# Patient Record
Sex: FEMALE | Race: WHITE | NOT HISPANIC OR LATINO | Employment: OTHER | URBAN - NONMETROPOLITAN AREA
[De-identification: names, ages, dates, MRNs, and addresses within clinical notes are randomized per-mention and may not be internally consistent; named-entity substitution may affect disease eponyms.]

---

## 2017-01-02 ENCOUNTER — COMMUNICATION - GICH (OUTPATIENT)
Dept: FAMILY MEDICINE | Facility: OTHER | Age: 67
End: 2017-01-02

## 2017-01-02 DIAGNOSIS — I10 ESSENTIAL (PRIMARY) HYPERTENSION: ICD-10-CM

## 2017-01-09 ENCOUNTER — COMMUNICATION - GICH (OUTPATIENT)
Dept: FAMILY MEDICINE | Facility: OTHER | Age: 67
End: 2017-01-09

## 2017-05-08 ENCOUNTER — HISTORY (OUTPATIENT)
Dept: FAMILY MEDICINE | Facility: OTHER | Age: 67
End: 2017-05-08

## 2017-05-08 ENCOUNTER — OFFICE VISIT - GICH (OUTPATIENT)
Dept: FAMILY MEDICINE | Facility: OTHER | Age: 67
End: 2017-05-08

## 2017-05-08 DIAGNOSIS — J01.01 ACUTE RECURRENT MAXILLARY SINUSITIS: ICD-10-CM

## 2017-05-11 ENCOUNTER — COMMUNICATION - GICH (OUTPATIENT)
Dept: FAMILY MEDICINE | Facility: OTHER | Age: 67
End: 2017-05-11

## 2017-05-11 DIAGNOSIS — J01.01 ACUTE RECURRENT MAXILLARY SINUSITIS: ICD-10-CM

## 2017-05-16 ENCOUNTER — COMMUNICATION - GICH (OUTPATIENT)
Dept: FAMILY MEDICINE | Facility: OTHER | Age: 67
End: 2017-05-16

## 2017-05-16 DIAGNOSIS — J01.01 ACUTE RECURRENT MAXILLARY SINUSITIS: ICD-10-CM

## 2017-09-22 ENCOUNTER — HOSPITAL ENCOUNTER (OUTPATIENT)
Dept: RADIOLOGY | Facility: OTHER | Age: 67
End: 2017-09-22
Attending: FAMILY MEDICINE

## 2017-09-22 ENCOUNTER — HISTORY (OUTPATIENT)
Dept: RADIOLOGY | Facility: OTHER | Age: 67
End: 2017-09-22

## 2017-09-22 DIAGNOSIS — Z12.31 ENCOUNTER FOR SCREENING MAMMOGRAM FOR MALIGNANT NEOPLASM OF BREAST: ICD-10-CM

## 2017-10-25 ENCOUNTER — HISTORY (OUTPATIENT)
Dept: FAMILY MEDICINE | Facility: OTHER | Age: 67
End: 2017-10-25

## 2017-10-25 ENCOUNTER — OFFICE VISIT - GICH (OUTPATIENT)
Dept: FAMILY MEDICINE | Facility: OTHER | Age: 67
End: 2017-10-25

## 2017-10-25 DIAGNOSIS — Z23 ENCOUNTER FOR IMMUNIZATION: ICD-10-CM

## 2017-10-25 DIAGNOSIS — R30.0 DYSURIA: ICD-10-CM

## 2017-10-25 DIAGNOSIS — N30.00 ACUTE CYSTITIS WITHOUT HEMATURIA: ICD-10-CM

## 2017-10-25 LAB
BILIRUB UR QL: NEGATIVE
CLARITY, URINE: CLEAR CLARITY
COLOR UR: YELLOW COLOR
GLUCOSE URINE: NEGATIVE MG/DL
KETONES UR QL: NEGATIVE MG/DL
LEUKOCYTE ESTERASE URINE: NEGATIVE
NITRITE UR QL STRIP: NEGATIVE
OCCULT BLOOD,URINE - HISTORICAL: NEGATIVE
PH UR: 6.5 [PH]
PROTEIN QUALITATIVE,URINE - HISTORICAL: NEGATIVE MG/DL
SP GR UR STRIP: <=1.005
UROBILINOGEN,QUALITATIVE - HISTORICAL: NORMAL EU/DL

## 2017-10-25 ASSESSMENT — ANXIETY QUESTIONNAIRES
7. FEELING AFRAID AS IF SOMETHING AWFUL MIGHT HAPPEN: NOT AT ALL
2. NOT BEING ABLE TO STOP OR CONTROL WORRYING: NOT AT ALL
5. BEING SO RESTLESS THAT IT IS HARD TO SIT STILL: NOT AT ALL
GAD7 TOTAL SCORE: 5
1. FEELING NERVOUS, ANXIOUS, OR ON EDGE: SEVERAL DAYS
6. BECOMING EASILY ANNOYED OR IRRITABLE: MORE THAN HALF THE DAYS
3. WORRYING TOO MUCH ABOUT DIFFERENT THINGS: NOT AT ALL
4. TROUBLE RELAXING: MORE THAN HALF THE DAYS

## 2017-11-02 ENCOUNTER — COMMUNICATION - GICH (OUTPATIENT)
Dept: FAMILY MEDICINE | Facility: OTHER | Age: 67
End: 2017-11-02

## 2017-11-02 DIAGNOSIS — N30.00 ACUTE CYSTITIS WITHOUT HEMATURIA: ICD-10-CM

## 2017-11-07 ENCOUNTER — AMBULATORY - GICH (OUTPATIENT)
Dept: RADIOLOGY | Facility: OTHER | Age: 67
End: 2017-11-07

## 2017-11-07 ENCOUNTER — HOSPITAL ENCOUNTER (OUTPATIENT)
Dept: RADIOLOGY | Facility: OTHER | Age: 67
End: 2017-11-07

## 2017-11-07 DIAGNOSIS — R52 PAIN: ICD-10-CM

## 2017-11-22 ENCOUNTER — AMBULATORY - GICH (OUTPATIENT)
Dept: SCHEDULING | Facility: OTHER | Age: 67
End: 2017-11-22

## 2017-12-06 ENCOUNTER — COMMUNICATION - GICH (OUTPATIENT)
Dept: FAMILY MEDICINE | Facility: OTHER | Age: 67
End: 2017-12-06

## 2017-12-06 DIAGNOSIS — N95.1 FEMALE CLIMACTERIC STATE: ICD-10-CM

## 2017-12-08 ENCOUNTER — AMBULATORY - GICH (OUTPATIENT)
Dept: SCHEDULING | Facility: OTHER | Age: 67
End: 2017-12-08

## 2017-12-20 ENCOUNTER — OFFICE VISIT - GICH (OUTPATIENT)
Dept: FAMILY MEDICINE | Facility: OTHER | Age: 67
End: 2017-12-20

## 2017-12-20 ENCOUNTER — HISTORY (OUTPATIENT)
Dept: FAMILY MEDICINE | Facility: OTHER | Age: 67
End: 2017-12-20

## 2017-12-20 DIAGNOSIS — M25.511 PAIN IN RIGHT SHOULDER: ICD-10-CM

## 2017-12-20 DIAGNOSIS — G89.29 OTHER CHRONIC PAIN: ICD-10-CM

## 2017-12-20 DIAGNOSIS — S43.431D SUPERIOR GLENOID LABRUM LESION OF RIGHT SHOULDER, SUBSEQUENT ENCOUNTER: ICD-10-CM

## 2017-12-20 DIAGNOSIS — K21.9 GASTRO-ESOPHAGEAL REFLUX DISEASE WITHOUT ESOPHAGITIS: ICD-10-CM

## 2017-12-20 DIAGNOSIS — M75.21 BICIPITAL TENDINITIS OF RIGHT SHOULDER: ICD-10-CM

## 2017-12-20 DIAGNOSIS — G47.00 INSOMNIA: ICD-10-CM

## 2017-12-20 DIAGNOSIS — Z01.818 ENCOUNTER FOR OTHER PREPROCEDURAL EXAMINATION: ICD-10-CM

## 2017-12-20 LAB
A/G RATIO - HISTORICAL: 1.7 (ref 1–2)
ABSOLUTE BASOPHILS - HISTORICAL: 0 THOU/CU MM
ABSOLUTE EOSINOPHILS - HISTORICAL: 0.1 THOU/CU MM
ABSOLUTE IMMATURE GRANULOCYTES(METAS,MYELOS,PROS) - HISTORICAL: 0 THOU/CU MM
ABSOLUTE LYMPHOCYTES - HISTORICAL: 1.8 THOU/CU MM (ref 0.9–2.9)
ABSOLUTE MONOCYTES - HISTORICAL: 0.4 THOU/CU MM
ABSOLUTE NEUTROPHILS - HISTORICAL: 4 THOU/CU MM (ref 1.7–7)
ALBUMIN SERPL-MCNC: 4.1 G/DL (ref 3.5–5.7)
ALP SERPL-CCNC: 54 IU/L (ref 34–104)
ALT (SGPT) - HISTORICAL: 18 IU/L (ref 7–52)
ANION GAP - HISTORICAL: 10 (ref 5–18)
AST SERPL-CCNC: 20 IU/L (ref 13–39)
BASOPHILS # BLD AUTO: 0.3 %
BILIRUB SERPL-MCNC: 0.3 MG/DL (ref 0.3–1)
BUN SERPL-MCNC: 21 MG/DL (ref 7–25)
BUN/CREAT RATIO - HISTORICAL: 23
CALCIUM SERPL-MCNC: 9 MG/DL (ref 8.6–10.3)
CHLORIDE SERPLBLD-SCNC: 105 MMOL/L (ref 98–107)
CHOL/HDL RATIO - HISTORICAL: 3.1
CHOLESTEROL TOTAL: 242 MG/DL
CO2 SERPL-SCNC: 23 MMOL/L (ref 21–31)
CREAT SERPL-MCNC: 0.91 MG/DL (ref 0.7–1.3)
EOSINOPHIL NFR BLD AUTO: 1.4 %
ERYTHROCYTE [DISTWIDTH] IN BLOOD BY AUTOMATED COUNT: 12.2 % (ref 11.5–15.5)
GFR IF NOT AFRICAN AMERICAN - HISTORICAL: >60 ML/MIN/1.73M2
GLOBULIN - HISTORICAL: 2.4 G/DL (ref 2–3.7)
GLUCOSE SERPL-MCNC: 89 MG/DL (ref 70–105)
HCT VFR BLD AUTO: 39.9 % (ref 33–51)
HDLC SERPL-MCNC: 78 MG/DL (ref 23–92)
HEMOGLOBIN: 13.9 G/DL (ref 12–16)
IMMATURE GRANULOCYTES(METAS,MYELOS,PROS) - HISTORICAL: 0.3 %
LDLC SERPL CALC-MCNC: 146 MG/DL
LYMPHOCYTES NFR BLD AUTO: 28.8 % (ref 20–44)
MCH RBC QN AUTO: 31.6 PG (ref 26–34)
MCHC RBC AUTO-ENTMCNC: 34.8 G/DL (ref 32–36)
MCV RBC AUTO: 91 FL (ref 80–100)
MONOCYTES NFR BLD AUTO: 5.5 %
NEUTROPHILS NFR BLD AUTO: 63.7 % (ref 42–72)
NON-HDL CHOLESTEROL - HISTORICAL: 164 MG/DL
PLATELET # BLD AUTO: 192 THOU/CU MM (ref 140–440)
PMV BLD: 10 FL (ref 6.5–11)
POTASSIUM SERPL-SCNC: 3.9 MMOL/L (ref 3.5–5.1)
PROT SERPL-MCNC: 6.5 G/DL (ref 6.4–8.9)
PROVIDER ORDERDED STATUS - HISTORICAL: ABNORMAL
RED BLOOD COUNT - HISTORICAL: 4.4 MIL/CU MM (ref 4–5.2)
SODIUM SERPL-SCNC: 138 MMOL/L (ref 133–143)
TRIGL SERPL-MCNC: 91 MG/DL
WHITE BLOOD COUNT - HISTORICAL: 6.3 THOU/CU MM (ref 4.5–11)

## 2017-12-27 NOTE — PROGRESS NOTES
"Patient Information     Patient Name MRN Sex     Monisha Velez [id number] Female 1950      Progress Notes by Tracey Eastman MD at 10/25/2017  9:45 AM     Author:  Tracey Eastman MD Service:  (none) Author Type:  Physician     Filed:  10/25/2017  8:03 PM Encounter Date:  10/25/2017 Status:  Signed     :  Tracey Eastman MD (Physician)            Nursing Notes:   Aminta Hardin  10/25/2017 10:58 AM  Signed  Patient presents in the clinic with a urinary problem. Patient states she has urning, pain during urination, and urinary frequency. Patient also has concerns regarding pain on the right side, with no injury. Patient also has concerns regarding tingling in hands and feet.  Aminta Hardin LPN............................ 10/25/2017 9:51 AM    SUBJECTIVE: Monisha Velez is a 67 y.o. female who complains of urinary frequency, urgency and dysuria x 2 days, without flank pain, fever, chills, or abnormal vaginal discharge or bleeding.       /70  Pulse 60  Temp 97  F (36.1  C) (Tympanic)   Ht 1.619 m (5' 3.75\")  Wt 74 kg (163 lb 3.2 oz)  Breastfeeding? No  BMI 28.23 kg/m2    OBJECTIVE: Appears well, in no apparent distress.   The abdomen is soft with tenderness on bladder, guarding, mass, rebound or organomegaly. No CVA tenderness or inguinal adenopathy noted. Urine dipstick shows     Results for orders placed or performed in visit on 10/25/17      URINALYSIS W REFLEX MICROSCOPIC IF POSITIVE      Result  Value Ref Range    COLOR                     Yellow Yellow Color    CLARITY                   Clear Clear Clarity    SPECIFIC GRAVITY,URINE    <=1.005 (A) 1.010, 1.015, 1.020, 1.025                    PH,URINE                  6.5 6.0, 7.0, 8.0, 5.5, 6.5, 7.5, 8.5                    UROBILINOGEN,QUALITATIVE  Normal Normal EU/dl    PROTEIN, URINE Negative Negative mg/dL    GLUCOSE, URINE Negative Negative mg/dL    KETONES,URINE             Negative Negative " mg/dL    BILIRUBIN,URINE           Negative Negative                    OCCULT BLOOD,URINE        Negative Negative                    NITRITE                   Negative Negative                    LEUKOCYTE ESTERASE        Negative Negative                         ASSESSMENT: UTI uncomplicated without evidence of pyelonephritis    PLAN:  - Treatment per orders - also push fluids, may use Pyridium OTC prn. Call or return to clinic prn if these symptoms worsen or fail to improve as anticipated.    1.  Start macrobid ;  One tablet twice a day for 7 days for urinary tract infection  2. Urine will be cultured  3. Recommend Aspercreme with lidocaine, to  areas of muscle soreness in back neck  4.  Low back pain exercises  5.  ICE is best anti- inflammation.   Recommend as tolerated.  Follow up worsening or persistent symptoms

## 2017-12-28 NOTE — TELEPHONE ENCOUNTER
Patient Information     Patient Name MRN Sex Monisha Barnes [id number] Female 1950      Telephone Encounter by Zaira Taveras RN at 2017 11:27 AM     Author:  Zaira Taveras RN Service:  (none) Author Type:  NURS- Registered Nurse     Filed:  2017 11:29 AM Encounter Date:  2017 Status:  Signed     :  Zaira Taveras RN (NURS- Registered Nurse)            Limited treatment plan for 7 days.  Unable to complete prescription refill per RN Medication Refill Policy.................... Zaira Taveras RN ....................  2017   11:29 AM

## 2017-12-28 NOTE — PROGRESS NOTES
Patient Information     Patient Name MRN Sex Monisha Barnes [id number] Female 1950      Progress Notes by [facility] at 2017  1:32 PM     Author:  Rowena Almendarez R.T. ([facility]) Service:  (none) Author Type:  (none)     Filed:  2017  1:33 PM Date of Service:  2017  1:32 PM Status:  Signed     :  Rowena Almendarez R.T. (MARCIAL[facility]) ([facility] - Registered Radiologic Technologist)            Falls Risk Criteria:    Age 65 and older or under age 4        Sensory deficits    Poor vision    Use of ambulatory aides    Impaired judgment    Unable to walk independently    Meets High Risk criteria for falls:  Yes               1.  Do you have dizziness or vertigo?    no                    2.  Do you need help standing or walking?   no                 3.  Have you fallen within the last 6 months?    no           4.  Has the patient been fasting?      no       If any risks are marked Yes, the following interventions are utilized:    Do not leave patient unattended     Assist patient in the dressing room and bathroom    Have ambulatory aides available throughout procedure    Involve patient s family if available    \

## 2017-12-28 NOTE — PATIENT INSTRUCTIONS
Patient Information     Patient Name MRN Sex Monisha Barnes [id number] Female 1950      Patient Instructions by Tracey Eastman MD at 10/25/2017 10:45 AM     Author:  Tracey Eastman MD  Service:  (none) Author Type:  Physician     Filed:  10/25/2017 10:46 AM  Encounter Date:  10/25/2017 Status:  Addendum     :  Tracey Eastman MD (Physician)        Related Notes: Original Note by Tracey Eastman MD (Physician) filed at 10/25/2017 10:45 AM             1.  Start macrobid ;  One tablet twice a day for 7 days for urinary tract infection  2. Urine will be cultured  3. Recommend Aspercreme with lidocaine, to  areas of muscle soreness in back neck  4.  Low back pain exercises  5.  ICE is best anti- inflammation.   Recommend as tolerated.  Follow up worsening or persistent symptoms  Index Faroese   Low Back Pain Exercises   Exercises that stretch and strengthen the muscles of your abdomen and spine can help prevent back problems. Strong back and abdominal muscles help you keep good posture, with your spine in its correct position.  If your muscles are tight, take a warm shower or bath before doing the exercises. Exercise on a rug or mat. Wear loose clothing. Don t wear shoes. Stop doing any exercise that causes pain until you have talked with your healthcare provider.  Ask your provider or physical therapist to help you develop an exercise program. Ask your provider how many times a week you need to do the exercises. Remember to start slowly.   Exercises  These exercises are intended only as suggestions. Be sure to check with your provider before starting the exercises.     Abdominal drawing-in maneuver: Lie on your back with your knees bent and your feet flat on the floor. Try to pull your belly button in towards your spine. Hold this position for 15 seconds and then relax. Repeat 5 to 10 times.    Cat and camel: Get down on your hands and knees. Let your stomach  sag, allowing your back to curve downward. Hold this position for 5 seconds. Then arch your back and hold for 5 seconds. Do 2 sets of 15.    Quadruped arm and leg raise: Get down on your hands and knees. Pull in your belly button and tighten your abdominal muscles to stiffen your spine. While keeping your abdominals tight, raise one arm and the opposite leg away from you. Hold this position for 5 seconds. Lower your arm and leg slowly and change sides. Do this 10 times on each side.    Pelvic tilt: Lie on your back with your knees bent and your feet flat on the floor. Pull your belly button in towards your spine and push your lower back into the floor, flattening your back. Hold this position for 15 seconds, then relax. Repeat 5 to 10 times.    Partial curl: Lie on your back with your knees bent and your feet flat on the floor. Draw in your abdomen and tighten your stomach muscles. With your hands stretched out in front of you, curl your upper body forward until your shoulders clear the floor. Hold this position for 3 seconds. Don't hold your breath. It helps to breathe out as you lift your shoulders. Relax back to the floor. Repeat 10 times. Build to 2 sets of 15. To challenge yourself, clasp your hands behind your head and keep your elbows out to your sides.    Gluteal stretch: Lie on your back with both knees bent. Rest your right ankle over the knee of your left leg. Grasp the thigh of the left leg and pull toward your chest. You will feel a stretch along the buttocks and possibly along the outside of your hip. Hold the stretch for 15 to 30 seconds. Then repeat the exercise with your left ankle over your right knee. Do the exercise 3 times with each leg.    Extension exercise  1. Lie face down on the floor for 5 minutes. If this hurts too much, lie face down with a pillow under your stomach. This should relieve your leg or back pain. When you can lie on your stomach for 5 minutes without a pillow, you can  continue with Part B of this exercise.  2. After lying on your stomach for 5 minutes, prop yourself up on your elbows for another 5 minutes. If you can do this without having more leg or buttock pain, you can start doing part C of this exercise.  3. Lie on your stomach with your hands under your shoulders. Then press down on your hands and extend your elbows while keeping your hips flat on the floor. Hold for 1 second and lower yourself to the floor. Do 3 to 5 sets of 10 repetitions. Rest for 1 minute between sets. You should have no pain in your legs when you do this, but it is normal to feel some pain in your lower back.  Do this exercise several times a day.    Side plank: Lie on your side with your legs, hips, and shoulders in a straight line. Prop yourself up onto your forearm with your elbow directly under your shoulder. Lift your hips off the floor and balance on your forearm and the outside of your foot. Try to hold this position for 15 seconds and then slowly lower your hip to the ground. Switch sides and repeat. Work up to holding for 1 minute. This exercise can be made easier by starting with your knees and hips flexed toward your chest.    Prone plank: Lie on your stomach on the floor with your elbows bent and your forearms resting on the floor. Lift your hips and knees off the floor and try to stay in this position while keeping your back flat. Work up to holding this position for at least 1 minute. Do 3 sets.  Exercises to avoid   It s best to avoid the following exercises because they strain the lower back:    Exercises in which you lie on your back and raise and lower both legs together    Full sit-ups or sit-ups with straight legs    Hip twists    Squats with weights  Sports and other activities   In addition to strengthening your back muscles, it s helpful to keep your entire body in shape. Good activities for people with back problems include:    Walking    Bicycling    Swimming    Cross-country  skiing    Yoga    Tomás Chi    Pilates  Some sports can hurt your back because of rough contact, twisting, sudden impact, or direct stress on your back. Sports that may be dangerous to your back include:    Basketball    Football    Soccer    Volleyball    Handball    Golf    Weight lifting    Trampoline    Tobogganing    Sledding    Snowmobiling    Snowboarding    Ice hockey  Developed by [employer].  Adult Advisor 2016.3 published by [employer].  Last modified: 2016-05-26  Last reviewed: 2016-05-18  This content is reviewed periodically and is subject to change as new health information becomes available. The information is intended to inform and educate and is not a replacement for medical evaluation, advice, diagnosis or treatment by a healthcare professional.  References   Adult Advisor 2016.3 Index    Copyright   2016 [employer], a division of [employer] All rights reserved.          Index Thai   Bladder Infection: Brief Version   ________________________________________________________________________  KEY POINTS    The bladder is the part of your body that stores urine. When bacteria get into the bladder, it can get infected.    Your provider will give you antibiotics to treat the infection.    Drink lots of water and take your medicine for as long as your healthcare provider prescribes, even when you feel better.  ________________________________________________________________________  What is a bladder infection?   The bladder is the part of your body that stores urine. When bacteria get into the bladder, it can get infected.  What is the cause?  A bladder infection happens when bacteria from the skin get into the bladder.    Bacteria can spread to the bladder from the rectal area or vagina.    Sometimes the bladder gets infected when something is blocking the flow of urine. For example, in men the problem can be caused by an enlarged prostate gland. In pregnant women pressure from the  baby might cause the problem.  Women get bladder infections more often than men.  What are the symptoms?     You may feel the need to urinate a lot.    You may feel a burning or stinging when you urinate.    You may have cramps in your lower belly or back.    Your urine may be cloudy and smell bad.    Your urine may look pink or red.    You may have a fever or chills.  How is it treated?   If tests by your healthcare provider show that you have a bladder infection, your provider will prescribe antibiotics. You may also need pain medicine.  How can I take care of myself?     Take the antibiotic medicine for as long as your healthcare provider prescribes, even when you feel better.    Drink more water than you usually do.    Make sure you know when you should come back for a checkup.    Call your provider if your symptoms aren t better in 2 days, or if you have worse fever or pain.  How can I help prevent bladder infection?   Urinate often during the day. You should also urinate after you have sex.  If you are a woman, it is important to:     Keep the area around your vagina clean.    Wipe from front to back after you go to the bathroom.    Gently wash the area around your vagina when you bathe or shower.    Wear cotton underwear and use pantyhose with cotton crotches.    Avoid tight clothing. Wear loose pants.    Take wet bathing suits off right away.  Talk to your healthcare provider if you have bladder infections often. You may need tests to find out why. Your provider may prescribe medicine that helps prevent bladder infections.  If you are a man, remember to:    Always wash your penis when you bathe or shower. If you are not circumcised, gently pull back the foreskin and wash the tip of the penis when you bathe.  Developed by [employer].  Adult Advisor 2016.3 published by [employer].  Last modified: 2015-04-29  Last reviewed: 2015-04-28  This content is reviewed periodically and is subject to change as new  health information becomes available. The information is intended to inform and educate and is not a replacement for medical evaluation, advice, diagnosis or treatment by a healthcare professional.  References   Adult Advisor 2016.3 Index    Copyright   2016 [employer], a division of [employer] All rights reserved.

## 2017-12-30 NOTE — NURSING NOTE
Patient Information     Patient Name MRN Monisha Samayoa [id number] Female 1950      Nursing Note by Aminta Hardin at 10/25/2017  9:45 AM     Author:  Aminta Hardin Service:  (none) Author Type:  (none)     Filed:  10/25/2017 10:58 AM Encounter Date:  10/25/2017 Status:  Signed     :  Aminta Hardin            Patient presents in the clinic with a urinary problem. Patient states she has urning, pain during urination, and urinary frequency. Patient also has concerns regarding pain on the right side, with no injury. Patient also has concerns regarding tingling in hands and feet.  Aminta Hardin LPN............................ 10/25/2017 9:51 AM

## 2018-01-02 NOTE — TELEPHONE ENCOUNTER
Patient Information     Patient Name MRN Monisha Samayoa [id number] Female 1950      Telephone Encounter by Yesi Garcia RN at 2017  2:20 PM     Author:  Yesi Garcia RN Service:  (none) Author Type:  (none)     Filed:  2017  2:23 PM Encounter Date:  2017 Status:  Signed     :  Yesi Garcia RN (NURS- Registered Nurse)            Hormones    Office visit in the past 12 months or per provider note.    Last visit with MANJU CHANDRA was on: 2016 in [facility]  Next visit with MANJU CHANDRA is on: No future appointment listed with this provider  Next visit with Family Practice is on: No future appointment listed in this department    Max refill for 12 months from last office visit or per provider note.    Refill request refused - given #90 R-3 11/10/16.    Unable to complete prescription refill per RN Medication Refill Policy.................... Yesi Garcia ....................  2017   2:22 PM

## 2018-01-02 NOTE — TELEPHONE ENCOUNTER
Patient Information     Patient Name MRN Sex Monisha Barnes [id number] Female 1950      Telephone Encounter by Bridget Mcdonald RN at 2017  1:42 PM     Author:  Bridget Mcdonald RN Service:  (none) Author Type:  NURS- Registered Nurse     Filed:  2017  1:47 PM Encounter Date:  2017 Status:  Signed     :  Bridget Mcdonald RN (NURS- Registered Nurse)            Diuretics (may be prescribed for edema)    Office visit in the past 12 months or per provider note.    Last visit with MANJU CHANDRA was on: 2016 in [facility]  Next visit with MANJU CHANDRA is on: No future appointment listed with this provider  Next visit with Family Practice is on: No future appointment listed in this department    Lab testing requirements:  Creatinine and Potassium annually, if ordering lab, order BMP.  CREATININE (mg/dL)    Date Value   11/10/2016 1.12     POTASSIUM (mmol/L)    Date Value   2016 3.5     BP Readings from Last 4 Encounters:    16 132/80   16 160/84   11/10/16 120/70   10/24/16 124/84         Review last provider visit note.  If BP reviewed and plan is noted, can refill.  Max refill for 12 months from last office visit or per provider note.

## 2018-01-04 NOTE — NURSING NOTE
Patient Information     Patient Name MRN Sex Monisha Barnes [id number] Female 1950      Nursing Note by Nora Gomez at 2017 10:30 AM     Author:  Nora Gomez Service:  (none) Author Type:  (none)     Filed:  2017 10:39 AM Encounter Date:  2017 Status:  Signed     :  Nora Gomez            Has chronic sinus, has a bad, cough is clear, times four days. Headache is bad  Nora Gomez ....................  2017   10:33 AM

## 2018-01-04 NOTE — PROGRESS NOTES
Patient Information     Patient Name MRN Sex Monisha Barnes [id number] Female 1950      Progress Notes by Bo Piper MD at 2017 10:30 AM     Author:  Bo Piper MD Service:  (none) Author Type:  Physician     Filed:  2017 10:48 AM Encounter Date:  2017 Status:  Signed     :  Bo Piper MD (Physician)            SUBJECTIVE:    Monisha Velez is a 67 y.o. female who presents for cough and headache      HPI    Has been up at night coughing.  Started with nasal congestion, which is improving, but now has a deep dry hacking cough.  Shortness of breath at times. Has felt feverish on and off.  Headache with all of this.  She senses this is all form post nasal drip, but had pneumonia last fall and is a bit worried it is back.  Lots of sinus pressure in her entire face.  Using nasal saline, not working much at all.    Allergies      Allergen   Reactions     Effexor [Venlafaxine Analogues]  Hives     'bad reaction       Pcn [Penicillins]  Hives     Sulfa (Sulfonamide Antibiotics)  Hives   ,   Current Outpatient Prescriptions on File Prior to Visit       Medication  Sig Dispense Refill     aspirin enteric coated 81 mg tablet Take 1 tablet by mouth once daily with a meal.  0     estradiol (ESTRACE) 0.5 mg tablet Take 1 tablet by mouth once daily. 90 tablet 3     FLUoxetine (PROZAC) 10 mg tablet Take 1 tablet by mouth every morning. 30 tablet 2     fluticasone (50 mcg per actuation) nasal solution (FLONASE) Inhale 2 Sprays into both nostrils at bedtime. 1 Bottle 3     hydroCHLOROthiazide 12.5 mg tablet TAKE 1 TABLET BY MOUTH BY MOUTH EVERY DAY 90 tablet 3     hydroCHLOROthiazide 12.5 mg tablet Take 1 tablet by mouth once daily. 90 tablet 3     Melatonin 5 mg Tab Take 2 tablets at bedtime for sleep.  0     mometasone-formoterol (DULERA) 200-5 mcg/actuation inhaler Inhale 2 Puffs by mouth 2 times daily. 1 Inhaler 0     multivitamin (MVI) tablet Take 1 tablet by mouth once daily.  0      No current facility-administered medications on file prior to visit.    ,   Past Medical History:     Diagnosis  Date     ADNEXAL MASS, RIGHT 8/22/2011     CONSTIPATION 7/29/2011     Dermatophytosis of the body 11/19/2010     H/O diplopia     Episode of mild diplopia secondary to sinusitis, ophthalmology consult 07/2008.      Hx of colonoscopy 11/19/03     Influenza vaccine needed     flu shot 2011, pneumovax 2011, zostavax 4/20/12      MENOPAUSE-RELATED VASOMOTOR SYMPTOMS      NUMMULAR ECZEMA 12/16/2009     URINARY RETENTION 8/22/2011     VAGINITIS, ATROPHIC, POSTMENOPAUSAL 7/29/2011    and   Past Surgical History:      Procedure  Laterality Date     COLONOSCOPY DIAGNOSTIC  3/18/16    F/U       COLONOSCOPY SCREENING  10/07          Colonoscopy, done in [place], normal.       CYSTOSCOPY  1/2013    [facility]       DENTAL SURGERY  2005           Dental surgery, left jaw, Dr. Olmos.       ESOPHAGOGASTRODUODENOSCOPY  3/18/16    EGD       pelvic surgery 9/27/11      laproscopy, cystectomy and lysis of adhesions 9/27/11 by Dr. Gay Mcgovern  at [facility]         SALPINGO-OOPHORECTOMY  10/07          Laparoscopy bilateral salpingo-oophorectomy for bilateral ovarian serous cystadenofibromas, benign.       SINUS SURGERY  1993            VAGINAL HYSTERECTOMY  1990        REVIEW OF SYSTEMS:  Review of Systems   Constitutional: Positive for fever.   HENT: Positive for congestion.    Respiratory: Positive for cough and shortness of breath.    Neurological: Positive for headaches.       OBJECTIVE:  /62  Resp 16  Wt 72.3 kg (159 lb 6.4 oz)  BMI 27.58 kg/m2    EXAM:   Physical Exam   Constitutional: She is well-developed, well-nourished, and in no distress.   HENT:   Head: Normocephalic and atraumatic.   Right Ear: External ear normal.   Left Ear: External ear normal.   Moderate posterior pharyngeal cobblestoning    Pulmonary/Chest: No respiratory distress. She has no wheezes. She has no  rales.   Cough with deep inspiration, otherwise clear to auscultation        ASSESSMENT/PLAN:    ICD-10-CM    1. Acute recurrent maxillary sinusitis J01.01 azithromycin (ZITHROMAX) 250 mg tablet      benzonatate (TESSALON PERLES) 100 mg capsule        Plan:  Her exam is relatively reassuring.  She really wants to try an antibiotic and a cough suppressant.  I want her to try aggressive nasal saline first, then if not working, can start the azithromycin.    Bo Piper MD ....................  5/8/2017   10:47 AM

## 2018-01-05 NOTE — TELEPHONE ENCOUNTER
Patient Information     Patient Name MRN Sex Monisha Barnes [id number] Female 1950      Telephone Encounter by Yesi Garcia RN at 2017  8:11 AM     Author:  Yesi Garcia RN Service:  (none) Author Type:  (none)     Filed:  2017  8:15 AM Encounter Date:  2017 Status:  Signed     :  Yesi Garcia RN (NURS- Registered Nurse)            This is a Refill request from: [facility]  Name of Medication: benzonatate  Quantity requested: #30  Last fill date: 2017  Last visit with WM HARRIS was on: 2017 in [facility]  PCP:  Tracey Eastman MD      Refused - was meant as short term medication. Must be seen if not better.     Unable to complete prescription refill per RN Medication Refill Policy.................... Yesi Garcia ....................  2017   8:12 AM

## 2018-01-26 VITALS
WEIGHT: 163.2 LBS | DIASTOLIC BLOOD PRESSURE: 70 MMHG | HEART RATE: 60 BPM | TEMPERATURE: 97 F | BODY MASS INDEX: 27.86 KG/M2 | HEIGHT: 64 IN | SYSTOLIC BLOOD PRESSURE: 122 MMHG

## 2018-01-26 VITALS
BODY MASS INDEX: 27.36 KG/M2 | WEIGHT: 159.4 LBS | RESPIRATION RATE: 16 BRPM | SYSTOLIC BLOOD PRESSURE: 124 MMHG | DIASTOLIC BLOOD PRESSURE: 62 MMHG

## 2018-01-28 ASSESSMENT — ANXIETY QUESTIONNAIRES: GAD7 TOTAL SCORE: 5

## 2018-02-09 VITALS
HEIGHT: 64 IN | HEART RATE: 62 BPM | DIASTOLIC BLOOD PRESSURE: 82 MMHG | SYSTOLIC BLOOD PRESSURE: 138 MMHG | BODY MASS INDEX: 27.83 KG/M2 | WEIGHT: 163 LBS

## 2018-02-12 NOTE — TELEPHONE ENCOUNTER
Patient Information     Patient Name MRN Sex Monisha Barnes [id number] Female 1950      Telephone Encounter by Zaira Taveras RN at 2017  2:56 PM     Author:  Zaira Taveras RN Service:  (none) Author Type:  NURS- Registered Nurse     Filed:  2017  3:07 PM Encounter Date:  2017 Status:  Signed     :  Zaira Taveras RN (NURS- Registered Nurse)            Hormones    Office visit in the past 12 months or per provider note.    Last visit with MANJU CHANDRA was on: 10/25/2017 in [facility]  Next visit with MANJU CHANDRA is on: No future appointment listed with this provider  Next visit with Family Practice is on: No future appointment listed in this department    Max refill for 12 months from last office visit or per provider note.    Patient is due for medication management appointment. Limited refill provided at this time. [employer] message and/or letter sent for reminder to patient. Prescription refilled per RN Medication Refill Policy.................... Zaira Taveras RN ....................  2017   3:06 PM

## 2018-02-12 NOTE — PROGRESS NOTES
Patient Information     Patient Name MRN Sex     Monisha Velez [id number] Female 1950      Progress Notes by Tea Garvin MD at 2017  2:01 PM     Author:  Tea Garvin MD Service:  (none) Author Type:  Physician     Filed:  2017  3:14 PM Encounter Date:  2017 Status:  Signed     :  Tea Garvin MD (Physician)              PREOPERATIVE CLEARANCE  Date of Exam: 2017    Nursing Notes:   Lien Hammond  2017  1:53 PM  Unsigned  This patient presents today for a Preoperative exam for this procedure: Right shoulder arthroscopy and debridement   Date of Surgery: 17   Surgeon:  Dr. John  Facility:  [facility]  Fax:  [phone]  Kalyn Hammond LPN ...... 2017 1:53 PM             HPI:  Monisha Velez is a 67 y.o. Female here for preoperative clearance for above procedure. She has been experiencing right shoulder pain for about 6 months that has been nonresponsive to chiropractic care, therapy and massage. She is tried lidocaine patches without relief. She recalls an injury where she reached up to try to catch a log that was falling off a pile and thinks that's when she actually hurt it although progressively has gotten worse. Recent MRI confirms need for surgery. That report is available from [facility] today.     Problem List:   Patient Active Problem List     Diagnosis  Code     RESTLESS LEG SYNDROME G25.81     INSOMNIA, CHRONIC G47.00     Rosacea L71.9     ANXIETY F41.1     H/O colonoscopy Z98.890     Family history of malignant melanoma of skin Z80.8     Gastroesophageal reflux disease without esophagitis K21.9     H/O adenomatous polyp of colon Z86.010     Fundic gastritis K29.50     Reactive airway disease that is not asthma R09.89      Histories:  Past Medical History:     Diagnosis  Date     ADNEXAL MASS, RIGHT 2011     CONSTIPATION 2011     Dermatophytosis of the body 2010     H/O diplopia      Episode of mild diplopia secondary to sinusitis, ophthalmology consult 07/2008.      Hx of colonoscopy 11/19/03     Influenza vaccine needed     flu shot 2011, pneumovax 2011, zostavax 4/20/12      MENOPAUSE-RELATED VASOMOTOR SYMPTOMS      NUMMULAR ECZEMA 12/16/2009     URINARY RETENTION 8/22/2011     VAGINITIS, ATROPHIC, POSTMENOPAUSAL 7/29/2011      Past Surgical History:      Procedure  Laterality Date     COLONOSCOPY DIAGNOSTIC  3/18/16    F/U       COLONOSCOPY SCREENING  10/07          Colonoscopy, done in [place], normal.       CYSTOSCOPY  1/2013    [facility]       DENTAL SURGERY  2005           Dental surgery, left jaw, Dr. Olmos.       ESOPHAGOGASTRODUODENOSCOPY  3/18/16    EGD       pelvic surgery 9/27/11      laproscopy, cystectomy and lysis of adhesions 9/27/11 by Dr. Gay Mcgovern  at [facility]         SALPINGO-OOPHORECTOMY  10/07          Laparoscopy bilateral salpingo-oophorectomy for bilateral ovarian serous cystadenofibromas, benign.       SINUS SURGERY  1993            VAGINAL HYSTERECTOMY  1990      Social History     Social History        Marital status:       Spouse name: N/A     Number of children:  N/A     Years of education:  N/A     Occupational History      Not on file.     Social History Main Topics        Smoking status:  Former Smoker     Packs/day: 0.50     Quit date: 8/1/1972     Smokeless tobacco:  Never Used     Alcohol use  No     Drug use:  No     Sexual activity:  Not on file     Other Topics   Concern      Service  No     Blood Transfusions  No     Caffeine Concern  No     Occupational Exposure  No     Hobby Hazards  No     Sleep Concern  Yes     melatonin helps      Stress Concern  Yes     Weight Concern  No     Special Diet  No     Back Care  No     Exercise  No     Bike Helmet  Not Asked     n/a      Seat Belt  Yes     100%      Self-Exams  Yes     Social History Narrative     No tobacco.  [employer]    The patient is retired.  She  and her  are building a home, moved to the area in the spring of 2005 from [place].  Previously worked for [employer] ;   Her grand-daughter is living with her.     Patient is a former smoker.     Alcohol Use - yes    Drug Use - no    HIV/High Risk - no    Regular Exercise - yes        <Preloaded 12      Obstetric History       T2      L2     SAB0   TAB0   Ectopic0   Multiple0   Live Births0      Family History       Problem   Relation Age of Onset     Asthma  Mother      Asthma, diabetes mellitus type 2 , hypertension       Hypertension  Father      Hypertension, melanoma in his 80's       Cancer  Sister      ? Ovarian cancer       Other  Daughter      pleurisy, chronic       Thyroid Disease  Sister      thyroid problems       Stroke  No Family History      Cancer-breast  No Family History       Allergies: Effexor [venlafaxine analogues]; Pcn [penicillins]; and Sulfa (sulfonamide antibiotics)   Latex Allergy: no    Current Medications:  Current Outpatient Rx       Medication  Sig Dispense Refill     aspirin enteric coated 81 mg tablet Take 1 tablet by mouth once daily with a meal.  0     estradiol (ESTRACE) 0.5 mg tablet TAKE 1 TABLET BY MOUTH ONCE DAILY. 90 tablet 0     Melatonin 5 mg Tab Take 2 tablets at bedtime for sleep.  0     multivitamin (MVI) tablet Take 1 tablet by mouth once daily.  0     Medications have been reviewed by me and are current to the best of my knowledge and ability.    Recent use of: no recent use of aspirin (ASA), NSAIDS or steroids    HABITS:   Social History      Substance Use Topics        Smoking status:  Former Smoker     Packs/day: 0.50     Quit date: 1972     Smokeless tobacco:  Never Used     Alcohol use  No   Tetanus up to date yes    Proposed anesthesia: per surgeon and anesthesia  Anesthesia Complications: None  History of abnormal bleeding : None  History of Blood Transfusions: No    Health Care Directive or Living Will: no    REVIEW OF  "SYSTEMS:  A comprehensive review of systems was negative except for items noted in HPI/Subjective.    Preoperative Evaluation: Obstructive Sleep Apnea screening    S: Snore -  Do you snore loudly? (louder than talking or loud enough to be heard through closed doors)(YES)  T: Tired - Do you often feel tired, fatigued, or sleepy during the daytime?(NO)  O: Observed - Has anyone ever observed you stop breathing during your sleep?(NO)  P: Pressure - Do you have or are you being treated for high blood pressure?(NO)  B: BMI - BMI greater than 35kg/m2?(NO)  A: Age - Age over 50 years old?(YES)  N: Neck - Neck circumference greater than 40 cm?(NO)  G: Gender - Gender: Male?(NO)    Total number of \"YES\" responses:  2    Scoring: Low risk of FELIZ 0-2  At Risk of FELIZ: >3 High Risk of FELIZ: 5-8          EXAM:   /82 (Cuff Site: Right Arm, Position: Sitting, Cuff Size: Adult Regular)  Pulse 62  Ht 1.62 m (5' 3.78\")  Wt 73.9 kg (163 lb)  BMI 28.17 kg/m2 Body mass index is 28.17 kg/(m^2).  General Appearance: Pleasant, alert, appropriate appearance for age. No acute distress  Ear Exam: Normal TM's bilaterally. Normal auditory canals and external ears. Non-tender.  Dentition normal and oropharynx unremarkable. No dentures  Neck Exam: Supple, no masses or nodes.  Chest/Respiratory Exam: Normal chest wall and respirations. Clear to auscultation.  Cardiovascular Exam: Regular rate and rhythm. S1, S2, no murmur, click, gallop, or rubs.  Skin: Normal. and no rash or abnormalities  Psychiatric Exam: Alert and oriented, appropriate affect.    DIAGNOSTICS:   1. EKG: EKG FINDINGS -   Notes Recorded by Joselo Webb MD on 2/1/2016 at 3:49 PM  EKG Interpretation:  Normal axis and intervals.  Sinus bradycardia  Rate 57  Otherwise Normal EKG   Compared to previous EKG of 5/23/2007, there are no significant changes.  Joselo Webb MD  2. CXR: Not indicated  3. Labs:   Results for orders placed or performed in visit on 12/20/17    "   COMPLETE METABOLIC PANEL      Result  Value Ref Range    SODIUM 138 133 - 143 mmol/L    POTASSIUM 3.9 3.5 - 5.1 mmol/L    CHLORIDE 105 98 - 107 mmol/L    CO2,TOTAL 23 21 - 31 mmol/L    ANION GAP 10 5 - 18                    GLUCOSE 89 70 - 105 mg/dL    CALCIUM 9.0 8.6 - 10.3 mg/dL    BUN 21 7 - 25 mg/dL    CREATININE 0.91 0.70 - 1.30 mg/dL    BUN/CREAT RATIO           23                    GFR if African American >60 >60 ml/min/1.73m2    GFR if not African American >60 >60 ml/min/1.73m2    ALBUMIN 4.1 3.5 - 5.7 g/dL    PROTEIN,TOTAL 6.5 6.4 - 8.9 g/dL    GLOBULIN                  2.4 2.0 - 3.7 g/dL    A/G RATIO 1.7 1.0 - 2.0                    BILIRUBIN,TOTAL 0.3 0.3 - 1.0 mg/dL    ALK PHOSPHATASE 54 34 - 104 IU/L    ALT (SGPT) 18 7 - 52 IU/L    AST (SGOT) 20 13 - 39 IU/L   LIPID PANEL      Result  Value Ref Range    CHOLESTEROL,TOTAL 242 (H) <200 mg/dL    TRIGLYCERIDES 91 <150 mg/dL    HDL CHOLESTEROL 78 23 - 92 mg/dL    NON-HDL CHOLESTEROL 164 (H) <145 mg/dl    CHOL/HDL RATIO            3.10 <4.50                    LDL CHOLESTEROL 146 (H) <100 mg/dL    PROVIDER ORDERED STATUS RANDOM    CBC WITH AUTO DIFFERENTIAL      Result  Value Ref Range    WHITE BLOOD COUNT         6.3 4.5 - 11.0 thou/cu mm    RED BLOOD COUNT           4.40 4.00 - 5.20 mil/cu mm    HEMOGLOBIN                13.9 12.0 - 16.0 g/dL    HEMATOCRIT                39.9 33.0 - 51.0 %    MCV                       91 80 - 100 fL    MCH                       31.6 26.0 - 34.0 pg    MCHC                      34.8 32.0 - 36.0 g/dL    RDW                       12.2 11.5 - 15.5 %    PLATELET COUNT            192 140 - 440 thou/cu mm    MPV                       10.0 6.5 - 11.0 fL    NEUTROPHILS               63.7 42.0 - 72.0 %    LYMPHOCYTES               28.8 20.0 - 44.0 %    MONOCYTES                 5.5 <12.0 %    EOSINOPHILS               1.4 <8.0 %    BASOPHILS                 0.3 <3.0 %    IMMATURE GRANULOCYTES(METAS,MYELOS,PROS) 0.3 %    ABSOLUTE  NEUTROPHILS      4.0 1.7 - 7.0 thou/cu mm    ABSOLUTE LYMPHOCYTES      1.8 0.9 - 2.9 thou/cu mm    ABSOLUTE MONOCYTES        0.4 <0.9 thou/cu mm    ABSOLUTE EOSINOPHILS      0.1 <0.5 thou/cu mm    ABSOLUTE BASOPHILS        0.0 <0.3 thou/cu mm    ABSOLUTE IMMATURE GRANULOCYTES(METAS,MYELOS,PROS) 0.0 <=0.3 thou/cu mm     I have personally reviewed the labs listed above.    4. Pre-op urine for pregnancy (for 12 yrs and older or menstruating): not applicable    IMPRESSION:   1. Preop examination    2. Chronic right shoulder pain    3. Tear of right glenoid labrum, subsequent encounter    4. Biceps tendinosis of right shoulder    5. Gastroesophageal reflux disease without esophagitis    6. INSOMNIA, CHRONIC         For above listed surgery and anesthesia:   Patient is low risk for perioperative complications.    RECOMMENDATIONS: proceed without further diagnostic evaluation, discontinue aspirin (ASA) 7 days prior to reduce bleeding risk, discontinue NSAIDS 7 days prior to procedure to reduce bleeding risk and resume all medications post-operative or per surgeon  May use Tylenol as needed for pain preoperatively.    Electronically Signed by:   Tea Garvin MD  2:38 PM 12/20/2017     Portions of this dictation were created using the Dragon Nuance voice recognition system. Proofreading was completed but there may be errors in text.

## 2018-02-12 NOTE — H&P
Patient Information     Patient Name MRN Sex     Monisha Velez [id number] Female 1950      H&P by Tea Garvin MD at 2017  1:45 PM     Author:  Tea Garvin MD Service:  (none) Author Type:  Physician     Filed:  2017  3:14 PM Encounter Date:  2017 Status:  Signed     :  Tea Garvin MD (Physician)              PREOPERATIVE CLEARANCE  Date of Exam: 2017    Nursing Notes:   Lien Hammond  2017  1:53 PM  Unsigned  This patient presents today for a Preoperative exam for this procedure: Right shoulder arthroscopy and debridement   Date of Surgery: 17   Surgeon:  Dr. John  Facility:  [facility]  Fax:  [phone]  Kalyn Hammond BRENDA ...... 2017 1:53 PM             HPI:  Monisha Velez is a 67 y.o. Female here for preoperative clearance for above procedure. She has been experiencing right shoulder pain for about 6 months that has been nonresponsive to chiropractic care, therapy and massage. She is tried lidocaine patches without relief. She recalls an injury where she reached up to try to catch a log that was falling off a pile and thinks that's when she actually hurt it although progressively has gotten worse. Recent MRI confirms need for surgery. That report is available from [facility] today.     Problem List:   Patient Active Problem List     Diagnosis  Code     RESTLESS LEG SYNDROME G25.81     INSOMNIA, CHRONIC G47.00     Rosacea L71.9     ANXIETY F41.1     H/O colonoscopy Z98.890     Family history of malignant melanoma of skin Z80.8     Gastroesophageal reflux disease without esophagitis K21.9     H/O adenomatous polyp of colon Z86.010     Fundic gastritis K29.50     Reactive airway disease that is not asthma R09.89      Histories:  Past Medical History:     Diagnosis  Date     ADNEXAL MASS, RIGHT 2011     CONSTIPATION 2011     Dermatophytosis of the body 2010     H/O diplopia     Episode  of mild diplopia secondary to sinusitis, ophthalmology consult 07/2008.      Hx of colonoscopy 11/19/03     Influenza vaccine needed     flu shot 2011, pneumovax 2011, zostavax 4/20/12      MENOPAUSE-RELATED VASOMOTOR SYMPTOMS      NUMMULAR ECZEMA 12/16/2009     URINARY RETENTION 8/22/2011     VAGINITIS, ATROPHIC, POSTMENOPAUSAL 7/29/2011      Past Surgical History:      Procedure  Laterality Date     COLONOSCOPY DIAGNOSTIC  3/18/16    F/U       COLONOSCOPY SCREENING  10/07          Colonoscopy, done in [place], normal.       CYSTOSCOPY  1/2013    [facility]       DENTAL SURGERY  2005           Dental surgery, left jaw, Dr. Olmos.       ESOPHAGOGASTRODUODENOSCOPY  3/18/16    EGD       pelvic surgery 9/27/11      laproscopy, cystectomy and lysis of adhesions 9/27/11 by Dr. Gay Mcgovern  at [facility]         SALPINGO-OOPHORECTOMY  10/07          Laparoscopy bilateral salpingo-oophorectomy for bilateral ovarian serous cystadenofibromas, benign.       SINUS SURGERY  1993            VAGINAL HYSTERECTOMY  1990      Social History     Social History        Marital status:       Spouse name: N/A     Number of children:  N/A     Years of education:  N/A     Occupational History      Not on file.     Social History Main Topics        Smoking status:  Former Smoker     Packs/day: 0.50     Quit date: 8/1/1972     Smokeless tobacco:  Never Used     Alcohol use  No     Drug use:  No     Sexual activity:  Not on file     Other Topics   Concern      Service  No     Blood Transfusions  No     Caffeine Concern  No     Occupational Exposure  No     Hobby Hazards  No     Sleep Concern  Yes     melatonin helps      Stress Concern  Yes     Weight Concern  No     Special Diet  No     Back Care  No     Exercise  No     Bike Helmet  Not Asked     n/a      Seat Belt  Yes     100%      Self-Exams  Yes     Social History Narrative     No tobacco.  [employer]    The patient is retired.  She and her   are building a home, moved to the area in the spring of 2005 from [place].  Previously worked for [employer] ;   Her grand-daughter is living with her.     Patient is a former smoker.     Alcohol Use - yes    Drug Use - no    HIV/High Risk - no    Regular Exercise - yes        <Preloaded 12      Obstetric History       T2      L2     SAB0   TAB0   Ectopic0   Multiple0   Live Births0      Family History       Problem   Relation Age of Onset     Asthma  Mother      Asthma, diabetes mellitus type 2 , hypertension       Hypertension  Father      Hypertension, melanoma in his 80's       Cancer  Sister      ? Ovarian cancer       Other  Daughter      pleurisy, chronic       Thyroid Disease  Sister      thyroid problems       Stroke  No Family History      Cancer-breast  No Family History       Allergies: Effexor [venlafaxine analogues]; Pcn [penicillins]; and Sulfa (sulfonamide antibiotics)   Latex Allergy: no    Current Medications:  Current Outpatient Rx       Medication  Sig Dispense Refill     aspirin enteric coated 81 mg tablet Take 1 tablet by mouth once daily with a meal.  0     estradiol (ESTRACE) 0.5 mg tablet TAKE 1 TABLET BY MOUTH ONCE DAILY. 90 tablet 0     Melatonin 5 mg Tab Take 2 tablets at bedtime for sleep.  0     multivitamin (MVI) tablet Take 1 tablet by mouth once daily.  0     Medications have been reviewed by me and are current to the best of my knowledge and ability.    Recent use of: no recent use of aspirin (ASA), NSAIDS or steroids    HABITS:   Social History      Substance Use Topics        Smoking status:  Former Smoker     Packs/day: 0.50     Quit date: 1972     Smokeless tobacco:  Never Used     Alcohol use  No   Tetanus up to date yes    Proposed anesthesia: per surgeon and anesthesia  Anesthesia Complications: None  History of abnormal bleeding : None  History of Blood Transfusions: No    Health Care Directive or Living Will: no    REVIEW OF SYSTEMS:  A  "comprehensive review of systems was negative except for items noted in HPI/Subjective.    Preoperative Evaluation: Obstructive Sleep Apnea screening    S: Snore -  Do you snore loudly? (louder than talking or loud enough to be heard through closed doors)(YES)  T: Tired - Do you often feel tired, fatigued, or sleepy during the daytime?(NO)  O: Observed - Has anyone ever observed you stop breathing during your sleep?(NO)  P: Pressure - Do you have or are you being treated for high blood pressure?(NO)  B: BMI - BMI greater than 35kg/m2?(NO)  A: Age - Age over 50 years old?(YES)  N: Neck - Neck circumference greater than 40 cm?(NO)  G: Gender - Gender: Male?(NO)    Total number of \"YES\" responses:  2    Scoring: Low risk of FELIZ 0-2  At Risk of FELIZ: >3 High Risk of FELIZ: 5-8          EXAM:   /82 (Cuff Site: Right Arm, Position: Sitting, Cuff Size: Adult Regular)  Pulse 62  Ht 1.62 m (5' 3.78\")  Wt 73.9 kg (163 lb)  BMI 28.17 kg/m2 Body mass index is 28.17 kg/(m^2).  General Appearance: Pleasant, alert, appropriate appearance for age. No acute distress  Ear Exam: Normal TM's bilaterally. Normal auditory canals and external ears. Non-tender.  Dentition normal and oropharynx unremarkable. No dentures  Neck Exam: Supple, no masses or nodes.  Chest/Respiratory Exam: Normal chest wall and respirations. Clear to auscultation.  Cardiovascular Exam: Regular rate and rhythm. S1, S2, no murmur, click, gallop, or rubs.  Skin: Normal. and no rash or abnormalities  Psychiatric Exam: Alert and oriented, appropriate affect.    DIAGNOSTICS:   1. EKG: EKG FINDINGS -   Notes Recorded by Joselo Webb MD on 2/1/2016 at 3:49 PM  EKG Interpretation:  Normal axis and intervals.  Sinus bradycardia  Rate 57  Otherwise Normal EKG   Compared to previous EKG of 5/23/2007, there are no significant changes.  Joselo Webb MD  2. CXR: Not indicated  3. Labs:   Results for orders placed or performed in visit on 12/20/17      COMPLETE " METABOLIC PANEL      Result  Value Ref Range    SODIUM 138 133 - 143 mmol/L    POTASSIUM 3.9 3.5 - 5.1 mmol/L    CHLORIDE 105 98 - 107 mmol/L    CO2,TOTAL 23 21 - 31 mmol/L    ANION GAP 10 5 - 18                    GLUCOSE 89 70 - 105 mg/dL    CALCIUM 9.0 8.6 - 10.3 mg/dL    BUN 21 7 - 25 mg/dL    CREATININE 0.91 0.70 - 1.30 mg/dL    BUN/CREAT RATIO           23                    GFR if African American >60 >60 ml/min/1.73m2    GFR if not African American >60 >60 ml/min/1.73m2    ALBUMIN 4.1 3.5 - 5.7 g/dL    PROTEIN,TOTAL 6.5 6.4 - 8.9 g/dL    GLOBULIN                  2.4 2.0 - 3.7 g/dL    A/G RATIO 1.7 1.0 - 2.0                    BILIRUBIN,TOTAL 0.3 0.3 - 1.0 mg/dL    ALK PHOSPHATASE 54 34 - 104 IU/L    ALT (SGPT) 18 7 - 52 IU/L    AST (SGOT) 20 13 - 39 IU/L   LIPID PANEL      Result  Value Ref Range    CHOLESTEROL,TOTAL 242 (H) <200 mg/dL    TRIGLYCERIDES 91 <150 mg/dL    HDL CHOLESTEROL 78 23 - 92 mg/dL    NON-HDL CHOLESTEROL 164 (H) <145 mg/dl    CHOL/HDL RATIO            3.10 <4.50                    LDL CHOLESTEROL 146 (H) <100 mg/dL    PROVIDER ORDERED STATUS RANDOM    CBC WITH AUTO DIFFERENTIAL      Result  Value Ref Range    WHITE BLOOD COUNT         6.3 4.5 - 11.0 thou/cu mm    RED BLOOD COUNT           4.40 4.00 - 5.20 mil/cu mm    HEMOGLOBIN                13.9 12.0 - 16.0 g/dL    HEMATOCRIT                39.9 33.0 - 51.0 %    MCV                       91 80 - 100 fL    MCH                       31.6 26.0 - 34.0 pg    MCHC                      34.8 32.0 - 36.0 g/dL    RDW                       12.2 11.5 - 15.5 %    PLATELET COUNT            192 140 - 440 thou/cu mm    MPV                       10.0 6.5 - 11.0 fL    NEUTROPHILS               63.7 42.0 - 72.0 %    LYMPHOCYTES               28.8 20.0 - 44.0 %    MONOCYTES                 5.5 <12.0 %    EOSINOPHILS               1.4 <8.0 %    BASOPHILS                 0.3 <3.0 %    IMMATURE GRANULOCYTES(METAS,MYELOS,PROS) 0.3 %    ABSOLUTE NEUTROPHILS       4.0 1.7 - 7.0 thou/cu mm    ABSOLUTE LYMPHOCYTES      1.8 0.9 - 2.9 thou/cu mm    ABSOLUTE MONOCYTES        0.4 <0.9 thou/cu mm    ABSOLUTE EOSINOPHILS      0.1 <0.5 thou/cu mm    ABSOLUTE BASOPHILS        0.0 <0.3 thou/cu mm    ABSOLUTE IMMATURE GRANULOCYTES(METAS,MYELOS,PROS) 0.0 <=0.3 thou/cu mm     I have personally reviewed the labs listed above.    4. Pre-op urine for pregnancy (for 12 yrs and older or menstruating): not applicable    IMPRESSION:   1. Preop examination    2. Chronic right shoulder pain    3. Tear of right glenoid labrum, subsequent encounter    4. Biceps tendinosis of right shoulder    5. Gastroesophageal reflux disease without esophagitis    6. INSOMNIA, CHRONIC         For above listed surgery and anesthesia:   Patient is low risk for perioperative complications.    RECOMMENDATIONS: proceed without further diagnostic evaluation, discontinue aspirin (ASA) 7 days prior to reduce bleeding risk, discontinue NSAIDS 7 days prior to procedure to reduce bleeding risk and resume all medications post-operative or per surgeon  May use Tylenol as needed for pain preoperatively.    Electronically Signed by:   Tea Garvin MD  2:38 PM 12/20/2017     Portions of this dictation were created using the Dragon Nuance voice recognition system. Proofreading was completed but there may be errors in text.

## 2018-02-12 NOTE — NURSING NOTE
Patient Information     Patient Name MRN Sex Monisha Barnes [id number] Female 1950      Nursing Note by Lien Hammond at 2017  1:45 PM     Author:  Lien Hammond Service:  (none) Author Type:  (none)     Filed:  2017  2:05 PM Encounter Date:  2017 Status:  Signed     :  Lien Hammond            This patient presents today for a Preoperative exam for this procedure: Right shoulder arthroscopy and debridement   Date of Surgery: 17   Surgeon:  Dr. John  Facility:  [facility]  Fax:  [phone]  Kalyn Hammond LPN ...... 2017 1:53 PM

## 2018-02-28 ENCOUNTER — DOCUMENTATION ONLY (OUTPATIENT)
Dept: FAMILY MEDICINE | Facility: OTHER | Age: 68
End: 2018-02-28

## 2018-02-28 PROBLEM — G25.81 RESTLESS LEG SYNDROME: Status: ACTIVE | Noted: 2018-02-28

## 2018-02-28 PROBLEM — F51.04 CHRONIC INSOMNIA: Status: ACTIVE | Noted: 2018-02-28

## 2018-02-28 RX ORDER — ASPIRIN 81 MG/1
81 TABLET ORAL
COMMUNITY
Start: 2012-08-20 | End: 2023-03-10

## 2018-02-28 RX ORDER — ESTRADIOL 0.5 MG/1
0.5 TABLET ORAL DAILY
COMMUNITY
Start: 2017-12-06 | End: 2018-03-05

## 2018-02-28 RX ORDER — DIPHENOXYLATE HYDROCHLORIDE AND ATROPINE SULFATE 2.5; .025 MG/1; MG/1
1 TABLET ORAL DAILY
COMMUNITY
Start: 2012-08-20 | End: 2018-04-25

## 2018-03-05 DIAGNOSIS — N95.1 PERIMENOPAUSAL VASOMOTOR SYMPTOMS: Primary | ICD-10-CM

## 2018-03-13 RX ORDER — ESTRADIOL 0.5 MG/1
TABLET ORAL
Qty: 90 TABLET | Refills: 2 | Status: SHIPPED | OUTPATIENT
Start: 2018-03-13 | End: 2018-11-26

## 2018-03-13 NOTE — TELEPHONE ENCOUNTER
Patient called and states she is out of her medication.    Patient was last in to see Dr. Garvin on 12/20/17 for pre-op and medication was reviewed at that time.    Medication failed [facility] hormone replacement therapy refill protocol due to no mammogram on file in the past 2 years, however:    Patient had mammogram on 09/22/2017 and results were normal/benign.     Prescription approved per [facility] Refill Protocol. Will fill for 90 days and 2 refills at this time. Mika trejo [facility] notified.     Jenelle Mallory RN .............. 3/13/2018  2:37 PM

## 2018-04-25 ENCOUNTER — OFFICE VISIT (OUTPATIENT)
Dept: FAMILY MEDICINE | Facility: OTHER | Age: 68
End: 2018-04-25
Attending: FAMILY MEDICINE
Payer: OTHER GOVERNMENT

## 2018-04-25 VITALS
HEIGHT: 64 IN | WEIGHT: 172.4 LBS | HEART RATE: 56 BPM | DIASTOLIC BLOOD PRESSURE: 74 MMHG | BODY MASS INDEX: 29.43 KG/M2 | SYSTOLIC BLOOD PRESSURE: 131 MMHG

## 2018-04-25 DIAGNOSIS — G25.81 RESTLESS LEG SYNDROME: ICD-10-CM

## 2018-04-25 DIAGNOSIS — Z76.89 ENCOUNTER TO ESTABLISH CARE WITH NEW DOCTOR: Primary | ICD-10-CM

## 2018-04-25 PROCEDURE — 99213 OFFICE O/P EST LOW 20 MIN: CPT | Performed by: FAMILY MEDICINE

## 2018-04-25 RX ORDER — PRAMIPEXOLE DIHYDROCHLORIDE 0.12 MG/1
0.125-0.25 TABLET ORAL AT BEDTIME
Qty: 60 TABLET | Refills: 3 | Status: SHIPPED | OUTPATIENT
Start: 2018-04-25 | End: 2018-08-24

## 2018-04-25 NOTE — MR AVS SNAPSHOT
After Visit Summary   4/25/2018    Monisha Velez    MRN: [id number]           Patient Information     Date Of Birth          1950        Visit Information        Provider Department      4/25/2018 8:30 AM Tea Garvin MD [facility]        Today's Diagnoses     Restless leg syndrome    -  1      Care Instructions      Restless Legs Syndrome: What You Can Do    Symptoms of restless leg syndrome (RLS) can be treated. Together, you and your healthcare provider can work on your treatment plan. If needed, medicines may be prescribed. Also learn what you can do to ease your discomfort. Good sleep habits and a healthy lifestyle will help you rest better at night and have more energy during the day.  Working with your healthcare provider  RLS may occur on its own and may be passed on in families. It is sometimes linked to other medical problems. Low iron may cause some RLS symptoms. Your healthcare provider may order a lab test to check your iron level. Other medical problems associated with RLS are kidney disease, diabetes, Parkinson disease, and multiple sclerosis. Your doctor may prescribe medicines to reduce your symptoms and help you sleep better.  Tips for temporary relief  To reduce your discomfort, try the following:    Walking or stretching    Rubbing your legs    Having a massage    Taking a hot or cold bath    Doing activities that make muscles in your hands or legs work    Relaxing with yoga or meditation  Good sleep habits  Even though you have RLS, you can still have restful sleep. Try these good sleeping habits:    Keep a regular sleep schedule. Go to bed and get up at the same time each day.    Avoid or limit naps.    Make sure the bedroom is quiet, dark, and not too hot or too cold.    Use your bed only for sleep and sex.  Healthy lifestyle  Your lifestyle affects your health and your sleep. Here are some healthy habits:    Eat a balanced diet. To get enough  "vitamins and minerals, you may also need to take supplements.    Manage stress and learn ways to relax. Deep breathing techniques and visualization can help to relax your muscles and calm your mind.    Exercise regularly. It can help reduce stress. Also, you will have more energy during the day and be more tired at bedtime. Afternoon exercise is best. Nighttime exercise may affect how well you sleep.  Date Last Reviewed: 9/1/2017 2000-2017 [employer]. [address] [place] [address], [place] [address]. All rights reserved. This information is not intended as a substitute for professional medical care. Always follow your healthcare professional's instructions.                Follow-ups after your visit        Who to contact     If you have questions or need follow up information about today's clinic visit or your schedule please contact [facility] directly at [phone].  Normal or non-critical lab and imaging results will be communicated to you by [employer]art, letter or phone within 4 business days after the clinic has received the results. If you do not hear from us within 7 days, please contact the clinic through [employer]ihart or phone. If you have a critical or abnormal lab result, we will notify you by phone as soon as possible.  Submit refill requests through Ravgen or call your pharmacy and they will forward the refill request to us. Please allow 3 business days for your refill to be completed.          Additional Information About Your Visit        Ravgen Information     [employer] lets you send messages to your doctor, view your test results, renew your prescriptions, schedule appointments and more. To sign up, go to [email or web address] . Click on \"Log in\" on the left side of the screen, which will take you to the Welcome page. Then click on \"Sign up Now\" on the right side of the page.     You will be asked to enter the access code listed below, as well as some personal " "information. Please follow the directions to create your username and password.     Your access code is: [email or web address]  Expires: 2018  9:13 AM     Your access code will  in 90 days. If you need help or a new code, please call your [facility] clinic or [phone].        Care EveryWhere ID     This is your Care EveryWhere ID. This could be used by other organizations to access your [facility] medical records  [id number]        Your Vitals Were     Pulse Height BMI (Body Mass Index)             58 5' 4.25\" (1.632 m) 29.36 kg/m2          Blood Pressure from Last 3 Encounters:   18 (!) 140/100   17 138/82   10/25/17 122/70    Weight from Last 3 Encounters:   18 172 lb 6.4 oz (78.2 kg)   17 163 lb (73.9 kg)   10/25/17 163 lb 3.2 oz (74 kg)              Today, you had the following     No orders found for display         Today's Medication Changes          These changes are accurate as of 18  9:14 AM.  If you have any questions, ask your nurse or doctor.               Start taking these medicines.        Dose/Directions    pramipexole 0.125 MG tablet   Commonly known as:  MIRAPEX   Used for:  Restless leg syndrome   Started by:  Tea Garvin MD        Dose:  0.125-0.25 mg   Take 1-2 tablets (0.125-0.25 mg) by mouth At Bedtime   Quantity:  60 tablet   Refills:  3         These medicines have changed or have updated prescriptions.        Dose/Directions    aspirin EC 81 MG EC tablet   This may have changed:  Another medication with the same name was removed. Continue taking this medication, and follow the directions you see here.   Changed by:  Tea Garvin MD        Dose:  81 mg   Take 81 mg by mouth daily with food   Refills:  0       estradiol 0.5 MG tablet   Commonly known as:  ESTRACE   This may have changed:  Another medication with the same name was removed. Continue taking this medication, and follow the directions you see here.   Used for:  Perimenopausal " vasomotor symptoms   Changed by:  Tea Garvin MD        TAKE 1 TABLET BY MOUTH ONCE DAILY.   Quantity:  90 tablet   Refills:  2         Stop taking these medicines if you haven't already. Please contact your care team if you have questions.     ammonium lactate 12 % cream   Commonly known as:  AMLACTIN   Stopped by:  Tea Garvin MD           BENADRYL 25 MG capsule   Generic drug:  diphenhydrAMINE   Stopped by:  Tea Garvin MD           calcium polycarbophil 625 MG tablet   Commonly known as:  FIBERCON   Stopped by:  Tea Garvin MD           CELEXA PO   Stopped by:  Tea Garvin MD           CLARITIN 10 MG tablet   Generic drug:  loratadine   Stopped by:  Tea Garvin MD           fish oil-omega-3 fatty acids 1000 MG capsule   Stopped by:  Tea Garvin MD           FLONASE 50 MCG/ACT spray   Generic drug:  fluticasone   Stopped by:  Tea Garvin MD           ibuprofen 600 MG tablet   Commonly known as:  ADVIL/MOTRIN   Stopped by:  Tea Garvin MD           LORazepam 1 MG tablet   Commonly known as:  ATIVAN   Stopped by:  Tea Garvin MD           MAGNESIUM OXIDE -MG SUPPLEMENT PO   Stopped by:  Tea Garvin MD           MELATONIN PO   Stopped by:  Tea Garvin MD           MULTI-VITAMINS Tabs   Stopped by:  Tea Garvin MD           oxyCODONE-acetaminophen 5-325 MG per tablet   Commonly known as:  PERCOCET   Stopped by:  Tea Garvin MD           PROVENTIL  (90 Base) MCG/ACT Inhaler   Generic drug:  albuterol   Stopped by:  Tea Garvin MD                Where to get your medicines      These medications were sent to [facility] - [place], [place] - [address]  [address], [facility] [address]     Phone:  [phone]     pramipexole 0.125 MG tablet                Primary Care Provider Office Phone # Fax #    Tracey Eastman [phone]  [phone]       [address]  [place] [address]        Equal Access to Services     WILLAPANRA REANNA : Hadii fam burgos flavio Schmidt, wakristinda luinydia, stephta katarada melissawallace, radhika arlen davealexandria torresbairon sumagarth britney german. So [facility] [phone].    ATENCIÓN: Si habla español, tiene a olson disposición servicios gratuitos de asistencia lingüística. Llame al [phone].    We comply with applicable federal civil rights laws and [place] laws. We do not discriminate on the basis of race, color, national origin, age, disability, sex, sexual orientation, or gender identity.            Thank you!     Thank you for choosing [facility]  for your care. Our goal is always to provide you with excellent care. Hearing back from our patients is one way we can continue to improve our services. Please take a few minutes to complete the written survey that you may receive in the mail after your visit with us. Thank you!             Your Updated Medication List - Protect others around you: Learn how to safely use, store and throw away your medicines at www.disposemymeds.org.          This list is accurate as of 4/25/18  9:14 AM.  Always use your most recent med list.                   Brand Name Dispense Instructions for use Diagnosis    aspirin EC 81 MG EC tablet      Take 81 mg by mouth daily with food        CALCIUM 500 500-250-200 MG-MG-UNIT Tabs   Generic drug:  Calcium-Magnesium-Vitamin D      Take 2 tablets by mouth daily.        estradiol 0.5 MG tablet    ESTRACE    90 tablet    TAKE 1 TABLET BY MOUTH ONCE DAILY.    Perimenopausal vasomotor symptoms       melatonin 5 MG tablet      Take 2 tablets by mouth At Bedtime        metroNIDAZOLE 1 % cream    NORITATE     Apply topically daily        MULTIVITAMIN PO      Take 1 tablet by mouth daily.        pramipexole 0.125 MG tablet    MIRAPEX    60 tablet    Take 1-2 tablets (0.125-0.25 mg) by mouth At Bedtime    Restless leg syndrome

## 2018-04-25 NOTE — PROGRESS NOTES
SUBJECTIVE:   Monisha Velez is a 68 year old female who presents to clinic today for the following health issues:  Here to establish care with a new provider since previously chosen provider is leaving the clinic.  She had shoulder surgery on the right in December so still recovering.      Wanted to know if blood sugar on last lab testing was normal since her mother had onset of type 2 diabetes in her 60s.  Her most recent blood sugars have been fine.  No evidence to suggest diabetes.  She also wants to discuss issues with restless legs.  She finds that when she tries to go to sleep at night her legs are uncomfortable and she feels like she has to move them all the time.  She does not notice this as much during the day.  In the past she had used melatonin to try to help her sleep but that is no longer working.  Recent CBC showed no evidence of anemia.    HPI    Patient Active Problem List   Diagnosis     Anxiety state     Family history of malignant melanoma of skin     Fundic gastritis     Gastroesophageal reflux disease without esophagitis     H/O adenomatous polyp of colon     Chronic insomnia     Reactive airway disease that is not asthma     Restless leg syndrome     Rosacea     Past Surgical History:   Procedure Laterality Date     COLONOSCOPY      10/07,Colonoscopy, done in [place], normal.     COLONOSCOPY  03/18/2016    Next in 5 years     CYSTOSCOPY      1/2013,[facility]     DENTAL SURGERY  2005    Root canal,  Dr. Olmos.     ENDOSCOPIC SINUS SURGERY  1993     ESOPHAGOSCOPY, GASTROSCOPY, DUODENOSCOPY (EGD), COMBINED      3/18/16,EGD     HYSTERECTOMY VAGINAL  1990    Endometriosis     OTHER SURGICAL HISTORY      334886,OTHER,laproscopy, cystectomy and lysis of adhesions 9/27/11 by Dr. Gay Mcgovern  at [facility]     SALPINGO-OOPHORECTOMY BILATERAL      10/07,Laparoscopy bilateral salpingo-oophorectomy for bilateral ovarian serous cystadenofibromas, benign.       Social History  "  Substance Use Topics     Smoking status: Former Smoker     Packs/day: 0.50     Quit date: 8/1/1972     Smokeless tobacco: Never Used     Alcohol use No     Family History   Problem Relation Age of Onset     Asthma Mother      Hypertension Mother      DIABETES Mother      Hypertension Father      Hypertension,Hypertension, melanoma in his 80's     DIABETES Sister      DIABETES Sister      Breast Cancer No family hx of      Cancer-breast         Current Outpatient Prescriptions   Medication Sig Dispense Refill     aspirin EC 81 MG EC tablet Take 81 mg by mouth daily with food       Calcium-Magnesium-Vitamin D (CALCIUM 500) 500-250-200 MG-MG-UNIT TABS Take 2 tablets by mouth daily.       estradiol (ESTRACE) 0.5 MG tablet TAKE 1 TABLET BY MOUTH ONCE DAILY. 90 tablet 2     melatonin 5 MG tablet Take 2 tablets by mouth At Bedtime       metroNIDAZOLE (NORITATE) 1 % cream Apply topically daily       Multiple Vitamin (MULTIVITAMIN OR) Take 1 tablet by mouth daily.       pramipexole (MIRAPEX) 0.125 MG tablet Take 1-2 tablets (0.125-0.25 mg) by mouth At Bedtime 60 tablet 3     [DISCONTINUED] estradiol (ESTRACE) 0.5 MG tablet Take 0.5 mg by mouth daily.       Allergies   Allergen Reactions     Codeine Sulfate Other (See Comments)     nightmares     Morphine Sulfate Other (See Comments)     Cant sleep     Penicillins Hives     Sulfa Drugs Hives     Venlafaxine Hives     'bad reaction      Venlafaxine Hcl      Effexor     Zolpidem Tartrate      Paresthesias; stomach upset         Review of Systems     OBJECTIVE:     /74  Pulse 56  Ht 5' 4.25\" (1.632 m)  Wt 172 lb 6.4 oz (78.2 kg)  BMI 29.36 kg/m2  Body mass index is 29.36 kg/(m^2).   Wt Readings from Last 3 Encounters:   04/25/18 172 lb 6.4 oz (78.2 kg)   12/20/17 163 lb (73.9 kg)   10/25/17 163 lb 3.2 oz (74 kg)       Physical Exam   Constitutional: She appears well-developed. No distress.   Musculoskeletal:   Range of motion right shoulder is still limited with " internal rotation.   Nursing note and vitals reviewed.      Diagnostic Test Results:  none     ASSESSMENT/PLAN:         ICD-10-CM    1. Encounter to establish care with new doctor Z76.89    2. Restless leg syndrome G25.81 pramipexole (MIRAPEX) 0.125 MG tablet     Plan:  Trial of mirapex for restless legs at night. Stretches, warm bathes.  Has gained weight over the winter and after her surgery and plans to get back into exercise and healthy eating.  Follow up in December for annual labs and follow up.    Tea Garvin MD  [facility]    Portions of this dictation were created using the Dragon Nuance voice recognition system. Proofreading was completed but there may be errors in text.

## 2018-04-25 NOTE — PATIENT INSTRUCTIONS
Restless Legs Syndrome: What You Can Do    Symptoms of restless leg syndrome (RLS) can be treated. Together, you and your healthcare provider can work on your treatment plan. If needed, medicines may be prescribed. Also learn what you can do to ease your discomfort. Good sleep habits and a healthy lifestyle will help you rest better at night and have more energy during the day.  Working with your healthcare provider  RLS may occur on its own and may be passed on in families. It is sometimes linked to other medical problems. Low iron may cause some RLS symptoms. Your healthcare provider may order a lab test to check your iron level. Other medical problems associated with RLS are kidney disease, diabetes, Parkinson disease, and multiple sclerosis. Your doctor may prescribe medicines to reduce your symptoms and help you sleep better.  Tips for temporary relief  To reduce your discomfort, try the following:    Walking or stretching    Rubbing your legs    Having a massage    Taking a hot or cold bath    Doing activities that make muscles in your hands or legs work    Relaxing with yoga or meditation  Good sleep habits  Even though you have RLS, you can still have restful sleep. Try these good sleeping habits:    Keep a regular sleep schedule. Go to bed and get up at the same time each day.    Avoid or limit naps.    Make sure the bedroom is quiet, dark, and not too hot or too cold.    Use your bed only for sleep and sex.  Healthy lifestyle  Your lifestyle affects your health and your sleep. Here are some healthy habits:    Eat a balanced diet. To get enough vitamins and minerals, you may also need to take supplements.    Manage stress and learn ways to relax. Deep breathing techniques and visualization can help to relax your muscles and calm your mind.    Exercise regularly. It can help reduce stress. Also, you will have more energy during the day and be more tired at bedtime. Afternoon exercise is best. Nighttime  exercise may affect how well you sleep.  Date Last Reviewed: 9/1/2017 2000-2017 [employer]. [address] [facility], [place] [address]. All rights reserved. This information is not intended as a substitute for professional medical care. Always follow your healthcare professional's instructions.

## 2018-05-21 ENCOUNTER — TELEPHONE (OUTPATIENT)
Dept: FAMILY MEDICINE | Facility: OTHER | Age: 68
End: 2018-05-21

## 2018-05-21 NOTE — TELEPHONE ENCOUNTER
Patient has question on pramipexole.  Experiencing unsually frequent urinating and unusual appetite.    Brenda Schaeffer on 5/21/2018 at 8:57 AM

## 2018-05-21 NOTE — TELEPHONE ENCOUNTER
"Returned call to patient. Patient has been having urinary frequency and urgency, since starting MIRAPEX on 4/25. No complaints of fever, chills or burning during urination. Patients appetite has been different. Patient states,\"she cant seem to eat enough food, she is hungry all the time\". Patient states,\"she has put on quite a bit and wonders, if it is fluid weight\". Would you recommend she follow up with you? Pharmacy and allergies have been reviewed. Please advise.    Katheryn Churchill LPN on 5/21/2018 at 10:33 AM    "

## 2018-05-21 NOTE — TELEPHONE ENCOUNTER
Urinary frequency has been reported as side effect of med.  Just stop taking it.   Tea Garvin MD  10:44 AM 5/21/2018   .

## 2018-07-05 ENCOUNTER — OFFICE VISIT (OUTPATIENT)
Dept: FAMILY MEDICINE | Facility: OTHER | Age: 68
End: 2018-07-05
Attending: FAMILY MEDICINE
Payer: OTHER GOVERNMENT

## 2018-07-05 VITALS
HEART RATE: 60 BPM | DIASTOLIC BLOOD PRESSURE: 70 MMHG | BODY MASS INDEX: 29.23 KG/M2 | SYSTOLIC BLOOD PRESSURE: 128 MMHG | TEMPERATURE: 96.7 F | WEIGHT: 171.2 LBS | HEIGHT: 64 IN

## 2018-07-05 DIAGNOSIS — R39.89 URINARY PROBLEM: ICD-10-CM

## 2018-07-05 DIAGNOSIS — R32 URINARY INCONTINENCE, UNSPECIFIED TYPE: Primary | ICD-10-CM

## 2018-07-05 LAB
ALBUMIN UR-MCNC: NEGATIVE MG/DL
APPEARANCE UR: CLEAR
BILIRUB UR QL STRIP: NEGATIVE
COLOR UR AUTO: YELLOW
GLUCOSE UR STRIP-MCNC: NEGATIVE MG/DL
HGB UR QL STRIP: NEGATIVE
KETONES UR STRIP-MCNC: NEGATIVE MG/DL
LEUKOCYTE ESTERASE UR QL STRIP: NEGATIVE
NITRATE UR QL: NEGATIVE
PH UR STRIP: 5 PH (ref 5–7)
SOURCE: NORMAL
SP GR UR STRIP: 1.01 (ref 1–1.03)
UROBILINOGEN UR STRIP-ACNC: 0.2 EU/DL (ref 0.2–1)

## 2018-07-05 PROCEDURE — 87086 URINE CULTURE/COLONY COUNT: CPT | Performed by: FAMILY MEDICINE

## 2018-07-05 PROCEDURE — 99213 OFFICE O/P EST LOW 20 MIN: CPT | Performed by: FAMILY MEDICINE

## 2018-07-05 PROCEDURE — 81003 URINALYSIS AUTO W/O SCOPE: CPT | Performed by: FAMILY MEDICINE

## 2018-07-05 RX ORDER — OXYBUTYNIN CHLORIDE 15 MG/1
15 TABLET, EXTENDED RELEASE ORAL DAILY
Qty: 90 TABLET | Refills: 3 | Status: SHIPPED | OUTPATIENT
Start: 2018-07-05 | End: 2018-12-12

## 2018-07-05 NOTE — MR AVS SNAPSHOT
After Visit Summary   7/5/2018    Monisha Velez    MRN: [id number]           Patient Information     Date Of Birth          1950        Visit Information        Provider Department      7/5/2018 11:30 AM Tea Garvin MD [facility]        Today's Diagnoses     Urinary incontinence, unspecified type    -  1    Urinary problem          Care Instructions      Urinary Incontinence, Female (Adult)  Urinary incontinence means loss of control of the bladder. This problem affects many women, especially as they get older. If you have incontinence, you may be embarrassed to ask for help. But know that this problem can be treated.  Types of Incontinence  There are different types of incontinence. Two of the main types are described here. You can have more than one type.    Stress incontinence. With this type, urine leaks when pressure (stress) is put on the bladder. This may happen when you cough, sneeze, or laugh. Stress incontinence most often occurs because the pelvic floor muscles that support the bladder and urethra are weak. This can happen after pregnancy and vaginal childbirth or a hysterectomy. It can also be due to excess body weight or hormone changes.    Urge incontinence (also called overactive bladder). With this type, a sudden urge to urinate is felt often. This may happen even though there may not be much urine in the bladder. The need to urinate often during the night is common. Urge incontinence most often occurs because of bladder spasms. This may be due to bladder irritation or infection. Damage to bladder nerves or pelvic muscles, constipation, and certain medicines can also lead to urge incontinence.  Treatment of urinary incontinence depends on the cause. Further evaluation is needed to find the type you have. This will likely include an exam and certain tests. Based on the results, you and your healthcare provider can then plan treatment. Until a diagnosis  is made, the home care tips below can help relieve symptoms.  Home care    Do pelvic floor muscle exercises, if they are prescribed. The pelvic floor muscles help support the bladder and urethra. Many women find that their symptoms improve when doing special exercises that strengthen these muscles. To do the exercises contract the muscles you would use to stop your stream of urine, but do this when you re not urinating. Hold for 10 seconds, then relax. Repeat 10 to 20 times in a row, at least 3 times a day. Your provider may give you other instructions for how to do the exercises and how often.    Keep a bladder diary. This helps track how often and how much you urinate over a set period of time. Bring this diary with you to your next visit with the provider. The information can help your provider learn more about your bladder problem.    Lose weight, if advised to by your provider. Excess weight puts pressure on the bladder. Your provider can help you create a weight-loss plan that s right for you. This may include exercising more and making certain diet changes.    Don't consume foods and drinks that may irritate the bladder. These can include alcohol and caffeinated drinks.    Quit smoking. Smoking and other tobacco use can lead to chronic cough that strains the pelvic floor muscles. Smoking may also damage the bladder and urethra. Talk with your provider about treatments or methods you can use to quit smoking.    If drinking large amounts of fluid causes you to have symptoms, you may be advised to limit your fluid intake. You may also be advised to drink most of your fluids during the day and to limit fluids at night.    If you re worried about urine leakage or accidents, you may wear absorbent pads to catch urine. Change the pads often. This helps reduce discomfort. It may also reduce the risk of skin or bladder infections.  Follow-up care  Follow up with your healthcare provider, or as directed. It may take  "some to find the right treatment for your problem. Your treatment plan may include special therapies or medicines. Certain procedures or surgery may also be options. Be sure to discuss any questions you have with your provider.  When to seek medical advice  Call the healthcare provider right away if any of these occur:    Fever of 100.4 F (38 C) or higher, or as directed by your provider    Bladder pain or fullness    Abdominal swelling    Nausea or vomiting    Back pain    Weakness, dizziness or fainting  Date Last Reviewed: 10/1/2017    5945-0145 [employer]. [address] [place] [address]. All rights reserved. This information is not intended as a substitute for professional medical care. Always follow your healthcare professional's instructions.                Follow-ups after your visit        Who to contact     If you have questions or need follow up information about today's clinic visit or your schedule please contact [facility] directly at [phone].  Normal or non-critical lab and imaging results will be communicated to you by GrabInboxhart, letter or phone within 4 business days after the clinic has received the results. If you do not hear from us within 7 days, please contact the clinic through GrabInboxhart or phone. If you have a critical or abnormal lab result, we will notify you by phone as soon as possible.  Submit refill requests through FanLib or call your pharmacy and they will forward the refill request to us. Please allow 3 business days for your refill to be completed.          Additional Information About Your Visit        FanLib Information     FanLib lets you send messages to your doctor, view your test results, renew your prescriptions, schedule appointments and more. To sign up, go to [email or web address] . Click on \"Log in\" on the left side of the screen, which will take you to the Welcome page. Then click on \"Sign up Now\" on the right side of " "the page.     You will be asked to enter the access code listed below, as well as some personal information. Please follow the directions to create your username and password.     Your access code is: [email or web address]  Expires: 2018  9:13 AM     Your access code will  in 90 days. If you need help or a new code, please call your [facility] clinic or [phone].        Care EveryWhere ID     This is your Care EveryWhere ID. This could be used by other organizations to access your [facility] medical records  [id number]        Your Vitals Were     Pulse Temperature Height Breastfeeding? BMI (Body Mass Index)       60 96.7  F (35.9  C) 5' 4\" (1.626 m) No 29.39 kg/m2        Blood Pressure from Last 3 Encounters:   18 128/70   18 131/74   17 138/82    Weight from Last 3 Encounters:   18 171 lb 3.2 oz (77.7 kg)   18 172 lb 6.4 oz (78.2 kg)   17 163 lb (73.9 kg)              We Performed the Following     Urinalysis w Reflex Microscopic If Positive          Today's Medication Changes          These changes are accurate as of 18 12:08 PM.  If you have any questions, ask your nurse or doctor.               Start taking these medicines.        Dose/Directions    oxybutynin chloride 15 MG Tb24   Commonly known as:  DITROPAN XL   Used for:  Urinary incontinence, unspecified type   Started by:  Tea Garvin MD        Dose:  15 mg   Take 1 tablet (15 mg) by mouth daily   Quantity:  90 tablet   Refills:  3            Where to get your medicines      These medications were sent to [id number] - [place], [place] - [address]  [address], [facility] [address]     Phone:  [phone]     oxybutynin chloride 15 MG Tb24                Primary Care Provider Office Phone # Fax #    Tea Garvin [phone]       [address]  [facility] [address]        Equal Access to Services     KYLEE FORTE AH: Hadii " fam Schmidt, wakristinda luqadaha, qaybta kaalonesimo dc, waxcristiano arlen moiseabiolashakila tan maxi. So [facility] [phone].    ATENCIÓN: Si surinderla lulu, tiene a olson disposición servicios gratuitos de asistencia lingüística. Torres al [phone].    We comply with applicable federal civil rights laws and [place] laws. We do not discriminate on the basis of race, color, national origin, age, disability, sex, sexual orientation, or gender identity.            Thank you!     Thank you for choosing [facility]  for your care. Our goal is always to provide you with excellent care. Hearing back from our patients is one way we can continue to improve our services. Please take a few minutes to complete the written survey that you may receive in the mail after your visit with us. Thank you!             Your Updated Medication List - Protect others around you: Learn how to safely use, store and throw away your medicines at www.disposemymeds.org.          This list is accurate as of 7/5/18 12:08 PM.  Always use your most recent med list.                   Brand Name Dispense Instructions for use Diagnosis    aspirin 81 MG EC tablet      Take 81 mg by mouth daily with food        CALCIUM 500 500-250-200 MG-MG-UNIT Tabs   Generic drug:  Calcium-Magnesium-Vitamin D      Take 2 tablets by mouth daily.        estradiol 0.5 MG tablet    ESTRACE    90 tablet    TAKE 1 TABLET BY MOUTH ONCE DAILY.    Perimenopausal vasomotor symptoms       melatonin 5 MG tablet      Take 2 tablets by mouth At Bedtime        metroNIDAZOLE 1 % cream    NORITATE     Apply topically daily        MULTIVITAMIN PO      Take 1 tablet by mouth daily.        oxybutynin chloride 15 MG Tb24    DITROPAN XL    90 tablet    Take 1 tablet (15 mg) by mouth daily    Urinary incontinence, unspecified type       pramipexole 0.125 MG tablet    MIRAPEX    60 tablet    Take 1-2 tablets (0.125-0.25 mg) by mouth At Bedtime    Restless leg  syndrome

## 2018-07-05 NOTE — NURSING NOTE
Patient presents to clinic with possible UTI, frequency, lower back pain during urination. She also would like to discuss her having some dizziness.  Teressa Ann ....................  7/5/2018   11:27 AM

## 2018-07-05 NOTE — PROGRESS NOTES
"Nursing Notes:   Teressa Oakes LPN  7/5/2018 11:37 AM  Addendum  Patient presents to clinic with possible UTI, frequency, lower back pain during urination. She also would like to discuss her having some dizziness.  Teressa Ann ....................  7/5/2018   11:27 AM      SUBJECTIVE: Monisha Velez is a 68 year old female who complains of urinary incontinence and worsening. If she has an urge to void she cannot get to the bathroom fast enough. No dysuria, thinks that her urine has an odor.This has been worse in the last 2 months. Has found herself avoiding going out because when she goes she soaks herself. Happens multiple times a day. Up about 3 times a night.     Had an episode while walking down her road where she felt off and unsteady and \"thought I was walking in the ditch\". She also had an episode in [place] where she was looking at a screen and vision was blurred.This happened after she started the mirapex for her restless legs and discussed that it could be a side effect.  She has had no episodes of slurring of speech, facial asymmetry, numbness in her hands or extremities or other neurologic symptoms to be concerned for TIA.    OBJECTIVE:   /70  Pulse 60  Temp 96.7  F (35.9  C)  Ht 5' 4\" (1.626 m)  Wt 171 lb 3.2 oz (77.7 kg)  Breastfeeding? No  BMI 29.39 kg/m2  Appears well, in no apparent distress.     Results for orders placed or performed in visit on 07/05/18 (from the past 24 hour(s))   Urinalysis w Reflex Microscopic If Positive   Result Value Ref Range    Color Urine Yellow     Appearance Urine Clear     Glucose Urine Negative NEG^Negative mg/dL    Bilirubin Urine Negative NEG^Negative    Ketones Urine Negative NEG^Negative mg/dL    Specific Gravity Urine 1.010 1.003 - 1.035    Blood Urine Negative NEG^Negative    pH Urine 5.0 5.0 - 7.0 pH    Protein Albumin Urine Negative NEG^Negative mg/dL    Urobilinogen Urine 0.2 0.2 - 1.0 EU/dL    Nitrite Urine Negative NEG^Negative    " Leukocyte Esterase Urine Negative NEG^Negative    Source Midstream Urine      I have personally reviewed the labslisted above.    ASSESSMENT:  1. Urinary incontinence, unspecified type    2. Urinary problem        PLAN:   No evidence of UTI on exam of urine done today.  Encouraged good hydration status with intermittent episodes of lightheadedness.  She may have to stop the Mirapex to see if she is having some side effects from the drug.  Trial of Ditropan XR for urinary incontinence/urge symptoms and if not working then should be seen by urology.  Tea Garvin MD  12:37 PM 7/5/2018   Portions of this dictation were created using the Dragon Nuance voice recognition system. Proofreading was completed but there may be errors in text.

## 2018-07-05 NOTE — PATIENT INSTRUCTIONS
Urinary Incontinence, Female (Adult)  Urinary incontinence means loss of control of the bladder. This problem affects many women, especially as they get older. If you have incontinence, you may be embarrassed to ask for help. But know that this problem can be treated.  Types of Incontinence  There are different types of incontinence. Two of the main types are described here. You can have more than one type.    Stress incontinence. With this type, urine leaks when pressure (stress) is put on the bladder. This may happen when you cough, sneeze, or laugh. Stress incontinence most often occurs because the pelvic floor muscles that support the bladder and urethra are weak. This can happen after pregnancy and vaginal childbirth or a hysterectomy. It can also be due to excess body weight or hormone changes.    Urge incontinence (also called overactive bladder). With this type, a sudden urge to urinate is felt often. This may happen even though there may not be much urine in the bladder. The need to urinate often during the night is common. Urge incontinence most often occurs because of bladder spasms. This may be due to bladder irritation or infection. Damage to bladder nerves or pelvic muscles, constipation, and certain medicines can also lead to urge incontinence.  Treatment of urinary incontinence depends on the cause. Further evaluation is needed to find the type you have. This will likely include an exam and certain tests. Based on the results, you and your healthcare provider can then plan treatment. Until a diagnosis is made, the home care tips below can help relieve symptoms.  Home care    Do pelvic floor muscle exercises, if they are prescribed. The pelvic floor muscles help support the bladder and urethra. Many women find that their symptoms improve when doing special exercises that strengthen these muscles. To do the exercises contract the muscles you would use to stop your stream of urine, but do this when  you re not urinating. Hold for 10 seconds, then relax. Repeat 10 to 20 times in a row, at least 3 times a day. Your provider may give you other instructions for how to do the exercises and how often.    Keep a bladder diary. This helps track how often and how much you urinate over a set period of time. Bring this diary with you to your next visit with the provider. The information can help your provider learn more about your bladder problem.    Lose weight, if advised to by your provider. Excess weight puts pressure on the bladder. Your provider can help you create a weight-loss plan that s right for you. This may include exercising more and making certain diet changes.    Don't consume foods and drinks that may irritate the bladder. These can include alcohol and caffeinated drinks.    Quit smoking. Smoking and other tobacco use can lead to chronic cough that strains the pelvic floor muscles. Smoking may also damage the bladder and urethra. Talk with your provider about treatments or methods you can use to quit smoking.    If drinking large amounts of fluid causes you to have symptoms, you may be advised to limit your fluid intake. You may also be advised to drink most of your fluids during the day and to limit fluids at night.    If you re worried about urine leakage or accidents, you may wear absorbent pads to catch urine. Change the pads often. This helps reduce discomfort. It may also reduce the risk of skin or bladder infections.  Follow-up care  Follow up with your healthcare provider, or as directed. It may take some to find the right treatment for your problem. Your treatment plan may include special therapies or medicines. Certain procedures or surgery may also be options. Be sure to discuss any questions you have with your provider.  When to seek medical advice  Call the healthcare provider right away if any of these occur:    Fever of 100.4 F (38 C) or higher, or as directed by your provider    Bladder  pain or fullness    Abdominal swelling    Nausea or vomiting    Back pain    Weakness, dizziness or fainting  Date Last Reviewed: 10/1/2017    7502-1192 [employer]. [address] [place] [address], [place] [address]. All rights reserved. This information is not intended as a substitute for professional medical care. Always follow your healthcare professional's instructions.

## 2018-07-07 LAB
BACTERIA SPEC CULT: NORMAL
SPECIMEN SOURCE: NORMAL

## 2018-07-23 NOTE — PROGRESS NOTES
Patient Information     Patient Name  Monisha Velez MRN  [id number] Sex  Female   1950      Letter by Tea Garvin MD at      Author:  Tea Garvin MD Service:  (none) Author Type:  (none)    Filed:   Encounter Date:  2017 Status:  (Other)           Monisha Velez  [address] [facility] [address]          2017    Dear Ms. Velez:    Following are the tests completed during your last clinic visit. Your cholesterol is a bit higher than it was last time it was checked. Based on your age, blood pressure in the clinic and cholesterol level your risk of heart disease in the next 10 years is about 13.5%. This indicates that you should consider being on a statin or medication to lower cholesterol. If you're interested in this medication please make an appointment to discuss this and get a prescription. The remainder of your labs are fine.    Results for orders placed or performed in visit on 17      COMPLETE METABOLIC PANEL      Result  Value Ref Range    SODIUM 138 133 - 143 mmol/L    POTASSIUM 3.9 3.5 - 5.1 mmol/L    CHLORIDE 105 98 - 107 mmol/L    CO2,TOTAL 23 21 - 31 mmol/L    ANION GAP 10 5 - 18                    GLUCOSE 89 70 - 105 mg/dL    CALCIUM 9.0 8.6 - 10.3 mg/dL    BUN 21 7 - 25 mg/dL    CREATININE 0.91 0.70 - 1.30 mg/dL    BUN/CREAT RATIO           23                    GFR if African American >60 >60 ml/min/1.73m2    GFR if not African American >60 >60 ml/min/1.73m2    ALBUMIN 4.1 3.5 - 5.7 g/dL    PROTEIN,TOTAL 6.5 6.4 - 8.9 g/dL    GLOBULIN                  2.4 2.0 - 3.7 g/dL    A/G RATIO 1.7 1.0 - 2.0                    BILIRUBIN,TOTAL 0.3 0.3 - 1.0 mg/dL    ALK PHOSPHATASE 54 34 - 104 IU/L    ALT (SGPT) 18 7 - 52 IU/L    AST (SGOT) 20 13 - 39 IU/L   LIPID PANEL      Result  Value Ref Range    CHOLESTEROL,TOTAL 242 (H) <200 mg/dL    TRIGLYCERIDES 91 <150 mg/dL    HDL CHOLESTEROL 78 23 - 92 mg/dL    NON-HDL CHOLESTEROL 164 (H) <145 mg/dl    CHOL/HDL  RATIO            3.10 <4.50                    LDL CHOLESTEROL 146 (H) <100 mg/dL    PROVIDER ORDERED STATUS RANDOM    CBC WITH AUTO DIFFERENTIAL      Result  Value Ref Range    WHITE BLOOD COUNT         6.3 4.5 - 11.0 thou/cu mm    RED BLOOD COUNT           4.40 4.00 - 5.20 mil/cu mm    HEMOGLOBIN                13.9 12.0 - 16.0 g/dL    HEMATOCRIT                39.9 33.0 - 51.0 %    MCV                       91 80 - 100 fL    MCH                       31.6 26.0 - 34.0 pg    MCHC                      34.8 32.0 - 36.0 g/dL    RDW                       12.2 11.5 - 15.5 %    PLATELET COUNT            192 140 - 440 thou/cu mm    MPV                       10.0 6.5 - 11.0 fL    NEUTROPHILS               63.7 42.0 - 72.0 %    LYMPHOCYTES               28.8 20.0 - 44.0 %    MONOCYTES                 5.5 <12.0 %    EOSINOPHILS               1.4 <8.0 %    BASOPHILS                 0.3 <3.0 %    IMMATURE GRANULOCYTES(METAS,MYELOS,PROS) 0.3 %    ABSOLUTE NEUTROPHILS      4.0 1.7 - 7.0 thou/cu mm    ABSOLUTE LYMPHOCYTES      1.8 0.9 - 2.9 thou/cu mm    ABSOLUTE MONOCYTES        0.4 <0.9 thou/cu mm    ABSOLUTE EOSINOPHILS      0.1 <0.5 thou/cu mm    ABSOLUTE BASOPHILS        0.0 <0.3 thou/cu mm    ABSOLUTE IMMATURE GRANULOCYTES(METAS,MYELOS,PROS) 0.0 <=0.3 thou/cu mm         If you have any further questions or problems contact my office at  [phone].  Hoping to do well after your upcoming surgery and I enjoyed seeing you,    Tea Garvin MD  4:27 PM 12/20/2017

## 2018-07-24 NOTE — PROGRESS NOTES
Patient Information     Patient Name  Monisha Velez MRN  [id number] Sex  Female   1950      Letter by Tracey Eastman MD at      Author:  Tracey Eastman MD Service:  (none) Author Type:  (none)    Filed:   Encounter Date:  2017 Status:  (Other)           Monisha Velez  [address]  [facility] [address]          2017    Dear Ms. Velez:    This letter is to remind you that you are due for your annual exam with Tracey Eastman MD. Your last comprehensive visit was more than 12 months ago.    A LIMITED refill of   Orders Placed This Encounter      estradiol (ESTRACE) 0.5 mg tablet has been called into your pharmacy. Additional refills require you to complete this appointment.    Please call the clinic at [phone] to schedule your appointment.    If you should require additional refills before your scheduled appointment, please contact your pharmacy and we will refill your medication until the date of your appointment.    If you are no longer seeing Tracey Eastman MD for primary care, please call to let us know. Doing so will remove you from our call/contact list.      Thank you for choosing [facility] for your health care needs.    Sincerely,    Refill RN  [facility]

## 2018-08-24 ENCOUNTER — OFFICE VISIT (OUTPATIENT)
Dept: INTERNAL MEDICINE | Facility: OTHER | Age: 68
End: 2018-08-24
Attending: INTERNAL MEDICINE
Payer: OTHER GOVERNMENT

## 2018-08-24 VITALS
TEMPERATURE: 96.3 F | SYSTOLIC BLOOD PRESSURE: 134 MMHG | WEIGHT: 172.5 LBS | RESPIRATION RATE: 20 BRPM | HEART RATE: 72 BPM | BODY MASS INDEX: 29.45 KG/M2 | HEIGHT: 64 IN | DIASTOLIC BLOOD PRESSURE: 86 MMHG

## 2018-08-24 DIAGNOSIS — N30.00 ACUTE CYSTITIS WITHOUT HEMATURIA: ICD-10-CM

## 2018-08-24 DIAGNOSIS — R30.0 BURNING WITH URINATION: Primary | ICD-10-CM

## 2018-08-24 DIAGNOSIS — N89.8 VAGINAL LESION: ICD-10-CM

## 2018-08-24 LAB
ALBUMIN UR-MCNC: NEGATIVE MG/DL
APPEARANCE UR: CLEAR
BACTERIA #/AREA URNS HPF: ABNORMAL /HPF
BILIRUB UR QL STRIP: NEGATIVE
COLOR UR AUTO: YELLOW
GLUCOSE UR STRIP-MCNC: NEGATIVE MG/DL
HGB UR QL STRIP: NEGATIVE
KETONES UR STRIP-MCNC: NEGATIVE MG/DL
LEUKOCYTE ESTERASE UR QL STRIP: ABNORMAL
NITRATE UR QL: POSITIVE
NON-SQ EPI CELLS #/AREA URNS LPF: ABNORMAL /LPF
PH UR STRIP: 6 PH (ref 5–9)
RBC #/AREA URNS AUTO: ABNORMAL /HPF
SOURCE: ABNORMAL
SP GR UR STRIP: <1.005 (ref 1–1.03)
UROBILINOGEN UR STRIP-ACNC: 0.2 EU/DL (ref 0.2–1)
WBC #/AREA URNS AUTO: ABNORMAL /HPF

## 2018-08-24 PROCEDURE — 81001 URINALYSIS AUTO W/SCOPE: CPT | Performed by: INTERNAL MEDICINE

## 2018-08-24 PROCEDURE — 99213 OFFICE O/P EST LOW 20 MIN: CPT | Performed by: INTERNAL MEDICINE

## 2018-08-24 PROCEDURE — 87086 URINE CULTURE/COLONY COUNT: CPT | Performed by: INTERNAL MEDICINE

## 2018-08-24 PROCEDURE — 87088 URINE BACTERIA CULTURE: CPT | Performed by: INTERNAL MEDICINE

## 2018-08-24 RX ORDER — NITROFURANTOIN 25; 75 MG/1; MG/1
100 CAPSULE ORAL 2 TIMES DAILY
Qty: 14 CAPSULE | Refills: 0 | Status: SHIPPED | OUTPATIENT
Start: 2018-08-24 | End: 2018-09-13

## 2018-08-24 ASSESSMENT — ANXIETY QUESTIONNAIRES
GAD7 TOTAL SCORE: 0
6. BECOMING EASILY ANNOYED OR IRRITABLE: NOT AT ALL
5. BEING SO RESTLESS THAT IT IS HARD TO SIT STILL: NOT AT ALL
IF YOU CHECKED OFF ANY PROBLEMS ON THIS QUESTIONNAIRE, HOW DIFFICULT HAVE THESE PROBLEMS MADE IT FOR YOU TO DO YOUR WORK, TAKE CARE OF THINGS AT HOME, OR GET ALONG WITH OTHER PEOPLE: NOT DIFFICULT AT ALL
2. NOT BEING ABLE TO STOP OR CONTROL WORRYING: NOT AT ALL
7. FEELING AFRAID AS IF SOMETHING AWFUL MIGHT HAPPEN: NOT AT ALL
3. WORRYING TOO MUCH ABOUT DIFFERENT THINGS: NOT AT ALL
1. FEELING NERVOUS, ANXIOUS, OR ON EDGE: NOT AT ALL

## 2018-08-24 ASSESSMENT — PATIENT HEALTH QUESTIONNAIRE - PHQ9: 5. POOR APPETITE OR OVEREATING: NOT AT ALL

## 2018-08-24 NOTE — MR AVS SNAPSHOT
After Visit Summary   8/24/2018    Monisha Velez    MRN: [id number]           Patient Information     Date Of Birth          1950        Visit Information        Provider Department      8/24/2018 11:00 AM Raeann Pearson DO [facility]        Today's Diagnoses     Burning with urination    -  1    Perimenopausal vasomotor symptoms        Acute cystitis without hematuria        Vaginal lesion          Care Instructions    Your symptoms are most consistent with a bacterial bladder infection.    Take your antibiotics as prescribed. Increase water intake.May try cranberry juice daily to help prevent    Recommend eatingyogurt/kefir or taking probiotics 1-2 times daily while on antibiotics     To prevent UTI's I would recommend the following: urination after intercourse, avoidance of bubble baths, douching, avoid scentedunderware/pads/cosemetics    Call if symptoms do not improve in a couple days or if you develop any medication reactions.    General  - get extra rest  - drink plenty of fluid  - avoid tobacco products    You will need to be evaluated if you start to experience:   Fever higher than 102.5 F (39.2 C)   Worsening of current symptoms  Trouble breathing, chest pain or a stiff neck    * If you are a smoker, try to quit *                Follow-ups after your visit        Follow-up notes from your care team     Return if symptoms worsen or fail to improve.      Who to contact     If you have questions or need follow up information about today's clinic visit or your schedule please contact [facility] directly at [phone].  Normal or non-critical lab and imaging results will be communicated to you by MyChart, letter or phone within 4 business days after the clinic has received the results. If you do not hear from us within 7 days, please contact the clinic through MyChart or phone. If you have a critical or abnormal lab result, we will notify you by  "phone as soon as possible.  Submit refill requests through [employer] or call your pharmacy and they will forward the refill request to us. Please allow 3 business days for your refill to be completed.          Additional Information About Your Visit        [employer] Information     [employer] lets you send messages to your doctor, view your test results, renew your prescriptions, schedule appointments and more. To sign up, go to [email or web address].[email or web address]/[employer] . Click on \"Log in\" on the left side of the screen, which will take you to the Welcome page. Then click on \"Sign up Now\" on the right side of the page.     You will be asked to enter the access code listed below, as well as some personal information. Please follow the directions to create your username and password.     Your access code is: [email or web address]  Expires: 2018 11:42 AM     Your access code will  in 90 days. If you need help or a new code, please call your [facility] clinic or [phone].        Care EveryWhere ID     This is your Care EveryWhere ID. This could be used by other organizations to access your [facility] medical records  [id number]        Your Vitals Were     Pulse Temperature Respirations Height Breastfeeding? BMI (Body Mass Index)    72 96.3  F (35.7  C) 20 5' 4\" (1.626 m) No 29.61 kg/m2       Blood Pressure from Last 3 Encounters:   18 134/86   18 128/70   18 131/74    Weight from Last 3 Encounters:   18 172 lb 8 oz (78.2 kg)   18 171 lb 3.2 oz (77.7 kg)   18 172 lb 6.4 oz (78.2 kg)              We Performed the Following     *UA reflex to Microscopic     Urine Culture Aerobic Bacterial     Urine Microscopic          Today's Medication Changes          These changes are accurate as of 18 11:42 AM.  If you have any questions, ask your nurse or doctor.               Start taking these medicines.        Dose/Directions    nitroFURantoin (macrocrystal-monohydrate) 100 MG capsule   Commonly known as:  " MACROBID   Used for:  Acute cystitis without hematuria   Started by:  Raeann Pearson DO        Dose:  100 mg   Take 1 capsule (100 mg) by mouth 2 times daily   Quantity:  14 capsule   Refills:  0         Stop taking these medicines if you haven't already. Please contact your care team if you have questions.     pramipexole 0.125 MG tablet   Commonly known as:  MIRAPEX   Stopped by:  Raeann Pearson DO                Where to get your medicines      These medications were sent to [id number] - [place], [place] - [address], [facility] [address]     Phone:  [phone]     nitroFURantoin (macrocrystal-monohydrate) 100 MG capsule                Primary Care Provider Office Phone # Fax #    Tea Michelle Garvin [phone] [address] [facility] [address]        Equal Access to Services     AP[facility]NA [facility] AH: Hadii aad ku hadasho Soomaali, waaxda luqadaha, qaybta kaalmada adeegyada, radhika mullenin daven reynold tan . So [facility] [phone].    ATENCIÓN: Si surinderla lulu, tiene a olson disposición servicios gratuitos de asistencia lingüística. Llame al [phone].    We comply with applicable federal civil rights laws and [place] laws. We do not discriminate on the basis of race, color, national origin, age, disability, sex, sexual orientation, or gender identity.            Thank you!     Thank you for choosing [facility]  for your care. Our goal is always to provide you with excellent care. Hearing back from our patients is one way we can continue to improve our services. Please take a few minutes to complete the written survey that you may receive in the mail after your visit with us. Thank you!             Your Updated Medication List - Protect others around you: Learn how to safely use, store and throw away your medicines at www.disposemymeds.org.          This list is accurate as of 8/24/18  11:42 AM.  Always use your most recent med list.                   Brand Name Dispense Instructions for use Diagnosis    aspirin 81 MG EC tablet      Take 81 mg by mouth daily with food        CALCIUM 500 500-250-200 MG-MG-UNIT Tabs   Generic drug:  Calcium-Magnesium-Vitamin D      Take 2 tablets by mouth daily.        estradiol 0.5 MG tablet    ESTRACE    90 tablet    TAKE 1 TABLET BY MOUTH ONCE DAILY.    Perimenopausal vasomotor symptoms       melatonin 5 MG tablet      Take 2 tablets by mouth At Bedtime        MULTIVITAMIN PO      Take 1 tablet by mouth daily.        nitroFURantoin (macrocrystal-monohydrate) 100 MG capsule    MACROBID    14 capsule    Take 1 capsule (100 mg) by mouth 2 times daily    Acute cystitis without hematuria       oxybutynin chloride 15 MG Tb24    DITROPAN XL    90 tablet    Take 1 tablet (15 mg) by mouth daily    Urinary incontinence, unspecified type

## 2018-08-24 NOTE — PATIENT INSTRUCTIONS
Your symptoms are most consistent with a bacterial bladder infection.    Take your antibiotics as prescribed. Increase water intake.May try cranberry juice daily to help prevent    Recommend eatingyogurt/kefir or taking probiotics 1-2 times daily while on antibiotics     To prevent UTI's I would recommend the following: urination after intercourse, avoidance of bubble baths, douching, avoid scentedunderware/pads/cosemetics    Call if symptoms do not improve in a couple days or if you develop any medication reactions.    General  - get extra rest  - drink plenty of fluid  - avoid tobacco products    You will need to be evaluated if you start to experience:   Fever higher than 102.5 F (39.2 C)   Worsening of current symptoms  Trouble breathing, chest pain or a stiff neck    * If you are a smoker, try to quit *

## 2018-08-24 NOTE — NURSING NOTE
Patient presents to clinic for experiencing urinary frequency, pressure and burning.  Jenelle Guzman LPN............8/24/2018 11:13 AM

## 2018-08-24 NOTE — PROGRESS NOTES
"Chief Complaint   Patient presents with     Urinary Problem     frequency, pressure, pain with urination          Subjective:   Ms. Velez is a 68 year old female  seen for the acute concern today of ongoing urinary symptoms.    This has been present for several months.  It comes and goes.  She may feel better for a day or 2 and then gets worse again.  She has had urinary frequency, urgency and some incontinence.  She also has intermittent dysuria, low back pain and pelvic pressure.  She has had 2 urine samples done last year that did not show any abnormality.    She also has a vaginal lesion.  It is feels is harder than the regular mucosa.  She does not believe it has changed.  It has been present for at least a couple months.  It is just inside the vaginal opening.    She  reports that she quit smoking about 46 years ago. She smoked 0.50 packs per day. She has never used smokeless tobacco.      ROS:   Pertinent  ROS was performed and was negative, including for fevers, chills, constipation.  No other concerns, with exception of HPI above.      Objective:    /86 (BP Location: Left arm, Patient Position: Sitting, Cuff Size: Adult Regular)  Pulse 72  Temp 96.3  F (35.7  C)  Resp 20  Ht 5' 4\" (1.626 m)  Wt 172 lb 8 oz (78.2 kg)  Breastfeeding? No  BMI 29.61 kg/m2  GEN: Vitals reviewed.  Patient is in no acute distress. Cooperative with exam.  HEENT: Normocephalic atraumatic.  Pupils equally round.  No scleral icterus, no conjunctival erythema.   SKIN: Warm and dry to touch.  No rash on face, arms and legs.  EXT: No clubbing or cyanosis.  No peripheral edema.  : No flank pain, mild suprapubic tenderness.  No external lesions noted.  Area of concern is slightly hypopigmented normal mucosa consistent with scar tissue.    LABS: 8/24/2018 - Personally ordered/reviewed  Results for orders placed or performed in visit on 08/24/18   *UA reflex to Microscopic   Result Value Ref Range    Color Urine Yellow     " Appearance Urine Clear     Glucose Urine Negative NEG^Negative mg/dL    Bilirubin Urine Negative NEG^Negative    Ketones Urine Negative NEG^Negative mg/dL    Specific Gravity Urine <1.005 1.000 - 1.030    Blood Urine Negative NEG^Negative    pH Urine 6.0 5.0 - 9.0 pH    Protein Albumin Urine Negative NEG^Negative mg/dL    Urobilinogen Urine 0.2 0.2 - 1.0 EU/dL    Nitrite Urine Positive (A) NEG^Negative    Leukocyte Esterase Urine Trace (A) NEG^Negative    Source Midstream Urine    Urine Microscopic   Result Value Ref Range    WBC Urine 0 - 5 OTO5^0 - 5 /HPF    RBC Urine O - 2 OTO2^O - 2 /HPF    Squamous Epithelial /LPF Urine Few FEW^Few /LPF    Bacteria Urine Many (A) NEG^Negative /HPF           Assessment/Plan:   Burning with urination  see below  - *UA reflex to Microscopic  - Urine Microscopic    Acute cystitis without hematuria  - exam and UA consistent with cystitis  - will add on urine culture and adjust treatment as indicated  - will treat with antibiotics; encouraged intake of probiotics/yogurt while on antibiotics and just after  - educated on prevention including urination after intercourse, avoidance of bubble baths, douching, scented underware/pads/cosemetics  - may drink cranberry juice to help prevent  - Return/call as needed for follow-up should any new symptoms develop, for worsening of current symptoms or if symptoms do not resolve with above plan.  - Urine Culture Aerobic Bacterial  - nitroFURantoin, macrocrystal-monohydrate, (MACROBID) 100 MG capsule  Dispense: 14 capsule; Refill: 0    Vaginal lesion  -Reassurance given.  She is to call if there is any worsening       - Return/call as needed for follow-up should any new symptoms develop, for worsening of current symptoms or if symptoms do not resolve with above plan.    Follow up with me in 6 months to establish care.    Patient Instructions   Your symptoms are most consistent with a bacterial bladder infection.    Take your antibiotics as  prescribed. Increase water intake.May try cranberry juice daily to help prevent    Recommend eatingyogurt/kefir or taking probiotics 1-2 times daily while on antibiotics     To prevent UTI's I would recommend the following: urination after intercourse, avoidance of bubble baths, douching, avoid scentedunderware/pads/cosemetics    Call if symptoms do not improve in a couple days or if you develop any medication reactions.    General  - get extra rest  - drink plenty of fluid  - avoid tobacco products    You will need to be evaluated if you start to experience:   Fever higher than 102.5 F (39.2 C)   Worsening of current symptoms  Trouble breathing, chest pain or a stiff neck    * If you are a smoker, try to quit *             NATE DRUMMOND DO   8/24/2018 12:19 PM    This document was prepared using voice generated softwear. While every attempt was made for accuracy, grammatical errors may exist.

## 2018-08-25 ASSESSMENT — PATIENT HEALTH QUESTIONNAIRE - PHQ9: SUM OF ALL RESPONSES TO PHQ QUESTIONS 1-9: 0

## 2018-08-25 ASSESSMENT — ANXIETY QUESTIONNAIRES: GAD7 TOTAL SCORE: 0

## 2018-08-26 LAB
BACTERIA SPEC CULT: ABNORMAL
SPECIMEN SOURCE: ABNORMAL

## 2018-09-13 ENCOUNTER — HOSPITAL ENCOUNTER (OUTPATIENT)
Dept: GENERAL RADIOLOGY | Facility: OTHER | Age: 68
Discharge: HOME | End: 2018-09-13
Attending: FAMILY MEDICINE | Admitting: FAMILY MEDICINE
Payer: OTHER GOVERNMENT

## 2018-09-13 ENCOUNTER — TRANSFERRED RECORDS (OUTPATIENT)
Dept: HEALTH INFORMATION MANAGEMENT | Facility: OTHER | Age: 68
End: 2018-09-13

## 2018-09-13 ENCOUNTER — OFFICE VISIT (OUTPATIENT)
Dept: FAMILY MEDICINE | Facility: OTHER | Age: 68
End: 2018-09-13
Attending: FAMILY MEDICINE
Payer: OTHER GOVERNMENT

## 2018-09-13 VITALS
DIASTOLIC BLOOD PRESSURE: 80 MMHG | BODY MASS INDEX: 29.53 KG/M2 | WEIGHT: 173 LBS | SYSTOLIC BLOOD PRESSURE: 130 MMHG | HEART RATE: 75 BPM | RESPIRATION RATE: 22 BRPM | TEMPERATURE: 98.6 F | HEIGHT: 64 IN

## 2018-09-13 DIAGNOSIS — R39.15 URGENCY OF URINATION: Primary | ICD-10-CM

## 2018-09-13 DIAGNOSIS — N39.0 RECURRENT UTI: ICD-10-CM

## 2018-09-13 DIAGNOSIS — R05.9 COUGH: ICD-10-CM

## 2018-09-13 LAB
ALBUMIN UR-MCNC: NEGATIVE MG/DL
APPEARANCE UR: CLEAR
BILIRUB UR QL STRIP: NEGATIVE
COLOR UR AUTO: YELLOW
GLUCOSE UR STRIP-MCNC: NEGATIVE MG/DL
HGB UR QL STRIP: NEGATIVE
KETONES UR STRIP-MCNC: NEGATIVE MG/DL
LEUKOCYTE ESTERASE UR QL STRIP: NEGATIVE
NITRATE UR QL: NEGATIVE
PH UR STRIP: 6 PH (ref 5–9)
SOURCE: NORMAL
SP GR UR STRIP: <1.005 (ref 1–1.03)
UROBILINOGEN UR STRIP-ACNC: 0.2 EU/DL (ref 0.2–1)

## 2018-09-13 PROCEDURE — 81003 URINALYSIS AUTO W/O SCOPE: CPT | Performed by: FAMILY MEDICINE

## 2018-09-13 PROCEDURE — 99213 OFFICE O/P EST LOW 20 MIN: CPT | Performed by: FAMILY MEDICINE

## 2018-09-13 PROCEDURE — 71046 X-RAY EXAM CHEST 2 VIEWS: CPT

## 2018-09-13 PROCEDURE — 87086 URINE CULTURE/COLONY COUNT: CPT | Performed by: FAMILY MEDICINE

## 2018-09-13 RX ORDER — AZITHROMYCIN 250 MG/1
TABLET, FILM COATED ORAL
Qty: 6 TABLET | Refills: 0 | Status: SHIPPED | OUTPATIENT
Start: 2018-09-13 | End: 2018-10-08

## 2018-09-13 ASSESSMENT — ANXIETY QUESTIONNAIRES
IF YOU CHECKED OFF ANY PROBLEMS ON THIS QUESTIONNAIRE, HOW DIFFICULT HAVE THESE PROBLEMS MADE IT FOR YOU TO DO YOUR WORK, TAKE CARE OF THINGS AT HOME, OR GET ALONG WITH OTHER PEOPLE: SOMEWHAT DIFFICULT
5. BEING SO RESTLESS THAT IT IS HARD TO SIT STILL: SEVERAL DAYS
3. WORRYING TOO MUCH ABOUT DIFFERENT THINGS: NEARLY EVERY DAY
1. FEELING NERVOUS, ANXIOUS, OR ON EDGE: MORE THAN HALF THE DAYS
6. BECOMING EASILY ANNOYED OR IRRITABLE: SEVERAL DAYS
GAD7 TOTAL SCORE: 11
7. FEELING AFRAID AS IF SOMETHING AWFUL MIGHT HAPPEN: NOT AT ALL
2. NOT BEING ABLE TO STOP OR CONTROL WORRYING: NEARLY EVERY DAY

## 2018-09-13 ASSESSMENT — PATIENT HEALTH QUESTIONNAIRE - PHQ9: 5. POOR APPETITE OR OVEREATING: SEVERAL DAYS

## 2018-09-13 NOTE — PROGRESS NOTES
SUBJECTIVE:   There are no exam notes on file for this visit.    Monisha Velez is a 68 year old female who presents to clinic today for follow up.  Had a urinary tract infection on 8/24/18.  Urine culture grew E. Coli, which was pansensitive.  She has had recurrent utis.  Gets pain in her lower abdomen and low back.  She was treated with nitrofurantoin for 2 weeks.  She has been off of the antibiotics for a few days, but didn't feel like her symptoms got completely better.  She is having more abdominal bloating.  She has been having a bowel movement daily, which is soft and formed.    She also states that she has been having a cough for the past 8 weeks.  Her cough is paroxysmal at times.  She has had some gagging with this, but no vomiting.  No recent fever.  Minimal nasal symptoms.  No sore throat.  No known pertussis exposure.    HPI    I personally reviewed medications/allergies/history listed below:    Patient Active Problem List    Diagnosis Date Noted     Chronic insomnia 02/28/2018     Priority: Medium     Restless leg syndrome 02/28/2018     Priority: Medium     Reactive airway disease that is not asthma 11/11/2016     Priority: Medium     Fundic gastritis 03/18/2016     Priority: Medium     H/O adenomatous polyp of colon 03/18/2016     Priority: Medium     Gastroesophageal reflux disease without esophagitis 03/16/2016     Priority: Medium     Family history of malignant melanoma of skin 12/09/2015     Priority: Medium     Overview:   Father       Anxiety state 11/03/2011     Priority: Medium     Rosacea 09/29/2010     Priority: Medium     Past Medical History:   Diagnosis Date     Constipation     7/29/2011     Contact dermatitis     12/16/2009     Encounter for immunization     flu shot 2011, pneumovax 2011, zostavax 4/20/12     Female climacteric state     No Comments Provided     Other intra-abdominal and pelvic swelling, mass and lump     8/22/2011     Other specified postprocedural states      11/19/03     Personal history of other diseases of the nervous system and sense organs     Episode of mild diplopia secondary to sinusitis, ophthalmology consult 07/2008.     Postmenopausal atrophic vaginitis     7/29/2011     Retention of urine     8/22/2011     Tinea corporis     11/19/2010      Past Surgical History:   Procedure Laterality Date     COLONOSCOPY      10/07,Colonoscopy, done in [place], normal.     COLONOSCOPY  03/18/2016    Next in 5 years     CYSTOSCOPY      1/2013,[facility]     DENTAL SURGERY  2005    Root canal,  Dr. Olmos.     ENDOSCOPIC SINUS SURGERY  1993     ESOPHAGOSCOPY, GASTROSCOPY, DUODENOSCOPY (EGD), COMBINED      3/18/16,EGD     HYSTERECTOMY VAGINAL  1990    Endometriosis     OTHER SURGICAL HISTORY      [id number],OTHER,laproscopy, cystectomy and lysis of adhesions 9/27/11 by Dr. Gay Mcgovern  at [facility]     SALPINGO-OOPHORECTOMY BILATERAL      10/07,Laparoscopy bilateral salpingo-oophorectomy for bilateral ovarian serous cystadenofibromas, benign.     Family History   Problem Relation Age of Onset     Asthma Mother      Hypertension Mother      Diabetes Mother      Hypertension Father      Hypertension,Hypertension, melanoma in his 80's     Diabetes Sister      Diabetes Sister      Breast Cancer No family hx of      Cancer-breast     Social History   Substance Use Topics     Smoking status: Former Smoker     Packs/day: 0.50     Quit date: 8/1/1972     Smokeless tobacco: Never Used     Alcohol use No     Social History     Social History Narrative    [place]  The patient is retired.      Has one son and one daughter living in the [place].     She and her  are building a home, moved to the area in the spring of 2005 from [place].      Previously worked for [employer]        Her grand-daughter is living with her.       Current Outpatient Prescriptions   Medication Sig Dispense Refill     aspirin EC 81 MG EC tablet Take 81 mg by mouth daily  "with food       azithromycin (ZITHROMAX) 250 MG tablet Two tablets first day, then one tablet daily for four days. 6 tablet 0     Calcium-Magnesium-Vitamin D (CALCIUM 500) 500-250-200 MG-MG-UNIT TABS Take 2 tablets by mouth daily.       estradiol (ESTRACE) 0.5 MG tablet TAKE 1 TABLET BY MOUTH ONCE DAILY. 90 tablet 2     melatonin 5 MG tablet Take 2 tablets by mouth At Bedtime       Multiple Vitamin (MULTIVITAMIN OR) Take 1 tablet by mouth daily.       oxybutynin chloride (DITROPAN XL) 15 MG TB24 Take 1 tablet (15 mg) by mouth daily 90 tablet 3     Allergies   Allergen Reactions     Codeine Sulfate Other (See Comments)     nightmares     Morphine Sulfate Other (See Comments)     Cant sleep     Penicillins Hives     Sulfa Drugs Hives     Venlafaxine Hives     'bad reaction      Venlafaxine Hcl      Effexor     Zolpidem Tartrate      Paresthesias; stomach upset         Review of Systems   Constitutional: Negative for fever.   Respiratory: Positive for cough. Negative for wheezing.    Genitourinary: Positive for dysuria, frequency and urgency.   Psychiatric/Behavioral: The patient is not nervous/anxious.         OBJECTIVE:     /80 (BP Location: Right arm, Patient Position: Sitting, Cuff Size: Adult Regular)  Pulse 75  Temp 98.6  F (37  C) (Tympanic)  Resp 22  Ht 5' 4\" (1.626 m)  Wt 173 lb (78.5 kg)  Breastfeeding? No  BMI 29.7 kg/m2  Body mass index is 29.7 kg/(m^2).  Physical Exam   Constitutional: She appears well-developed.   HENT:   Head: Normocephalic.   Right Ear: External ear normal.   Left Ear: External ear normal.   Mouth/Throat: Oropharynx is clear and moist.   Eyes: Pupils are equal, round, and reactive to light.   Neck: Normal range of motion. Neck supple. No thyromegaly present.   Cardiovascular: Normal rate, regular rhythm and normal heart sounds.    No murmur heard.  Pulmonary/Chest: Effort normal and breath sounds normal. No respiratory distress. She has no wheezes. She has no rales. "   Abdominal: Soft. Bowel sounds are normal. She exhibits no distension and no mass. There is no tenderness. There is no rebound and no guarding.   Musculoskeletal: She exhibits no edema.   Lymphadenopathy:     She has no cervical adenopathy.   Psychiatric: She has a normal mood and affect.         PHQ-9 SCORE 11/10/2016 8/24/2018 9/13/2018   Total Score 4 0 4       PHQ-2 Score:     PHQ-2 ( 1999 [employer]) 9/13/2018 8/24/2018   Q1: Little interest or pleasure in doing things 0 0   Q2: Feeling down, depressed or hopeless 0 0   PHQ-2 Score 0 0       ELENA-7 SCORE 10/25/2017 8/24/2018 9/13/2018   Total Score 5 0 11         No flowsheet data found.      I personally reviewed results withpatient as listed below:   Diagnostic Test Results:  Results for orders placed or performed in visit on 09/13/18   UA reflex to Microscopic and Culture   Result Value Ref Range    Color Urine Yellow     Appearance Urine Clear     Glucose Urine Negative NEG^Negative mg/dL    Bilirubin Urine Negative NEG^Negative    Ketones Urine Negative NEG^Negative mg/dL    Specific Gravity Urine <1.005 1.000 - 1.030    Blood Urine Negative NEG^Negative    pH Urine 6.0 5.0 - 9.0 pH    Protein Albumin Urine Negative NEG^Negative mg/dL    Urobilinogen Urine 0.2 0.2 - 1.0 EU/dL    Nitrite Urine Negative NEG^Negative    Leukocyte Esterase Urine Negative NEG^Negative    Source Midstream Urine    Urine Culture Aerobic Bacterial   Result Value Ref Range    Specimen Description Midstream Urine     Culture Micro       Urine culture negative (no growth of uropathogens).   PROCEDURE:  XR CHEST 2 VW     HISTORY:  ; Cough.      COMPARISON:  11/10/2016     FINDINGS:   The cardiac silhouette is normal in size. The pulmonary vasculature is  normal.  The lungs are clear. No pleural effusion or pneumothorax.         IMPRESSION:  No acute cardiopulmonary disease.       MARIAH ANNE MD    ASSESSMENT/PLAN:       ICD-10-CM    1. Urgency of urination R39.15 UA reflex to  Microscopic and Culture     Urine Culture Aerobic Bacterial   2. Recurrent UTI N39.0 CT Urogram wo & w Contrast     CANCELED: CT Abdomen Pelvis w/o & w Contrast   3. Cough R05 XR Chest 2 Views     azithromycin (ZITHROMAX) 250 MG tablet       1.  UA was normal.  Urine culture sent.  Await culture before determination of any further antibiotics.  2.  With recurrent symptoms of UTI in the past several months as well as her abdominal symptoms, will obtain CT urogram for further evaluation.  Discussed potential referral to urology.  3.  Chest x-ray appears normal today.  With persistence of symptoms, recommended screening for pertussis.  Serology completed through the [place] [facility].  Treat empirically with Zithromax as noted above.  If not improving, needs to follow-up.    Vanessa Walters MD  [facility]

## 2018-09-13 NOTE — MR AVS SNAPSHOT
After Visit Summary   9/13/2018    Monisha Velez    MRN: [id number]           Patient Information     Date Of Birth          1950        Visit Information        Provider Department      9/13/2018 3:30 PM Vanessa Walters MD [facility]        Today's Diagnoses     Urgency of urination    -  1    Cough        Recurrent UTI           Follow-ups after your visit        Your next 10 appointments already scheduled     Feb 27, 2019  8:40 AM CST   Office Visit with Raeann Pearson DO   [facility] ([facility])    [address]  [place] [address] [phone]           Bring a current list of meds and any records pertaining to this visit. For Physicals, please bring immunization records and any forms needing to be filled out. Please arrive 10 minutes early to complete paperwork.              Future tests that were ordered for you today     Open Future Orders        Priority Expected Expires Ordered    XR Chest 2 Views Routine 9/13/2018 9/13/2019 9/13/2018    CT Urogram wo & w Contrast Routine  9/13/2019 9/13/2018            Who to contact     If you have questions or need follow up information about today's clinic visit or your schedule please contact [facility] directly at [phone].  Normal or non-critical lab and imaging results will be communicated to you by Screenmailerhart, letter or phone within 4 business days after the clinic has received the results. If you do not hear from us within 7 days, please contact the clinic through Screenmailerhart or phone. If you have a critical or abnormal lab result, we will notify you by phone as soon as possible.  Submit refill requests through [employer] or call your pharmacy and they will forward the refill request to us. Please allow 3 business days for your refill to be completed.          Additional Information About Your Visit        MyChart Information     [employer]  "lets you send messages to your doctor, view your test results, renew your prescriptions, schedule appointments and more. To sign up, go to [email or web address] . Click on \"Log in\" on the left side of the screen, which will take you to the Welcome page. Then click on \"Sign up Now\" on the right side of the page.     You will be asked to enter the access code listed below, as well as some personal information. Please follow the directions to create your username and password.     Your access code is: [email or web address]  Expires: 2018 11:42 AM     Your access code will  in 90 days. If you need help or a new code, please call your [facility] clinic or [phone].        Care EveryWhere ID     This is your Care EveryWhere ID. This could be used by other organizations to access your [facility] medical records  [id number]        Your Vitals Were     Pulse Temperature Respirations Height Breastfeeding? BMI (Body Mass Index)    75 98.6  F (37  C) (Tympanic) 22 5' 4\" (1.626 m) No 29.7 kg/m2       Blood Pressure from Last 3 Encounters:   18 130/80   18 134/86   18 128/70    Weight from Last 3 Encounters:   18 173 lb (78.5 kg)   18 172 lb 8 oz (78.2 kg)   18 171 lb 3.2 oz (77.7 kg)              We Performed the Following     UA reflex to Microscopic and Culture     Urine Culture Aerobic Bacterial          Today's Medication Changes          These changes are accurate as of 18  4:51 PM.  If you have any questions, ask your nurse or doctor.               Start taking these medicines.        Dose/Directions    azithromycin 250 MG tablet   Commonly known as:  ZITHROMAX   Used for:  Cough   Started by:  Vanessa Walters MD        Two tablets first day, then one tablet daily for four days.   Quantity:  6 tablet   Refills:  0            Where to get your medicines      These medications were sent to [facility] [address] [place] [address] [address]  [address] OF  [address], [facility] [address]     Phone:  [phone]     azithromycin 250 MG tablet                Primary Care Provider Office Phone # Fax #    Raeann Pearson [phone]       [address]  [facility] [address]        Equal Access to Services     KYLEE FORTE : Hadii aad ku hadasho Soomaali, waaxda luqadaha, qaybta kaalmada adeegyada, waxcristiano mckinley haymaeven adebairon moiseabiolashakila tan . So [facility] [phone].    ATENCIÓN: Si habla español, tiene a olson disposición servicios gratuitos de asistencia lingüística. Llame al [phone].    We comply with applicable federal civil rights laws and [place] laws. We do not discriminate on the basis of race, color, national origin, age, disability, sex, sexual orientation, or gender identity.            Thank you!     Thank you for choosing [facility]  for your care. Our goal is always to provide you with excellent care. Hearing back from our patients is one way we can continue to improve our services. Please take a few minutes to complete the written survey that you may receive in the mail after your visit with us. Thank you!             Your Updated Medication List - Protect others around you: Learn how to safely use, store and throw away your medicines at www.disposemymeds.org.          This list is accurate as of 9/13/18  4:51 PM.  Always use your most recent med list.                   Brand Name Dispense Instructions for use Diagnosis    aspirin 81 MG EC tablet      Take 81 mg by mouth daily with food        azithromycin 250 MG tablet    ZITHROMAX    6 tablet    Two tablets first day, then one tablet daily for four days.    Cough       CALCIUM 500 500-250-200 MG-MG-UNIT Tabs   Generic drug:  Calcium-Magnesium-Vitamin D      Take 2 tablets by mouth daily.        estradiol 0.5 MG tablet    ESTRACE    90 tablet    TAKE 1 TABLET BY MOUTH ONCE DAILY.    Perimenopausal vasomotor symptoms       melatonin 5 MG tablet       Take 2 tablets by mouth At Bedtime        MULTIVITAMIN PO      Take 1 tablet by mouth daily.        oxybutynin chloride 15 MG Tb24    DITROPAN XL    90 tablet    Take 1 tablet (15 mg) by mouth daily    Urinary incontinence, unspecified type

## 2018-09-14 ASSESSMENT — ANXIETY QUESTIONNAIRES: GAD7 TOTAL SCORE: 11

## 2018-09-14 ASSESSMENT — PATIENT HEALTH QUESTIONNAIRE - PHQ9: SUM OF ALL RESPONSES TO PHQ QUESTIONS 1-9: 4

## 2018-09-16 LAB
BACTERIA SPEC CULT: NORMAL
SPECIMEN SOURCE: NORMAL

## 2018-09-16 ASSESSMENT — ENCOUNTER SYMPTOMS
FREQUENCY: 1
DYSURIA: 1
WHEEZING: 0
FEVER: 0
NERVOUS/ANXIOUS: 0
COUGH: 1

## 2018-09-17 ENCOUNTER — TELEPHONE (OUTPATIENT)
Dept: FAMILY MEDICINE | Facility: OTHER | Age: 68
End: 2018-09-17

## 2018-09-17 NOTE — TELEPHONE ENCOUNTER
She is wondering about the pertussis serology done through the [facility].  I don't have those results back yet.  Hopefully should be back in the next couple of days.  Vanessa Walters MD on 9/17/2018 at 12:04 PM

## 2018-09-17 NOTE — TELEPHONE ENCOUNTER
I gave patient urine result. She is asking about the blood work . I do not see what was done. Kerline Thakur LPN ....................9/17/2018  11:52 AM

## 2018-09-17 NOTE — TELEPHONE ENCOUNTER
After birth date was verified, spoke with patient and let her know the below information. No further questions or concerns at this time.        Gerardo Singer LPN 09/17/18 12:08 PM

## 2018-09-19 ENCOUNTER — HOSPITAL ENCOUNTER (OUTPATIENT)
Dept: CT IMAGING | Facility: OTHER | Age: 68
Discharge: HOME | End: 2018-09-19
Attending: FAMILY MEDICINE | Admitting: FAMILY MEDICINE
Payer: OTHER GOVERNMENT

## 2018-09-19 DIAGNOSIS — N39.0 RECURRENT UTI: ICD-10-CM

## 2018-09-19 PROCEDURE — 25500064 ZZH RX 255 OP 636: Performed by: FAMILY MEDICINE

## 2018-09-19 PROCEDURE — 74178 CT ABD&PLV WO CNTR FLWD CNTR: CPT

## 2018-09-19 RX ADMIN — IOHEXOL 100 ML: 350 INJECTION, SOLUTION INTRAVENOUS at 13:14

## 2018-09-20 DIAGNOSIS — N39.0 RECURRENT UTI: Primary | ICD-10-CM

## 2018-09-28 ENCOUNTER — HOSPITAL ENCOUNTER (OUTPATIENT)
Dept: MAMMOGRAPHY | Facility: OTHER | Age: 68
Discharge: HOME | End: 2018-09-28
Attending: FAMILY MEDICINE | Admitting: FAMILY MEDICINE
Payer: OTHER GOVERNMENT

## 2018-09-28 DIAGNOSIS — Z12.31 VISIT FOR SCREENING MAMMOGRAM: ICD-10-CM

## 2018-09-28 DIAGNOSIS — Z00.00 PREVENTATIVE HEALTH CARE: ICD-10-CM

## 2018-09-28 PROCEDURE — 77063 BREAST TOMOSYNTHESIS BI: CPT

## 2018-10-03 ENCOUNTER — OFFICE VISIT (OUTPATIENT)
Dept: UROLOGY | Facility: OTHER | Age: 68
End: 2018-10-03
Attending: FAMILY MEDICINE
Payer: OTHER GOVERNMENT

## 2018-10-03 VITALS
HEART RATE: 64 BPM | DIASTOLIC BLOOD PRESSURE: 80 MMHG | SYSTOLIC BLOOD PRESSURE: 122 MMHG | RESPIRATION RATE: 18 BRPM | BODY MASS INDEX: 29.04 KG/M2 | WEIGHT: 169.2 LBS

## 2018-10-03 DIAGNOSIS — N39.41 URGE INCONTINENCE: Primary | ICD-10-CM

## 2018-10-03 PROCEDURE — 51798 US URINE CAPACITY MEASURE: CPT | Performed by: UROLOGY

## 2018-10-03 PROCEDURE — 99204 OFFICE O/P NEW MOD 45 MIN: CPT | Mod: 25 | Performed by: UROLOGY

## 2018-10-03 RX ORDER — TOLTERODINE 4 MG/1
4 CAPSULE, EXTENDED RELEASE ORAL DAILY
Qty: 90 CAPSULE | Refills: 3 | Status: SHIPPED | OUTPATIENT
Start: 2018-10-03 | End: 2018-12-12

## 2018-10-03 ASSESSMENT — PAIN SCALES - GENERAL: PAINLEVEL: MODERATE PAIN (5)

## 2018-10-03 NOTE — MR AVS SNAPSHOT
After Visit Summary   10/3/2018    Monisha Velez    MRN: [id number]           Patient Information     Date Of Birth          1950        Visit Information        Provider Department      10/3/2018 10:45 AM Oskar Ha MD [facility]        Today's Diagnoses     Urge incontinence    -  1       Follow-ups after your visit        Your next 10 appointments already scheduled     Oct 17, 2018  8:40 AM CDT   LAB with [facility] LAB   [facility]    [address] [facility] [address]   [phone]           Please do not eat 10-12 hours before your appointment if you are coming in fasting for labs on lipids, cholesterol, or glucose (sugar). This does not apply to pregnant women. Water, hot tea and black coffee (with nothing added) are okay. Do not drink other fluids, diet soda or chew gum.            Oct 17, 2018  9:00 AM CDT   Cystoscopy with Oskar Ha MD   [facility]    [address]  [place] [address]   [phone]            Feb 27, 2019  8:40 AM CST   Office Visit with Raeann Pearson DO   [facility]    [address]  [place] [address]   [phone]           Bring a current list of meds and any records pertaining to this visit. For Physicals, please bring immunization records and any forms needing to be filled out. Please arrive 10 minutes early to complete paperwork.              Who to contact     If you have questions or need follow up information about today's clinic visit or your schedule please contact [facility] directly at [phone].  Normal or non-critical lab and imaging results will be communicated to you by MyChart, letter or phone within 4 business days after the clinic has received the results. If you do not hear from us within 7  days, please contact the clinic through [employer] or phone. If you have a critical or abnormal lab result, we will notify you by phone as soon as possible.  Submit refill requests through [employer] or call your pharmacy and they will forward the refill request to us. Please allow 3 business days for your refill to be completed.          Additional Information About Your Visit        [employer] Information     [employer] gives you secure access to your electronic health record. If you see a primary care provider, you can also send messages to your care team and make appointments. If you have questions, please call your primary care clinic.  If you do not have a primary care provider, please call [phone] and they will assist you.        Care EveryWhere ID     This is your Care EveryWhere ID. This could be used by other organizations to access your [place] medical records  [id number]        Your Vitals Were     Pulse Respirations BMI (Body Mass Index)             64 18 29.04 kg/m2          Blood Pressure from Last 3 Encounters:   10/03/18 122/80   09/13/18 130/80   08/24/18 134/86    Weight from Last 3 Encounters:   10/03/18 76.7 kg (169 lb 3.2 oz)   09/13/18 78.5 kg (173 lb)   08/24/18 78.2 kg (172 lb 8 oz)              We Performed the Following     POST-VOID RESIDUAL BLADDER SCAN          Today's Medication Changes          These changes are accurate as of 10/3/18  2:40 PM.  If you have any questions, ask your nurse or doctor.               Start taking these medicines.        Dose/Directions    tolterodine 4 MG 24 hr capsule   Commonly known as:  DETROL LA   Used for:  Urge incontinence   Started by:  Oskar Ha MD        Dose:  4 mg   Take 1 capsule (4 mg) by mouth daily   Quantity:  90 capsule   Refills:  3            Where to get your medicines      These medications were sent to [id number] - [place], [place] - [address] AT [address] OF  [address], [facility] [address]      Phone:  [phone]     tolterodine 4 MG 24 hr capsule                Primary Care Provider Office Phone # Fax #    Raeann Pearson [phone] [email or web address]    [facility] [address]        Equal Access to Services     KYLEE FORTE : Hadii fam burgos moisésnoah Sokarisali, waaxda luqadaha, qaybta kaalmada adeegyada, radhika mckinley davealexandria moiseabiolashakila german. So [facility] [phone].    ATENCIÓN: Si habla español, tiene a olson disposición servicios gratuitos de asistencia lingüística. Llame al [phone].    We comply with applicable federal civil rights laws and [place] laws. We do not discriminate on the basis of race, color, national origin, age, disability, sex, sexual orientation, or gender identity.            Thank you!     Thank you for choosing [facility] AND [facility]  for your care. Our goal is always to provide you with excellent care. Hearing back from our patients is one way we can continue to improve our services. Please take a few minutes to complete the written survey that you may receive in the mail after your visit with us. Thank you!             Your Updated Medication List - Protect others around you: Learn how to safely use, store and throw away your medicines at www.disposemymeds.org.          This list is accurate as of 10/3/18  2:40 PM.  Always use your most recent med list.                   Brand Name Dispense Instructions for use Diagnosis    aspirin 81 MG EC tablet      Take 81 mg by mouth daily with food        azithromycin 250 MG tablet    ZITHROMAX    6 tablet    Two tablets first day, then one tablet daily for four days.    Cough       CALCIUM 500 500-250-200 MG-MG-UNIT Tabs   Generic drug:  Calcium-Magnesium-Vitamin D      Take 2 tablets by mouth daily.        estradiol 0.5 MG tablet    ESTRACE    90 tablet    TAKE 1 TABLET BY MOUTH ONCE DAILY.    Perimenopausal vasomotor symptoms       melatonin 5 MG tablet      Take 2 tablets by mouth At Bedtime         MULTIVITAMIN PO      Take 1 tablet by mouth daily.        oxybutynin chloride 15 MG Tb24    DITROPAN XL    90 tablet    Take 1 tablet (15 mg) by mouth daily    Urinary incontinence, unspecified type       tolterodine 4 MG 24 hr capsule    DETROL LA    90 capsule    Take 1 capsule (4 mg) by mouth daily    Urge incontinence

## 2018-10-03 NOTE — PROGRESS NOTES
Type of Visit  NPV    Chief Complaint  Frequent UTIs  Urinary urgency    HPI  Ms. Velez is a 68 year old female who presents with urinary urgency and frequent UTIs.  She was prescribed oxybutynin 15mg daily but dry mouth was a dose limiting side effect.  She currently denies dysuria, hematuria.  No bowel symptoms.  Urgency and frequency have been progressively worsening for the last 6 months.  She is highly motivated for effective management of her urinary symptoms.      Past Medical History  She  has a past medical history of Constipation; Contact dermatitis; Encounter for immunization; Female climacteric state; Other intra-abdominal and pelvic swelling, mass and lump; Other specified postprocedural states; Personal history of other diseases of the nervous system and sense organs; Postmenopausal atrophic vaginitis; Retention of urine; and Tinea corporis.  Patient Active Problem List   Diagnosis     Anxiety state     Family history of malignant melanoma of skin     Fundic gastritis     Gastroesophageal reflux disease without esophagitis     H/O adenomatous polyp of colon     Chronic insomnia     Reactive airway disease that is not asthma     Restless leg syndrome     Rosacea       Past Surgical History  She  has a past surgical history that includes Endoscopic sinus surgery (1993); Dental surgery (2005); Hysterectomy vaginal (1990); Colonoscopy; Salpingo-oophorectomy bilateral; other surgical history; Cystoscopy; Esophagoscopy, gastroscopy, duodenoscopy (EGD), combined; and Colonoscopy (03/18/2016).    Medications  She has a current medication list which includes the following prescription(s): aspirin, azithromycin, calcium-magnesium-vitamin d, estradiol, melatonin, multiple vitamins-minerals, and oxybutynin chloride.    Allergies  Allergies   Allergen Reactions     Codeine Sulfate Other (See Comments)     nightmares     Morphine Sulfate Other (See Comments)     Cant sleep     Penicillins Hives     Sulfa Drugs  Hives     Venlafaxine Hives     'bad reaction      Venlafaxine Hcl      Effexor     Zolpidem Tartrate      Paresthesias; stomach upset         Social History  She  reports that she quit smoking about 46 years ago. She smoked 0.50 packs per day. She has never used smokeless tobacco. She reports that she does not drink alcohol or use illicit drugs.  No drug abuse.    Family History  Family History   Problem Relation Age of Onset     Asthma Mother      Hypertension Mother      Diabetes Mother      Hypertension Father      Hypertension,Hypertension, melanoma in his 80's     Diabetes Sister      Diabetes Sister      Breast Cancer No family hx of      Cancer-breast       Review of Systems  I personally reviewed the ROS with the patient.    Nursing Notes:   Tessie Borden LPN  10/3/2018 10:54 AM  Signed  Pt presents to clinic for consult on recurrent UTI    Post-Void Residual  A post-void residual was measured by ultrasonic bladder scanner.  0 mL  .  Review of Systems:    Weight loss:    No     Recent fever/chills:  Yes   Night sweats:   Yes  Current skin rash:  No   Recent hair loss:  No  Heat intolerance:  No   Cold intolerance:  Yes  Chest pain:   No   Palpitations:   No  Shortness of breath:  No   Wheezing:   Yes  Constipation:    No   Diarrhea:   No   Nausea:   No   Vomiting:   No   Kidney/side pain:  Yes   Back pain:   Yes  Frequent headaches:  No   Dizziness:     No  Leg swelling:   Yes   Calf pain:    Yes    Parents, brothers or sisters with history of kidney cancer:   No  Parents, brothers or sisters with history of bladder cancer: No  Father or brother with history of prostate cancer:  No          Physical Exam  Vitals:    10/03/18 1042   BP: 122/80   BP Location: Left arm   Patient Position: Sitting   Cuff Size: Adult Regular   Pulse: 64   Resp: 18   Weight: 76.7 kg (169 lb 3.2 oz)     Constitutional: NAD, WDWN  Head: NCAT  Eyes: Conjunctivae normal  Cardiovascular: Regular rate  Pulmonary/Chest:  Respirations are even and non-labored bilaterally  Abdominal: Soft with no distension, tenderness, masses, guarding or CVA tenderness  Neurological: A + O x 3,  cranial nerves II-XII grossly intact  Extremities: FER x 4, warm, no clubbing, no cyanosis  Skin: Pink, warm, dry with no rash  Psychiatric:  Normal mood and affect  Genitourinary: nonpalpable bladder    Labs  Results for MARY VELEZ (MRN [id number]) as of 10/3/2018 10:54   9/13/2018 16:04   Color Urine Yellow   Appearance Urine Clear   Glucose Urine Negative   Bilirubin Urine Negative   Ketones Urine Negative   Specific Gravity Urine <1.005   pH Urine 6.0   Protein Albumin Urine Negative   Urobilinogen Urine 0.2   Nitrite Urine Negative   Blood Urine Negative   Leukocyte Esterase Urine Negative   Source Midstream Urine   Specimen Description Midstream Urine   Culture Micro Urine culture neg...       Imaging  I personally reviewed and interpreted the images and report.  CT Urogram  9/19/2018  IMPRESSION:    No finding to account for recurrent UTIs. Stable study compared to  1/10/13.    Post-Void Residual  A post-void residual was measured by ultrasonic bladder scanner.  0 mL    Assessment  Ms. Velez is a 68 year old female with frequent UTIs and unexplained acute irritative LUTS.  UA and PVR reviewed.  She has failed oxybutynin due to dry mouth.    The patient was informed of the most common side effects with oral anticholinergic medication such as dry mouth, dry eyes and constipation.    Plan  Start Detrol LA 4mg by mouth once daily.  Follow up in 2 weeks with a UA, PVR and cystoscopy

## 2018-10-03 NOTE — NURSING NOTE
Pt presents to clinic for consult on recurrent UTI    Post-Void Residual  A post-void residual was measured by ultrasonic bladder scanner.  0 mL  .  Review of Systems:    Weight loss:    No     Recent fever/chills:  Yes   Night sweats:   Yes  Current skin rash:  No   Recent hair loss:  No  Heat intolerance:  No   Cold intolerance:  Yes  Chest pain:   No   Palpitations:   No  Shortness of breath:  No   Wheezing:   Yes  Constipation:    No   Diarrhea:   No   Nausea:   No   Vomiting:   No   Kidney/side pain:  Yes   Back pain:   Yes  Frequent headaches:  No   Dizziness:     No  Leg swelling:   Yes   Calf pain:    Yes    Parents, brothers or sisters with history of kidney cancer:   No  Parents, brothers or sisters with history of bladder cancer: No  Father or brother with history of prostate cancer:  No

## 2018-10-08 ENCOUNTER — OFFICE VISIT (OUTPATIENT)
Dept: FAMILY MEDICINE | Facility: OTHER | Age: 68
End: 2018-10-08
Attending: PHYSICIAN ASSISTANT
Payer: OTHER GOVERNMENT

## 2018-10-08 VITALS
TEMPERATURE: 97.6 F | WEIGHT: 171.6 LBS | DIASTOLIC BLOOD PRESSURE: 112 MMHG | HEART RATE: 65 BPM | OXYGEN SATURATION: 96 % | BODY MASS INDEX: 29.46 KG/M2 | SYSTOLIC BLOOD PRESSURE: 152 MMHG | RESPIRATION RATE: 16 BRPM

## 2018-10-08 DIAGNOSIS — J01.90 ACUTE NON-RECURRENT SINUSITIS, UNSPECIFIED LOCATION: Primary | ICD-10-CM

## 2018-10-08 DIAGNOSIS — J20.9 ACUTE BRONCHITIS, UNSPECIFIED ORGANISM: ICD-10-CM

## 2018-10-08 PROCEDURE — 99213 OFFICE O/P EST LOW 20 MIN: CPT | Performed by: PHYSICIAN ASSISTANT

## 2018-10-08 RX ORDER — DOXYCYCLINE 100 MG/1
100 CAPSULE ORAL 2 TIMES DAILY
Qty: 20 CAPSULE | Refills: 0 | Status: SHIPPED | OUTPATIENT
Start: 2018-10-08 | End: 2018-10-17

## 2018-10-08 NOTE — PROGRESS NOTES
Nursing Notes:   Lien Hammond LPN  10/8/2018 10:44 AM  Signed  Patient presents to clinic with c/o cough and sinus pressure for over a week.  Kalyn Hammond LPN ...... 10/8/2018 10:36 AM        HPI:    Monisha Velez is a 68 year old female who presents for cough and sinus pressure for over a week. Coughing fits.  Chills.  Sinus pain.  Nasal drainage.  Right ear pain. ST. Diarrhea.  No wheezing or rattling.  No urinary symptoms.    Past Medical History:   Diagnosis Date     Constipation     7/29/2011     Contact dermatitis     12/16/2009     Encounter for immunization     flu shot 2011, pneumovax 2011, zostavax 4/20/12     Female climacteric state     No Comments Provided     Other intra-abdominal and pelvic swelling, mass and lump     8/22/2011     Other specified postprocedural states     11/19/03     Personal history of other diseases of the nervous system and sense organs     Episode of mild diplopia secondary to sinusitis, ophthalmology consult 07/2008.     Postmenopausal atrophic vaginitis     7/29/2011     Retention of urine     8/22/2011     Tinea corporis     11/19/2010       Past Surgical History:   Procedure Laterality Date     COLONOSCOPY      10/07,Colonoscopy, done in [place], normal.     COLONOSCOPY  03/18/2016    Next in 5 years     CYSTOSCOPY      1/2013, [facility]     DENTAL SURGERY  2005    Root canal,  Dr. Olmos.     ENDOSCOPIC SINUS SURGERY  1993     ESOPHAGOSCOPY, GASTROSCOPY, DUODENOSCOPY (EGD), COMBINED      3/18/16,EGD     HYSTERECTOMY VAGINAL  1990    Endometriosis     OTHER SURGICAL HISTORY      170036,OTHER,laproscopy, cystectomy and lysis of adhesions 9/27/11 by Dr. Gay Mcgovern  at [facility]     SALPINGO-OOPHORECTOMY BILATERAL      10/07,Laparoscopy bilateral salpingo-oophorectomy for bilateral ovarian serous cystadenofibromas, benign.       Family History   Problem Relation Age of Onset     Asthma Mother      Hypertension Mother      Diabetes  Mother      Hypertension Father      Hypertension,Hypertension, melanoma in his 80's     Diabetes Sister      Diabetes Sister      Breast Cancer No family hx of      Cancer-breast       Social History     Social History     Marital status:      Spouse name: N/A     Number of children: N/A     Years of education: N/A     Occupational History     Not on file.     Social History Main Topics     Smoking status: Former Smoker     Packs/day: 0.50     Quit date: 8/1/1972     Smokeless tobacco: Never Used     Alcohol use No     Drug use: No      Comment: Drug use: No     Sexual activity: Not on file     Other Topics Concern     Not on file     Social History Narrative    Sandor bear  The patient is retired.      Has one son and one daughter living in the [place].     She and her  are building a home, moved to the area in the spring of 2005 from [place].      Previously worked for [employer]        Her grand-daughter is living with her.         Current Outpatient Prescriptions   Medication Sig Dispense Refill     aspirin EC 81 MG EC tablet Take 81 mg by mouth daily with food       Calcium-Magnesium-Vitamin D (CALCIUM 500) 500-250-200 MG-MG-UNIT TABS Take 2 tablets by mouth daily.       doxycycline (VIBRAMYCIN) 100 MG capsule Take 1 capsule (100 mg) by mouth 2 times daily for 10 days 20 capsule 0     estradiol (ESTRACE) 0.5 MG tablet TAKE 1 TABLET BY MOUTH ONCE DAILY. 90 tablet 2     melatonin 5 MG tablet Take 2 tablets by mouth At Bedtime       Multiple Vitamin (MULTIVITAMIN OR) Take 1 tablet by mouth daily.       oxybutynin chloride (DITROPAN XL) 15 MG TB24 Take 1 tablet (15 mg) by mouth daily 90 tablet 3     tolterodine (DETROL LA) 4 MG 24 hr capsule Take 1 capsule (4 mg) by mouth daily 90 capsule 3       Allergies   Allergen Reactions     Codeine Sulfate Other (See Comments)     nightmares     Morphine Sulfate Other (See Comments)     Cant sleep     Penicillins Hives     Sulfa Drugs Hives      Venlafaxine Hives     'bad reaction      Venlafaxine Hcl      Effexor     Zolpidem Tartrate      Paresthesias; stomach upset         REVIEW OF SYSTEMS:  Refer to HPI.    EXAM:   Vitals:    BP (!) 152/112 (BP Location: Right arm, Patient Position: Sitting, Cuff Size: Adult Regular)  Pulse 65  Temp 97.6  F (36.4  C)  Resp 16  Wt 171 lb 9.6 oz (77.8 kg)  SpO2 96%  BMI 29.46 kg/m2    General Appearance: Pleasant, alert, appropriate appearance for age. No acute distress  Ear Exam: Normal TM's bilaterally, grey, translucent, bony landmarks appreciated.   Left/Right TM: Effusion is not present. TM is not bulging. There is no pus appreciated.    Normal auditory canals and external ears. Non-tender.  OroPharynx Exam:  Erythematous posterior pharynx with no exudates. Sinus pain upon palpation of frontal and maxillary sinuses.  Chest/Respiratory Exam: Normal chest wall and respirations. Clear to auscultation.  No wheezing or crackling appreciated.  No retractions appreciated.  Cardiovascular Exam: Regular rate and rhythm. S1, S2, no murmur, click, gallop, or rubs.  Lymphatic Exam: ACLN.  Skin: no rash or abnormalities  Psychiatric Exam: Alert and oriented - appropriate affect.    PHQ Depression Screen  PHQ-9 SCORE 11/10/2016 8/24/2018 9/13/2018   Total Score 4 0 4         ASSESSMENT AND PLAN:    1. Acute non-recurrent sinusitis, unspecified location    2. Acute bronchitis, unspecified organism        Patient was started on doxycycline for acute nonrecurrent sinusitis and acute bronchitis.  Return to clinic with change/worsening of symptoms.     Patient Instructions   Antibiotic has been sent to pharmacy. Please take full course of antibiotic even if symptoms have completely resolved. This helps prevent against antibiotic resistance.     Patient prescribed antibiotics. Monitor for any fevers or chills. Return in 7-10 days if not feeling better. Please call clinic with any questions or concerns. Please take in a lot of  fluids and get rest. Return with any change orworsening of symptoms.    May use symptomatic care with tylenol or ibuprofen. Sudafed or mucinex work well for congestion. May use cough syrup or cough drops.  Using a humidifier works well to break up the congestion. You can also sleep propped up on a couple pillows to decrease symptoms at night. A nettipot works well to decrease nasal congestion.    Use a Neti Pot/sinus flush (Toro Med Sinus Rinse) 3 times daily to irrigate sinuses/mucosal tissue.     Sudafed or mucinex work well for congestion.   If you choose pseudoephedrine, usefor only 5-7 days AS DIRECTED. Speak to your pharmacist if you have any concerns about your medications. May also use decongestant nasal spray, but only for 3 days MAXIMUM.    You will need to be evaluated if you start to experience:  Fever higher than 102.5 F (39.2 C)   Sudden and severe pain in the face and head   Trouble seeing or seeing double   Trouble thinking clearly   Swelling or redness around 1 or both eyes   Trouble breathing or a stiff neck    * If you are a smoker, try to quit *    Call 9-1-1 or go to the emergency room if you:  Have trouble breathing   Are drooling because you cannot swallow your saliva   Have swelling of the neck or tongue   Cannot move your neck or have trouble opening your mouth        Pricilla Clifford PA-C..................10/8/2018 10:43 AM

## 2018-10-08 NOTE — MR AVS SNAPSHOT
After Visit Summary   10/8/2018    Monisha Velez    MRN: [id number]           Patient Information     Date Of Birth          1950        Visit Information        Provider Department      10/8/2018 10:30 AM Pricilla Clifford PA-C [facility]        Today's Diagnoses     Acute non-recurrent sinusitis, unspecified location    -  1    Acute bronchitis, unspecified organism          Care Instructions    Antibiotic has been sent to pharmacy. Please take full course of antibiotic even if symptoms have completely resolved. This helps prevent against antibiotic resistance.     Patient prescribed antibiotics. Monitor for any fevers or chills. Return in 7-10 days if not feeling better. Please call clinic with any questions or concerns. Please take in a lot of fluids and get rest. Return with any change orworsening of symptoms.    May use symptomatic care with tylenol or ibuprofen. Sudafed or mucinex work well for congestion. May use cough syrup or cough drops.  Using a humidifier works well to break up the congestion. You can also sleep propped up on a couple pillows to decrease symptoms at night. A nettipot works well to decrease nasal congestion.    Use a Neti Pot/sinus flush (Toro Med Sinus Rinse) 3 times daily to irrigate sinuses/mucosal tissue.     Sudafed or mucinex work well for congestion.   If you choose pseudoephedrine, usefor only 5-7 days AS DIRECTED. Speak to your pharmacist if you have any concerns about your medications. May also use decongestant nasal spray, but only for 3 days MAXIMUM.    You will need to be evaluated if you start to experience:  Fever higher than 102.5 F (39.2 C)   Sudden and severe pain in the face and head   Trouble seeing or seeing double   Trouble thinking clearly   Swelling or redness around 1 or both eyes   Trouble breathing or a stiff neck    * If you are a smoker, try to quit *    Call 9-1-1 or go to the emergency room if you:  Have trouble  breathing   Are drooling because you cannot swallow your saliva   Have swelling of the neck or tongue   Cannot move your neck or have trouble opening your mouth            Follow-ups after your visit        Follow-up notes from your care team     Return if symptoms worsen or fail to improve.      Your next 10 appointments already scheduled     Oct 17, 2018  8:40 AM CDT   LAB with [facility]   [facility]    [address] [facility] [address]   [phone]           Please do not eat 10-12 hours before your appointment if you are coming in fasting for labs on lipids, cholesterol, or glucose (sugar). This does not apply to pregnant women. Water, hot tea and black coffee (with nothing added) are okay. Do not drink other fluids, diet soda or chew gum.            Oct 17, 2018  9:00 AM CDT   Cystoscopy with Oskar Ha MD   [facility] ([facility])    [address]  [place] [address]   [phone]            Feb 27, 2019  8:40 AM CST   Office Visit with Raeann Pearson DO   [facility] ([facility])    [address] [facility] [address]   [phone]           Bring a current list of meds and any records pertaining to this visit. For Physicals, please bring immunization records and any forms needing to be filled out. Please arrive 10 minutes early to complete paperwork.              Who to contact     If you have questions or need follow up information about today's clinic visit or your schedule please contact [facility] directly at [phone].  Normal or non-critical lab and imaging results will be communicated to you by MyChart, letter or phone within 4 business days after the clinic has received the results. If you do not hear from us within 7 days, please contact the clinic through MyChart or phone. If you have a  critical or abnormal lab result, we will notify you by phone as soon as possible.  Submit refill requests through [employer] or call your pharmacy and they will forward the refill request to us. Please allow 3 business days for your refill to be completed.          Additional Information About Your Visit        [employer] Information     [employer] gives you secure access to your electronic health record. If you see a primary care provider, you can also send messages to your care team and make appointments. If you have questions, please call your primary care clinic.  If you do not have a primary care provider, please call [phone] and they will assist you.        Care EveryWhere ID     This is your Care EveryWhere ID. This could be used by other organizations to access your [place] medical records  [id number]        Your Vitals Were     Pulse Temperature Respirations Pulse Oximetry BMI (Body Mass Index)       65 97.6  F (36.4  C) 16 96% 29.46 kg/m2        Blood Pressure from Last 3 Encounters:   10/08/18 (!) 152/112   10/03/18 122/80   09/13/18 130/80    Weight from Last 3 Encounters:   10/08/18 171 lb 9.6 oz (77.8 kg)   10/03/18 169 lb 3.2 oz (76.7 kg)   09/13/18 173 lb (78.5 kg)              Today, you had the following     No orders found for display         Today's Medication Changes          These changes are accurate as of 10/8/18 10:52 AM.  If you have any questions, ask your nurse or doctor.               Start taking these medicines.        Dose/Directions    doxycycline 100 MG capsule   Commonly known as:  VIBRAMYCIN   Used for:  Acute non-recurrent sinusitis, unspecified location, Acute bronchitis, unspecified organism   Started by:  Pricilla Clifford PA-C        Dose:  100 mg   Take 1 capsule (100 mg) by mouth 2 times daily for 10 days   Quantity:  20 capsule   Refills:  0         Stop taking these medicines if you haven't already. Please contact your care team if you have questions.     azithromycin  250 MG tablet   Commonly known as:  ZITHROMAX   Stopped by:  Pricilla Clifford PA-C                Where to get your medicines      These medications were sent to [id number] - [place], [place] - [address]  [address], [facility] [address]     Phone:  [phone]     doxycycline 100 MG capsule                Primary Care Provider Office Phone # Fax #    Raeann Pearson [phone]       [address]  [facility] [address]        Equal Access to Services     [facility]: Hadii aad ku hadasho Soomaali, waaxda luqadaha, qaybta kaalmada adeegyada, radhika tan . So [facility] [phone].    ATENCIÓN: Si habla español, tiene a olson disposición servicios gratuitos de asistencia lingüística. [facility] [phone].    We comply with applicable federal civil rights laws and [place] laws. We do not discriminate on the basis of race, color, national origin, age, disability, sex, sexual orientation, or gender identity.            Thank you!     Thank you for choosing [facility] [place] [facility] AND [facility]  for your care. Our goal is always to provide you with excellent care. Hearing back from our patients is one way we can continue to improve our services. Please take a few minutes to complete the written survey that you may receive in the mail after your visit with us. Thank you!             Your Updated Medication List - Protect others around you: Learn how to safely use, store and throw away your medicines at www.disposemymeds.org.          This list is accurate as of 10/8/18 10:52 AM.  Always use your most recent med list.                   Brand Name Dispense Instructions for use Diagnosis    aspirin 81 MG EC tablet      Take 81 mg by mouth daily with food        CALCIUM 500 500-250-200 MG-MG-UNIT Tabs   Generic drug:  Calcium-Magnesium-Vitamin D      Take 2 tablets by mouth daily.        doxycycline 100 MG capsule     VIBRAMYCIN    20 capsule    Take 1 capsule (100 mg) by mouth 2 times daily for 10 days    Acute non-recurrent sinusitis, unspecified location, Acute bronchitis, unspecified organism       estradiol 0.5 MG tablet    ESTRACE    90 tablet    TAKE 1 TABLET BY MOUTH ONCE DAILY.    Perimenopausal vasomotor symptoms       melatonin 5 MG tablet      Take 2 tablets by mouth At Bedtime        MULTIVITAMIN PO      Take 1 tablet by mouth daily.        oxybutynin chloride 15 MG Tb24    DITROPAN XL    90 tablet    Take 1 tablet (15 mg) by mouth daily    Urinary incontinence, unspecified type       tolterodine 4 MG 24 hr capsule    DETROL LA    90 capsule    Take 1 capsule (4 mg) by mouth daily    Urge incontinence

## 2018-10-08 NOTE — PATIENT INSTRUCTIONS
Antibiotic has been sent to pharmacy. Please take full course of antibiotic even if symptoms have completely resolved. This helps prevent against antibiotic resistance.     Patient prescribed antibiotics. Monitor for any fevers or chills. Return in 7-10 days if not feeling better. Please call clinic with any questions or concerns. Please take in a lot of fluids and get rest. Return with any change orworsening of symptoms.    May use symptomatic care with tylenol or ibuprofen. Sudafed or mucinex work well for congestion. May use cough syrup or cough drops.  Using a humidifier works well to break up the congestion. You can also sleep propped up on a couple pillows to decrease symptoms at night. A nettipot works well to decrease nasal congestion.    Use a Neti Pot/sinus flush (Toro Med Sinus Rinse) 3 times daily to irrigate sinuses/mucosal tissue.     Sudafed or mucinex work well for congestion.   If you choose pseudoephedrine, usefor only 5-7 days AS DIRECTED. Speak to your pharmacist if you have any concerns about your medications. May also use decongestant nasal spray, but only for 3 days MAXIMUM.    You will need to be evaluated if you start to experience:  Fever higher than 102.5 F (39.2 C)   Sudden and severe pain in the face and head   Trouble seeing or seeing double   Trouble thinking clearly   Swelling or redness around 1 or both eyes   Trouble breathing or a stiff neck    * If you are a smoker, try to quit *    Call 9-1-1 or go to the emergency room if you:  Have trouble breathing   Are drooling because you cannot swallow your saliva   Have swelling of the neck or tongue   Cannot move your neck or have trouble opening your mouth

## 2018-10-08 NOTE — NURSING NOTE
Patient presents to clinic with c/o cough and sinus pressure for over a week.  Kalyn Hammond LPN ...... 10/8/2018 10:36 AM

## 2018-10-15 ENCOUNTER — TELEPHONE (OUTPATIENT)
Dept: LAB | Facility: OTHER | Age: 68
End: 2018-10-15

## 2018-10-15 DIAGNOSIS — R30.0 DYSURIA: ICD-10-CM

## 2018-10-15 DIAGNOSIS — N39.0 FREQUENT UTI: Primary | ICD-10-CM

## 2018-10-15 NOTE — TELEPHONE ENCOUNTER
"Pt.'s last office visit with Oskar Ha MD was on 10/3/18 and note states:  \"Plan  Start Detrol LA 4mg by mouth once daily.  Follow up in 2 weeks with a UA, PVR and cystoscopy\"  To MD to sign order.  Serenity Núñez RN.........10/15/2018...11:25 AM   "

## 2018-10-17 ENCOUNTER — APPOINTMENT (OUTPATIENT)
Dept: LAB | Facility: OTHER | Age: 68
End: 2018-10-17
Attending: INTERNAL MEDICINE
Payer: OTHER GOVERNMENT

## 2018-10-17 ENCOUNTER — OFFICE VISIT (OUTPATIENT)
Dept: UROLOGY | Facility: OTHER | Age: 68
End: 2018-10-17
Attending: UROLOGY
Payer: OTHER GOVERNMENT

## 2018-10-17 VITALS — TEMPERATURE: 96.8 F | BODY MASS INDEX: 29.35 KG/M2 | WEIGHT: 171 LBS | HEART RATE: 84 BPM

## 2018-10-17 DIAGNOSIS — N39.41 URGE INCONTINENCE: ICD-10-CM

## 2018-10-17 DIAGNOSIS — R31.29 MICROSCOPIC HEMATURIA: Primary | ICD-10-CM

## 2018-10-17 PROCEDURE — 52000 CYSTOURETHROSCOPY: CPT | Performed by: UROLOGY

## 2018-10-17 PROCEDURE — 99213 OFFICE O/P EST LOW 20 MIN: CPT | Mod: 25 | Performed by: UROLOGY

## 2018-10-17 ASSESSMENT — PAIN SCALES - GENERAL: PAINLEVEL: MODERATE PAIN (5)

## 2018-10-17 NOTE — MR AVS SNAPSHOT
After Visit Summary   10/17/2018    Monisha Velez    MRN: [id number]           Patient Information     Date Of Birth          1950        Visit Information        Provider Department      10/17/2018 9:00 AM Oskar Ha MD [facility]        Today's Diagnoses     Microscopic hematuria    -  1      Care Instructions    Home Care after Cystoscopy  Follow these guidelines for your care after your procedure.    Activity  No limitations    Bathing or showering  No limitations    Symptoms  You may notice some burning with urination but this usually resolves after 1-2 days.  You may also notice small amounts of blood in your urine.  Please increase water intake for the next few days to help with these symptoms.    Contacts  General Questions: [phone]  Appointments:  [phone]  Emergencies:  [phone]    When to call the clinic  If you develop any of the following symptoms please call the clinic immediately.  If the clinic is closed please be seen at an urgent care clinic or the Emergency Department.  - Burning with urination that worsens after 2 days  - Unable to urinate causing severe pelvic pain  - Fevers of greater than 101 degrees F  - Flank pain that is not responding to pain medication    Follow up  Please follow up as discussed at the appointment.          Follow-ups after your visit        Your next 10 appointments already scheduled     Oct 17, 2018  9:00 AM CDT   Cystoscopy with Oskar Ha MD   [facility]    [address]  [place] [address]   [phone]            Feb 27, 2019  8:40 AM CST   Office Visit with Raeann Pearson DO   [facility]    [address]  [place] [address]   [phone]           Bring a current list of meds and any records pertaining to this visit. For Physicals, please bring immunization records and  any forms needing to be filled out. Please arrive 10 minutes early to complete paperwork.              Who to contact     If you have questions or need follow up information about today's clinic visit or your schedule please contact [facility] directly at [phone].  Normal or non-critical lab and imaging results will be communicated to you by [employer], letter or phone within 4 business days after the clinic has received the results. If you do not hear from us within 7 days, please contact the clinic through [employer] or phone. If you have a critical or abnormal lab result, we will notify you by phone as soon as possible.  Submit refill requests through [employer] or call your pharmacy and they will forward the refill request to us. Please allow 3 business days for your refill to be completed.          Additional Information About Your Visit        [employer] Information     [employer] gives you secure access to your electronic health record. If you see a primary care provider, you can also send messages to your care team and make appointments. If you have questions, please call your primary care clinic.  If you do not have a primary care provider, please call [phone] and they will assist you.        Care EveryWhere ID     This is your Care EveryWhere ID. This could be used by other organizations to access your [place] medical records  [id number]        Your Vitals Were     Pulse Temperature BMI (Body Mass Index)             84 96.8  F (36  C) (Tympanic) 29.35 kg/m2          Blood Pressure from Last 3 Encounters:   10/08/18 (!) 152/112   10/03/18 122/80   09/13/18 130/80    Weight from Last 3 Encounters:   10/17/18 77.6 kg (171 lb)   10/08/18 77.8 kg (171 lb 9.6 oz)   10/03/18 76.7 kg (169 lb 3.2 oz)              We Performed the Following     CYSTOURETHROSCOPY        Primary Care Provider Office Phone # Fax #    Raeann Pearson [phone] [address]    [facility] [address]        Equal Access to Services     [facility] : Hadii fam burgos flavio Schmidt, wakristinda luqgregory, qanickolasta katarajessy dc, radhika moiseabiolashakila german. So [facility] [phone].    ATENCIÓN: Si habla español, tiene a olson disposición servicios gratuitos de asistencia lingüística. Llame al [phone].    We comply with applicable federal civil rights laws and [place] laws. We do not discriminate on the basis of race, color, national origin, age, disability, sex, sexual orientation, or gender identity.            Thank you!     Thank you for choosing [facility] [place]  for your care. Our goal is always to provide you with excellent care. Hearing back from our patients is one way we can continue to improve our services. Please take a few minutes to complete the written survey that you may receive in the mail after your visit with us. Thank you!             Your Updated Medication List - Protect others around you: Learn how to safely use, store and throw away your medicines at www.disposemymeds.org.          This list is accurate as of 10/17/18  8:54 AM.  Always use your most recent med list.                   Brand Name Dispense Instructions for use Diagnosis    aspirin 81 MG EC tablet      Take 81 mg by mouth daily with food        CALCIUM 500 500-250-200 MG-MG-UNIT Tabs   Generic drug:  Calcium-Magnesium-Vitamin D      Take 2 tablets by mouth daily.        DOXYCYCLINE HYCLATE PO      Take 100 mg by mouth 2 times daily        estradiol 0.5 MG tablet    ESTRACE    90 tablet    TAKE 1 TABLET BY MOUTH ONCE DAILY.    Perimenopausal vasomotor symptoms       melatonin 5 MG tablet      Take 2 tablets by mouth At Bedtime        MULTIVITAMIN PO      Take 1 tablet by mouth daily.        oxybutynin chloride 15 MG Tb24    DITROPAN XL    90 tablet    Take 1 tablet (15 mg) by mouth daily    Urinary incontinence, unspecified type       tolterodine 4 MG 24 hr capsule    DETROL LA    90  capsule    Take 1 capsule (4 mg) by mouth daily    Urge incontinence

## 2018-10-17 NOTE — PROGRESS NOTES
Type of Visit  EST    Chief Complaint  Frequent UTIs  Urinary urgency    HPI  Ms. Velez is a 68 year old female who follows up with urinary urgency and frequent UTIs.  She was prescribed oxybutynin 15mg daily but dry mouth was a dose limiting side effect.  She currently denies dysuria, hematuria.  No bowel symptoms.  Urgency and frequency have been progressively worsening for the last 6 months.  At the last visit 2 weeks ago she was prescribed Detrol LA 4 mg once daily.  Since starting the medication she reports about 65% improvement in symptoms.  Unfortunately since starting the medication she has had daily headaches.  She also complains of low-grade intermittent bilateral pelvic pain.  She denies constipation.      Family History  Family History   Problem Relation Age of Onset     Asthma Mother      Hypertension Mother      Diabetes Mother      Hypertension Father      Hypertension,Hypertension, melanoma in his 80's     Diabetes Sister      Diabetes Sister      Breast Cancer No family hx of      Cancer-breast       Review of Systems  I personally reviewed the ROS with the patient.    Weight loss:    No     Recent fever/chills:  No   night sweats:   No  Current skin rash:  No   Recent hair loss:  No  Heat intolerance:  No   Cold intolerance:  Yes  Chest pain:   No   Palpitations:   No  Shortness of breath:  No   Wheezing:   Yes  Constipation:    No   Diarrhea:   No   Nausea:   No   Vomiting:   No   Kidney/side pain:  Yes   back pain:   Yes  Frequent headaches:  No   Dizziness:     No  Leg swelling:   Yes   Calf pain:    Yes    Nursing Notes:   Serenity Núñez RN  10/17/2018  9:21 AM  Signed  Patient positioned in frog leg position prior to Oskar Ha MD prepping patient with chlorhexidine gluconate cleanser.    [place] Protocol    A. Pre-procedure verification complete Yes   1-relevant information / documentation available, reviewed and properly matched to the patient; 2-consent accurate and complete,  3-equipment and supplies available    B. Site marking complete N/A  Site marked if not in continuous attendance with patient    C. TIME OUT completed Yes  Time Out was conducted just prior to starting procedure to verify the eight required elements: 1-patient identity, 2-consent accurate and complete, 3-position, 4-correct side/site marked (if applicable), 5-procedure, 6-relevant images / results properly labeled and displayed (if applicable), 7-antibiotics / irrigation fluids (if applicable), 8-safety precautions.    After procedure perineum area rinsed. Discharge instructions reviewed with patient. Patient verbalized understanding of discharge instructions and discharged ambulatory.  Serenity Núñez..................10/17/2018  9:10 AM      Physical Exam  Vitals:    10/17/18 0846   Pulse: 84   Temp: 96.8  F (36  C)   TempSrc: Tympanic   Weight: 77.6 kg (171 lb)     Constitutional: NAD, WDWN  Head: NCAT  Eyes: Conjunctivae normal  Cardiovascular: Regular rate  Pulmonary/Chest: Respirations are even and non-labored bilaterally  Abdominal: Soft with no distension, tenderness, masses, guarding or CVA tenderness  Neurological: A + O x 3,  cranial nerves II-XII grossly intact  Extremities: FER x 4, warm, no clubbing, no cyanosis  Skin: Pink, warm, dry with no rash  Psychiatric:  Normal mood and affect  Genitourinary: nonpalpable bladder    ^^^^^^^^^^^^^^^^^^^^^^^^^^^^^^^^^^^^^^^^^^^^^^^^^^^^^^^^^^^^^^^    Preprocedure diagnosis  Hematuria    Postprocedure diagnosis  Hematuria    Procedure  Flexible Cystourethroscopy    Surgeon  Oskar Ha MD    Anesthesia  2% lidocaine jelly intraurethrally    Complications  None    Indications  68 year old female undergoing a flexible cystoscopy for the above mentioned indications.    Findings  Cystoscopic findings included a normal urethra.    The bladder appeared to be normal capacity.    There were no tumors, stones or foreign bodies.    The orifices were slit-shaped and in their  normal location.    Procedure  The patient was placed in supine position and prepped and draped in sterile fashion with lidocaine jelly per urethra for anesthesia.    I passed a lubricated 14F flexible cystoscope through the urethra and into the bladder and the bladder was completely visualized.  The cystoscope was retroflexed and the bladder neck visualized.    The cystoscope was slowly withdrawn while visualizing the urethra and the procedure terminated.    The patient tolerated the procedure well.      ^^^^^^^^^^^^^^^^^^^^^^^^^^^^^^^^^^^^^^^^^^^^^^^^^^^^^^^^^^^^^^^    Labs  Results for MARY VELEZ (MRN [id number]) as of 10/3/2018 10:54   9/13/2018 16:04   Color Urine Yellow   Appearance Urine Clear   Glucose Urine Negative   Bilirubin Urine Negative   Ketones Urine Negative   Specific Gravity Urine <1.005   pH Urine 6.0   Protein Albumin Urine Negative   Urobilinogen Urine 0.2   Nitrite Urine Negative   Blood Urine Negative   Leukocyte Esterase Urine Negative   Source Midstream Urine   Specimen Description Midstream Urine   Culture Micro Urine culture neg...       Imaging  I personally reviewed and interpreted the images and report.  CT Urogram  9/19/2018  IMPRESSION:    No finding to account for recurrent UTIs. Stable study compared to  1/10/13.    Post-Void Residual  A post-void residual was measured by ultrasonic bladder scanner.  0 mL (previously recorded)    Assessment  Ms. Velez is a 68 year old female with urinary urgency and urge incontinence.  UA and PVR previously reviewed.  Fortunately she has now failed to anticholinergic medications.  We discussed various treatment options including conservative approach, alternative medications as well as third line therapies such as Botox, InterStim and PTNM.    She does also have some bilateral pelvic pain.    Because of this I recommended physical therapy.    Plan  Discontinue Detrol LA 4mg by mouth once daily.  Up with me as needed.

## 2018-10-17 NOTE — PATIENT INSTRUCTIONS

## 2018-10-17 NOTE — NURSING NOTE
Patient positioned in frog leg position prior to Oskar Ha MD prepping patient with chlorhexidine gluconate cleanser.    [place] Protocol    A. Pre-procedure verification complete Yes   1-relevant information / documentation available, reviewed and properly matched to the patient; 2-consent accurate and complete, 3-equipment and supplies available    B. Site marking complete N/A  Site marked if not in continuous attendance with patient    C. TIME OUT completed Yes  Time Out was conducted just prior to starting procedure to verify the eight required elements: 1-patient identity, 2-consent accurate and complete, 3-position, 4-correct side/site marked (if applicable), 5-procedure, 6-relevant images / results properly labeled and displayed (if applicable), 7-antibiotics / irrigation fluids (if applicable), 8-safety precautions.    After procedure perineum area rinsed. Discharge instructions reviewed with patient. Patient verbalized understanding of discharge instructions and discharged ambulatory.  Serenity Núñez..................10/17/2018  9:10 AM

## 2018-11-06 ENCOUNTER — HOSPITAL ENCOUNTER (OUTPATIENT)
Dept: PHYSICAL THERAPY | Facility: OTHER | Age: 68
Setting detail: THERAPIES SERIES
End: 2018-11-06
Attending: UROLOGY
Payer: OTHER GOVERNMENT

## 2018-11-06 DIAGNOSIS — N39.41 URGE INCONTINENCE: ICD-10-CM

## 2018-11-06 PROCEDURE — 97162 PT EVAL MOD COMPLEX 30 MIN: CPT | Mod: GP

## 2018-11-06 PROCEDURE — G8990 OTHER PT/OT CURRENT STATUS: HCPCS | Mod: GP,CN

## 2018-11-06 PROCEDURE — G8991 OTHER PT/OT GOAL STATUS: HCPCS | Mod: GP,CK

## 2018-11-06 PROCEDURE — 97110 THERAPEUTIC EXERCISES: CPT | Mod: GP

## 2018-11-06 PROCEDURE — 40000185 ZZHC STATISTIC PT OUTPT VISIT

## 2018-11-06 NOTE — PROGRESS NOTES
11/06/18 0800   General Information   Type of Visit Initial OP Ortho PT Evaluation   Start of Care Date 11/06/18   Referring Physician Oskar Ha MD   Orders Evaluate and Treat   Date of Order 10/17/18   Insurance Type [employer]   Medical Diagnosis urge incontinence   Surgical/Medical history reviewed Yes   Body Part(s)   Body Part(s) Pelvic Floor Dysfunction   Presentation and Etiology   Pertinent history of current problem (include personal factors and/or comorbidities that impact the POC) Long history of urge and stress incontinence. Trialed anticholenergics but unltimately failed. Leakage occurs with urgency, coughing. Recently had a bad cold with cough, would need to change her clothes many times per day. Limited water intake. Also dealing with pain in lower abdomen, vague, cramp like. Also dealing with low back pain.    Impairments A. Pain;P. Bowel or bladder problems   Functional Limitations perform activities of daily living;perform desired leisure / sports activities   Onset date of current episode/exacerbation 10/17/18   Chronicity Chronic   Pain rating (0-10 point scale) Best (/10)   Best (/10) 1   Pain quality C. Aching;G. Cramping   Frequency of pain/symptoms B. Intermittent   Pain/symptoms exacerbated by B. Walking;C. Lifting;D. Carrying;F. Nothing;G. Certain positions;I. Bending;J. ADL;K. Home tasks   Pain/symptoms eased by C. Rest;E. Changing positions   Progression of symptoms since onset: Worsened   Prior Level of Function   Prior Level of Function-Mobility no limitations   Prior Level of Function-ADLs limited bladder control   Current Level of Function   Patient role/employment history F. Retired   Fall Risk Screen   Fall screen completed by PT   Have you fallen 2 or more times in the past year? No   Have you fallen and had an injury in the past year? No   Is patient a fall risk? No   Specific Questions   Specific Questions Pelvic Floor Dysfunction;Women's Health   Pelvic Floor Dysfunction Questions  "  Regular exercise No  (has been thinking about walking on treadmill. )   Fluid intake-glasses/day (one glass/cup = 8oz minimal, will complete diary   How long can you delay the need to urinate?  cannot   How often do you urinate during the day?   will complete diary but at least 20 times   Can you stop the flow of urine when on the toilet?  No   Is the volume of urine passed usually  Medium   Do you have the sensation that you need to go to the toilet?  Yes   Do you empty your bladder frequently, before you experience the urge to pass urine?  Yes   Do you have \"triggers\" that make you feel you can't wait to go to the toilet?  Yes   Number of bladder infections last year?  1   Frequency of bowel movements:  daily   Consistency of stool?  Soft formed   Do you ignore the urge to defecate?  No   Women's Health Questions   Number of pregnancies  2   Number of vaginal deliveries  2   Number of  section deliveries  0   Weight of largest baby  8#   Number of episiotomies  2 tears   Pelvic Floor Dysfunction Objective Findings   Pain-pelvic dysfunction Pelvic pain   Type of Storage Problem frequency;urgency;urge incontinence;stress incontinence;mixed   Power (MMT at Levator Ani) 3   Endurance (Up to 10 seconds as long as still 50% power) 2 seconds   Repetitions (Contract 10 seconds or MVC, rest 4 seconds and count max number of reps) 5   Fast Twitch (Number of 1 second contractions can do in 10 seconds. Norm=7 reps) 4   Elevation (Able to lift posterior vaginal wall toward head and public bone) Present  (noted co contraction of lower abs and PFM, no cough reflex )   Medications Meds for urge incontinence   Pelvic lower abdomen, pelvic floor, hip adductors   Planned Therapy Interventions   Planned Therapy Interventions joint mobilization;manual therapy;neuromuscular re-education   Planned Therapy Interventions Comment will need to complete assessment of lumbar spine and pelvic mechanics, myofascial restrictions in " abdomen, strength assessment of hips   Planned Modality Interventions   Planned Modality Interventions Biofeedback   Clinical Impression   Criteria for Skilled Therapeutic Interventions Met yes, treatment indicated   PT Diagnosis pelvic floor muscle weakness, low back pain, mixed incontinence   Influenced by the following impairments pain, urgency, frequency, muscle weakness, low back pain   Functional limitations due to impairments loss of bladder control during daytime functional tasks, back pain with day time functional tasks   Clinical Presentation Evolving/Changing   Clinical Presentation Rationale now has lower abdomen pain and cramping   Clinical Decision Making (Complexity) Moderate complexity   Therapy Frequency 1 time/week   Predicted Duration of Therapy Intervention (days/wks) 12 weeks   Risk & Benefits of therapy have been explained Yes   Patient, Family & other staff in agreement with plan of care Yes   Clinical Impression Comments Chronic urge and stress incontinence with history of RAYNE-BSO, labor trauma, endometriosis, abdominal surgeries.    ORTHO GOALS   PT Ortho Eval Goals 1;2;3;4   Ortho Goal 1   Goal Identifier urge   Goal Description Patient to report control of urgency during daytime.    Target Date 01/29/19   Ortho Goal 2   Goal Identifier PFM MMT   Goal Description Patient to have improved MMT of PFM to at least 4/5 with endurance of greater than 5 seconds to aid in bladder control during day time tasks.   Target Date 01/29/19   Ortho Goal 3   Goal Identifier tissue loading   Goal Description Patient to have equal tissue loading through trunk and pelvis to aid in normal mechanics of lumbar spine and pelvis to reduce low back and pelvic pain.   Target Date 01/29/19   Ortho Goal 4   Goal Identifier HEP   Goal Description Patient to be compliant with HEP for self management of symptoms.   Target Date 01/29/19   Total Evaluation Time   Total Evaluation Time 20

## 2018-11-06 NOTE — PROGRESS NOTES
[facility]          OUTPATIENT PHYSICAL THERAPY ORTHOPEDIC EVALUATION  PLAN OF TREATMENT FOR OUTPATIENT REHABILITATION  (COMPLETE FOR INITIAL CLAIMS ONLY)  Patient's Last Name, First Name, M.I.  YOB: 1950  Monisha Velez    Provider s Name:  [facility]   Medical Record No.  [id number]   Start of Care Date:  11/06/18   Onset Date:  10/17/18   Type:     _X__PT   ___OT   ___SLP Medical Diagnosis:   Urge incontinence     PT Diagnosis:  pelvic floor muscle weakness, low back pain, mixed incontinence   Visits from SOC:  1      _________________________________________________________________________________  Plan of Treatment/Functional Goals:  joint mobilization, manual therapy, neuromuscular re-education  will need to complete assessment of lumbar spine and pelvic mechanics, myofascial restrictions in abdomen, strength assessment of hips  Biofeedback     Goals  Goal Identifier: urge  Goal Description: Patient to report control of urgency during daytime.   Target Date: 01/29/19    Goal Identifier: PFM MMT  Goal Description: Patient to have improved MMT of PFM to at least 4/5 with endurance of greater than 5 seconds to aid in bladder control during day time tasks.  Target Date: 01/29/19    Goal Identifier: tissue loading  Goal Description: Patient to have equal tissue loading through trunk and pelvis to aid in normal mechanics of lumbar spine and pelvis to reduce low back and pelvic pain.  Target Date: 01/29/19    Goal Identifier: HEP  Goal Description: Patient to be compliant with HEP for self management of symptoms.  Target Date: 01/29/19                                                Therapy Frequency:  1 time/week  Predicted Duration of Therapy Intervention:  12 weeks    Heather Benavides, PT                 I CERTIFY THE NEED FOR THESE SERVICES FURNISHED UNDER        THIS PLAN OF TREATMENT AND WHILE UNDER MY CARE     (Physician co-signature of this document  indicates review and certification of the therapy plan).                       Certification Date From:      Certification Date To:       Referring Provider:  Oskar Ha MD    Initial Assessment        See Epic Evaluation Start of Care Date: 11/06/18

## 2018-11-12 ENCOUNTER — HOSPITAL ENCOUNTER (OUTPATIENT)
Dept: PHYSICAL THERAPY | Facility: OTHER | Age: 68
Setting detail: THERAPIES SERIES
End: 2018-11-12
Attending: UROLOGY
Payer: OTHER GOVERNMENT

## 2018-11-12 PROCEDURE — 97112 NEUROMUSCULAR REEDUCATION: CPT | Mod: GP

## 2018-11-12 PROCEDURE — 40000185 ZZHC STATISTIC PT OUTPT VISIT

## 2018-11-12 PROCEDURE — 97110 THERAPEUTIC EXERCISES: CPT | Mod: GP

## 2018-11-15 ENCOUNTER — HOSPITAL ENCOUNTER (OUTPATIENT)
Dept: PHYSICAL THERAPY | Facility: OTHER | Age: 68
Setting detail: THERAPIES SERIES
End: 2018-11-15
Attending: UROLOGY
Payer: OTHER GOVERNMENT

## 2018-11-15 PROCEDURE — 97110 THERAPEUTIC EXERCISES: CPT | Mod: GP

## 2018-11-15 PROCEDURE — 97112 NEUROMUSCULAR REEDUCATION: CPT | Mod: GP

## 2018-11-15 PROCEDURE — 40000185 ZZHC STATISTIC PT OUTPT VISIT

## 2018-11-26 DIAGNOSIS — N95.1 PERIMENOPAUSAL VASOMOTOR SYMPTOMS: ICD-10-CM

## 2018-11-27 ENCOUNTER — HOSPITAL ENCOUNTER (OUTPATIENT)
Dept: PHYSICAL THERAPY | Facility: OTHER | Age: 68
Setting detail: THERAPIES SERIES
End: 2018-11-27
Attending: UROLOGY
Payer: OTHER GOVERNMENT

## 2018-11-27 PROCEDURE — 97110 THERAPEUTIC EXERCISES: CPT | Mod: GP

## 2018-11-27 PROCEDURE — 97112 NEUROMUSCULAR REEDUCATION: CPT | Mod: GP

## 2018-11-27 PROCEDURE — 40000185 ZZHC STATISTIC PT OUTPT VISIT

## 2018-11-27 RX ORDER — ESTRADIOL 0.5 MG/1
TABLET ORAL
Qty: 90 TABLET | Refills: 0 | Status: SHIPPED | OUTPATIENT
Start: 2018-11-27 | End: 2019-02-15

## 2018-11-27 NOTE — TELEPHONE ENCOUNTER
"Refill request from [facility] for:  estradiol (ESTRACE) 0.5 MG tablet    LOV 10/8/2018 with Priiclla Clifford PA-C    Last filled 3/13/2018 for 90 X 2 = approx 9 month supply    Upcoming appt noted with PCP 2/27/2019    Will refill for 90 days  Requested Prescriptions   Pending Prescriptions Disp Refills     estradiol (ESTRACE) 0.5 MG tablet [Pharmacy Med Name: ESTRADIOL 0.5MG TABLETS] 90 tablet 0     Sig: TAKE 1 TABLET BY MOUTH EVERY DAY    Hormone Replacement Therapy Failed    11/26/2018  8:00 AM       Failed - Blood pressure under 140/90 in past 12 months    BP Readings from Last 3 Encounters:   10/08/18 (!) 152/112   10/03/18 122/80   09/13/18 130/80            Passed - Recent (12 mo) or future (30 days) visit within the authorizing provider's specialty    Patient had office visit in the last 12 months or has a visit in the next 30 days with authorizing provider or within the authorizing provider's specialty.  See \"Patient Info\" tab in inbasket, or \"Choose Columns\" in Meds & Orders section of the refill encounter.             Passed - Patient has mammogram in past 2 years on file if age 50-75       Passed - Patient is 18 years of age or older       Passed - No active pregnancy on record       Passed - No positive pregnancy test on record in past 12 months        Damaris Tillman RN  ....................  11/27/2018   4:37 PM      "

## 2018-12-12 ENCOUNTER — OFFICE VISIT (OUTPATIENT)
Dept: FAMILY MEDICINE | Facility: OTHER | Age: 68
End: 2018-12-12
Attending: PHYSICIAN ASSISTANT
Payer: OTHER GOVERNMENT

## 2018-12-12 VITALS
BODY MASS INDEX: 29.01 KG/M2 | WEIGHT: 169 LBS | TEMPERATURE: 98.4 F | SYSTOLIC BLOOD PRESSURE: 122 MMHG | RESPIRATION RATE: 20 BRPM | HEART RATE: 68 BPM | OXYGEN SATURATION: 96 % | DIASTOLIC BLOOD PRESSURE: 68 MMHG

## 2018-12-12 DIAGNOSIS — J01.10 ACUTE NON-RECURRENT FRONTAL SINUSITIS: ICD-10-CM

## 2018-12-12 DIAGNOSIS — J01.00 ACUTE NON-RECURRENT MAXILLARY SINUSITIS: Primary | ICD-10-CM

## 2018-12-12 PROCEDURE — 99213 OFFICE O/P EST LOW 20 MIN: CPT | Performed by: PHYSICIAN ASSISTANT

## 2018-12-12 RX ORDER — BENZONATATE 100 MG/1
100 CAPSULE ORAL 3 TIMES DAILY PRN
Qty: 30 CAPSULE | Refills: 0 | Status: SHIPPED | OUTPATIENT
Start: 2018-12-12 | End: 2019-02-27

## 2018-12-12 RX ORDER — CEFDINIR 300 MG/1
300 CAPSULE ORAL 2 TIMES DAILY
Qty: 20 CAPSULE | Refills: 0 | Status: SHIPPED | OUTPATIENT
Start: 2018-12-12 | End: 2019-02-27

## 2018-12-12 ASSESSMENT — PAIN SCALES - GENERAL: PAINLEVEL: MILD PAIN (3)

## 2018-12-12 NOTE — PATIENT INSTRUCTIONS
Sinus infection - Antibiotic has been sent to pharmacy. Please take full course of antibiotic even if symptoms have completely resolved. This helps prevent against antibiotic resistance.     May use symptomatic care with tylenol or ibuprofen. May use cough syrup or cough drops. Using a humidifier works well to break up the congestion. You can also sleep propped up on a couple pillows to decrease symptoms at night.     Use a Neti Pot/sinusflush (Toro Med Sinus Rinse) 3 times daily to irrigate sinuses/mucosal tissue.     Sudafed or mucinex work well for congestion.   If you choose pseudoephedrine, use for only 5-7 days AS DIRECTED. Speak to your pharmacist if you have any concerns about your medications. Mayalso use decongestant nasal spray, but only for 3 days MAXIMUM.    Please take tylenol as needed up to 4 times daily.  Treat symptomatically with warm salt water gargles.  Lozenges, Tylenol, Advil or Aleve as needed. Frequent swallows of cool liquid.  Oatmeal coats the throat and some patients find it soothes the pain.     Monitor for any fevers or chills. Return in 7-10 days if not feeling better. Please call clinic with any questions or concerns. Please take in a lot of fluids and get rest.     You will need to be evaluated if you start to experience:  Fever higher than 102.5 F (39.2 C)   Sudden and severe pain in the face and head   Trouble seeing or seeing double   Trouble thinking clearly   Swelling or redness around 1 or both eyes   Trouble breathing or a stiff neck    * If you are a smoker, try to quit *    Call 9-1-1 or go to the emergency room if you:  Have trouble breathing   Are drooling because you cannot swallow your saliva   Have swelling of the neck or tongue   Cannot move your neck or have trouble opening your mouth

## 2018-12-12 NOTE — PROGRESS NOTES
"Nursing Notes:   Tricia Frye  12/12/2018  2:31 PM  Signed  Patient presents to the clinic today for complaints of congestion that started on Friday.  Patient states that she has a cough, congestion, lots of drainage, and a sore throat.  Patient would like a cough suppressant.  Tricia Frye LPN 12/12/2018   2:14 PM    Chief Complaint   Patient presents with     Sinus Problem       Initial /68 (BP Location: Left arm, Patient Position: Sitting, Cuff Size: Adult Regular)   Pulse 68   Temp 98.4  F (36.9  C) (Tympanic)   Resp 16   Wt 76.7 kg (169 lb)   SpO2 96%   Breastfeeding? No   BMI 29.01 kg/m    Estimated body mass index is 29.01 kg/m  as calculated from the following:    Height as of 9/13/18: 1.626 m (5' 4\").    Weight as of this encounter: 76.7 kg (169 lb).  Medication Reconciliation: complete    Tricia Frye      HPI:    Monisha Velez is a 68 year old female who presents for complaints of congestion that started on Friday 12/7.  Patient states that she has a cough, congestion, lots of drainage, and a sore throat.  Patient would like a cough suppressant. Ear pain.  Chest sore from coughing.  Tx cold remedies.      Past Medical History:   Diagnosis Date     Constipation     7/29/2011     Contact dermatitis     12/16/2009     Encounter for immunization     flu shot 2011, pneumovax 2011, zostavax 4/20/12     Female climacteric state     No Comments Provided     Other intra-abdominal and pelvic swelling, mass and lump     8/22/2011     Other specified postprocedural states     11/19/03     Personal history of other diseases of the nervous system and sense organs     Episode of mild diplopia secondary to sinusitis, ophthalmology consult 07/2008.     Postmenopausal atrophic vaginitis     7/29/2011     Retention of urine     8/22/2011     Tinea corporis     11/19/2010       Past Surgical History:   Procedure Laterality Date     COLONOSCOPY      10/07,Colonoscopy, done in [place], normal.     " COLONOSCOPY  2016    Next in 5 years     CYSTOSCOPY      2013, [facility]     DENTAL SURGERY  2005    Root canal,  Dr. Olmos.     ENDOSCOPIC SINUS SURGERY       ESOPHAGOSCOPY, GASTROSCOPY, DUODENOSCOPY (EGD), COMBINED      3/18/16,EGD     HYSTERECTOMY VAGINAL      Endometriosis     OTHER SURGICAL HISTORY      329397,OTHER,laproscopy, cystectomy and lysis of adhesions 11 by Dr. Gay Mcgovern  at [facility]     SALPINGO-OOPHORECTOMY BILATERAL      10/07,Laparoscopy bilateral salpingo-oophorectomy for bilateral ovarian serous cystadenofibromas, benign.       Family History   Problem Relation Age of Onset     Asthma Mother      Hypertension Mother      Diabetes Mother      Hypertension Father         Hypertension,Hypertension, melanoma in his 80's     Diabetes Sister      Diabetes Sister      Breast Cancer No family hx of         Cancer-breast       Social History     Socioeconomic History     Marital status:      Spouse name: Not on file     Number of children: Not on file     Years of education: Not on file     Highest education level: Not on file   Social Needs     Financial resource strain: Not on file     Food insecurity - worry: Not on file     Food insecurity - inability: Not on file     Transportation needs - medical: Not on file     Transportation needs - non-medical: Not on file   Occupational History     Not on file   Tobacco Use     Smoking status: Former Smoker     Packs/day: 0.50     Last attempt to quit: 1972     Years since quittin.4     Smokeless tobacco: Never Used   Substance and Sexual Activity     Alcohol use: Yes     Alcohol/week: 0.0 oz     Comment: Rarely     Drug use: No     Sexual activity: Yes     Partners: Male   Other Topics Concern     Parent/sibling w/ CABG, MI or angioplasty before 65F 55M? Not Asked   Social History Narrative    [place]  The patient is retired.      Has one son and one daughter living in the [facility].      She and her  are building a home, moved to the area in the spring of 2005 from [place].      Previously worked for [employer]        Her grand-daughter is living with her.         Current Outpatient Medications   Medication Sig Dispense Refill     aspirin EC 81 MG EC tablet Take 81 mg by mouth daily with food       benzonatate (TESSALON) 100 MG capsule Take 1 capsule (100 mg) by mouth 3 times daily as needed for cough 30 capsule 0     cefdinir (OMNICEF) 300 MG capsule Take 1 capsule (300 mg) by mouth 2 times daily for 10 days 20 capsule 0     estradiol (ESTRACE) 0.5 MG tablet TAKE 1 TABLET BY MOUTH EVERY DAY 90 tablet 0     melatonin 5 MG tablet Take 2 tablets by mouth At Bedtime       Multiple Vitamin (MULTIVITAMIN OR) Take 1 tablet by mouth daily.         Allergies   Allergen Reactions     Codeine Sulfate Other (See Comments)     nightmares     Morphine Sulfate Other (See Comments)     Cant sleep     Penicillins Hives     Sulfa Drugs Hives     Venlafaxine Hives     'bad reaction      Venlafaxine Hcl      Effexor     Zolpidem Tartrate      Paresthesias; stomach upset         REVIEW OF SYSTEMS:  Refer to HPI.    EXAM:   Vitals:    /68 (BP Location: Left arm, Patient Position: Sitting, Cuff Size: Adult Regular)   Pulse 68   Temp 98.4  F (36.9  C) (Tympanic)   Resp 20   Wt 76.7 kg (169 lb)   SpO2 96%   Breastfeeding? No   BMI 29.01 kg/m      General Appearance: Pleasant, alert, appropriate appearance for age. No acute distress  Ear Exam: Normal TM's bilaterally, grey, translucent, bony landmarks appreciated.   Left/Right TM: Effusion is not present. TM is not bulging. There is no pus appreciated.    Normal auditory canals and external ears. Non-tender.  Nose Exam: Normal external nose, mucus membranes, and septum.  OroPharynx Exam:  Erythematous posterior pharynx with no exudates. Sinus pain upon palpation of frontal and maxillary sinuses.  Chest/Respiratory Exam: Normal chest wall and  respirations. Clear to auscultation. No retractions appreciated.  Cardiovascular Exam: Regular rate and rhythm. S1, S2, no murmur, click, gallop, or rubs.  Lymphatic Exam: ACLN.  Skin: no rash or abnormalities  Psychiatric Exam: Alert and oriented - appropriate affect.    PHQ Depression Screen  PHQ-9 SCORE 11/10/2016 8/24/2018 9/13/2018   PHQ-9 Total Score 4 0 4         ASSESSMENT AND PLAN:    1. Acute non-recurrent maxillary sinusitis    2. Acute non-recurrent frontal sinusitis        Patient was started on Ceftin ear for an acute sinus infection.  Given Tessalon Perles to use as needed for comfort.    Patient Instructions   Sinus infection - Antibiotic has been sent to pharmacy. Please take full course of antibiotic even if symptoms have completely resolved. This helps prevent against antibiotic resistance.     May use symptomatic care with tylenol or ibuprofen. May use cough syrup or cough drops. Using a humidifier works well to break up the congestion. You can also sleep propped up on a couple pillows to decrease symptoms at night.     Use a Neti Pot/sinusflush (Toro Med Sinus Rinse) 3 times daily to irrigate sinuses/mucosal tissue.     Sudafed or mucinex work well for congestion.   If you choose pseudoephedrine, use for only 5-7 days AS DIRECTED. Speak to your pharmacist if you have any concerns about your medications. Mayalso use decongestant nasal spray, but only for 3 days MAXIMUM.    Please take tylenol as needed up to 4 times daily.  Treat symptomatically with warm salt water gargles.  Lozenges, Tylenol, Advil or Aleve as needed. Frequent swallows of cool liquid.  Oatmeal coats the throat and some patients find it soothes the pain.     Monitor for any fevers or chills. Return in 7-10 days if not feeling better. Please call clinic with any questions or concerns. Please take in a lot of fluids and get rest.     You will need to be evaluated if you start to experience:  Fever higher than 102.5 F (39.2 C)    Sudden and severe pain in the face and head   Trouble seeing or seeing double   Trouble thinking clearly   Swelling or redness around 1 or both eyes   Trouble breathing or a stiff neck    * If you are a smoker, try to quit *    Call 9-1-1 or go to the emergency room if you:  Have trouble breathing   Are drooling because you cannot swallow your saliva   Have swelling of the neck or tongue   Cannot move your neck or have trouble opening your mouth        Pricilla Clifford PA-C..................12/12/2018 2:31 PM

## 2018-12-12 NOTE — NURSING NOTE
"Patient presents to the clinic today for complaints of congestion that started on Friday.  Patient states that she has a cough, congestion, lots of drainage, and a sore throat.  Patient would like a cough suppressant.  Tricia Frye LPN 12/12/2018   2:14 PM    Chief Complaint   Patient presents with     Sinus Problem       Initial /68 (BP Location: Left arm, Patient Position: Sitting, Cuff Size: Adult Regular)   Pulse 68   Temp 98.4  F (36.9  C) (Tympanic)   Resp 16   Wt 76.7 kg (169 lb)   SpO2 96%   Breastfeeding? No   BMI 29.01 kg/m   Estimated body mass index is 29.01 kg/m  as calculated from the following:    Height as of 9/13/18: 1.626 m (5' 4\").    Weight as of this encounter: 76.7 kg (169 lb).  Medication Reconciliation: complete    Tricia Frye    "

## 2018-12-19 ENCOUNTER — HOSPITAL ENCOUNTER (OUTPATIENT)
Dept: PHYSICAL THERAPY | Facility: OTHER | Age: 68
Setting detail: THERAPIES SERIES
End: 2018-12-19
Attending: UROLOGY
Payer: OTHER GOVERNMENT

## 2018-12-19 PROCEDURE — 97110 THERAPEUTIC EXERCISES: CPT | Mod: GP

## 2018-12-19 PROCEDURE — 97112 NEUROMUSCULAR REEDUCATION: CPT | Mod: GP

## 2018-12-19 PROCEDURE — 97140 MANUAL THERAPY 1/> REGIONS: CPT | Mod: GP

## 2018-12-27 ENCOUNTER — TELEPHONE (OUTPATIENT)
Dept: FAMILY MEDICINE | Facility: OTHER | Age: 68
End: 2018-12-27

## 2018-12-27 DIAGNOSIS — J01.01 ACUTE RECURRENT MAXILLARY SINUSITIS: ICD-10-CM

## 2018-12-27 DIAGNOSIS — J01.11 ACUTE RECURRENT FRONTAL SINUSITIS: Primary | ICD-10-CM

## 2018-12-27 RX ORDER — DOXYCYCLINE 100 MG/1
100 CAPSULE ORAL 2 TIMES DAILY
Qty: 20 CAPSULE | Refills: 0 | Status: SHIPPED | OUTPATIENT
Start: 2018-12-27 | End: 2019-02-27

## 2018-12-27 NOTE — TELEPHONE ENCOUNTER
Sent doxycycline to the pharmacy for treatment.  Please be seen for recheck if symptoms are not calming down or worsening as needed.  Pricilla Clifford PA-C.......... 12/27/2018 10:34 AM

## 2018-12-27 NOTE — TELEPHONE ENCOUNTER
Was on cefdinir for sinus infection, not feeling better, continues to have headaches, sore throat, real fatigued. Is wondering if she can get another round of antibiotics.  Kalyn Hammond LPN ...... 12/27/2018 10:15 AM

## 2018-12-27 NOTE — TELEPHONE ENCOUNTER
JRO - Patient states that they have finished antibiotic that was given  On 12/12/18 but they are still not feeling any better.

## 2019-01-03 ENCOUNTER — HOSPITAL ENCOUNTER (OUTPATIENT)
Dept: PHYSICAL THERAPY | Facility: OTHER | Age: 69
Setting detail: THERAPIES SERIES
End: 2019-01-03
Attending: UROLOGY
Payer: OTHER GOVERNMENT

## 2019-01-03 PROCEDURE — G8991 OTHER PT/OT GOAL STATUS: HCPCS | Mod: GP,CK

## 2019-01-03 PROCEDURE — 97110 THERAPEUTIC EXERCISES: CPT | Mod: GP

## 2019-01-03 PROCEDURE — G8992 OTHER PT/OT  D/C STATUS: HCPCS | Mod: GP,CK

## 2019-01-30 NOTE — ADDENDUM NOTE
Encounter addended by: Heather Benavides, PT on: 1/30/2019 1:20 PM   Actions taken: Episode edited, Episode resolved

## 2019-01-30 NOTE — ADDENDUM NOTE
Encounter addended by: Heather Benavides, PT on: 1/30/2019 1:20 PM   Actions taken: Pend clinical note, Sign clinical note, Flowsheet accepted, Charge Capture section accepted

## 2019-01-30 NOTE — PROGRESS NOTES
"Outpatient Physical Therapy Discharge Note     Patient: Monisha Velez  : 1950    Beginning/End Dates of Reporting Period:  18 to 1/3/2019    Referring Provider: Oskar Ha MD    Therapy Diagnosis: pelvic floor muscle weakness, low back pain, mixed incontinence     Client Self Report: Temperature change brings on her urgency. Back is feeling better but worried it won't stay that way. Wondering if sex will irritate the bladder. Overall, able to control her bladder with greater ease.     Objective Measurements:  Objective Measure: tissue laoding  Details: equal loading, no listening  Objective Measure: joint mobility  Details: no dysfunctions noted  Obejctive Measure: PERFECT Test  Details: 4/3///P/A/A        Goals:  Goal Identifier urge   Goal Description Patient to report control of urgency during daytime.    Target Date 19   Date Met   1/3/19   Progress:     Goal Identifier PFM MMT   Goal Description Patient to have improved MMT of PFM to at least 4/5 with endurance of greater than 5 seconds to aid in bladder control during day time tasks.   Target Date 19   Date Met   1/3/19   Progress:     Goal Identifier tissue loading   Goal Description Patient to have equal tissue loading through trunk and pelvis to aid in normal mechanics of lumbar spine and pelvis to reduce low back and pelvic pain.   Target Date 19   Date Met   1/3/19   Progress:     Goal Identifier HEP   Goal Description Patient to be compliant with HEP for self management of symptoms.   Target Date 19   Date Met   1/3/19   Progress:       Progress Toward Goals:   Progress this reporting period: Patient progressed with PT; improved bladder control, reduced urgency, reduce back pain. Patient feels her mind is more at ease with these issues. She will continue with HEP to maintain gains she made in PT. \"I feel I have my life back\".           Plan:  Discharge from therapy.    Discharge:    Reason for Discharge: Patient " has met all goals.    Equipment Issued: NA    Discharge Plan: Patient to continue home program.

## 2019-02-15 DIAGNOSIS — N95.1 PERIMENOPAUSAL VASOMOTOR SYMPTOMS: ICD-10-CM

## 2019-02-18 RX ORDER — ESTRADIOL 0.5 MG/1
TABLET ORAL
Qty: 30 TABLET | Refills: 0 | Status: SHIPPED | OUTPATIENT
Start: 2019-02-18 | End: 2019-02-27

## 2019-02-18 NOTE — TELEPHONE ENCOUNTER
Prescription approved per [facility].  LOV: 12/12/18  Per pharmacy patient had refilled on 1/22/19 #30.  Patient noted to have appointment with Dr. Pearson on 2/17/19    Kendy Stroud RN on 2/18/2019 at 3:22 PM

## 2019-02-27 ENCOUNTER — OFFICE VISIT (OUTPATIENT)
Dept: INTERNAL MEDICINE | Facility: OTHER | Age: 69
End: 2019-02-27
Attending: INTERNAL MEDICINE
Payer: OTHER GOVERNMENT

## 2019-02-27 VITALS
SYSTOLIC BLOOD PRESSURE: 110 MMHG | RESPIRATION RATE: 16 BRPM | WEIGHT: 173.6 LBS | HEIGHT: 64 IN | HEART RATE: 60 BPM | DIASTOLIC BLOOD PRESSURE: 68 MMHG | BODY MASS INDEX: 29.64 KG/M2

## 2019-02-27 DIAGNOSIS — N95.1 PERIMENOPAUSAL VASOMOTOR SYMPTOMS: Primary | ICD-10-CM

## 2019-02-27 DIAGNOSIS — Z13.220 SCREENING FOR HYPERLIPIDEMIA: ICD-10-CM

## 2019-02-27 DIAGNOSIS — Z79.899 HIGH RISK MEDICATION USE: ICD-10-CM

## 2019-02-27 DIAGNOSIS — L65.9 HAIR THINNING: ICD-10-CM

## 2019-02-27 DIAGNOSIS — Z78.0 POSTMENOPAUSAL STATUS: ICD-10-CM

## 2019-02-27 DIAGNOSIS — Z13.1 SCREENING FOR DIABETES MELLITUS: ICD-10-CM

## 2019-02-27 DIAGNOSIS — Z23 NEED FOR SHINGLES VACCINE: ICD-10-CM

## 2019-02-27 DIAGNOSIS — G47.00 INSOMNIA, UNSPECIFIED TYPE: ICD-10-CM

## 2019-02-27 DIAGNOSIS — Z23 NEED FOR INFLUENZA VACCINATION: ICD-10-CM

## 2019-02-27 LAB
ALBUMIN SERPL-MCNC: 4.1 G/DL (ref 3.5–5.7)
ALP SERPL-CCNC: 51 U/L (ref 34–104)
ALT SERPL W P-5'-P-CCNC: 18 U/L (ref 7–52)
ANION GAP SERPL CALCULATED.3IONS-SCNC: 6 MMOL/L (ref 3–14)
AST SERPL W P-5'-P-CCNC: 20 U/L (ref 13–39)
BILIRUB SERPL-MCNC: 0.5 MG/DL (ref 0.3–1)
BUN SERPL-MCNC: 13 MG/DL (ref 7–25)
CALCIUM SERPL-MCNC: 9.2 MG/DL (ref 8.6–10.3)
CHLORIDE SERPL-SCNC: 106 MMOL/L (ref 98–107)
CHOLEST SERPL-MCNC: 259 MG/DL
CO2 SERPL-SCNC: 28 MMOL/L (ref 21–31)
CREAT SERPL-MCNC: 0.86 MG/DL (ref 0.6–1.2)
ERYTHROCYTE [DISTWIDTH] IN BLOOD BY AUTOMATED COUNT: 12.2 % (ref 10–15)
GFR SERPL CREATININE-BSD FRML MDRD: 65 ML/MIN/{1.73_M2}
GLUCOSE SERPL-MCNC: 105 MG/DL (ref 70–105)
HCT VFR BLD AUTO: 43.5 % (ref 35–47)
HDLC SERPL-MCNC: 74 MG/DL (ref 23–92)
HGB BLD-MCNC: 15 G/DL (ref 11.7–15.7)
LDLC SERPL CALC-MCNC: 157 MG/DL
MCH RBC QN AUTO: 31.1 PG (ref 26.5–33)
MCHC RBC AUTO-ENTMCNC: 34.5 G/DL (ref 31.5–36.5)
MCV RBC AUTO: 90 FL (ref 78–100)
NONHDLC SERPL-MCNC: 185 MG/DL
PLATELET # BLD AUTO: 207 10E9/L (ref 150–450)
POTASSIUM SERPL-SCNC: 4.1 MMOL/L (ref 3.5–5.1)
PROT SERPL-MCNC: 6.9 G/DL (ref 6.4–8.9)
RBC # BLD AUTO: 4.82 10E12/L (ref 3.8–5.2)
SODIUM SERPL-SCNC: 140 MMOL/L (ref 134–144)
TRIGL SERPL-MCNC: 141 MG/DL
TSH SERPL DL<=0.05 MIU/L-ACNC: 2.01 IU/ML (ref 0.34–5.6)
WBC # BLD AUTO: 4.8 10E9/L (ref 4–11)

## 2019-02-27 PROCEDURE — 96372 THER/PROPH/DIAG INJ SC/IM: CPT

## 2019-02-27 PROCEDURE — 80053 COMPREHEN METABOLIC PANEL: CPT | Performed by: INTERNAL MEDICINE

## 2019-02-27 PROCEDURE — 99215 OFFICE O/P EST HI 40 MIN: CPT | Mod: 25 | Performed by: INTERNAL MEDICINE

## 2019-02-27 PROCEDURE — 85027 COMPLETE CBC AUTOMATED: CPT | Performed by: INTERNAL MEDICINE

## 2019-02-27 PROCEDURE — 36415 COLL VENOUS BLD VENIPUNCTURE: CPT | Performed by: INTERNAL MEDICINE

## 2019-02-27 PROCEDURE — 90471 IMMUNIZATION ADMIN: CPT | Performed by: INTERNAL MEDICINE

## 2019-02-27 PROCEDURE — 90662 IIV NO PRSV INCREASED AG IM: CPT | Performed by: INTERNAL MEDICINE

## 2019-02-27 PROCEDURE — 84443 ASSAY THYROID STIM HORMONE: CPT | Performed by: INTERNAL MEDICINE

## 2019-02-27 PROCEDURE — 80061 LIPID PANEL: CPT | Performed by: INTERNAL MEDICINE

## 2019-02-27 RX ORDER — ESTRADIOL 0.5 MG/1
.25-.5 TABLET ORAL DAILY
Qty: 90 TABLET | Refills: 3 | Status: SHIPPED | OUTPATIENT
Start: 2019-02-27 | End: 2019-03-27

## 2019-02-27 ASSESSMENT — ANXIETY QUESTIONNAIRES
2. NOT BEING ABLE TO STOP OR CONTROL WORRYING: NOT AT ALL
1. FEELING NERVOUS, ANXIOUS, OR ON EDGE: NOT AT ALL
3. WORRYING TOO MUCH ABOUT DIFFERENT THINGS: NOT AT ALL
GAD7 TOTAL SCORE: 0
7. FEELING AFRAID AS IF SOMETHING AWFUL MIGHT HAPPEN: NOT AT ALL
6. BECOMING EASILY ANNOYED OR IRRITABLE: NOT AT ALL
5. BEING SO RESTLESS THAT IT IS HARD TO SIT STILL: NOT AT ALL

## 2019-02-27 ASSESSMENT — PATIENT HEALTH QUESTIONNAIRE - PHQ9: 5. POOR APPETITE OR OVEREATING: NOT AT ALL

## 2019-02-27 ASSESSMENT — MIFFLIN-ST. JEOR: SCORE: 1292.44

## 2019-02-27 ASSESSMENT — PAIN SCALES - GENERAL: PAINLEVEL: NO PAIN (0)

## 2019-02-27 NOTE — PATIENT INSTRUCTIONS
Decrease estradiol to 1/2 tablet daily and see how you do    Try unisom as needed for sleep, start with 1/2 tablet nightly and increase to 1 tablet if needed    Call if you want to try Paxil for hot flashes and anxiety    - maintain a regular bedtime, routine and wake up time  - include a warm beverage and/or hot bath 1-2 hours before bedtime  - avoid naps as much as possible  - do not watch the clock  - avoid caffeine, alcohol and nicotine for at least 4-6 hours prior to bed  - exercise daily but avoid exercising right before bed  - no screen time within hour of bed  - keep your bedroom quiet, dark,comfortable and cool  - If you haven t been able to get to sleep after about 20 minutes or more, get up and do something calming or boring until you feel sleepy, then return to bed and try again. Avoid doing anything that is too stimulating or interesting, asthis will wake you up even more.  - use bed only for sleep and sex, do not use it as a place to watch TV, eat, read, work on your laptop, pay bills, and other things

## 2019-02-27 NOTE — PROGRESS NOTES
Chief Complaint   Patient presents with     Annual Visit         HPI: Ms. Velez is a 69 year old female who presents today for annual review of her medications and to establish care.    She does have a few concerns.  She has been having significant issues with sleep.  She finds that she has difficulty falling asleep along with difficulty staying asleep.  She reports wanting to get 9 hours of sleep and we discussed that this may not be possible with aging.  She does feel tired during the day.  She has been trying melatonin and has not found this to be beneficial.  She reports she does have some anxiety at bedtime.  Additionally her  moves around a lot and makes noise which makes it hard for her to fall asleep.  She does watch TV up until the time she goes to bed.    She also has been having some issues with hair thinning.  She is unsure why this is the case.  She is postmenopausal.  She is on estradiol which she has been on for prolonged period of time.  She had significant hot flashes with menopause and finds that the estradiol helps however if she stops it at any point she has return of her symptoms.  She denies any obvious side effects.    She is due for influenza vaccine, shingles vaccine, diabetes screening, lipid screening with borderline lipids in the past and DEXA scan.    History is discussed and updated on 2/27/2019 with patient.  It is current to the best of my knowledge as below.    Past Medical History:   Diagnosis Date     Adnexal mass 08/22/2011     Contact dermatitis 12/16/2009     Diplopia     Episode of mild diplopia secondary to sinusitis, ophthalmology consult 07/2008.     H/O gastritis      History of adenomatous polyp of colon      Postmenopausal atrophic vaginitis 07/29/2011     Restless leg syndrome      Retention of urine 08/22/2011     Rosacea      Tinea corporis 11/19/2010        Past Surgical History:   Procedure Laterality Date     COLONOSCOPY      10/07,Colonoscopy, done in  [place], normal.     COLONOSCOPY  03/18/2016    Next in 5 years     CYSTOSCOPY  01/2013    [facility]     DENTAL SURGERY  2005    Root canal,  Dr. Olmos.     ENDOSCOPIC SINUS SURGERY  1993     ESOPHAGOSCOPY, GASTROSCOPY, DUODENOSCOPY (EGD), COMBINED  03/18/2016     HYSTERECTOMY VAGINAL  1990    Endometriosis     LAPAROSCOPIC CYSTECTOMY OVARIAN (BENIGN)  09/27/2011    cystectomy with lysis of adhesions and possible oophrectomy  by Dr. Gay Mcgovern  at [facility]     ROTATOR CUFF REPAIR RT/LT Right 12/28/2017    Dr John     SALPINGO-OOPHORECTOMY BILATERAL  10/2007    bilateral ovarian serous cystadenofibromas, benign.         Current Outpatient Medications   Medication Sig Dispense Refill     aspirin EC 81 MG EC tablet Take 81 mg by mouth daily with food       doxylamine (UNISOM) 25 MG TABS tablet Take 0.5-1 tablets (12.5-25 mg) by mouth nightly as needed for sleep 30 each 1     estradiol (ESTRACE) 0.5 MG tablet Take 0.5-1 tablets (0.25-0.5 mg) by mouth daily 90 tablet 3     Multiple Vitamin (MULTIVITAMIN OR) Take 1 tablet by mouth daily.       zoster vaccine recombinant adjuvanted (SHINGRIX) injection Inject 0.5 mLs into the muscle once for 1 dose Repeat in 6 months 0.5 mL 0       Allergies   Allergen Reactions     Penicillins Hives     Sulfa Drugs Hives     Codeine Sulfate Other (See Comments)     nightmares     Morphine Sulfate Other (See Comments)     Cant sleep     Venlafaxine Hives     'bad reaction      Zolpidem Tartrate      Paresthesias; stomach upset          Family History   Problem Relation Age of Onset     Asthma Mother      Hypertension Mother      Diabetes Mother         insulin dependence     Peripheral Vascular Disease Mother      Hypertension Father      Melanoma Father      Diabetes Sister      Hypertension Sister      GI problems Daughter      Diabetes Sister      Hypertension Sister      Brain Hemorrhage Sister         after fall     Other - See Comments Sister          "bladder issues and repeat breast biopsies (benign)     Obesity Son      Hypertension Son      Breast Cancer No family hx of         Cancer-breast       Family Status   Relation Name Status     Mo   at age 84     Fa   at age 82     Sis Dannie Alive     Sedrick Libby Alive     Sis Kiana Alive     Sis Ama Alive     Sis Sol Alive     Son Darrel Alive     Neg  (Not Specified)        Social History     Tobacco Use     Smoking status: Former Smoker     Packs/day: 0.50     Last attempt to quit: 1972     Years since quittin.6     Smokeless tobacco: Never Used   Substance Use Topics     Alcohol use: Yes     Alcohol/week: 0.0 oz     Comment: Rarely       Social History     Social History Narrative    [employer]  The patient is retired, Previously worked for [employer].  , [place].    Her grand-daughter is living with her, Gay.  Has one son and one daughter living in the [place]. She and her  built a home, moved to the area in the spring of 2005 from [place].                    ROS  GEN: -fevers/+ occasional chills/-night sweats/-wt change  NEURO: -headaches/-vision changes  EARS: +hearing changes per her   NOSE: -drainage/+congestion  MOUTH/THROAT: - sore throat/-dysphagia/-sores  LUNGS: -sob/-cough  CARDIOVASCULAR: -cp/-palpitations  GI: + Chronic lower abdominal pain/-diarrhea/-constipation/-bloody stools  :-dysuria/-hematuria/-sores/-vaginal discharge or bleeding  ENDOCRINE: +skin or hair changes  HEMATOLOGIC/LYMPHATIC: -swollen nodes  SKIN: -rashes/-lesions/-breast or nipple changes  MSK/RHEUM: + Occasional joint pain/-swelling  NEURO: -weakness/-parasthesias  PSYCH: -anhedonia/+depression/+anxiety  SLEEP: +daytime somnolence         EXAM:   /68 (BP Location: Left arm, Patient Position: Sitting, Cuff Size: Adult Regular)   Pulse 60   Resp 16   Ht 1.626 m (5' 4\")   Wt 78.7 kg (173 lb 9.6 oz)   Breastfeeding? No   BMI 29.80 kg/m    Estimated body mass " "index is 29.8 kg/m  as calculated from the following:    Height as of this encounter: 1.626 m (5' 4\").    Weight as of this encounter: 78.7 kg (173 lb 9.6 oz).      GEN: Vitals reviewed. Healthy appearing. Patient is in no acute distress. Cooperative with exam.  HEENT: Normocephalic atraumatic.  Normal external eye, conjunctiva, lids, cornea with no scleral icterus or conjunctival erythema. Pupils equally round. Oropharynx with no erythema or exudates. Dentition adequate.  EACs clear bilat, TM gray with normal landmarks.  NECK: Supple; no thyromegaly or masses noted.  No cervical or supraclavicular lymphadenopathy.  CV: Heart regular in rate and rhythm with no murmur.    LUNGS: Lungs clear to auscultation bilaterally.  Chest rise equal bilaterally.  No accessory muscle use.  ABD:  Obese, Soft, mildly tender in the lower abdomen with palpation, and nondistended.  No rebound. Bowel sounds positive.  SKIN: Warm and dry to touch.  No rash on face, arms and legs.  EXT: No clubbing or cyanosis.   Trace bilateral lower extremity peripheral edema.  NEURO: Alert and oriented to person, place, and time.  Cranial nerves II-XII grossly intact with no focal or lateralizing deficits.  Muscle tone normal.  Gait normal. No tremor. Sensation intact to light touch.  MSK: ROM of upper and lower ext symmetric and full.  PSYCH: Mood is fair.  Affect appropriate. Speech fluent. Answers questions appropriately and thought process normal.       ASSESSMENT AND PLAN:    Perimenopausal vasomotor symptoms  We had a long discussion today regarding this medication.  She has been on it for at least 10 years.  We discussed potentially decreasing this.  She is hesitant.  She was cautioned that she will have hot flashes and symptoms going off the medication but hopefully they are tolerable.  We also discussed the potential for other medication treatments including Paxil or an alternative SSRI which may also help with her anxiety.  We will continue " to monitor this.  -She was made aware of the risks of being on prolonged hormonal treatment including breast cancer and cardiac disease.  - estradiol (ESTRACE) 0.5 MG tablet  Dispense: 90 tablet; Refill: 3    Insomnia, unspecified type  Behavioral changes given and instructions.  We will try Unisom.  If this does not help we can consider treatment of anxiety to see if this is beneficial.  Alternative choice may be trazodone.  - doxylamine (UNISOM) 25 MG TABS tablet  Dispense: 30 each; Refill: 1    Postmenopausal status  Plan for DEXA scan  - DX Hip/Pelvis/Spine    Need for influenza vaccination  Vaccine given today    Need for shingles vaccine  Rx given  - zoster vaccine recombinant adjuvanted (SHINGRIX) injection  Dispense: 0.5 mL; Refill: 0    High risk medication use  -With use of aspirin and estradiol labs are checked today.  - CBC W PLT No Diff    Screening for diabetes mellitus  - Comprehensive Metabolic Panel    Screening for hyperlipidemia  - Lipid Panel    Hair thinning  Likely related to postmenopausal state however TSH is checked today.  - TSH        A total of 44 minutes spent with in face-to-face consultation of this patient with greater than 50% spent in history taking, counseling and care coordination of above listed medical problems including diagnosis, treatment options with emphasis on risks and benefits of each,prognosis and importance of compliance for each.       Return in about 6 weeks (around 4/10/2019) for Recheck sleep.  At that time we will consider sleep study and possible addition of Veronica DRUMMOND DO   2/27/2019 9:39 AM    This document was prepared using voice generated softwear. While every attempt was made for accuracy, spelling and grammatical errors may exist.

## 2019-02-27 NOTE — NURSING NOTE
Patient presents to the clinic for annual medciation, discussion and review.     Medication Reconciliation: complete          Katelin Mccann LPN............. February 27, 2019 8:46 AM

## 2019-02-28 ASSESSMENT — ANXIETY QUESTIONNAIRES: GAD7 TOTAL SCORE: 0

## 2019-03-04 ENCOUNTER — HOSPITAL ENCOUNTER (OUTPATIENT)
Dept: BONE DENSITY | Facility: OTHER | Age: 69
Discharge: HOME | End: 2019-03-04
Attending: INTERNAL MEDICINE | Admitting: INTERNAL MEDICINE
Payer: OTHER GOVERNMENT

## 2019-03-04 DIAGNOSIS — Z78.0 POSTMENOPAUSAL STATUS: ICD-10-CM

## 2019-03-04 PROCEDURE — 77080 DXA BONE DENSITY AXIAL: CPT

## 2019-03-27 ENCOUNTER — OFFICE VISIT (OUTPATIENT)
Dept: INTERNAL MEDICINE | Facility: OTHER | Age: 69
End: 2019-03-27
Attending: INTERNAL MEDICINE
Payer: OTHER GOVERNMENT

## 2019-03-27 VITALS
RESPIRATION RATE: 16 BRPM | HEART RATE: 60 BPM | DIASTOLIC BLOOD PRESSURE: 64 MMHG | HEIGHT: 64 IN | BODY MASS INDEX: 29.57 KG/M2 | WEIGHT: 173.2 LBS | TEMPERATURE: 97.1 F | SYSTOLIC BLOOD PRESSURE: 118 MMHG

## 2019-03-27 DIAGNOSIS — N95.1 PERIMENOPAUSAL VASOMOTOR SYMPTOMS: Primary | ICD-10-CM

## 2019-03-27 DIAGNOSIS — K21.9 GASTROESOPHAGEAL REFLUX DISEASE, ESOPHAGITIS PRESENCE NOT SPECIFIED: ICD-10-CM

## 2019-03-27 DIAGNOSIS — G47.00 INSOMNIA, UNSPECIFIED TYPE: ICD-10-CM

## 2019-03-27 DIAGNOSIS — E66.3 OVERWEIGHT (BMI 25.0-29.9): ICD-10-CM

## 2019-03-27 PROCEDURE — 99214 OFFICE O/P EST MOD 30 MIN: CPT | Performed by: INTERNAL MEDICINE

## 2019-03-27 RX ORDER — ESTRADIOL 0.5 MG/1
.25-.5 TABLET ORAL DAILY
Qty: 90 TABLET | Refills: 3
Start: 2019-03-27 | End: 2020-03-10

## 2019-03-27 ASSESSMENT — ANXIETY QUESTIONNAIRES
5. BEING SO RESTLESS THAT IT IS HARD TO SIT STILL: NOT AT ALL
6. BECOMING EASILY ANNOYED OR IRRITABLE: NOT AT ALL
7. FEELING AFRAID AS IF SOMETHING AWFUL MIGHT HAPPEN: NOT AT ALL
1. FEELING NERVOUS, ANXIOUS, OR ON EDGE: NOT AT ALL
3. WORRYING TOO MUCH ABOUT DIFFERENT THINGS: NOT AT ALL
IF YOU CHECKED OFF ANY PROBLEMS ON THIS QUESTIONNAIRE, HOW DIFFICULT HAVE THESE PROBLEMS MADE IT FOR YOU TO DO YOUR WORK, TAKE CARE OF THINGS AT HOME, OR GET ALONG WITH OTHER PEOPLE: NOT DIFFICULT AT ALL
2. NOT BEING ABLE TO STOP OR CONTROL WORRYING: NOT AT ALL
GAD7 TOTAL SCORE: 0

## 2019-03-27 ASSESSMENT — PATIENT HEALTH QUESTIONNAIRE - PHQ9
5. POOR APPETITE OR OVEREATING: NOT AT ALL
SUM OF ALL RESPONSES TO PHQ QUESTIONS 1-9: 0

## 2019-03-27 ASSESSMENT — MIFFLIN-ST. JEOR: SCORE: 1290.63

## 2019-03-27 ASSESSMENT — PAIN SCALES - GENERAL: PAINLEVEL: NO PAIN (0)

## 2019-03-27 NOTE — NURSING NOTE
Patient presents to the clinic for sleep Follow-up.     Medication Reconciliation: complete   Katelin Mccann LPN............. March 27, 2019 8:26 AM

## 2019-03-27 NOTE — PATIENT INSTRUCTIONS
Start with 1/2 tablet of estradiol for one week and see if this is enough to help    Work on diet and activity level to help cholesterol    Increase water intake (70-80 ounces) daily; try apple cider vinegar; try to increase food intake earlier in the day and minimize food in the evening    - maintain a regular bedtime, routine and wake up time  - include a warm beverage and/or hot bath 1-2 hours before bedtime  - avoid naps  - do not watch the clock  - avoid caffeine, alcohol and nicotine for at least 4-6 hours prior to bed  - exercise daily but avoid exercising right before bed  - no screen time within hour of bed  - keep your bedroom quiet, dark,comfortable and cool  - If you haven t been able to get to sleep after about 20 minutes or more, get up and do something calming or boring until you feel sleepy, then return to bed and try again. Avoid doing anything that is too stimulating or interesting, asthis will wake you up even more.  - use bed only for sleep and sex, do not use it as a place to watch TV, eat, read, work on your laptop, pay bills, and other things      ---------------------------------------------------------------------------------------------------  - avoid caffeine, NSAIDS, chocolate, alcohol, spicy food, red sauce; consider raising the head of bed 10-15 degrees; do not eat within 2-3 hours of bedtime    Caution with NSAIDS  (ibuprofen, aspirin, naproxen, aleve, advil) due to risk for increased blood pressure, stomach pain/nausea/ulcers and kidney damage; use minimal amount necessary    - can trial the following medications:    Tums, gaviscon as needed      Ranitidine (Zantac) 75-150mg twice daily 20-30 minutes before eating    Or     Famotidine (Pepcid) 10-20mg twice daily 20-30 minutes before eating        Lifestyle Changes for Controlling GERD  When you have GERD, stomach acid feels as if it s backing up toward your mouth. Whether or not you take medicine to control your GERD, your symptoms  can often be improved with lifestyle changes. Talk to your healthcare provider about the following suggestions. These suggestions may help you get relief from your symptoms.      Raise your head  Reflux is more likely to strike when you re lying down flat, because stomach fluid can flow backward more easily. Raising the head of your bed 4 to 6 inches can help. To do this:    Slide blocks or books under the legs at the head of your bed. Or, place a wedge under the mattress. [employer] can make a suitable wedge for you. The wedge should run from your waist to the top of your head.    Don t just prop your head on several pillows. This increases pressure on your stomach. It can make GERD worse.  Watch your eating habits  Certain foods may increase the acid in your stomach or relax the lower esophageal sphincter. This makes GERD more likely. It s best to avoid the following if they cause you symptoms:    Coffee, tea, and carbonated drinks (with and without caffeine)    Fatty, fried, or spicy food    Mint, chocolate, onions, and tomatoes    Peppermint    Any other foods that seem to irritate your stomach or cause you pain  Relieve the pressure  Tips include the following:    Eat smaller meals, even if you have to eat more often.    Don t lie down right after you eat. Wait a few hours for your stomach to empty.    Avoid tight belts and tight-fitting clothes.    Lose excess weight.  Tobacco and alcohol  Avoid smoking tobacco and drinking alcohol. They can make GERD symptoms worse.  Date Last Reviewed: 7/1/2016 2000-2017 [employer]. [address] [place] [address], [place] [address]. All rights reserved. This information is not intended as a substitute for professional medical care. Always follow your healthcare professional's instructions.

## 2019-03-27 NOTE — PROGRESS NOTES
"Chief Complaint   Patient presents with     RECHECK         HPI: Ms. Velez is a 69 year old female who presents today for follow up of medications and sleep.    When she was seen recently we discussed tapering off of her estradiol.  She did stop this and found that she had significant sweats during the day and at night along with increased reflux especially at nighttime.  She is curious if she can go back on the medication.  She is also interested in something that may be available for acid reflux.    She does have issues with insomnia.  She tried some Unisom and found that it did not help.  She is curious if there are other options.    She is overweight and is interested in something that may act as an appetite suppressant.  We checked her cholesterol when she was seen in February and she is concerned about the increase in this.      She  reports that she quit smoking about 46 years ago. She smoked 0.50 packs per day. she has never used smokeless tobacco.    Past medical history reviewed as below:     Past Medical History:   Diagnosis Date     Adnexal mass 08/22/2011     Contact dermatitis 12/16/2009     Diplopia     Episode of mild diplopia secondary to sinusitis, ophthalmology consult 07/2008.     H/O gastritis      History of adenomatous polyp of colon      Postmenopausal atrophic vaginitis 07/29/2011     Restless leg syndrome      Retention of urine 08/22/2011     Rosacea      Tinea corporis 11/19/2010   .      ROS  Pertinent ROS was performed and was negative, including for fever, chills, chest pain, shortness of breath, increased lower extremity edema, changes in bowel or bladder, blood in the stool, difficulty swallowing, sores in the mouth. No other concerns, with exception of HPI above.      EXAM:   /64 (BP Location: Right arm, Patient Position: Sitting, Cuff Size: Adult Regular)   Pulse 60   Temp 97.1  F (36.2  C) (Tympanic)   Resp 16   Ht 1.626 m (5' 4\")   Wt 78.6 kg (173 lb 3.2 oz)   " "Breastfeeding? No   BMI 29.73 kg/m      Estimated body mass index is 29.73 kg/m  as calculated from the following:    Height as of this encounter: 1.626 m (5' 4\").    Weight as of this encounter: 78.6 kg (173 lb 3.2 oz).      GEN: Vitals reviewed. Healthy appearing. Patient is in no acute distress. Cooperative with exam.  HEENT: Normocephalic atraumatic.  Pupils equally round.  No scleral icterus, no conjunctival erythema. Oropharynx with no erythema or exudates. Dentition adequate.  CV: Heart regular in rate and rhythm with no murmur.    LUNGS: Lungs clear to auscultation bilaterally.  Chest rise equal bilaterally.  No accessory muscle use.  ABD:  Obese.  SKIN: Warm and dry to touch.  No rash on face, arms and legs.  EXT: No clubbing or cyanosis.  No peripheral edema.  PSYCH: Mood is good.  Affect appropriate. Speech fluent. Answers questions appropriately and thought process normal.     ASSESSMENT AND PLAN:    Perimenopausal vasomotor symptoms  -Medication is sent back in.  She is to start with half tablet to see if this is enough.  She is to call with any issues.  - estradiol (ESTRACE) 0.5 MG tablet  Dispense: 90 tablet; Refill: 3    Insomnia, unspecified type  -We discussed various options for insomnia.  Overall given her age and comorbidities we are limited in medications.  She is willing to try the Unisom along with restarting her Estrace to see if this is enough to help with sleep.  She was given the list of behavioral changes that can be beneficial.  - doxylamine (UNISOM) 25 MG TABS tablet  Dispense: 30 each; Refill: 1    Overweight (BMI 25.0-29.9)  -We discussed the best way for accomplishing weight loss.  At this time I do not think medications are a great idea.  She was given some alternative options including increasing her water intake, eating food earlier in the day and trying some apple cider vinegar once daily.    Gastroesophageal reflux disease, esophagitis presence not specified  -Over-the-counter " and behavioral changes given.  She is to call if she has continued issues.         Return in about 2 months (around 5/27/2019) for Recheck.    A total of 25 minutes spent with in face-to-face consultation of this patient with greater than 50% spent in counseling and care coordination of above listed medical problems including diagnosis, treatment options with emphasis on risks and benefits of each,prognosis and importance of compliance for each.      NATE DRUMMOND DO   3/27/2019 8:05 AM    This document was prepared using voice generated softwear. While every attempt was made for accuracy, grammatical errors may exist.

## 2019-03-28 ASSESSMENT — ANXIETY QUESTIONNAIRES: GAD7 TOTAL SCORE: 0

## 2019-04-05 ENCOUNTER — TELEPHONE (OUTPATIENT)
Dept: INTERNAL MEDICINE | Facility: OTHER | Age: 69
End: 2019-04-05

## 2019-04-05 DIAGNOSIS — G47.00 INSOMNIA, UNSPECIFIED TYPE: Primary | ICD-10-CM

## 2019-04-05 RX ORDER — TRAZODONE HYDROCHLORIDE 50 MG/1
50 TABLET, FILM COATED ORAL AT BEDTIME
Qty: 30 TABLET | Refills: 1 | Status: SHIPPED | OUTPATIENT
Start: 2019-04-05 | End: 2019-06-07

## 2019-04-05 NOTE — TELEPHONE ENCOUNTER
Patient should discontinue her Unisom.  Prescription for trazodone sent in.  She should call if she has any questions or concerns regarding the medication.  If she continues to have issues I would recommend that she set up a appointment sooner than June to discuss this

## 2019-04-30 ENCOUNTER — OFFICE VISIT (OUTPATIENT)
Dept: FAMILY MEDICINE | Facility: OTHER | Age: 69
End: 2019-04-30
Attending: NURSE PRACTITIONER
Payer: OTHER GOVERNMENT

## 2019-04-30 VITALS
DIASTOLIC BLOOD PRESSURE: 76 MMHG | HEART RATE: 66 BPM | WEIGHT: 171 LBS | RESPIRATION RATE: 15 BRPM | BODY MASS INDEX: 29.19 KG/M2 | SYSTOLIC BLOOD PRESSURE: 104 MMHG | TEMPERATURE: 97.6 F | HEIGHT: 64 IN

## 2019-04-30 DIAGNOSIS — J06.9 VIRAL UPPER RESPIRATORY ILLNESS: Primary | ICD-10-CM

## 2019-04-30 PROCEDURE — 99213 OFFICE O/P EST LOW 20 MIN: CPT | Performed by: NURSE PRACTITIONER

## 2019-04-30 RX ORDER — BENZONATATE 200 MG/1
200 CAPSULE ORAL 3 TIMES DAILY PRN
Qty: 30 CAPSULE | Refills: 0 | Status: SHIPPED | OUTPATIENT
Start: 2019-04-30 | End: 2019-10-07

## 2019-04-30 ASSESSMENT — PAIN SCALES - GENERAL: PAINLEVEL: SEVERE PAIN (7)

## 2019-04-30 ASSESSMENT — PATIENT HEALTH QUESTIONNAIRE - PHQ9: SUM OF ALL RESPONSES TO PHQ QUESTIONS 1-9: 0

## 2019-04-30 ASSESSMENT — MIFFLIN-ST. JEOR: SCORE: 1280.65

## 2019-04-30 NOTE — PROGRESS NOTES
HPI:    Monisha Velez is a 69 year old female who presents to clinic today for sinus congestion. Monisha has been ill since Sunday 4/28/2019. She has a of sinus issuse with multiple sinus surgeries over 20 years ago. Left side nares feels plugged and both ears are plugged. She is having chills and sweats, no recorded fevers. No known sick contacts. She has pressure-like pain above and below eyes on both sides. She has been doing nasal rinses and taking regular tylenol, which helps a little. She hasn't tried any nasal sprays or ibuprofen. She can feel post nasal drip which is causing cough, the cough is causing difficulty sleeping at night. No loss of appetite, she is drinking lots of fluids, denies chest pain, shortness of breath, palpitations, nausea, vomiting, diarrhea, muscle aches or swelling in extremities.       Past Medical History:   Diagnosis Date     Adnexal mass 08/22/2011     Contact dermatitis 12/16/2009     Diplopia     Episode of mild diplopia secondary to sinusitis, ophthalmology consult 07/2008.     H/O gastritis      History of adenomatous polyp of colon      Postmenopausal atrophic vaginitis 07/29/2011     Restless leg syndrome      Retention of urine 08/22/2011     Rosacea      Tinea corporis 11/19/2010         Current Outpatient Medications   Medication Sig Dispense Refill     aspirin EC 81 MG EC tablet Take 81 mg by mouth daily with food       benzonatate (TESSALON) 200 MG capsule Take 1 capsule (200 mg) by mouth 3 times daily as needed for cough 30 capsule 0     estradiol (ESTRACE) 0.5 MG tablet Take 0.5-1 tablets (0.25-0.5 mg) by mouth daily 90 tablet 3     Multiple Vitamin (MULTIVITAMIN OR) Take 1 tablet by mouth daily.       traZODone (DESYREL) 50 MG tablet Take 1 tablet (50 mg) by mouth At Bedtime 30 tablet 1       Allergies   Allergen Reactions     Penicillins Hives     Sulfa Drugs Hives     Codeine Sulfate Other (See Comments)     nightmares     Morphine Sulfate Other (See Comments)      "Cant sleep     Venlafaxine Hives     'bad reaction      Zolpidem Tartrate      Paresthesias; stomach upset         ROS:  Pertinent positives and negatives are noted in HPI.    EXAM:  /76 (BP Location: Left arm, Patient Position: Sitting, Cuff Size: Adult Regular)   Pulse 66   Temp 97.6  F (36.4  C) (Tympanic)   Resp 15   Ht 1.626 m (5' 4\")   Wt 77.6 kg (171 lb)   Breastfeeding? No   BMI 29.35 kg/m    General appearance: well appearing female, in no acute distress  Head: normocephalic, atraumatic  Ears: TM's with cone of light, no erythema, clear effusion bilaterally, canals clear bilaterally  Eyes: conjunctivae normal  Orophayrnx: moist mucous membranes, tonsils without erythema, exudates or petechiae, post nasal drip seen. Tenderness to palpation of bilateral frontal and maxillary sinuses.   Neck: supple without adenopathy  Respiratory: clear to auscultation bilaterally, no wheezes or rhonchi  Cardiac: RRR with no murmurs, no distal extremity edema  Abdomen: soft, nontender, no masses or organomegally  Dermatological: no rashes or lesions on exposed skin  Psychological: normal affect, alert and pleasant    ASSESSMENT AND PLAN:    1. Viral upper respiratory illness      Illness is likely viral due to duration and severity of symptoms. Educated patient on ineffectiveness and harm of using antibiotics for viral illnesses. Instructed her to follow up in 10-14 days if she has not improved by then. Patient understands and is in agreement. Encouraged continuing supportive care and adding Flonase or other steroid nasal spray for sinus inflammation. Also prescribed Tessalon for cough from post nasal drip.       Luanne Singer..................4/30/2019 2:53 PM      This document was prepared using voice generated software.  While every attempt was made for accuracy, grammatical errors may exist.  "

## 2019-04-30 NOTE — PROGRESS NOTES
Monisha has been ill since Sunday 4/28/2019. Hx of chronic acute sinuses with surgeries over 20 years ago. Left side nares is plugged. Ears are plugged. Chills and sweats. No sick contacts. Pain above and below eyes. Nasal rinses. Taking tylenol. No nasal sprays. No ibuprofen

## 2019-04-30 NOTE — NURSING NOTE
Patient presents to clinic today for sinus issues since Sunday.     No LMP recorded. Patient has had a hysterectomy.  Medication Reconciliation: complete    Tricia Mtz LPN  4/30/2019 2:33 PM

## 2019-04-30 NOTE — PATIENT INSTRUCTIONS
Try Flonase, two sprays in each nostril once/day. Do this after a sinus rinse.      Sleep with head elevated, use humidifier at night.     We have sent Tessalon Pearls to the pharmacy for cough.    Follow-up if symptoms have not improved in 10-14 days.     Continue good hand-washing.

## 2019-06-07 ENCOUNTER — OFFICE VISIT (OUTPATIENT)
Dept: INTERNAL MEDICINE | Facility: OTHER | Age: 69
End: 2019-06-07
Attending: INTERNAL MEDICINE
Payer: OTHER GOVERNMENT

## 2019-06-07 VITALS
RESPIRATION RATE: 16 BRPM | HEART RATE: 63 BPM | SYSTOLIC BLOOD PRESSURE: 128 MMHG | WEIGHT: 174 LBS | TEMPERATURE: 96.7 F | HEIGHT: 64 IN | OXYGEN SATURATION: 97 % | DIASTOLIC BLOOD PRESSURE: 68 MMHG | BODY MASS INDEX: 29.71 KG/M2

## 2019-06-07 DIAGNOSIS — F41.9 ANXIETY: ICD-10-CM

## 2019-06-07 DIAGNOSIS — G47.00 INSOMNIA, UNSPECIFIED TYPE: Primary | ICD-10-CM

## 2019-06-07 DIAGNOSIS — N95.1 MENOPAUSAL SYNDROME (HOT FLASHES): ICD-10-CM

## 2019-06-07 PROCEDURE — 99214 OFFICE O/P EST MOD 30 MIN: CPT | Performed by: INTERNAL MEDICINE

## 2019-06-07 RX ORDER — PAROXETINE 10 MG/1
10 TABLET, FILM COATED ORAL AT BEDTIME
Qty: 90 TABLET | Refills: 0 | Status: SHIPPED | OUTPATIENT
Start: 2019-06-07 | End: 2019-10-07

## 2019-06-07 RX ORDER — TRAZODONE HYDROCHLORIDE 50 MG/1
50 TABLET, FILM COATED ORAL AT BEDTIME
Qty: 30 TABLET | Refills: 1 | Status: CANCELLED | OUTPATIENT
Start: 2019-06-07

## 2019-06-07 ASSESSMENT — ANXIETY QUESTIONNAIRES
5. BEING SO RESTLESS THAT IT IS HARD TO SIT STILL: NOT AT ALL
2. NOT BEING ABLE TO STOP OR CONTROL WORRYING: NOT AT ALL
GAD7 TOTAL SCORE: 0
6. BECOMING EASILY ANNOYED OR IRRITABLE: NOT AT ALL
1. FEELING NERVOUS, ANXIOUS, OR ON EDGE: NOT AT ALL
7. FEELING AFRAID AS IF SOMETHING AWFUL MIGHT HAPPEN: NOT AT ALL
IF YOU CHECKED OFF ANY PROBLEMS ON THIS QUESTIONNAIRE, HOW DIFFICULT HAVE THESE PROBLEMS MADE IT FOR YOU TO DO YOUR WORK, TAKE CARE OF THINGS AT HOME, OR GET ALONG WITH OTHER PEOPLE: NOT DIFFICULT AT ALL
3. WORRYING TOO MUCH ABOUT DIFFERENT THINGS: NOT AT ALL

## 2019-06-07 ASSESSMENT — MIFFLIN-ST. JEOR: SCORE: 1294.26

## 2019-06-07 ASSESSMENT — PAIN SCALES - GENERAL: PAINLEVEL: NO PAIN (0)

## 2019-06-07 ASSESSMENT — PATIENT HEALTH QUESTIONNAIRE - PHQ9: 5. POOR APPETITE OR OVEREATING: NOT AT ALL

## 2019-06-07 NOTE — PATIENT INSTRUCTIONS
Ok to increase Estradiol to 1 tablet daily    Start Paxil 10mg in evening and call if any issues.    - maintain a regular bedtime, routine and wake up time  - include a warm beverage and/or hot bath 1-2 hours before bedtime  - avoid naps  - do not watch theclock  - avoid caffeine, alcohol and nicotine for at least 4-6 hours prior to bed  - exercise daily but avoid exercising right before bed  - no screen time within hour of bed  - keep your bedroom quiet, dark,comfortable and cool  - If you haven t been able to get to sleep after about 20 minutes or more, get up and do something calming or boring until you feel sleepy, then return to bed and try again. Avoid doing anything that is too stimulating or interesting, asthis will wake you up even more.  - use bed only for sleep and sex, do not use it as a place to watch TV, eat, read, work on your laptop, pay bills, and other things  - can trial melatonin 1mg - 5mg nightly 1-3 hours before sleep if you have not in the past    Patient Education     Treating Anxiety Disorders with Therapy  If you have an anxiety disorder, you don t have to suffer anymore. Treatment is available. Therapy (also called counseling) is often a helpful treatment for anxiety disorders. With therapy, a specially trained professional (therapist) helps you face and learn to manage your anxiety. Therapy can be short-term or long-term depending on your needs. In some cases, medicine may also be prescribed with therapy. It may take time before you notice how much therapy is helping, but stick with it. With therapy, you can feel better.  Cognitive behavioral therapy (CBT)  Cognitive behavioral therapy (CBT) teaches you to manage anxiety. It does this by helping you understand how you think and act when you re anxious. Research has shown CBT to be a very effective treatment for anxiety disorders. How CBT is run is almost like a class. It involves homework and activities to build skills that teach you to  cope with anxiety step by step. It can be done in a group or one-on-one, and often takes place for a set number of sessions. CBT has two main parts:    Cognitive therapy helps you identify the negative, irrational thoughts that occur with your anxiety. You ll learn to replace these with more positive, realistic thoughts.    Behavioral therapy helps you change how you react to anxiety. You ll learn coping skills and methods for relaxing to help you better deal with anxiety.  Other forms of therapy  Other therapy methods may work better for you than CBT. Or, you may move from CBT to another form of therapy as your treatment needs change. This may mean meeting with a therapist by yourself or in a group. Therapy can also help you work through problems in your life, such as drug or alcohol dependence, that may be making your anxiety worse.  Getting better takes time  Therapy will help you feel better and teach you skills to help manage anxiety long term. But change doesn t happen right away. It takes a commitment from you. And treatment only works if you learn to face the causes of your anxiety. So, you might feel worse before you feel better. This can sometimes make it hard to stick with it. But remember: Therapy is a very effective treatment. The results will be well worth it.  Helping yourself  If anxiety is wearing you down, here are some things you can do to cope:    Check with your doctor and rule out any physical problems that may be causing the anxiety symptoms.    If an anxiety disorder is diagnosed, seek mental healthcare. This is an illness and it can respond to treatment. Most types of anxiety disorders will respond to talk therapy and medicine.    Educate yourself about anxiety disorders. Keep track of helpful online resources and books you can use during stressful periods.    Try stress management techniques such as meditation.    Consider online or in-person support groups.    Don t fight your feelings.  Anxiety feeds itself. The more you worry about it, the worse it gets. Instead, try to identify what might have triggered your anxiety. Then try to put this threat in perspective.    Keep in mind that you can t control everything about a situation. Change what you can and let the rest take its course.    Exercise -- it s a great way to relieve tension and help your body feel relaxed.    Examine your life for stress, and try to find ways to reduce it.    Avoid caffeine and nicotine, which can make anxiety symptoms worse.    Fight the temptation to turn to alcohol or unprescribed drugs for relief. They only make things worse in the long run.   Date Last Reviewed: 1/1/2017 2000-2018 [employer]. [address] [facility], [place] [address]. All rights reserved. This information is not intended as a substitute for professional medical care. Always follow your healthcare professional's instructions.

## 2019-06-07 NOTE — PROGRESS NOTES
"Chief Complaint   Patient presents with     RECHECK         HPI: Ms. Velez is a 69 year old female who presents today for follow up of insomnia.    She reports that when she took the trazodone she did not feel right on it.  It felt like it dried her out and made her feel uneasy.  She has gone back to melatonin.  She is taking 5 mg 30 minutes before bedtime.  She does feel like this helps as much as the trazodone or the Unisom did.    She does have significant issues with anxiety.  This has been ongoing and does cause her to have some issues with sleep.  She is not currently on any medications regarding this.  She is hesitant regarding starting meds.    She has a history of hot flashes.  She has been taking estradiol half tablet daily.  She reports that this has not controlled her symptoms completely.  She is curious about increasing the dose.    She  reports that she quit smoking about 46 years ago. She smoked 0.50 packs per day. She has never used smokeless tobacco.    Past medical history reviewed as below:     Past Medical History:   Diagnosis Date     Adnexal mass 08/22/2011     Contact dermatitis 12/16/2009     Diplopia     Episode of mild diplopia secondary to sinusitis, ophthalmology consult 07/2008.     H/O gastritis      History of adenomatous polyp of colon      Postmenopausal atrophic vaginitis 07/29/2011     Restless leg syndrome      Retention of urine 08/22/2011     Rosacea      Tinea corporis 11/19/2010   .      ROS  Pertinent ROS was performed and was negative, including for fever, chills, chest pain, shortness of breath, increased lower extremity edema, changes in bowel or bladder, blood in the stool, difficulty swallowing, sores in the mouth. No other concerns, with exception of HPI above.      EXAM:   /68 (BP Location: Right arm, Patient Position: Sitting, Cuff Size: Adult Regular)   Pulse 63   Temp 96.7  F (35.9  C) (Tympanic)   Resp 16   Ht 1.626 m (5' 4\")   Wt 78.9 kg (174 lb)   " "SpO2 97%   Breastfeeding? No   BMI 29.87 kg/m      Estimated body mass index is 29.87 kg/m  as calculated from the following:    Height as of this encounter: 1.626 m (5' 4\").    Weight as of this encounter: 78.9 kg (174 lb).      GEN: Vitals reviewed. Healthy appearing. Patient is in no acute distress. Cooperative with exam.  HEENT: Normocephalic atraumatic.  Pupils equally round.  No scleral icterus, no conjunctival erythema. Oropharynx with no erythema or exudates. Dentition adequate.  NECK: Supple; no thyromegaly.  No neck, cervical LAD.   CV: Heart regular in rate and rhythm with no murmur.    LUNGS: Lungs clear to auscultation bilaterally.  Chest rise equal bilaterally.  No accessory muscle use.  ABD: Nondistended.  SKIN: Warm and dry to touch.  No rash on face, arms and legs.  EXT: No clubbing or cyanosis.  No peripheral edema.  PSYCH: Mood is anxious.  Affect appropriate. Speech fluent. Answers questions appropriately and thought process normal.     ASSESSMENT AND PLAN:    Insomnia, unspecified type  At this time she can continue with the melatonin.  She is to continue to work on behavioral changes to help this.  She is to call if she has further issues.    Anxiety  -We discussed the pros and cons of medication today.  She is interested in possibly trying some Paxil particularly if it helps with her hot flashes.  She was also given information on therapy and encouraged to look into this.  - PARoxetine (PAXIL) 10 MG tablet  Dispense: 90 tablet; Refill: 0    Menopausal syndrome (hot flashes)  -At this time we will try Paxil to see if this helps with her hot flashes.  She is to monitor for side effects including weight gain, dizziness or GI upset.  She was instructed that she can stop it if she has any adverse reactions.  She can go up to the full dose of the estradiol if needed.  She is to call with any issues.  - PARoxetine (PAXIL) 10 MG tablet  Dispense: 90 tablet; Refill: 0    We will plan to touch base in " approximately 4 weeks to see how she is doing.  She will follow-up with me next spring or sooner as needed.        NATE DRUMMOND, DO   6/7/2019 6:16 AM    This document was prepared using voice generated softwear. While every attempt was made for accuracy, grammatical errors may exist.

## 2019-06-07 NOTE — NURSING NOTE
Patient presents to the clinic for follow-up sleep.     Medication Reconciliation: complete   Katelin Mccann LPN............. June 7, 2019 8:44 AM

## 2019-06-08 ASSESSMENT — ANXIETY QUESTIONNAIRES: GAD7 TOTAL SCORE: 0

## 2019-06-24 ENCOUNTER — TELEPHONE (OUTPATIENT)
Dept: INTERNAL MEDICINE | Facility: OTHER | Age: 69
End: 2019-06-24

## 2019-06-24 NOTE — TELEPHONE ENCOUNTER
Called and spoke with patient after proper verification.  Patient states since starting her new med, she states she feels more nervous, getting very anxious and other people are noticing. Also making her more lethargic, sleeping all day. Noticing a decrease in daily activities that she likes. Please advise.     Katelin Mccann LPN............. June 24, 2019 10:26 AM

## 2019-06-24 NOTE — TELEPHONE ENCOUNTER
Please have her stop the Paxil by decreasing to 1/2 tablet for 3 to 5 days and then stopping completely.  She can use her estradiol for hot flashes/night sweats and if she is interested in trying something else let me know.

## 2019-06-24 NOTE — TELEPHONE ENCOUNTER
Called and verified patients full name and . Notified patient of the below. Nothing further needed at this time.     Gay Crews LPN on 2019 at 11:37 AM

## 2019-10-07 ENCOUNTER — OFFICE VISIT (OUTPATIENT)
Dept: FAMILY MEDICINE | Facility: OTHER | Age: 69
End: 2019-10-07
Attending: NURSE PRACTITIONER
Payer: OTHER GOVERNMENT

## 2019-10-07 VITALS
TEMPERATURE: 97.7 F | HEART RATE: 62 BPM | OXYGEN SATURATION: 95 % | WEIGHT: 173.4 LBS | SYSTOLIC BLOOD PRESSURE: 126 MMHG | HEIGHT: 64 IN | RESPIRATION RATE: 20 BRPM | BODY MASS INDEX: 29.6 KG/M2 | DIASTOLIC BLOOD PRESSURE: 63 MMHG

## 2019-10-07 DIAGNOSIS — R03.0 ELEVATED BP WITHOUT DIAGNOSIS OF HYPERTENSION: Primary | ICD-10-CM

## 2019-10-07 DIAGNOSIS — J01.90 ACUTE BACTERIAL RHINOSINUSITIS: ICD-10-CM

## 2019-10-07 DIAGNOSIS — B96.89 ACUTE BACTERIAL RHINOSINUSITIS: ICD-10-CM

## 2019-10-07 DIAGNOSIS — R30.0 DYSURIA: ICD-10-CM

## 2019-10-07 LAB
ALBUMIN UR-MCNC: NEGATIVE MG/DL
APPEARANCE UR: CLEAR
BILIRUB UR QL STRIP: NEGATIVE
COLOR UR AUTO: YELLOW
GLUCOSE UR STRIP-MCNC: NEGATIVE MG/DL
HGB UR QL STRIP: NEGATIVE
KETONES UR STRIP-MCNC: NEGATIVE MG/DL
LEUKOCYTE ESTERASE UR QL STRIP: NEGATIVE
NITRATE UR QL: NEGATIVE
PH UR STRIP: 7 PH (ref 5–9)
SOURCE: NORMAL
SP GR UR STRIP: 1.01 (ref 1–1.03)
UROBILINOGEN UR STRIP-ACNC: 0.2 EU/DL (ref 0.2–1)

## 2019-10-07 PROCEDURE — 99214 OFFICE O/P EST MOD 30 MIN: CPT | Performed by: NURSE PRACTITIONER

## 2019-10-07 PROCEDURE — 81003 URINALYSIS AUTO W/O SCOPE: CPT | Mod: ZL | Performed by: NURSE PRACTITIONER

## 2019-10-07 RX ORDER — DOXYCYCLINE 100 MG/1
100 CAPSULE ORAL 2 TIMES DAILY
Qty: 10 CAPSULE | Refills: 0 | Status: SHIPPED | OUTPATIENT
Start: 2019-10-07 | End: 2019-10-24

## 2019-10-07 ASSESSMENT — MIFFLIN-ST. JEOR: SCORE: 1291.54

## 2019-10-07 ASSESSMENT — PAIN SCALES - GENERAL: PAINLEVEL: NO PAIN (0)

## 2019-10-07 NOTE — PROGRESS NOTES
Subjective     Monisha Velez is a 69 year old female who presents to clinic today for the following health issues:    HPI   Acute Illness   Acute illness concerns: cough, sinus congestion  Onset: 2 weeks    Fever: no    Chills/Sweats: YES    Headache (location?): YES- frontal    Sinus Pressure:YES- tender, post-nasal drainage, facial pain, teeth pain and mucopurulent discharge    Conjunctivitis:  no    Ear Pain: YES: bilateral    Rhinorrhea: YES- green    Congestion: YES    Sore Throat: YES     Cough: YES-productive of clear sputum, improving over time    Wheeze: YES    Decreased Appetite: YES    Nausea: no    Vomiting: no    Diarrhea:  YES    Dysuria/Freq.: YES    Fatigue/Achiness: YES    Sick/Strep Exposure: YES- hubby has cold as well     Therapies Tried and outcome: DM Max, nyquil, rest, increased fluids    URINARY TRACT SYMPTOMS  Onset: 4-5 days ago    Description:   Painful urination (Dysuria): no            Frequency: YES  Blood in urine (Hematuria): no   Delay in urine (Hesitency): no     Intensity: mild    Progression of Symptoms:  same    Accompanying Signs & Symptoms:  Fever/chills: no   Flank pain no   Nausea and vomiting: no   Any vaginal symptoms: none  Abdominal/Pelvic Pain: no     History:   History of frequent UTI's: no   History of kidney stones: no   Sexually Active: no   Possibility of pregnancy: No    Precipitating factors:   nothing    Therapies Tried and outcome: Increase fluid intake       Patient Active Problem List   Diagnosis     Anxiety state     Family history of malignant melanoma of skin     Fundic gastritis     Gastroesophageal reflux disease without esophagitis     H/O adenomatous polyp of colon     Chronic insomnia     Reactive airway disease that is not asthma     Restless leg syndrome     Rosacea     Past Surgical History:   Procedure Laterality Date     COLONOSCOPY      10/07,Colonoscopy, done in [place], normal.     COLONOSCOPY  03/18/2016    Tubular adenoma, Next in 5 years      CYSTOSCOPY  2013    [facility]     DENTAL SURGERY  2005    Root canal,  Dr. Olmos.     ENDOSCOPIC SINUS SURGERY       ESOPHAGOSCOPY, GASTROSCOPY, DUODENOSCOPY (EGD), COMBINED  2016     HYSTERECTOMY VAGINAL      Endometriosis     LAPAROSCOPIC CYSTECTOMY OVARIAN (BENIGN)  2011    cystectomy with lysis of adhesions and possible oophrectomy  by Dr. Gay Mcgovern  at [facility]     ROTATOR CUFF REPAIR RT/LT Right 2017    Dr John     SALPINGO-OOPHORECTOMY BILATERAL  10/2007    bilateral ovarian serous cystadenofibromas, benign.       Social History     Tobacco Use     Smoking status: Former Smoker     Packs/day: 0.50     Last attempt to quit: 1972     Years since quittin.2     Smokeless tobacco: Never Used   Substance Use Topics     Alcohol use: Yes     Alcohol/week: 0.0 standard drinks     Comment: Rarely     Family History   Problem Relation Age of Onset     Asthma Mother      Hypertension Mother      Diabetes Mother         insulin dependence     Peripheral Vascular Disease Mother      Hypertension Father      Melanoma Father      Diabetes Sister      Hypertension Sister      GI problems Daughter      Diabetes Sister      Hypertension Sister      Brain Hemorrhage Sister         after fall     Other - See Comments Sister         bladder issues and repeat breast biopsies (benign)     Obesity Son      Hypertension Son      Breast Cancer No family hx of         Cancer-breast         Current Outpatient Medications   Medication Sig Dispense Refill     aspirin EC 81 MG EC tablet Take 81 mg by mouth daily with food       estradiol (ESTRACE) 0.5 MG tablet Take 0.5-1 tablets (0.25-0.5 mg) by mouth daily 90 tablet 3     melatonin 5 MG tablet Take 5 mg by mouth nightly as needed for sleep       Multiple Vitamin (MULTIVITAMIN OR) Take 1 tablet by mouth daily.       Allergies   Allergen Reactions     Penicillins Hives     Sulfa Drugs Hives     Codeine Sulfate  "Other (See Comments)     nightmares     Morphine Sulfate Other (See Comments)     Cant sleep     Venlafaxine Hives     'bad reaction      Zolpidem Tartrate      Paresthesias; stomach upset         Reviewed and updated as needed this visit by Provider  Tobacco  Allergies  Meds  Problems  Med Hx  Surg Hx  Fam Hx  Soc Hx          Review of Systems   ROS COMP: As above      Objective    BP (!) 148/94   Pulse 62   Temp 97.7  F (36.5  C) (Temporal)   Resp 20   Ht 1.626 m (5' 4\")   Wt 78.7 kg (173 lb 6.4 oz)   SpO2 95%   BMI 29.76 kg/m    Body mass index is 29.76 kg/m .  Physical Exam   GENERAL: alert, no distress and fatigued  EYES: Eyes grossly normal to inspection, PERRL and conjunctivae and sclerae normal  HENT: normal cephalic/atraumatic, both ears: clear effusion, nasal mucosa edematous , rhinorrhea green, oral mucous membranes moist, sinuses: maxillary tenderness on both sides and cobblestoning of posterior oropharynx  NECK: no adenopathy, no asymmetry, masses, or scars and thyroid normal to palpation  RESP: lungs clear to auscultation - no rales, rhonchi or wheezes  CV: regular rate and rhythm, normal S1 S2, no S3 or S4, no murmur, click or rub, no peripheral edema and peripheral pulses strong  ABDOMEN: soft, nontender, no hepatosplenomegaly, no masses and bowel sounds normal  MS: no gross musculoskeletal defects noted, no edema  SKIN: no suspicious lesions or rashes  NEURO: Normal strength and tone, mentation intact and speech normal  PSYCH: mentation appears normal, affect normal/bright    Diagnostic Test Results:  Labs reviewed in Epic  Results for orders placed or performed in visit on 10/07/19   UA reflex to Microscopic and Culture   Result Value Ref Range    Color Urine Yellow     Appearance Urine Clear     Glucose Urine Negative NEG^Negative mg/dL    Bilirubin Urine Negative NEG^Negative    Ketones Urine Negative NEG^Negative mg/dL    Specific Gravity Urine 1.010 1.000 - 1.030    Blood Urine " Negative NEG^Negative    pH Urine 7.0 5.0 - 9.0 pH    Protein Albumin Urine Negative NEG^Negative mg/dL    Urobilinogen Urine 0.2 0.2 - 1.0 EU/dL    Nitrite Urine Negative NEG^Negative    Leukocyte Esterase Urine Negative NEG^Negative    Source Midstream Urine            Assessment & Plan     1. Elevated BP without diagnosis of hypertension  BP initially elevated in office today, though improved on recheck. Does admit to some anxiety and is not feeling well, though felt much better after evaluation. Will check at home if able or return to clinic next week for a nurse only visit.     2. Dysuria  UA normal.  - UA reflex to Microscopic and Culture    3. Acute bacterial rhinosinusitis  - Take entire course of antibiotic even if you start to feel better.  - Antibiotics can cause stomach upset including nausea and diarrhea. Read your bottle or ask the pharmacist if antibiotic can be taken with food to help prevent nausea. If you have symptoms of diarrhea you can take an over-the-counter probiotic and/or increase foods with probiotics such as yogurt, Josue, sauerkraut.  - doxycycline hyclate (VIBRAMYCIN) 100 MG capsule; Take 1 capsule (100 mg) by mouth 2 times daily for 5 days  Dispense: 10 capsule; Refill: 0       Radha Light NP  [facility] AND [facility]

## 2019-10-07 NOTE — NURSING NOTE
"Chief Complaint   Patient presents with     Sinus Problem     cough, sinus congestion, green discharge x 2 weeks     Frequency     urine leaks when coughing         Initial BP (!) 148/94   Pulse 62   Temp 97.7  F (36.5  C) (Temporal)   Resp 20   Ht 1.626 m (5' 4\")   Wt 78.7 kg (173 lb 6.4 oz)   SpO2 95%   BMI 29.76 kg/m   Estimated body mass index is 29.76 kg/m  as calculated from the following:    Height as of this encounter: 1.626 m (5' 4\").    Weight as of this encounter: 78.7 kg (173 lb 6.4 oz).    Medication Reconciliation: complete      Norma J. Gosselin, LPN  "

## 2019-10-16 ENCOUNTER — HOSPITAL ENCOUNTER (OUTPATIENT)
Dept: MAMMOGRAPHY | Facility: OTHER | Age: 69
Discharge: HOME | End: 2019-10-16
Attending: INTERNAL MEDICINE | Admitting: INTERNAL MEDICINE
Payer: OTHER GOVERNMENT

## 2019-10-16 DIAGNOSIS — Z12.39 BREAST SCREENING: ICD-10-CM

## 2019-10-16 DIAGNOSIS — Z12.31 VISIT FOR SCREENING MAMMOGRAM: ICD-10-CM

## 2019-10-16 PROCEDURE — 77063 BREAST TOMOSYNTHESIS BI: CPT

## 2019-10-22 ENCOUNTER — TELEPHONE (OUTPATIENT)
Dept: INTERNAL MEDICINE | Facility: OTHER | Age: 69
End: 2019-10-22

## 2019-10-22 NOTE — TELEPHONE ENCOUNTER
Patient states she seen Radha Light on 10/7 and given an antibiotic for a sinus infection. Patient states it is not completely gone and was wanting a refill on the antibiotic.     Please call patient at [phone]    Tamika Talbot on 10/22/2019 at 8:35 AM

## 2019-10-23 NOTE — TELEPHONE ENCOUNTER
Called patient and verified name and date of birth.     She states she has had sinus pain and drainage for a month. Along with a deep, productive cough with green/brown sputum. She was treated with doxycycline with no relief of symptoms. She would like another refill of the antibiotic. Informed her she may need to be evaluated.  Please advise.  Chelsea Poon LPN on 10/23/2019 at 10:47 AM

## 2019-10-23 NOTE — TELEPHONE ENCOUNTER
Left message at the number listed to call patient back. Wanted to inform patient that she can be seen tomorrow 10/24 at 2:40.    Katelin Mccann LPN............. October 23, 2019 2:56 PM

## 2019-10-23 NOTE — TELEPHONE ENCOUNTER
Given that she has had no improvement I recommend the patient be seen prior to additional treatment.  I can see her tomorrow afternoon.

## 2019-10-24 ENCOUNTER — OFFICE VISIT (OUTPATIENT)
Dept: INTERNAL MEDICINE | Facility: OTHER | Age: 69
End: 2019-10-24
Attending: INTERNAL MEDICINE
Payer: OTHER GOVERNMENT

## 2019-10-24 VITALS
DIASTOLIC BLOOD PRESSURE: 90 MMHG | OXYGEN SATURATION: 95 % | TEMPERATURE: 97.8 F | SYSTOLIC BLOOD PRESSURE: 130 MMHG | WEIGHT: 175 LBS | RESPIRATION RATE: 25 BRPM | HEART RATE: 62 BPM | BODY MASS INDEX: 30.04 KG/M2

## 2019-10-24 DIAGNOSIS — R05.9 COUGH: ICD-10-CM

## 2019-10-24 DIAGNOSIS — L98.9 SKIN LESION OF FACE: ICD-10-CM

## 2019-10-24 DIAGNOSIS — Z23 NEED FOR IMMUNIZATION AGAINST INFLUENZA: Primary | ICD-10-CM

## 2019-10-24 DIAGNOSIS — J01.10 SUBACUTE FRONTAL SINUSITIS: ICD-10-CM

## 2019-10-24 DIAGNOSIS — R32 URINARY INCONTINENCE, UNSPECIFIED TYPE: ICD-10-CM

## 2019-10-24 PROCEDURE — 90662 IIV NO PRSV INCREASED AG IM: CPT | Performed by: INTERNAL MEDICINE

## 2019-10-24 PROCEDURE — 99214 OFFICE O/P EST MOD 30 MIN: CPT | Mod: 25 | Performed by: INTERNAL MEDICINE

## 2019-10-24 PROCEDURE — 90471 IMMUNIZATION ADMIN: CPT | Performed by: INTERNAL MEDICINE

## 2019-10-24 PROCEDURE — 17110 DESTRUCTION B9 LES UP TO 14: CPT | Performed by: INTERNAL MEDICINE

## 2019-10-24 RX ORDER — DOXYCYCLINE 100 MG/1
100 CAPSULE ORAL 2 TIMES DAILY
Qty: 20 CAPSULE | Refills: 0 | Status: SHIPPED | OUTPATIENT
Start: 2019-10-24 | End: 2019-12-17

## 2019-10-24 RX ORDER — GUAIFENESIN 200 MG/10ML
200 LIQUID ORAL EVERY 4 HOURS PRN
Qty: 118 ML | Refills: 1 | Status: SHIPPED | OUTPATIENT
Start: 2019-10-24 | End: 2020-07-09

## 2019-10-24 ASSESSMENT — ANXIETY QUESTIONNAIRES
6. BECOMING EASILY ANNOYED OR IRRITABLE: NOT AT ALL
7. FEELING AFRAID AS IF SOMETHING AWFUL MIGHT HAPPEN: NOT AT ALL
IF YOU CHECKED OFF ANY PROBLEMS ON THIS QUESTIONNAIRE, HOW DIFFICULT HAVE THESE PROBLEMS MADE IT FOR YOU TO DO YOUR WORK, TAKE CARE OF THINGS AT HOME, OR GET ALONG WITH OTHER PEOPLE: NOT DIFFICULT AT ALL
5. BEING SO RESTLESS THAT IT IS HARD TO SIT STILL: NOT AT ALL
1. FEELING NERVOUS, ANXIOUS, OR ON EDGE: NOT AT ALL
GAD7 TOTAL SCORE: 1
3. WORRYING TOO MUCH ABOUT DIFFERENT THINGS: NOT AT ALL
2. NOT BEING ABLE TO STOP OR CONTROL WORRYING: SEVERAL DAYS

## 2019-10-24 ASSESSMENT — PATIENT HEALTH QUESTIONNAIRE - PHQ9: 5. POOR APPETITE OR OVEREATING: NOT AT ALL

## 2019-10-24 ASSESSMENT — PAIN SCALES - GENERAL: PAINLEVEL: MODERATE PAIN (4)

## 2019-10-24 NOTE — PROGRESS NOTES
Chief Complaint   Patient presents with     URI          Subjective:   Ms. Velez is a 69 year old female  seen for the acute concern today of continued sinus problems.    She reports these started 1 month ago, sinus pressure, she did have some drainage and most recently has had green mucus and blood in sputum in morning.  She has had increased cough but no hemoptysis.  She has had severe coughing where she feels she is going to vomit.  She did have fevers in beginning but none now.    She was on doxycycline for 5 days and had some improvement but not more than 30% better.  She was last on it 12 days ago.  No recent travel.  Her  this and got better after 4 days.    She does have a skin lesion on her right cheek.  She reports that this started out of nowhere.  She has had previous skin lesion sprayed with liquid nitrogen.    She has continued to have urinary issues.  This has been worse with her recent cough.  She is interested in some cough medicine to help with this and also in referral back to physical therapy who helped her in the past.  She denies any hematuria.    She  reports that she quit smoking about 47 years ago. She smoked 0.50 packs per day. She has never used smokeless tobacco.    Past medical history reviewed as below:     Past Medical History:   Diagnosis Date     Adnexal mass 08/22/2011     Contact dermatitis 12/16/2009     Diplopia     Episode of mild diplopia secondary to sinusitis, ophthalmology consult 07/2008.     H/O gastritis      History of adenomatous polyp of colon      Postmenopausal atrophic vaginitis 07/29/2011     Restless leg syndrome      Retention of urine 08/22/2011     Rosacea      Tinea corporis 11/19/2010   .      ROS:   Pertinent  ROS was performed and was negative, including for fevers at this time, chills, chest pain, shortness of breath, increased lower extremity edema, changes in bowel or bladder, blood in the stool, difficulty swallowing, sores in the mouth. No  other concerns, with exception of HPI above.      Objective:    BP (!) 130/90 (BP Location: Left arm, Patient Position: Sitting, Cuff Size: Adult Regular)   Pulse 62   Temp 97.8  F (36.6  C) (Tympanic)   Resp 25   Wt 79.4 kg (175 lb)   SpO2 95%   BMI 30.04 kg/m    GEN: Vitals reviewed.  Patient is in no acute distress. Cooperative with exam.  HEENT: Normocephalic atraumatic.  Pupils equally round.  No scleral icterus, no conjunctival erythema. Oropharynx with no erythema or exudates. Dentition adequate.  NECK: Supple; no thyromegaly.  No neck, cervical LAD.    CV: Heart regular in rate and rhythm with no murmur.   LUNGS: Lungs clear to auscultation bilaterally.  Chest rise equal bilaterally.  No accessory muscle use.  SKIN: Warm and dry to touch.  No rash on face, arms and legs.  5 mm skin colored raised lesion on the right cheek consistent with actinic keratosis versus early skin cancer.  EXT: No clubbing or cyanosis.  No peripheral edema.     Assessment/Plan:   Need for immunization against influenza  - [facility] IMM-  FLU VACCINE, INCREASED ANTIGEN, PRESV FREE    Cough  - guaiFENesin (ROBITUSSIN) 100 MG/5ML liquid  Dispense: 118 mL; Refill: 1    Subacute frontal sinusitis  - most consistent with bacterial illness due to duration of illness  - will treat with antibiotics again, patient to treat symptoms as instructed below, call if she has any ADR's or worsening symptoms  - recommend eating yogurt/kefir 1-2 times daily while on antibiotics  - doxycycline monohydrate (MONODOX) 100 MG capsule  Dispense: 20 capsule; Refill: 0    Skin lesion of face  -Patient was offered referral for excisional biopsy or dermatology.  She would prefer to treat today.  Liquid nitrogen applied to lesion on face with 3 freeze thaw cycles.  No immediate complications.  - DESTRUCT PREMALIGNANT LESION, FIRST    Urinary incontinence, unspecified type  -Referral placed for physical therapy.  She is to call if she has any new or changing  symptoms.  - PHYSICAL THERAPY REFERRAL      - Return/call as needed for follow-up should any new symptoms develop, for worsening of current symptoms or if symptoms do not resolve with above plan.     NATE DRUMMOND DO   10/24/2019 4:38 PM    This document was prepared using voice generated softwear. While every attempt was made for accuracy, grammatical errors may exist.

## 2019-10-24 NOTE — NURSING NOTE
Immunization Documentation    Prior to Immunization administration, verified patients identity using patient's name and date of birth. Please see IMMUNIZATIONS  and order for additional information.  Patient / Parent instructed to remain in clinic for 15 minutes and report any adverse reaction to staff immediately.    Was entire vial of medication used? Yes  Vial/Syringe: Tejal Mccann LPN  10/24/2019   5:03 PM

## 2019-10-25 ASSESSMENT — ANXIETY QUESTIONNAIRES: GAD7 TOTAL SCORE: 1

## 2019-11-15 ENCOUNTER — TELEPHONE (OUTPATIENT)
Dept: SCHEDULING | Facility: CLINIC | Age: 69
End: 2019-11-15

## 2019-11-15 NOTE — TELEPHONE ENCOUNTER
Reason for call:  Other   Patient called regarding (reason for call): call back  Additional comments: Patient has a question regarding recent test results    Phone number to reach patient:  Cell number on file:    Telephone Information:   Mobile [phone]       Best Time:  Any time    Can we leave a detailed message on this number?  YES

## 2019-11-16 NOTE — TELEPHONE ENCOUNTER
This should be routed to the internal medicine nursing pool to gather further information and not directly to me.    IM nursing-please call and clarify patient's questions.

## 2019-11-18 NOTE — TELEPHONE ENCOUNTER
It is unclear to me what she is talking about as an energy pill.  If she has having continued sinus symptoms I would recommend starting the generic of Allegra daily.  This can be bought over-the-counter, fexofenadine 180 mg daily.  If she is continuing to have sinus congestion and headaches after antibiotics she may need further evaluation through ENT and I can put in a referral for that.

## 2019-11-18 NOTE — TELEPHONE ENCOUNTER
Called and spoke with patient after proper verification. Informed patient of below message. Patient stated understanding and no further questions at this time.     Katelin Mccann LPN............. November 18, 2019 1:06 PM

## 2019-11-18 NOTE — TELEPHONE ENCOUNTER
"Called and spoke with patient after proper verification. Patient states she was seen by Raeann Pearson DO on 10/24 and is still having sinus problems. S/sx include non-productive cough, lethargy and sinus headaches. Patient states she can't get her energy back and \"would just like an energy pill prescribed\". Please advise.     Katelin Mccann LPN............. November 18, 2019 8:30 AM     "

## 2019-11-26 ENCOUNTER — HOSPITAL ENCOUNTER (OUTPATIENT)
Dept: PHYSICAL THERAPY | Facility: OTHER | Age: 69
Setting detail: THERAPIES SERIES
End: 2019-11-26
Attending: INTERNAL MEDICINE
Payer: OTHER GOVERNMENT

## 2019-11-26 DIAGNOSIS — R32 URINARY INCONTINENCE, UNSPECIFIED TYPE: ICD-10-CM

## 2019-11-26 PROCEDURE — 97110 THERAPEUTIC EXERCISES: CPT | Mod: GP

## 2019-11-26 PROCEDURE — 97161 PT EVAL LOW COMPLEX 20 MIN: CPT | Mod: GP

## 2019-11-26 PROCEDURE — 97140 MANUAL THERAPY 1/> REGIONS: CPT | Mod: GP

## 2019-11-26 PROCEDURE — 97112 NEUROMUSCULAR REEDUCATION: CPT | Mod: GP

## 2019-11-26 NOTE — PROGRESS NOTES
11/26/19 1400   General Information   Type of Visit Initial OP Ortho PT Evaluation   Start of Care Date 11/26/19   Referring Physician Raeann Pearson, DO   Patient/Family Goals Statement reduce incontinence   Orders Evaluate and Treat   Date of Order 10/24/19   Certification Required? Yes   Medical Diagnosis urinary incontinence   Surgical/Medical history reviewed Yes   Body Part(s)   Body Part(s) Lumbar Spine/SI;Pelvic Floor Dysfunction   Presentation and Etiology   Pertinent history of current problem (include personal factors and/or comorbidities that impact the POC) Patient has been seen in the past for urinary incontinence and was successful with PT. IN the last 1-2 months, had a bad cold, leakage worsened. She feels she has improved but still isn't back to her normal. Would like to improve this with PT as well as back discomfort.   Impairments A. Pain;P. Bowel or bladder problems   Functional Limitations perform activities of daily living   Onset date of current episode/exacerbation 10/24/19   Chronicity Recurrent   Pain rating (0-10 point scale) Denies pain   Frequency of pain/symptoms B. Intermittent   Pain/symptoms exacerbated by G. Certain positions;M. Other   Pain exacerbation comment coughing, urine leakage occurs at random, daily   Pain/symptoms eased by D. Nothing;E. Changing positions   Progression of symptoms since onset: Improved   Prior Level of Function   Prior Level of Function-Mobility no limitations   Current Level of Function   Patient role/employment history F. Retired   Fall Risk Screen   Fall screen completed by PT   Have you fallen 2 or more times in the past year? No   Have you fallen and had an injury in the past year? No   Is patient a fall risk? No   Lumbar Spine/SI Objective Findings   Posture left iliac crest superior   Hip Screen negative   Lumbar/SI Special Tests Comments - FFT   Palpation postural loading limited through right shoulder and left pelvis; general listening to right  lower rib cage; local listening to liver. Myofasical tightness with right triangular ligament, coronary ligament, falciform ligament, parietal peritonrum <>bladder.    Pelvic Floor Dysfunction Objective Findings   Type of Storage Problem stress incontinence   Protection needed Pad   Power (MMT at [facility]) to be assessed at later date if indicated   Planned Therapy Interventions   Planned Therapy Interventions manual therapy;neuromuscular re-education;strengthening;stretching   Planned Modality Interventions   Planned Modality Interventions Biofeedback;Cryotherapy;Hot packs   Clinical Impression   Criteria for Skilled Therapeutic Interventions Met yes, treatment indicated   PT Diagnosis pelvic floor muscle weakness, myofascial tightness, low back pain   Influenced by the following impairments loss of bladder control, pain   Functional limitations due to impairments control of bladder during daily tasks, back pain during daily tasks.    Clinical Presentation Evolving/Changing   Clinical Decision Making (Complexity) Low complexity   Therapy Frequency 2 times/Week   Predicted Duration of Therapy Intervention (days/wks) 3 months   Risk & Benefits of therapy have been explained Yes   Patient, Family & other staff in agreement with plan of care Yes   Clinical Impression Comments patient with reoccurring stress incontinence with movement and coughing    ORTHO GOALS   PT Ortho Eval Goals 1;2;3;4   Ortho Goal 1   Goal Identifier postural loading   Goal Description Patient to have equal postural loading through trunk and pelvis showing reducing myofascial tightness adding to back pain and bladder control issues.    Target Date 02/26/20   Ortho Goal 2   Goal Identifier PMT MMT   Goal Description Patient to have at least 4/5 MMT of pelvic floor to reduce incontinence to less than once daily.   Target Date 02/26/20   Ortho Goal 3   Goal Identifier HEP   Goal Description Patient to be compliant with HEP for self managment of  symptoms.    Target Date 02/26/20   Total Evaluation Time   PT Robyn Low Complexity Minutes (40082) 20   Therapy Certification   Certification date from 11/26/19   Certification date to 02/26/20   Medical Diagnosis urinary incontinence

## 2019-11-26 NOTE — PROGRESS NOTES
[facility]          OUTPATIENT PHYSICAL THERAPY ORTHOPEDIC EVALUATION  PLAN OF TREATMENT FOR OUTPATIENT REHABILITATION  (COMPLETE FOR INITIAL CLAIMS ONLY)  Patient's Last Name, First Name, M.I.  YOB: 1950  Monisha Velez    Provider s Name:  [facility]   Medical Record No.  [id number]   Start of Care Date:  11/26/19   Onset Date:  (P) 10/24/19   Type:     _X__PT   ___OT   ___SLP Medical Diagnosis:  (P) urinary incontinence     PT Diagnosis:  (P) pelvic floor muscle weakness, myofascial tightness, low back pain   Visits from SOC:  1      _________________________________________________________________________________  Plan of Treatment/Functional Goals:  (P) manual therapy, neuromuscular re-education, strengthening, stretching     (P) Biofeedback, Cryotherapy, Hot packs     Goals  Goal Identifier: (P) postural loading  Goal Description: (P) Patient to have equal postural loading through trunk and pelvis showing reducing myofascial tightness adding to back pain and bladder control issues.   Target Date: (P) 02/26/20    Goal Identifier: (P) PMT MMT  Goal Description: (P) Patient to have at least 4/5 MMT of pelvic floor to reduce incontinence to less than once daily.  Target Date: (P) 02/26/20    Goal Identifier: (P) HEP  Goal Description: (P) Patient to be compliant with HEP for self managment of symptoms.   Target Date: (P) 02/26/20                                                           Therapy Frequency:  (P) 2 times/Week  Predicted Duration of Therapy Intervention:  (P) 3 months    Heather Benavides, PT                 I CERTIFY THE NEED FOR THESE SERVICES FURNISHED UNDER        THIS PLAN OF TREATMENT AND WHILE UNDER MY CARE     (Physician co-signature of this document indicates review and certification of the therapy plan).                       Certification Date From:  (P) 11/26/19   Certification Date To:  (P) 02/26/20    Referring Provider:  Raeann  Lili, DO    Initial Assessment        See Epic Evaluation Start of Care Date: 11/26/19

## 2019-12-03 ENCOUNTER — HOSPITAL ENCOUNTER (OUTPATIENT)
Dept: PHYSICAL THERAPY | Facility: OTHER | Age: 69
Setting detail: THERAPIES SERIES
End: 2019-12-03
Attending: INTERNAL MEDICINE
Payer: OTHER GOVERNMENT

## 2019-12-03 PROCEDURE — 97140 MANUAL THERAPY 1/> REGIONS: CPT | Mod: GP

## 2019-12-03 PROCEDURE — 97112 NEUROMUSCULAR REEDUCATION: CPT | Mod: GP

## 2019-12-05 ENCOUNTER — TELEPHONE (OUTPATIENT)
Dept: INTERNAL MEDICINE | Facility: OTHER | Age: 69
End: 2019-12-05

## 2019-12-05 NOTE — TELEPHONE ENCOUNTER
[facility] -Patient had a rodney on face  froze off.  Now some of rodney is back. Does she need to come in again to get froze?  Please call to advise.   Akua Alvarez on 12/5/2019 at 2:03 PM

## 2019-12-10 ENCOUNTER — TELEPHONE (OUTPATIENT)
Dept: INTERNAL MEDICINE | Facility: OTHER | Age: 69
End: 2019-12-10

## 2019-12-10 DIAGNOSIS — L98.9 SKIN LESION OF FACE: Primary | ICD-10-CM

## 2019-12-10 NOTE — TELEPHONE ENCOUNTER
Patient has concerns about appearance of possible melanoma on face again. Patient has small spots appearing on face.      Chary Awan on 12/10/2019 at 11:26 AM

## 2019-12-10 NOTE — TELEPHONE ENCOUNTER
After verification, patient wondering if she should come back to be seen for reoccurring spot on her face that Dr Pearson had froze  3-4 weeks ago.  Frances Gonzalez LPN ....................12/10/2019   1:02 PM

## 2019-12-11 ENCOUNTER — HOSPITAL ENCOUNTER (OUTPATIENT)
Dept: PHYSICAL THERAPY | Facility: OTHER | Age: 69
Setting detail: THERAPIES SERIES
End: 2019-12-11
Attending: INTERNAL MEDICINE
Payer: OTHER GOVERNMENT

## 2019-12-11 PROCEDURE — 97112 NEUROMUSCULAR REEDUCATION: CPT | Mod: GP

## 2019-12-11 PROCEDURE — 97140 MANUAL THERAPY 1/> REGIONS: CPT | Mod: GP

## 2019-12-11 NOTE — TELEPHONE ENCOUNTER
Multiple phone notes open. Please see other encounter.     Katelin Mccann LPN............. December 11, 2019 10:20 AM

## 2019-12-11 NOTE — TELEPHONE ENCOUNTER
At this time I would recommend referral for excisional biopsy.  If she is okay with this I will place an order for her to see general surgery to have it completed.

## 2019-12-11 NOTE — TELEPHONE ENCOUNTER
Called and spoke with patient after proper verification.  Patient is okay with surgery referral.     Katelin Mccann LPN............. December 11, 2019 11:11 AM

## 2019-12-17 ENCOUNTER — OFFICE VISIT (OUTPATIENT)
Dept: INTERNAL MEDICINE | Facility: OTHER | Age: 69
End: 2019-12-17
Attending: INTERNAL MEDICINE
Payer: OTHER GOVERNMENT

## 2019-12-17 ENCOUNTER — HOSPITAL ENCOUNTER (OUTPATIENT)
Dept: GENERAL RADIOLOGY | Facility: OTHER | Age: 69
Discharge: HOME | End: 2019-12-17
Attending: INTERNAL MEDICINE | Admitting: INTERNAL MEDICINE
Payer: OTHER GOVERNMENT

## 2019-12-17 VITALS
OXYGEN SATURATION: 96 % | RESPIRATION RATE: 20 BRPM | BODY MASS INDEX: 30.14 KG/M2 | DIASTOLIC BLOOD PRESSURE: 84 MMHG | HEART RATE: 80 BPM | TEMPERATURE: 98 F | SYSTOLIC BLOOD PRESSURE: 132 MMHG | WEIGHT: 175.6 LBS

## 2019-12-17 DIAGNOSIS — R05.9 COUGH: ICD-10-CM

## 2019-12-17 DIAGNOSIS — R05.9 COUGH: Primary | ICD-10-CM

## 2019-12-17 PROCEDURE — 99213 OFFICE O/P EST LOW 20 MIN: CPT | Performed by: INTERNAL MEDICINE

## 2019-12-17 PROCEDURE — 71046 X-RAY EXAM CHEST 2 VIEWS: CPT

## 2019-12-17 RX ORDER — LEVOFLOXACIN 500 MG/1
500 TABLET, FILM COATED ORAL DAILY
Qty: 5 TABLET | Refills: 0 | Status: SHIPPED | OUTPATIENT
Start: 2019-12-17 | End: 2019-12-20

## 2019-12-17 ASSESSMENT — ANXIETY QUESTIONNAIRES
IF YOU CHECKED OFF ANY PROBLEMS ON THIS QUESTIONNAIRE, HOW DIFFICULT HAVE THESE PROBLEMS MADE IT FOR YOU TO DO YOUR WORK, TAKE CARE OF THINGS AT HOME, OR GET ALONG WITH OTHER PEOPLE: NOT DIFFICULT AT ALL
5. BEING SO RESTLESS THAT IT IS HARD TO SIT STILL: NOT AT ALL
6. BECOMING EASILY ANNOYED OR IRRITABLE: NOT AT ALL
1. FEELING NERVOUS, ANXIOUS, OR ON EDGE: NOT AT ALL
3. WORRYING TOO MUCH ABOUT DIFFERENT THINGS: NOT AT ALL
2. NOT BEING ABLE TO STOP OR CONTROL WORRYING: NOT AT ALL
7. FEELING AFRAID AS IF SOMETHING AWFUL MIGHT HAPPEN: NOT AT ALL
GAD7 TOTAL SCORE: 0

## 2019-12-17 ASSESSMENT — ENCOUNTER SYMPTOMS
EYES NEGATIVE: 1
ALLERGIC/IMMUNOLOGIC NEGATIVE: 1
CONSTITUTIONAL NEGATIVE: 1
ENDOCRINE NEGATIVE: 1

## 2019-12-17 ASSESSMENT — PATIENT HEALTH QUESTIONNAIRE - PHQ9: 5. POOR APPETITE OR OVEREATING: NOT AT ALL

## 2019-12-17 NOTE — NURSING NOTE
"The patient is here today to be seen for a cough and chest discomfort.  Silvia Tanner LPN on 12/17/2019 at 9:27 AM  Chief Complaint   Patient presents with     Cough       Initial /84 (BP Location: Left arm, Patient Position: Sitting, Cuff Size: Adult Regular)   Pulse 80   Temp 98  F (36.7  C) (Tympanic)   Resp 20   Wt 79.7 kg (175 lb 9.6 oz)   SpO2 96%   Breastfeeding No   BMI 30.14 kg/m   Estimated body mass index is 30.14 kg/m  as calculated from the following:    Height as of 10/7/19: 1.626 m (5' 4\").    Weight as of this encounter: 79.7 kg (175 lb 9.6 oz).  Medication Reconciliation: complete    Silvia Tanner LPN    "

## 2019-12-17 NOTE — PROGRESS NOTES
Chief Complaint:  Cough.    HPI: She is in today complaining of a cough.  She has had it for a week.  She has had fevers and chills.  She is coughing up yellow phlegm.  She has developed some chest pain with her cough as well.  She has a little bit of a sore throat, her sinuses are congested and her ears seem to be congested as well.  She was recently treated with doxycycline, telling me that this is her third time that she has had a respiratory infection.    Past Medical History:   Diagnosis Date     Adnexal mass 08/22/2011     Contact dermatitis 12/16/2009     Diplopia     Episode of mild diplopia secondary to sinusitis, ophthalmology consult 07/2008.     H/O gastritis      History of adenomatous polyp of colon      Postmenopausal atrophic vaginitis 07/29/2011     Restless leg syndrome      Retention of urine 08/22/2011     Rosacea      Tinea corporis 11/19/2010       Past Surgical History:   Procedure Laterality Date     COLONOSCOPY      10/07,Colonoscopy, done in [place], normal.     COLONOSCOPY  03/18/2016    Tubular adenoma, Next in 5 years     CYSTOSCOPY  01/2013    [facility]     DENTAL SURGERY  2005    Root canal,  Dr. Olmos.     ENDOSCOPIC SINUS SURGERY  1993     ESOPHAGOSCOPY, GASTROSCOPY, DUODENOSCOPY (EGD), COMBINED  03/18/2016     HYSTERECTOMY VAGINAL  1990    Endometriosis     LAPAROSCOPIC CYSTECTOMY OVARIAN (BENIGN)  09/27/2011    cystectomy with lysis of adhesions and possible oophrectomy  by Dr. Gay Mcgovern  at [facility]     ROTATOR CUFF REPAIR RT/LT Right 12/28/2017    Dr John     SALPINGO-OOPHORECTOMY BILATERAL  10/2007    bilateral ovarian serous cystadenofibromas, benign.       Allergies   Allergen Reactions     Penicillins Hives     Sulfa Drugs Hives     Codeine Sulfate Other (See Comments)     nightmares     Morphine Sulfate Other (See Comments)     Cant sleep     Venlafaxine Hives     'bad reaction      Zolpidem Tartrate      Paresthesias; stomach upset          Current Outpatient Medications   Medication Sig Dispense Refill     aspirin EC 81 MG EC tablet Take 81 mg by mouth daily with food       estradiol (ESTRACE) 0.5 MG tablet Take 0.5-1 tablets (0.25-0.5 mg) by mouth daily 90 tablet 3     guaiFENesin (ROBITUSSIN) 100 MG/5ML liquid Take 10 mLs (200 mg) by mouth every 4 hours as needed for cough 118 mL 1     melatonin 5 MG tablet Take 5 mg by mouth nightly as needed for sleep       Multiple Vitamin (MULTIVITAMIN OR) Take 1 tablet by mouth daily.         Review of Systems   Constitutional: Negative.    Eyes: Negative.    Endocrine: Negative.    Allergic/Immunologic: Negative.        Physical Exam  Vitals signs and nursing note reviewed.   Constitutional:       General: She is not in acute distress.     Appearance: Normal appearance. She is not ill-appearing, toxic-appearing or diaphoretic.   HENT:      Head: Normocephalic.      Left Ear: Tympanic membrane normal.      Mouth/Throat:      Pharynx: Oropharynx is clear. No oropharyngeal exudate or posterior oropharyngeal erythema.   Cardiovascular:      Rate and Rhythm: Normal rate and regular rhythm.   Pulmonary:      Breath sounds: Rhonchi present. No decreased breath sounds, wheezing or rales.      Comments: Continued cough  Lymphadenopathy:      Cervical: No cervical adenopathy.   Neurological:      Mental Status: She is alert.         Assessment:        ICD-10-CM    1. Cough R05 XR Chest 2 Views       Plan: Questionable left basilar infiltrate on chest x-ray today so I did elect to treat her with Levaquin 500 mg daily for 5 days.  Warned of possible side effects.  Plenty of rest and fluids.  Over-the-counter cough medication as needed.  Return or notify if not improving.

## 2019-12-18 ENCOUNTER — HOSPITAL ENCOUNTER (OUTPATIENT)
Dept: PHYSICAL THERAPY | Facility: OTHER | Age: 69
Setting detail: THERAPIES SERIES
End: 2019-12-18
Attending: INTERNAL MEDICINE
Payer: OTHER GOVERNMENT

## 2019-12-18 PROCEDURE — 97140 MANUAL THERAPY 1/> REGIONS: CPT | Mod: GP

## 2019-12-18 PROCEDURE — 97112 NEUROMUSCULAR REEDUCATION: CPT | Mod: GP

## 2019-12-18 ASSESSMENT — ANXIETY QUESTIONNAIRES: GAD7 TOTAL SCORE: 0

## 2019-12-20 ENCOUNTER — TELEPHONE (OUTPATIENT)
Dept: INTERNAL MEDICINE | Facility: OTHER | Age: 69
End: 2019-12-20

## 2019-12-20 DIAGNOSIS — R05.9 COUGH: ICD-10-CM

## 2019-12-20 RX ORDER — LEVOFLOXACIN 500 MG/1
500 TABLET, FILM COATED ORAL DAILY
Qty: 5 TABLET | Refills: 0 | Status: SHIPPED | OUTPATIENT
Start: 2019-12-20 | End: 2020-01-06

## 2019-12-20 NOTE — TELEPHONE ENCOUNTER
Contacted the patient and gave her the information below.  Silvia Tanner LPN on 12/20/2019 at 10:22 AM

## 2019-12-20 NOTE — TELEPHONE ENCOUNTER
Patient was seen last week by [facility] for bronchitis and given Levofoloxacin 500  Mg. The patient has not gotten any better and would like a refill or maybe a different medication.     Chary Awan on 12/20/2019 at 8:05 AM

## 2019-12-20 NOTE — TELEPHONE ENCOUNTER
The patient saw you on December 17 th for cough, fever and chills. She was given an Rx for Levofloxacin to take daily for 5 days. She has one pill left. She said her cough and diarrhea are a little better but she is still fatigued and weak. She is wanting to get more of the antibiotic.  Cyndee Perez LPN..................12/20/2019   8:58 AM

## 2019-12-23 ENCOUNTER — HOSPITAL ENCOUNTER (OUTPATIENT)
Dept: PHYSICAL THERAPY | Facility: OTHER | Age: 69
Setting detail: THERAPIES SERIES
End: 2019-12-23
Attending: INTERNAL MEDICINE
Payer: OTHER GOVERNMENT

## 2019-12-23 PROCEDURE — 97140 MANUAL THERAPY 1/> REGIONS: CPT | Mod: GP

## 2019-12-23 PROCEDURE — 97112 NEUROMUSCULAR REEDUCATION: CPT | Mod: GP

## 2019-12-26 ENCOUNTER — TELEPHONE (OUTPATIENT)
Dept: INTERNAL MEDICINE | Facility: OTHER | Age: 69
End: 2019-12-26

## 2019-12-26 NOTE — TELEPHONE ENCOUNTER
Spoke with patient regarding pain in both lower legs for about a week with pain rated 6/10. Sent to scheduling for an appointment.    Rachael Barrios LPN on 12/26/2019 at 9:45 AM

## 2019-12-26 NOTE — TELEPHONE ENCOUNTER
Patient would like to get in for pains in her legs if you could get her in please give her a call.   Kerline Proctor on 12/26/2019 at 9:36 AM

## 2019-12-31 ENCOUNTER — OFFICE VISIT (OUTPATIENT)
Dept: INTERNAL MEDICINE | Facility: OTHER | Age: 69
End: 2019-12-31
Attending: NURSE PRACTITIONER
Payer: OTHER GOVERNMENT

## 2019-12-31 VITALS
DIASTOLIC BLOOD PRESSURE: 74 MMHG | TEMPERATURE: 97.4 F | RESPIRATION RATE: 16 BRPM | HEIGHT: 64 IN | BODY MASS INDEX: 30.01 KG/M2 | SYSTOLIC BLOOD PRESSURE: 128 MMHG | HEART RATE: 71 BPM | WEIGHT: 175.8 LBS | OXYGEN SATURATION: 96 %

## 2019-12-31 DIAGNOSIS — R05.9 COUGH: Primary | ICD-10-CM

## 2019-12-31 DIAGNOSIS — R09.82 PND (POST-NASAL DRIP): ICD-10-CM

## 2019-12-31 PROBLEM — M79.604 PAIN OF RIGHT LOWER EXTREMITY: Status: ACTIVE | Noted: 2019-12-31

## 2019-12-31 PROBLEM — M79.605 PAIN OF LEFT LOWER EXTREMITY: Status: ACTIVE | Noted: 2019-12-31

## 2019-12-31 PROCEDURE — 99213 OFFICE O/P EST LOW 20 MIN: CPT | Performed by: NURSE PRACTITIONER

## 2019-12-31 RX ORDER — BENZONATATE 100 MG/1
100 CAPSULE ORAL 3 TIMES DAILY PRN
Qty: 21 CAPSULE | Refills: 1 | Status: SHIPPED | OUTPATIENT
Start: 2019-12-31 | End: 2020-07-09

## 2019-12-31 RX ORDER — CETIRIZINE HYDROCHLORIDE 10 MG/1
10 TABLET ORAL DAILY
Qty: 1 TABLET | Refills: 1 | Status: SHIPPED | OUTPATIENT
Start: 2019-12-31 | End: 2020-03-10

## 2019-12-31 SDOH — SOCIAL STABILITY: SOCIAL INSECURITY
WITHIN THE LAST YEAR, HAVE YOU BEEN RAPED OR FORCED TO HAVE ANY KIND OF SEXUAL ACTIVITY BY YOUR PARTNER OR EX-PARTNER?: NO

## 2019-12-31 SDOH — ECONOMIC STABILITY: FOOD INSECURITY: HOW HARD IS IT FOR YOU TO PAY FOR THE VERY BASICS LIKE FOOD, HOUSING, MEDICAL CARE, AND HEATING?: NOT HARD AT ALL

## 2019-12-31 SDOH — ECONOMIC STABILITY: TRANSPORTATION INSECURITY: IN THE PAST 12 MONTHS, HAS LACK OF TRANSPORTATION KEPT YOU FROM MEDICAL APPOINTMENTS OR FROM GETTING MEDICATIONS?: NO

## 2019-12-31 SDOH — SOCIAL STABILITY: SOCIAL INSECURITY
WITHIN THE LAST YEAR, HAVE YOU BEEN KICKED, HIT, SLAPPED, OR OTHERWISE PHYSICALLY HURT BY YOUR PARTNER OR EX-PARTNER?: NO

## 2019-12-31 SDOH — SOCIAL STABILITY: SOCIAL INSECURITY: WITHIN THE LAST YEAR, HAVE YOU BEEN AFRAID OF YOUR PARTNER OR EX-PARTNER?: NO

## 2019-12-31 SDOH — SOCIAL STABILITY: SOCIAL INSECURITY: WITHIN THE LAST YEAR, HAVE YOU BEEN HUMILIATED OR EMOTIONALLY ABUSED IN OTHER WAYS BY YOUR PARTNER OR EX-PARTNER?: NO

## 2019-12-31 SDOH — ECONOMIC STABILITY: FOOD INSECURITY: WITHIN THE PAST 12 MONTHS, YOU WORRIED THAT YOUR FOOD WOULD RUN OUT BEFORE YOU GOT THE MONEY TO BUY MORE.: NEVER TRUE

## 2019-12-31 SDOH — HEALTH STABILITY: PHYSICAL HEALTH: ON AVERAGE, HOW MANY MINUTES DO YOU ENGAGE IN EXERCISE AT THIS LEVEL?: 30 MIN

## 2019-12-31 SDOH — HEALTH STABILITY: MENTAL HEALTH
DO YOU FEEL STRESS - TENSE, RESTLESS, NERVOUS, OR ANXIOUS, OR UNABLE TO SLEEP AT NIGHT BECAUSE YOUR MIND IS TROUBLED ALL THE TIME - THESE DAYS?: TO SOME EXTENT

## 2019-12-31 SDOH — HEALTH STABILITY: MENTAL HEALTH: HOW OFTEN DO YOU HAVE A DRINK CONTAINING ALCOHOL?: MONTHLY OR LESS

## 2019-12-31 SDOH — HEALTH STABILITY: PHYSICAL HEALTH: ON AVERAGE, HOW MANY DAYS PER WEEK DO YOU ENGAGE IN MODERATE TO STRENUOUS EXERCISE (LIKE A BRISK WALK)?: 3 DAYS

## 2019-12-31 SDOH — ECONOMIC STABILITY: FOOD INSECURITY: WITHIN THE PAST 12 MONTHS, THE FOOD YOU BOUGHT JUST DIDN'T LAST AND YOU DIDN'T HAVE MONEY TO GET MORE.: NEVER TRUE

## 2019-12-31 ASSESSMENT — ACTIVITIES OF DAILY LIVING (ADL): LACK_OF_TRANSPORTATION: NO

## 2019-12-31 ASSESSMENT — MIFFLIN-ST. JEOR: SCORE: 1302.42

## 2019-12-31 ASSESSMENT — PAIN SCALES - GENERAL: PAINLEVEL: SEVERE PAIN (6)

## 2019-12-31 NOTE — NURSING NOTE
Chief Complaint   Patient presents with     Pain     bilateral legs   Patient presents to the clinic today for pain bilateral in her legs. Patient also reports of having wheezing, coughing, fatigue from having pneumonia. Patient is done taking antibiotics for pneumonia since Friday. Patient states she has been having pain in her feet and legs since starting the medication Levofloxacin for pneumonia.    Medication Reconciliation: completed   Akua Fox LPN  12/31/2019 8:05 AM

## 2019-12-31 NOTE — PATIENT INSTRUCTIONS
Patient Education   When You Have Low Back Pain  Caring for Your Back  You are not alone.  Low back pain is very common. Nearly half of all adults have low back pain in any given year.  The good news is that back pain is rarely a danger to your health. Most people can manage their back pain on their own and about half of them start feeling better within 2 weeks. In 9 out of 10 cases, low back pain goes away or no longer limits daily activity within 6 weeks.  Your outlook is good!  Your symptoms tell us that your low back pain is most likely not a danger to you. Most of the time we do not know the exact cause of low back pain, even if you see a doctor or have an MRI. However, treatment can still work without knowing the cause of the pain. Less than 1 in 100 people need surgery for their back pain.  What can I do about my low back pain?  There are three things you can do to ease low back pain and help it go away.    Use heat or cold packs.    Take medicine as directed.    Use positions, movements and exercises.  Using heat or cold packs  Try cold packs or gentle heat to ease your pain. Use whichever gives you the most relief. Apply the cold pack or heat for 15 minutes at a time, as often as needed.  Taking medicine    If your doctor has prescribed medicine, be sure to follow the directions.    If you take over-the-counter medicine, read and follow the directions.    Talk to your doctor if you have any questions.  Using positions, movements and exercises  Research tells us that moving your joints and muscles can help you recover from back pain. Such activity should be simple and gentle.  Use the positions in the photos as well as walking to help relieve your pain. Try taking a short walk every 3 to 4 hours during the day. Walk for a few minutes inside your home or take longer walks outside, on a treadmill or at a mall. Slowly increase the amount of time you walk.  Expect discomfort when you begin, but it should  lessen as your back starts to heal. When your back feels better, walk daily to keep your back and body healthy.  Finding a comfortable position  When your back pain is new, certain positions will ease your pain. Gently try each of the positions below until you find one that is helpful. Once you find a position of comfort, use it as often as you like when you are resting. You will recover faster if you combine rest with activity.         When should I call my doctor?  Your back pain should improve over the first couple of weeks. As it improves, you should be able to return to your normal activities. But call your doctor if:    You have a sudden change in your ability to control?your bladder or bowels.    You feel tingling in your groin or legs.    The pain spreads down your leg and into your foot.    Your toes, feet or leg muscles feel weak.    You feel generally unwell or sick.    Your pain does not get better or gets worse.    For informational purposes only. Not to replace the advice of your health care provider.  Copyright   2013 [employer] [facility]. All rights reserved. [employer] 608254 - REV 03/16.

## 2019-12-31 NOTE — PROGRESS NOTES
Chief Complaint   Patient presents with     Pain     bilateral legs          Subjective:   Monisha Velez is a 69 year old female who presents to the clinic today with complaints of bilateral leg and feet pain. The pain starts in her feet and shoots zingers up her legs.  She reports the pain is not a result of an injury; the pain began when she started her antibiotic therapy of Levaquin 500 mg daily for her recent pneumonia. She was on this medication for two weeks. The pain in her feet is rated 5 on a scale of 1-10. Numbness and tingling is reported.  She has not taken anything for this discomfort. She is unable to pinpoint any known trigger of this sensation. Denies any injury to her back or heavy lifting.  She does follow with [facility].    She  reports that she quit smoking about 47 years ago. She smoked 0.50 packs per day. She has never used smokeless tobacco.    The patient has a past medical history which is reviewed and listed as below:     Past Medical History:   Diagnosis Date     Adnexal mass 08/22/2011     Contact dermatitis 12/16/2009     Diplopia     Episode of mild diplopia secondary to sinusitis, ophthalmology consult 07/2008.     H/O gastritis      History of adenomatous polyp of colon      Postmenopausal atrophic vaginitis 07/29/2011     Restless leg syndrome      Retention of urine 08/22/2011     Rosacea      Tinea corporis 11/19/2010     Urge incontinence of urine    .  Social History     Socioeconomic History     Marital status:      Spouse name: Drake     Number of children: 2     Years of education: Not on file     Highest education level: Not on file   Occupational History     Occupation: [employer]     Comment: Retired   Social Needs     Financial resource strain: Not hard at all     Food insecurity:     Worry: Never true     Inability: Never true     Transportation needs:     Medical: No     Non-medical: No   Tobacco Use     Smoking status: Former Smoker      Packs/day: 0.50     Last attempt to quit: 1972     Years since quittin.4     Smokeless tobacco: Never Used   Substance and Sexual Activity     Alcohol use: Yes     Alcohol/week: 0.0 standard drinks     Frequency: Monthly or less     Comment: Rarely     Drug use: No     Sexual activity: Yes     Partners: Male   Lifestyle     Physical activity:     Days per week: 3 days     Minutes per session: 30 min     Stress: To some extent   Relationships     Social connections:     Talks on phone: Not on file     Gets together: Not on file     Attends Sabianist service: Not on file     Active member of club or organization: Not on file     Attends meetings of clubs or organizations: Not on file     Relationship status: Not on file     Intimate partner violence:     Fear of current or ex partner: No     Emotionally abused: No     Physically abused: No     Forced sexual activity: No   Other Topics Concern     Parent/sibling w/ CABG, MI or angioplasty before 65F 55M? Not Asked   Social History Narrative    Sandor fox.  The patient is retired, Previously worked for [employer].  , [place].  Her grand-daughter is living with her, Gay.  Has one son and one daughter living in the [place]. She and her  built a home, moved to the area in the spring of 2005 from [place].           Family History   Problem Relation Age of Onset     Asthma Mother      Hypertension Mother      Diabetes Mother         insulin dependence     Peripheral Vascular Disease Mother      Hypertension Father      Melanoma Father      Diabetes Sister      Hypertension Sister      GI problems Daughter      Diabetes Sister      Hypertension Sister      Brain Hemorrhage Sister         after fall     Other - See Comments Sister         bladder issues and repeat breast biopsies (benign)     Obesity Son      Hypertension Son      Breast Cancer No family hx of         Cancer-breast        Allergies   Allergen Reactions     Penicillins Hives      Sulfa Drugs Hives     Codeine Sulfate Other (See Comments)     nightmares     Morphine Sulfate Other (See Comments)     Cant sleep     Venlafaxine Hives     'bad reaction      Zolpidem Tartrate      Paresthesias; stomach upset       ROS:   GENERAL: Reports she is just getting over pneumonia, treated x2 with Levaquin. Denies fevers, night sweats or weight changes. Does report chills.  HEAD: Denies syncope, dizziness, or vertigo. Denies history of head trauma. Reports headaches.  EYES: Denies difficulty with vision, changes in vision, or double vision. Denies eye pain, discharge, or lesion.  Wears glasses.  EARS: Denies difficulty hearing, tinnitus, redness, or drainage.  NOSE/SINUSES: Denies frequent colds, congestion, obstruction, epistaxis, abnormal discharge, or alteration in smell. Reports sneezing.  MOUTH/THROAT: Denies tooth or gum pain, sores or lesions in mouth. Denies dysphagia, voice changes, hoarseness. Reports sore throat.  RESPIRATORY: Denies pain with breathing, Reports wheezing, shortness of breath and cough.  CARDIOVASCULAR: Denies chest pain, palpitations, murmurs, edema, cyanosis or fatigue.   GASTROINTESTINAL: Appetite good with no change. Denies vomiting, abdominal pain, diarrhea, or constipation. Reports bowel movements daily with no melena or pain. Reports slight nausea.   GENITOURINARY: Denies dysuria, frequency, hesitancy, straining or hematuria. Denies sores, vaginal discharge or bleeding. Reports history of  increased urgency.   ENDOCRINE: Denies polyuria, polydypsia or polyphagia. Denies heat or cold intolerances, skin, hair, or appetite changes.  HEMATOLOGIC/LYMPHATIC: Denies abnormal bleeding or bruising. Denies noted enlarged nodes.   INTEGUMENTARY: Denies skin changes, rashes, lesions, redness, itching or new changing moles. Denies hair changes or thinning. Denies changes in finger or toe nails.   MUSCULOSKELETAL/RHEUM: Denies joint pain or swelling, limitations with ROM, or falls.  "Reports the pain in her legs.  NEUROLOGICAL: Denies any weakness, tremors, reports the numbness, and tingling of today's concern. Denies history of fainting, mood or behavior disturbances.  PSYCHOLOGICAL: Denies mood changes, depression, anxiety, anhedonia, thoughts of self harm or suicidal ideation.      Objective:    /74 (BP Location: Right arm, Patient Position: Sitting, Cuff Size: Adult Regular)   Pulse 71   Temp 97.4  F (36.3  C) (Tympanic)   Resp 16   Ht 1.626 m (5' 4\")   Wt 79.7 kg (175 lb 12.8 oz)   SpO2 96%   BMI 30.18 kg/m      GENERAL: Vitals reviewed. Well nourished, well developed, healthy appearing. Patient is alert, oriented and in no acute distress. Cooperative with exam.  HEENT: Normocephalic, atraumatic.  Normal external eye, conjunctiva, lids, cornea with no scleral icterus or conjunctival erythema. Pupils equally round. External ears within normal limits, normal hearing. External nose normal in appearance, nares patent, nasal discharge absent, general lip and mouth appearance normal. Oropharynx with no erythema or exudates. Dentition adequate.    NECK: Supple; no thyromegaly or masses noted.  No cervical or supraclavicular lymphadenopathy.  CARDIOVASCULAR:Heart regular in rate and rhythm with no murmur. Normal S1 and S2.  RESPIRATORY: Lungs clear to auscultation bilaterally in all lobes, no crackles or wheezes.  Chest rise equal bilaterally.  No accessory muscle use. Dry cough noted throughout exam.  GASTROINTESTINAL:  Obese, soft, nontender, and nondistended.  No rebound. Bowel sounds positive in all four quadrants.  INTEGUMENTARY: Warm and dry to touch.  No rash or suspicious lesions noted on face, arms and legs.  EXTREMITIES: No clubbing or cyanosis.  No peripheral edema. Feet bilaterally are warm, CMS intact, pulses positive.   NEUROLOGICAL: Alert and oriented to person, place, and time.  Cranial nerves II-XII grossly intact with no focal or lateralizing deficits.  Muscle tone " normal.  Gait normal. No tremor.   MUSCULOSKELETAL: ROM of upper and lower extremities is symmetric and full.   PSYCHOLOGICAL: Mood is good. Affect appropriate. Speech fluent. Answers questions appropriately and thought process appears normal.     Assessment/Plan:     1. Pain of right lower extremity - encouraged to do daily stretches as shown in office. She may have aggravated her back with excessive coughing which may have triggered some nerve issues. Time and stretching should alleviate her symptoms. If any sudden onset of pain, she needs to notify health care provider.   2. Pain of left lower extremity - encouraged to do daily stretches as shown in office. She may have aggravated her back with excessive coughing which may have triggered some nerve issues. Time and stretching should alleviate her symptoms. If any sudden onset of pain, she needs to notify health care provider.   3. Cough - Instructed her that post pneumonia cough may last a few weeks, especially with PND. Gave her Tessalon perles to assist with this. She has used them prior with great success. Encouraged her to increase water intake and continue to remain active.   4. PND (post-nasal drip) - Encouraged her to try an OTC product such as Zyrtec to help dry up secretions. Increase water, good hand hygiene.        - Stretching exercises as demonstrated in the office.   - Tylenol as needed for discomfort.  - Continue Physical Therapy as prior.   - Patient is to call if she has additional problems with this or if new symptoms develop.    Return if symptoms worsen or fail to improve.     Patient verbalizes her understanding of plan of care and is agreeable.     EUNICE BOB NP  12/31/2019 09:05

## 2020-01-06 ENCOUNTER — OFFICE VISIT (OUTPATIENT)
Dept: SURGERY | Facility: OTHER | Age: 70
End: 2020-01-06
Attending: INTERNAL MEDICINE
Payer: OTHER GOVERNMENT

## 2020-01-06 VITALS
WEIGHT: 176.8 LBS | SYSTOLIC BLOOD PRESSURE: 120 MMHG | HEART RATE: 78 BPM | RESPIRATION RATE: 18 BRPM | TEMPERATURE: 96.5 F | BODY MASS INDEX: 30.35 KG/M2 | DIASTOLIC BLOOD PRESSURE: 70 MMHG

## 2020-01-06 DIAGNOSIS — L98.9 SKIN LESION OF FACE: Primary | ICD-10-CM

## 2020-01-06 PROCEDURE — 88305 TISSUE EXAM BY PATHOLOGIST: CPT

## 2020-01-06 PROCEDURE — 11442 EXC FACE-MM B9+MARG 1.1-2 CM: CPT | Performed by: SURGERY

## 2020-01-06 ASSESSMENT — PAIN SCALES - GENERAL: PAINLEVEL: NO PAIN (0)

## 2020-01-06 NOTE — NURSING NOTE
"Chief Complaint   Patient presents with     Derm Problem     lesion on right cheek       Initial /70 (BP Location: Left arm, Patient Position: Sitting, Cuff Size: Adult Large)   Pulse 78   Temp 96.5  F (35.8  C) (Tympanic)   Resp 18   Wt 80.2 kg (176 lb 12.8 oz)   Breastfeeding No   BMI 30.35 kg/m   Estimated body mass index is 30.35 kg/m  as calculated from the following:    Height as of 12/31/19: 1.626 m (5' 4\").    Weight as of this encounter: 80.2 kg (176 lb 12.8 oz).  Medication Reconciliation: complete    Lesly Restrepo LPN     TIMEOUT  [facility] Protocol    A. Pre-procedure verification complete     1-relevant information / documentation available, reviewed and properly matched to the patient; 2-consent accurate and complete, 3-equipment and supplies available    B. Site marking complete     Site marked if not in continuous attendance with patient    C. TIME OUT completed     Time Out was conducted just prior to starting procedure to verify the eight required elements: 1-patient identity, 2-consent accurate and complete, 3-position, 4-correct side/site marked (if applicable), 5-procedure, 6-relevant images / results properly labeled and displayed (if applicable), 7-antibiotics / irrigation fluids (if applicable), 8-safety precautions.  "

## 2020-01-06 NOTE — PATIENT INSTRUCTIONS
Your incision was closed with stitches that will dissolve.  A glue was used to help keep the incision closed.    It is ok to get the incision wet in the shower on the day after your procedure.     Don't soak in a tub, pool or lake for 2 weeks.  . If you have concerns, please call.

## 2020-01-06 NOTE — NURSING NOTE
"Chief Complaint   Patient presents with     Derm Problem     lesion on right cheek       Initial /70 (BP Location: Left arm, Patient Position: Sitting, Cuff Size: Adult Large)   Pulse 78   Temp 96.5  F (35.8  C) (Tympanic)   Resp 18   Wt 80.2 kg (176 lb 12.8 oz)   Breastfeeding No   BMI 30.35 kg/m   Estimated body mass index is 30.35 kg/m  as calculated from the following:    Height as of 12/31/19: 1.626 m (5' 4\").    Weight as of this encounter: 80.2 kg (176 lb 12.8 oz).  Medication Reconciliation: complete    Lesly Restrepo LPN  "

## 2020-01-06 NOTE — PROGRESS NOTES
Procedure Note     Pre/Post Operative Diagnosis:   Right cheek skin lesion     Procedure:    Excision of Right cheek skin lesion      Surgeon: SOCRATES Zimmerman MD     Local Anesthesia: 1% lidocaine with0.25%Marcaine with epinephrine    Indication for the procedure:    This is a 69 year old female patient with Right cheek skin lesion.  This lesion was previously treated with cryotherapy however it has recurred.  Patient has a significant family history of melanoma.  Patient denies personal history of skin cancer.  After explaining the risks to include bleeding, infection, recurrence or need for re-excision, and scarring the patient wished to proceed.    Procedure:   The area was prepped and draped in usual sterile fashion with betadine. After adequate local anesthesia, an elliptical skin incision was made to encompass the lesion, 1.2cm x 0.6 cm with margins. The skin was closed with 4-0 monocryl suture in a running fashion.  mastasol and steristrips were applied.     Plan:  The patient will be called with pathology results.  Patient will followup if there any problems with the wound including redness or drainage.      SOCRATES Zimmerman MD

## 2020-01-09 ENCOUNTER — TELEPHONE (OUTPATIENT)
Dept: SURGERY | Facility: OTHER | Age: 70
End: 2020-01-09

## 2020-01-09 NOTE — TELEPHONE ENCOUNTER
Questions as to when she can remove her bandage.  She is able to do that now.  Lesly Restrepo LPN..........1/9/2020  10:12 AM

## 2020-01-10 ENCOUNTER — TELEPHONE (OUTPATIENT)
Dept: SURGERY | Facility: OTHER | Age: 70
End: 2020-01-10

## 2020-01-14 ENCOUNTER — HOSPITAL ENCOUNTER (OUTPATIENT)
Dept: PHYSICAL THERAPY | Facility: OTHER | Age: 70
Setting detail: THERAPIES SERIES
End: 2020-01-14
Attending: INTERNAL MEDICINE
Payer: OTHER GOVERNMENT

## 2020-01-14 PROCEDURE — 97112 NEUROMUSCULAR REEDUCATION: CPT | Mod: GP

## 2020-01-14 PROCEDURE — 97140 MANUAL THERAPY 1/> REGIONS: CPT | Mod: GP

## 2020-01-20 ENCOUNTER — HOSPITAL ENCOUNTER (OUTPATIENT)
Dept: PHYSICAL THERAPY | Facility: OTHER | Age: 70
Setting detail: THERAPIES SERIES
End: 2020-01-20
Attending: INTERNAL MEDICINE
Payer: OTHER GOVERNMENT

## 2020-01-20 PROCEDURE — 97112 NEUROMUSCULAR REEDUCATION: CPT | Mod: GP

## 2020-01-20 PROCEDURE — 97140 MANUAL THERAPY 1/> REGIONS: CPT | Mod: GP

## 2020-01-29 ENCOUNTER — HOSPITAL ENCOUNTER (OUTPATIENT)
Dept: PHYSICAL THERAPY | Facility: OTHER | Age: 70
Setting detail: THERAPIES SERIES
End: 2020-01-29
Attending: INTERNAL MEDICINE
Payer: OTHER GOVERNMENT

## 2020-01-29 PROCEDURE — 97112 NEUROMUSCULAR REEDUCATION: CPT | Mod: GP

## 2020-01-29 PROCEDURE — 97140 MANUAL THERAPY 1/> REGIONS: CPT | Mod: GP

## 2020-02-12 ENCOUNTER — HOSPITAL ENCOUNTER (OUTPATIENT)
Dept: PHYSICAL THERAPY | Facility: OTHER | Age: 70
Setting detail: THERAPIES SERIES
End: 2020-02-12
Attending: INTERNAL MEDICINE
Payer: OTHER GOVERNMENT

## 2020-02-12 PROCEDURE — 97112 NEUROMUSCULAR REEDUCATION: CPT | Mod: GP

## 2020-02-12 PROCEDURE — 97140 MANUAL THERAPY 1/> REGIONS: CPT | Mod: GP

## 2020-02-20 ENCOUNTER — HOSPITAL ENCOUNTER (OUTPATIENT)
Dept: PHYSICAL THERAPY | Facility: OTHER | Age: 70
Setting detail: THERAPIES SERIES
End: 2020-02-20
Attending: INTERNAL MEDICINE
Payer: OTHER GOVERNMENT

## 2020-02-20 PROCEDURE — 97112 NEUROMUSCULAR REEDUCATION: CPT | Mod: GP

## 2020-02-20 PROCEDURE — 97140 MANUAL THERAPY 1/> REGIONS: CPT | Mod: GP

## 2020-02-21 NOTE — PROGRESS NOTES
Outpatient Physical Therapy Progress Note     Patient: Monisha Velez  : 1950    Beginning/End Dates of Reporting Period:  19 to 2020    Referring Provider: DO Sandy Gimenez Diagnosis: pelvic floor muscle weakness, myofascial tightness, low back pain     Client Self Report: Last session really helped with the bladder and lower abdominal pain. Made a big difference. Only occasionally notices some discomfort. Main issue today is low back. About 1.5 hours ago was bending over to  object from floor and is having sever pain, back spasms, pain referring to hips.     Objective Measurements:  Objective Measure: postural loading  Details: limited loading through left shoulder and pelvis; general listening to left lumbar; local listening to l4  Objective Measure: joint mobility  Details: FRS left L4, L5  Objective Measure: myofascial  Details: L5<>S1           Goals:  Goal Identifier postural loading   Goal Description Patient to have equal postural loading through trunk and pelvis showing reducing myofascial tightness adding to back pain and bladder control issues.    Target Date 20   Date Met      Progress: general does well with this except for today's visit.      Goal Identifier PMT MMT   Goal Description Patient to have at least 4/5 MMT of pelvic floor to reduce incontinence to less than once daily.   Target Date 20   Date Met      Progress: will continue working towards this goal.      Goal Identifier HEP   Goal Description Patient to be compliant with HEP for self managment of symptoms.    Target Date 20   Date Met   20   Progress:     Progress Toward Goals:   Progress this reporting period: Patient continues to respond to myofascial work externally and internally; helps to reduce bladder irritation, urgency, leakage and lower abdominal pain. Low back pain interfers at times such as today's visit.           Plan:  Continue therapy per current plan of  care.    Discharge:  No

## 2020-02-23 ENCOUNTER — HEALTH MAINTENANCE LETTER (OUTPATIENT)
Age: 70
End: 2020-02-23

## 2020-02-24 ENCOUNTER — OFFICE VISIT (OUTPATIENT)
Dept: INTERNAL MEDICINE | Facility: OTHER | Age: 70
End: 2020-02-24
Attending: NURSE PRACTITIONER
Payer: OTHER GOVERNMENT

## 2020-02-24 ENCOUNTER — HOSPITAL ENCOUNTER (OUTPATIENT)
Dept: GENERAL RADIOLOGY | Facility: OTHER | Age: 70
End: 2020-02-24
Attending: NURSE PRACTITIONER
Payer: OTHER GOVERNMENT

## 2020-02-24 ENCOUNTER — TELEPHONE (OUTPATIENT)
Dept: INTERNAL MEDICINE | Facility: OTHER | Age: 70
End: 2020-02-24

## 2020-02-24 VITALS
HEART RATE: 66 BPM | WEIGHT: 176.7 LBS | BODY MASS INDEX: 30.17 KG/M2 | OXYGEN SATURATION: 97 % | DIASTOLIC BLOOD PRESSURE: 70 MMHG | RESPIRATION RATE: 16 BRPM | TEMPERATURE: 96.9 F | SYSTOLIC BLOOD PRESSURE: 124 MMHG | HEIGHT: 64 IN

## 2020-02-24 DIAGNOSIS — M54.42 ACUTE BILATERAL LOW BACK PAIN WITH BILATERAL SCIATICA: ICD-10-CM

## 2020-02-24 DIAGNOSIS — M54.41 ACUTE BILATERAL LOW BACK PAIN WITH BILATERAL SCIATICA: Primary | ICD-10-CM

## 2020-02-24 DIAGNOSIS — M54.41 ACUTE BILATERAL LOW BACK PAIN WITH BILATERAL SCIATICA: ICD-10-CM

## 2020-02-24 DIAGNOSIS — M54.42 ACUTE BILATERAL LOW BACK PAIN WITH BILATERAL SCIATICA: Primary | ICD-10-CM

## 2020-02-24 PROCEDURE — 72100 X-RAY EXAM L-S SPINE 2/3 VWS: CPT

## 2020-02-24 PROCEDURE — 99213 OFFICE O/P EST LOW 20 MIN: CPT | Performed by: NURSE PRACTITIONER

## 2020-02-24 RX ORDER — IBUPROFEN 800 MG/1
800 TABLET, FILM COATED ORAL EVERY 8 HOURS
Qty: 21 TABLET | Refills: 1 | Status: SHIPPED | OUTPATIENT
Start: 2020-02-24 | End: 2020-03-10

## 2020-02-24 ASSESSMENT — ENCOUNTER SYMPTOMS
NUMBNESS: 1
CONSTIPATION: 1
BACK PAIN: 1
FREQUENCY: 1
MYALGIAS: 1
WEAKNESS: 1
FATIGUE: 1

## 2020-02-24 ASSESSMENT — PAIN SCALES - GENERAL: PAINLEVEL: SEVERE PAIN (6)

## 2020-02-24 ASSESSMENT — MIFFLIN-ST. JEOR: SCORE: 1301.51

## 2020-02-24 NOTE — NURSING NOTE
Chief Complaint   Patient presents with     Back Pain   Patient presents to the clinic today for back pain rated at a 6. Patient states she lifted a pot on Saturday and something felt it like it blew. Patient stated she took Tylenol 600 mg at 5:30 am today.    Medication Reconciliation: completed   Akua Fox LPN  2/24/2020 8:47 AM

## 2020-02-24 NOTE — PROGRESS NOTES
Nursing Notes:   Akua Fox LPN  2/24/2020  8:58 AM  Signed  Chief Complaint   Patient presents with     Back Pain   Patient presents to the clinic today for back pain rated at a 6. Patient states she lifted a pot on Saturday and something felt it like it blew. Patient stated she took Tylenol 600 mg at 5:30 am today.    Medication Reconciliation: completed   Akua Fox LPN  2/24/2020 8:47 AM      Nursing note reviewed with patient.  Accuracy and completeness verified.      Subjective:   Ms. Velez is a 70 year old female who presents to the clinic today with complaints of low back pain with pain on both sides. She reports she was lifting something at home up onto an end table and she felt a sharp burning pain on both sides of low back. She has tried Tylenol with no relief and Ibuprofen 600 mg four times a day with some relief and ice packs. She went to her PT appointment with [facility] on 2/20/2020 with details noted as follows:    Objective Measurements:  Objective Measure: postural loading  Details: limited loading through left shoulder and pelvis; general listening to left lumbar; local listening to l4  Objective Measure: joint mobility  Details: FRS left L4, L5  Objective Measure: myofascial  Details: L5<>S1     She  reports that she quit smoking about 47 years ago. She smoked 0.50 packs per day. She has never used smokeless tobacco.    The pain is a result of an injury  The pain began on 2/20/2020 at approximately noon.  The pain is located in low back, bilaterally, and radiates into both hips.  The pain is rated 10 on a scale of 1-10 at its worst, and has used Tylenol and rates it a 5/10.  Numbness and tingling is reported.  Lower extremity weakness is not reported.   Bowel/Bladder dysfunction is as prior.  Methods for relief previously tried are Ibuprofen 600 mg four times a day.  What aggravates the pain? Lying, standing, bending, up/down off toilet, in/out of care  What relieves the pain?  "Ibuprofen    The patient has a past medical history which is reviewed and listed as below:     Past Medical History:   Diagnosis Date     Adnexal mass 08/22/2011     Contact dermatitis 12/16/2009     Diplopia     Episode of mild diplopia secondary to sinusitis, ophthalmology consult 07/2008.     H/O gastritis      History of adenomatous polyp of colon      Postmenopausal atrophic vaginitis 07/29/2011     Restless leg syndrome      Retention of urine 08/22/2011     Rosacea      Tinea corporis 11/19/2010     Urge incontinence of urine      ROS:     Review of Systems   Constitutional: Positive for fatigue.        Cant sleep because of the pain   Gastrointestinal: Positive for constipation.   Genitourinary: Positive for frequency and urgency.   Musculoskeletal: Positive for back pain, gait problem and myalgias.   Neurological: Positive for weakness and numbness.     Objective:    /70 (BP Location: Right arm, Patient Position: Sitting, Cuff Size: Adult Regular)   Pulse 66   Temp 96.9  F (36.1  C) (Tympanic)   Resp 16   Ht 1.626 m (5' 4\")   Wt 80.2 kg (176 lb 11.2 oz)   SpO2 97%   BMI 30.33 kg/m      Physical Exam  Constitutional:       Appearance: Normal appearance.   HENT:      Head: Normocephalic and atraumatic.      Nose: Nose normal.      Mouth/Throat:      Mouth: Mucous membranes are moist.      Pharynx: Oropharynx is clear.   Eyes:      Extraocular Movements: Extraocular movements intact.      Conjunctiva/sclera: Conjunctivae normal.      Pupils: Pupils are equal, round, and reactive to light.   Neck:      Musculoskeletal: Normal range of motion and neck supple.   Cardiovascular:      Rate and Rhythm: Normal rate and regular rhythm.      Pulses: Normal pulses.      Heart sounds: Normal heart sounds.   Pulmonary:      Effort: Pulmonary effort is normal.      Breath sounds: Normal breath sounds.   Abdominal:      General: Bowel sounds are normal.      Palpations: Abdomen is soft.   Musculoskeletal:       "   General: Tenderness present.      Lumbar back: She exhibits decreased range of motion, tenderness and pain.        Arms:    Skin:     General: Skin is warm and dry.   Neurological:      Mental Status: She is alert and oriented to person, place, and time.   Psychiatric:         Mood and Affect: Mood normal.         Behavior: Behavior normal.         Thought Content: Thought content normal.         Judgment: Judgment normal.       Results for orders placed or performed during the hospital encounter of 02/24/20   XR Lumbar Spine 2/3 Views     Status: None    Narrative    PROCEDURE: XR LUMBAR SPINE 2-3 VIEWS 2/24/2020 9:34 AM    HISTORY: Acute bilateral low back pain with bilateral sciatica; Acute  bilateral low back pain with bilateral sciatica    COMPARISONS: 11/7/2017.    TECHNIQUE: 3 views.    FINDINGS: There is a moderate right convex scoliosis with degenerative  change in the lumbar spine. This is most severe at the L3-4 level  where disc space narrowing is seen. There is some mild spur formation.    There is no acute compression fracture. There is mild vascular  calcification.         Impression    IMPRESSION: Scoliosis. No acute abnormality.    MARISSA LAWLER MD     Assessment/Plan:     1. Acute bilateral low back pain with bilateral sciatica    - Apply ice to painful area 3-4 times per day for 15 minutes each time, elevation, gentle movement/rest as tolerated.  - Ibuprofen, Naproxen or Tylenol as needed  - Offered Physical Therapy, patient will try at this time for her low back pain and instruction on Core Strengthening  - Patient is to call if she has additional problems with this or if new symptoms develop. We will see how she feels in one week to determine if she needs further imaging and treatment.  - Printed handout for stretching exercises given to patient.    Return in about 1 week (around 3/2/2020) for Recheck.     JERRELL OCASIO, AGNP-C  02/24/2020 10:34 AM

## 2020-02-24 NOTE — TELEPHONE ENCOUNTER
After proper verification, patient was relayed the results from below.  Akua Fox LPN on 2/24/2020 at 4:09 PM

## 2020-02-27 ENCOUNTER — HOSPITAL ENCOUNTER (OUTPATIENT)
Dept: PHYSICAL THERAPY | Facility: OTHER | Age: 70
Setting detail: THERAPIES SERIES
End: 2020-02-27
Attending: NURSE PRACTITIONER
Payer: OTHER GOVERNMENT

## 2020-02-27 DIAGNOSIS — M54.42 ACUTE BILATERAL LOW BACK PAIN WITH BILATERAL SCIATICA: ICD-10-CM

## 2020-02-27 DIAGNOSIS — M54.41 ACUTE BILATERAL LOW BACK PAIN WITH BILATERAL SCIATICA: ICD-10-CM

## 2020-02-27 PROCEDURE — 97014 ELECTRIC STIMULATION THERAPY: CPT | Mod: GP

## 2020-02-27 PROCEDURE — 97110 THERAPEUTIC EXERCISES: CPT | Mod: GP

## 2020-02-27 PROCEDURE — 97112 NEUROMUSCULAR REEDUCATION: CPT | Mod: GP

## 2020-02-27 PROCEDURE — 97161 PT EVAL LOW COMPLEX 20 MIN: CPT | Mod: GP

## 2020-02-27 NOTE — PROGRESS NOTES
02/27/20 0900   General Information   Type of Visit Initial OP Ortho PT Evaluation   Start of Care Date 02/27/20   Referring Physician Nafisa Escobar NP   Patient/Family Goals Statement reduce pain    Orders Evaluate and Treat   Date of Order 02/24/20   Certification Required? Yes   Medical Diagnosis acute bilateral low back pain with sciatica   Surgical/Medical history reviewed Yes   Body Part(s)   Body Part(s) Lumbar Spine/SI   Presentation and Etiology   Pertinent history of current problem (include personal factors and/or comorbidities that impact the POC) Onset of acute low back pain on 2/20/20. Was bending over, picked up light object and turn to set it on a table. Acute pain has subsided with use of ibuprofen, ice. Had xray. See EHR. Having tingling in lower legs, this is new.    Impairments A. Pain;D. Decreased ROM;E. Decreased flexibility;F. Decreased strength and endurance;G. Impaired balance;H. Impaired gait;L. Tingling;N. Headaches;P. Bowel or bladder problems   Functional Limitations perform activities of daily living;perform desired leisure / sports activities   Symptom Location low back, gluteals   Onset date of current episode/exacerbation 02/20/20   Chronicity Recurrent   Pain quality A. Sharp;B. Dull;C. Aching;G. Cramping   Frequency of pain/symptoms A. Constant   Pain/symptoms exacerbated by A. Sitting;B. Walking;C. Lifting;D. Carrying;E. Rest;G. Certain positions;H. Overhead reach;I. Bending;K. Home tasks   Pain/symptoms eased by E. Changing positions;G. Heat;H. Cold   Progression of symptoms since onset: Improved   Prior Level of Function   Functional Level Prior Comment prior back pain but not this severe   Current Level of Function   Patient role/employment history F. Retired   Lumbar Spine/SI Objective Findings   Observation guarded mobility   Gait/Locomotion slower derik   Flexion ROM limited due to pain   Lumbar ROM Comment seated rotation limited right greater than left   Pelvic  Screen +FFT left, left pubve inferior   Lumbar/Hip/Knee/Foot Strength Comments to be assess at a later date.    Hip Screen + LUBNA left   Palpation ptural loading limited thorugh left shoulder and pelvis. General listening to anterior pelvis; local listening to left pubic bone.    Planned Therapy Interventions   Planned Therapy Interventions joint mobilization;neuromuscular re-education;strengthening;stretching   Planned Modality Interventions   Planned Modality Interventions Cryotherapy;Electrical stimulation;Hot packs;Traction;Ultrasound   Clinical Impression   Criteria for Skilled Therapeutic Interventions Met yes, treatment indicated   PT Diagnosis low back pain, pelvic dysfunction, muscle weakness   Influenced by the following impairments pain, loss of movement, loss of strength, limited gait   Functional limitations due to impairments sitting, walking, lifting/carrying, reaching over head, bending forward, house hold tasks   Clinical Presentation Evolving/Changing   Clinical Decision Making (Complexity) Low complexity   Therapy Frequency 2 times/Week   Predicted Duration of Therapy Intervention (days/wks) 3 months   Risk & Benefits of therapy have been explained Yes   Patient, Family & other staff in agreement with plan of care Yes   Clinical Impression Comments Patient with onset of acute low back pain with history of DDD, scoliosis.    ORTHO GOALS   PT Ortho Eval Goals 4;5;6   Ortho Goal 4   Goal Identifier joint mobility   Goal Description Patient to demonstrate normal joint mechanics in lumbar spine and pelvis to allow forward bending, lift and carrying without pain.    Target Date 05/27/20   Ortho Goal 5   Goal Identifier MMT   Goal Description Patient to have 5/5/ MMT for core and hip musculature to stabilize spine during functional mobility tasks.    Target Date 05/27/20   Ortho Goal 6   Goal Identifier HEP   Goal Description Patient to be compliant with HEP for self management of symptoms.    Target  Date 05/27/20   Total Evaluation Time   PT Robyn, Low Complexity Minutes (62072) 20   Therapy Certification   Certification date from 02/02/20   Certification date to 05/27/20   Medical Diagnosis acute bilateral low back pain with sciatica

## 2020-02-27 NOTE — PROGRESS NOTES
[facility]          OUTPATIENT PHYSICAL THERAPY ORTHOPEDIC EVALUATION  PLAN OF TREATMENT FOR OUTPATIENT REHABILITATION  (COMPLETE FOR INITIAL CLAIMS ONLY)  Patient's Last Name, First Name, M.I.  YOB: 1950  Monisha Velez    Provider s Name:  [facility]   Medical Record No.  [id number]   Start of Care Date:  02/27/20   Onset Date:  02/20/20   Type:     _X__PT   ___OT   ___SLP Medical Diagnosis:  (P) acute bilateral low back pain with sciatica     PT Diagnosis:  low back pain, pelvic dysfunction, muscle weakness   Visits from SOC:  1      _________________________________________________________________________________  Plan of Treatment/Functional Goals:  joint mobilization, neuromuscular re-education, strengthening, stretching     Cryotherapy, Electrical stimulation, Hot packs, Traction, Ultrasound     Goals                                   Goal Identifier: (P) joint mobility  Goal Description: (P) Patient to demonstrate normal joint mechanics in lumbar spine and pelvis to allow forward bending, lift and carrying without pain.   Target Date: (P) 05/27/20    Goal Identifier: (P) MMT  Goal Description: (P) Patient to have 5/5/ MMT for core and hip musculature to stabilize spine during functional mobility tasks.   Target Date: (P) 05/27/20    Goal Identifier: (P) HEP  Goal Description: (P) Patient to be compliant with HEP for self management of symptoms.   Target Date: (P) 05/27/20                          Therapy Frequency:  2 times/Week  Predicted Duration of Therapy Intervention:  3 months    Heather Benavides, PT                 I CERTIFY THE NEED FOR THESE SERVICES FURNISHED UNDER        THIS PLAN OF TREATMENT AND WHILE UNDER MY CARE     (Physician co-signature of this document indicates review and certification of the therapy plan).                       Certification Date From:  (P) 02/02/20   Certification Date To:  (P) 05/27/20    Referring Provider:   Nafisa Escobar NP    Initial Assessment        See Epic Evaluation Start of Care Date: 02/27/20

## 2020-03-01 NOTE — PROGRESS NOTES
Nursing Notes:   Akua Fox LPN  3/3/2020  9:42 AM  Signed  Chief Complaint   Patient presents with     RECHECK     1m week follow up on back pain      Patient presents to the clinic today for a 1 week follow up on back pain rated at a 5 today. Patient states she took 600 mg ibuprofen at 3 am.   Medication Reconciliation: completed   Akua GRACE Fox  3/3/2020 9:06 AM      Nursing note reviewed with patient.  Accuracy and completeness verified.     Monisha Velez is a 70 year old female who presents to the clinic today for a follow up visit.  Monisha was seen in clinic on 2/24/2020 and was advised to follow up in one week for her back pain.       Summary of Most Recent Visit:  Complaints of low back pain with pain on both sides, had lifted something at home up onto an end table and she felt a sharp burning pain on both sides of low back. She has tried Tylenol with no relief and Ibuprofen 600 mg four times a day with some relief and ice packs. She went to her PT appointment with Yesi on 2/20/2020. The pain is a result of an injury on 2/20/2020 at approximately noon. The pain is located in low back, bilaterally, and radiates into both hips.  The pain is rated 10 on a scale of 1-10 at its worst, and she has used Tylenol and rates it a 5/10. Numbness and tingling is reported. Lower extremity weakness is not reported. Bowel/Bladder dysfunction is as prior. Methods for relief previously tried are Ibuprofen 600 mg four times a day. Lying, standing, bending, up/down off toilet, in/out of car aggravate the pain. Ibuprofen relieves the pain.    She was advised to apply ice to painful area 3-4 times per day for 15 minutes each time, elevation, gentle movement/rest as tolerated. Use Ibuprofen, Naproxen or Tylenol as needed. Physical Therapy for her low back pain and instruction on Core Strengthening.    Diagnostic Tests/Treatments reviewed:  Xray indicated there is a moderate right convex scoliosis with degenerative  "change in the lumbar spine. This is most severe at the L3-4 level where disc space narrowing is seen. There is some mild spur formation. There is no acute compression fracture. There is mild vascular calcification. Scoliosis. No acute abnormality.    Update since prior appointment:  Working with PT still, getting \"the works\". Feels this is helping some. Reports a 7 lb weight gain since last appointment, could be related to new Ibuprofen 800 mg she has been using. Reports her activity is decreased, she is on a 5 # lifting restriction per PT. Other than that, she is \"ok\".    The patient has a past medical history which is reviewed and listed as below:     Past Medical History:   Diagnosis Date     Adnexal mass 08/22/2011     Contact dermatitis 12/16/2009     Diplopia     Episode of mild diplopia secondary to sinusitis, ophthalmology consult 07/2008.     H/O gastritis      History of adenomatous polyp of colon      Postmenopausal atrophic vaginitis 07/29/2011     Restless leg syndrome      Retention of urine 08/22/2011     Rosacea      Tinea corporis 11/19/2010     Urge incontinence of urine      SUBJECTIVE:                                                      Review of Systems   Constitutional: Positive for activity change and unexpected weight change.   Musculoskeletal: Positive for back pain, gait problem and myalgias.   Skin:        Lesions on her face she wants removed.   Psychiatric/Behavioral: Positive for sleep disturbance.   All other systems reviewed and are negative.      OBJECTIVE:                                                      /80 (BP Location: Right arm, Patient Position: Sitting, Cuff Size: Adult Regular)   Pulse 65   Temp 97  F (36.1  C) (Tympanic)   Resp 16   Ht 1.626 m (5' 4\")   Wt 81.5 kg (179 lb 9.6 oz)   SpO2 97%   BMI 30.83 kg/m      Estimated body mass index is 30.83 kg/m  as calculated from the following:    Height as of this encounter: 1.626 m (5' 4\").    Weight as of this " encounter: 81.5 kg (179 lb 9.6 oz).     Physical Exam  Vitals signs reviewed.   Constitutional:       Appearance: Normal appearance.   HENT:      Head: Normocephalic.     Cardiovascular:      Rate and Rhythm: Normal rate and regular rhythm.      Pulses: Normal pulses.      Heart sounds: Normal heart sounds.   Pulmonary:      Effort: Pulmonary effort is normal.      Breath sounds: Normal breath sounds.   Abdominal:      General: Bowel sounds are normal.      Palpations: Abdomen is soft.   Musculoskeletal:         General: Tenderness present.      Comments: Low back/pelvis ROM limited due to muscle pain and stiffness   Skin:     General: Skin is warm and dry.      Findings: Lesion present.   Neurological:      Mental Status: She is alert and oriented to person, place, and time.   Psychiatric:         Mood and Affect: Mood normal.         Thought Content: Thought content normal.         Judgment: Judgment normal.       Results for orders placed or performed in visit on 03/03/20   Basic metabolic panel     Status: Abnormal   Result Value Ref Range    Sodium 141 134 - 144 mmol/L    Potassium 4.0 3.5 - 5.1 mmol/L    Chloride 108 (H) 98 - 107 mmol/L    Carbon Dioxide 25 21 - 31 mmol/L    Anion Gap 8 3 - 14 mmol/L    Glucose 96 70 - 105 mg/dL    Urea Nitrogen 20 7 - 25 mg/dL    Creatinine 0.83 0.60 - 1.20 mg/dL    GFR Estimate 68 >60 mL/min/[1.73_m2]    GFR Estimate If Black 82 >60 mL/min/[1.73_m2]    Calcium 9.1 8.6 - 10.3 mg/dL     ASSESSMENT/PLAN:                                                      1. Acute bilateral low back pain with bilateral sciatica  - Stop ibuprofen as this may be causing her fluid retention/weight gain.  - traMADol (ULTRAM) 50 MG tablet; Take 1 tablet (50 mg) by mouth every 6 hours as needed for moderate to severe pain  Dispense: 30 tablet; Refill: 0. Must recheck in one week to see if this is working for her pain. Advised to watch for side effects and notify provider if they occur.    2. Fluid  retention  - Stop ibuprofen.  - Basic metabolic panel which is normal today    3. Lesions on face  - Will freeze off at next visit.    - Patient is to call if additional problems with this or if new symptoms develop.    Patient verbalizes understanding and is agreeable to plan of care.    Return in about 1 week (around 3/10/2020) for Recheck of back pain and remove facial lesions.     JERRELL Ferrari, AGNP-C  Internal Medicine  [facility]    Mar 3, 2020 12:13 PM

## 2020-03-03 ENCOUNTER — OFFICE VISIT (OUTPATIENT)
Dept: INTERNAL MEDICINE | Facility: OTHER | Age: 70
End: 2020-03-03
Attending: NURSE PRACTITIONER
Payer: OTHER GOVERNMENT

## 2020-03-03 ENCOUNTER — TELEPHONE (OUTPATIENT)
Dept: INTERNAL MEDICINE | Facility: OTHER | Age: 70
End: 2020-03-03

## 2020-03-03 VITALS
SYSTOLIC BLOOD PRESSURE: 130 MMHG | DIASTOLIC BLOOD PRESSURE: 80 MMHG | HEART RATE: 65 BPM | RESPIRATION RATE: 16 BRPM | HEIGHT: 64 IN | BODY MASS INDEX: 30.66 KG/M2 | WEIGHT: 179.6 LBS | TEMPERATURE: 97 F | OXYGEN SATURATION: 97 %

## 2020-03-03 DIAGNOSIS — M54.41 ACUTE BILATERAL LOW BACK PAIN WITH BILATERAL SCIATICA: ICD-10-CM

## 2020-03-03 DIAGNOSIS — R60.9 FLUID RETENTION: Primary | ICD-10-CM

## 2020-03-03 DIAGNOSIS — M54.42 ACUTE BILATERAL LOW BACK PAIN WITH BILATERAL SCIATICA: ICD-10-CM

## 2020-03-03 LAB
ANION GAP SERPL CALCULATED.3IONS-SCNC: 8 MMOL/L (ref 3–14)
BUN SERPL-MCNC: 20 MG/DL (ref 7–25)
CALCIUM SERPL-MCNC: 9.1 MG/DL (ref 8.6–10.3)
CHLORIDE SERPL-SCNC: 108 MMOL/L (ref 98–107)
CO2 SERPL-SCNC: 25 MMOL/L (ref 21–31)
CREAT SERPL-MCNC: 0.83 MG/DL (ref 0.6–1.2)
GFR SERPL CREATININE-BSD FRML MDRD: 68 ML/MIN/{1.73_M2}
GLUCOSE SERPL-MCNC: 96 MG/DL (ref 70–105)
POTASSIUM SERPL-SCNC: 4 MMOL/L (ref 3.5–5.1)
SODIUM SERPL-SCNC: 141 MMOL/L (ref 134–144)

## 2020-03-03 PROCEDURE — 99213 OFFICE O/P EST LOW 20 MIN: CPT | Performed by: NURSE PRACTITIONER

## 2020-03-03 PROCEDURE — 36415 COLL VENOUS BLD VENIPUNCTURE: CPT | Mod: ZL | Performed by: NURSE PRACTITIONER

## 2020-03-03 PROCEDURE — 80048 BASIC METABOLIC PNL TOTAL CA: CPT | Mod: ZL | Performed by: NURSE PRACTITIONER

## 2020-03-03 RX ORDER — TRAMADOL HYDROCHLORIDE 50 MG/1
50 TABLET ORAL EVERY 6 HOURS PRN
Qty: 30 TABLET | Refills: 0 | Status: SHIPPED | OUTPATIENT
Start: 2020-03-03 | End: 2020-07-09

## 2020-03-03 RX ORDER — IBUPROFEN 800 MG/1
800 TABLET, FILM COATED ORAL EVERY 8 HOURS
Qty: 21 TABLET | Refills: 1 | Status: CANCELLED | OUTPATIENT
Start: 2020-03-03

## 2020-03-03 ASSESSMENT — ENCOUNTER SYMPTOMS
UNEXPECTED WEIGHT CHANGE: 1
SLEEP DISTURBANCE: 1
BACK PAIN: 1
MYALGIAS: 1
ACTIVITY CHANGE: 1

## 2020-03-03 ASSESSMENT — MIFFLIN-ST. JEOR: SCORE: 1314.66

## 2020-03-03 ASSESSMENT — PAIN SCALES - GENERAL: PAINLEVEL: MODERATE PAIN (5)

## 2020-03-03 NOTE — NURSING NOTE
Chief Complaint   Patient presents with     RECHECK     1m week follow up on back pain      Patient presents to the clinic today for a 1 week follow up on back pain rated at a 5 today. Patient states she took 600 mg ibuprofen at 3 am.   Medication Reconciliation: completed   Akua Fox LPN  3/3/2020 9:06 AM

## 2020-03-03 NOTE — TELEPHONE ENCOUNTER
Pharmacy called, looks like it has been sent on our end to them. After they checked their end - it has not gotten to them. Medication called in.   Betsy Hernandez LPN ..........3/3/2020 12:36 PM

## 2020-03-09 NOTE — PROGRESS NOTES
"Nursing Notes:   Akua Fox LPN  3/10/2020 10:35 AM  Signed  Chief Complaint   Patient presents with     RECHECK     back pain     Patient presents to the clinic today for a recheck on back pain and facial lesions.  Medication Reconciliation: completed   Akua Fox LPN  3/10/2020 10:07 AM     Nursing note reviewed with patient.  Accuracy and completeness verified.     Monisha Velez is a 70 year old female who presents to the clinic today for a follow up visit.  Monisha was seen in clinic on 3/3/2020 and was advised to follow up in one week for her back pain. Summary of Most Recent Visit:  Working with PT still, getting \"the works\". Feels this is helping some. Reports a 7 lb weight gain since last appointment, could be related to new Ibuprofen 800 mg she has been using. Reports her activity is decreased, she is on a 5 # lifting restriction per PT. Other than that, she is \"ok\". Continues to see PT and use ice.     Diagnostic Tests/Treatments reviewed:  Prior Xray indicated there is a moderate right convex scoliosis with degenerative change in the lumbar spine. This is most severe at the L3-4 level where disc space narrowing is seen. There is some mild spur formation. There is no acute compression fracture. There is mild vascular calcification. Scoliosis. No acute abnormality.     Update since prior appointment:  Doing fair, reports increased urinary frequency and urgency. Continues to see PT but thinks she may be about done regarding her back.    The patient has a past medical history which is reviewed and listed as below:     Past Medical History:   Diagnosis Date     Adnexal mass 08/22/2011     Contact dermatitis 12/16/2009     Diplopia     Episode of mild diplopia secondary to sinusitis, ophthalmology consult 07/2008.     H/O gastritis      History of adenomatous polyp of colon      Postmenopausal atrophic vaginitis 07/29/2011     Restless leg syndrome      Retention of urine 08/22/2011     Rosacea      Tinea " "corporis 11/19/2010     Urge incontinence of urine        SUBJECTIVE:                                                      REVIEW OF SYSTEMS:  Review of Systems   Genitourinary: Positive for frequency and urgency.   Musculoskeletal: Positive for back pain.   Skin:        Two small lesions she would like frozen off today   All other systems reviewed and are negative.    OBJECTIVE:                                                      /70 (BP Location: Right arm, Patient Position: Sitting, Cuff Size: Adult Regular)   Pulse 68   Temp 97.4  F (36.3  C) (Tympanic)   Resp 16   Ht 1.626 m (5' 4\")   Wt 80.1 kg (176 lb 9.6 oz)   SpO2 97%   BMI 30.31 kg/m      Estimated body mass index is 30.31 kg/m  as calculated from the following:    Height as of this encounter: 1.626 m (5' 4\").    Weight as of this encounter: 80.1 kg (176 lb 9.6 oz).     PHYSICAL EXAM:  Physical Exam  Vitals signs reviewed.   Constitutional:       Appearance: Normal appearance.   HENT:      Head: Normocephalic and atraumatic.        Nose: Nose normal.      Mouth/Throat:      Mouth: Mucous membranes are moist.      Pharynx: Oropharynx is clear.   Eyes:      Extraocular Movements: Extraocular movements intact.      Conjunctiva/sclera: Conjunctivae normal.      Pupils: Pupils are equal, round, and reactive to light.   Cardiovascular:      Rate and Rhythm: Normal rate and regular rhythm.      Heart sounds: Normal heart sounds.   Pulmonary:      Effort: Pulmonary effort is normal.      Breath sounds: Normal breath sounds.   Abdominal:      General: Bowel sounds are normal.   Skin:     General: Skin is warm.      Findings: Lesion present.   Neurological:      Mental Status: She is alert and oriented to person, place, and time. Mental status is at baseline.   Psychiatric:         Mood and Affect: Mood normal.         Behavior: Behavior normal.         Thought Content: Thought content normal.         Judgment: Judgment normal.     Procedure: " Cryotherapy  Diagnosis: keratosis  Location: left, right cheek  Specimen number: 2  Discussion of treatment options, all of patient's questions answered. After informed consent, patient elects to proceed with procedure. Cryotherapy performed in duplicate on both lesions. Wound care instructions given. Follow up with any wound problems, poor healing, or signs of infection.    Results for orders placed or performed in visit on 03/10/20   UA reflex to Microscopic     Status: None   Result Value Ref Range    Color Urine Yellow     Appearance Urine Clear     Glucose Urine Negative NEG^Negative mg/dL    Bilirubin Urine Negative NEG^Negative    Ketones Urine Negative NEG^Negative mg/dL    Specific Gravity Urine 1.007 1.003 - 1.035    Blood Urine Negative NEG^Negative    pH Urine 6.0 5.0 - 7.0 pH    Protein Albumin Urine Negative NEG^Negative mg/dL    Urobilinogen mg/dL Normal 0.0 - 2.0 mg/dL    Nitrite Urine Negative NEG^Negative    Leukocyte Esterase Urine Negative NEG^Negative    Source Midstream Urine        ASSESSMENT/PLAN:                                                      1. Urinary urgency  - UA reflex to Microscopic, negative    2. Urinary frequency  - UA reflex to Microscopic, negative    3. Perimenopausal vasomotor symptoms  - Refill of estradiol (ESTRACE) 0.5 MG tablet; Take 0.5-1 tablets (0.25-0.5 mg) by mouth daily  Dispense: 90 tablet; Refill: 3    4. Skin lesion  - DESTRUC BENIGN/PREMAL,2-14 LESIONS [47057]  - Patient is to call if additional problems with this or if new symptoms develop.    Patient verbalizes understanding and is agreeable to plan of care.    Return if symptoms worsen or fail to improve.     JERRELL Ferrari, AGNP-C  Internal Medicine  [facility]    Mar 10, 2020 2:51 PM

## 2020-03-10 ENCOUNTER — OFFICE VISIT (OUTPATIENT)
Dept: INTERNAL MEDICINE | Facility: OTHER | Age: 70
End: 2020-03-10
Attending: NURSE PRACTITIONER
Payer: OTHER GOVERNMENT

## 2020-03-10 ENCOUNTER — TELEPHONE (OUTPATIENT)
Dept: INTERNAL MEDICINE | Facility: OTHER | Age: 70
End: 2020-03-10

## 2020-03-10 ENCOUNTER — HOSPITAL ENCOUNTER (OUTPATIENT)
Dept: PHYSICAL THERAPY | Facility: OTHER | Age: 70
Setting detail: THERAPIES SERIES
End: 2020-03-10
Attending: INTERNAL MEDICINE
Payer: OTHER GOVERNMENT

## 2020-03-10 VITALS
HEIGHT: 64 IN | TEMPERATURE: 97.4 F | DIASTOLIC BLOOD PRESSURE: 70 MMHG | SYSTOLIC BLOOD PRESSURE: 122 MMHG | RESPIRATION RATE: 16 BRPM | HEART RATE: 68 BPM | WEIGHT: 176.6 LBS | BODY MASS INDEX: 30.15 KG/M2 | OXYGEN SATURATION: 97 %

## 2020-03-10 DIAGNOSIS — Z78.0 POSTMENOPAUSAL STATUS: ICD-10-CM

## 2020-03-10 DIAGNOSIS — L98.9 SKIN LESION: ICD-10-CM

## 2020-03-10 DIAGNOSIS — E66.3 OVERWEIGHT (BMI 25.0-29.9): ICD-10-CM

## 2020-03-10 DIAGNOSIS — N95.1 PERIMENOPAUSAL VASOMOTOR SYMPTOMS: ICD-10-CM

## 2020-03-10 DIAGNOSIS — Z13.220 SCREENING FOR HYPERLIPIDEMIA: Primary | ICD-10-CM

## 2020-03-10 DIAGNOSIS — R35.0 URINARY FREQUENCY: ICD-10-CM

## 2020-03-10 DIAGNOSIS — R60.9 FLUID RETENTION: ICD-10-CM

## 2020-03-10 DIAGNOSIS — Z00.00 ENCOUNTER FOR MEDICARE ANNUAL WELLNESS EXAM: ICD-10-CM

## 2020-03-10 DIAGNOSIS — R39.15 URINARY URGENCY: Primary | ICD-10-CM

## 2020-03-10 DIAGNOSIS — Z13.1 SCREENING FOR DIABETES MELLITUS: ICD-10-CM

## 2020-03-10 LAB
ALBUMIN UR-MCNC: NEGATIVE MG/DL
APPEARANCE UR: CLEAR
BILIRUB UR QL STRIP: NEGATIVE
COLOR UR AUTO: YELLOW
GLUCOSE UR STRIP-MCNC: NEGATIVE MG/DL
HGB UR QL STRIP: NEGATIVE
KETONES UR STRIP-MCNC: NEGATIVE MG/DL
LEUKOCYTE ESTERASE UR QL STRIP: NEGATIVE
NITRATE UR QL: NEGATIVE
PH UR STRIP: 6 PH (ref 5–7)
SOURCE: NORMAL
SP GR UR STRIP: 1.01 (ref 1–1.03)
UROBILINOGEN UR STRIP-MCNC: NORMAL MG/DL (ref 0–2)

## 2020-03-10 PROCEDURE — 99213 OFFICE O/P EST LOW 20 MIN: CPT | Mod: 25 | Performed by: NURSE PRACTITIONER

## 2020-03-10 PROCEDURE — 17003 DESTRUCT PREMALG LES 2-14: CPT | Mod: XU | Performed by: NURSE PRACTITIONER

## 2020-03-10 PROCEDURE — 17000 DESTRUCT PREMALG LESION: CPT | Performed by: NURSE PRACTITIONER

## 2020-03-10 PROCEDURE — 97110 THERAPEUTIC EXERCISES: CPT | Mod: GP

## 2020-03-10 PROCEDURE — 81003 URINALYSIS AUTO W/O SCOPE: CPT | Mod: ZL | Performed by: NURSE PRACTITIONER

## 2020-03-10 PROCEDURE — 97112 NEUROMUSCULAR REEDUCATION: CPT | Mod: GP

## 2020-03-10 RX ORDER — ESTRADIOL 0.5 MG/1
.25-.5 TABLET ORAL DAILY
Qty: 90 TABLET | Refills: 3 | Status: SHIPPED | OUTPATIENT
Start: 2020-03-10 | End: 2021-02-22

## 2020-03-10 ASSESSMENT — ENCOUNTER SYMPTOMS
FREQUENCY: 1
BACK PAIN: 1

## 2020-03-10 ASSESSMENT — MIFFLIN-ST. JEOR: SCORE: 1301.05

## 2020-03-10 ASSESSMENT — PAIN SCALES - GENERAL: PAINLEVEL: MILD PAIN (3)

## 2020-03-10 NOTE — NURSING NOTE
Chief Complaint   Patient presents with     RECHECK     back pain     Patient presents to the clinic today for a recheck on back pain and facial lesions.  Medication Reconciliation: completed   Akua Fox LPN  3/10/2020 10:07 AM

## 2020-03-10 NOTE — RESULT ENCOUNTER NOTE
Please call the patient and let her know her urine was normal. No signs of infection. She should keep drinking plenty of fluids.    Nafisa Escobar NP

## 2020-03-18 ENCOUNTER — HOSPITAL ENCOUNTER (OUTPATIENT)
Dept: PHYSICAL THERAPY | Facility: OTHER | Age: 70
Setting detail: THERAPIES SERIES
End: 2020-03-18
Attending: INTERNAL MEDICINE
Payer: OTHER GOVERNMENT

## 2020-03-18 PROCEDURE — 97140 MANUAL THERAPY 1/> REGIONS: CPT | Mod: GP

## 2020-03-18 PROCEDURE — 97112 NEUROMUSCULAR REEDUCATION: CPT | Mod: GP

## 2020-03-30 ENCOUNTER — HOSPITAL ENCOUNTER (OUTPATIENT)
Dept: PHYSICAL THERAPY | Facility: OTHER | Age: 70
Setting detail: THERAPIES SERIES
End: 2020-03-30
Attending: INTERNAL MEDICINE
Payer: OTHER GOVERNMENT

## 2020-03-30 PROCEDURE — 97112 NEUROMUSCULAR REEDUCATION: CPT | Mod: GP

## 2020-03-30 PROCEDURE — 97140 MANUAL THERAPY 1/> REGIONS: CPT | Mod: GP

## 2020-04-06 ENCOUNTER — HOSPITAL ENCOUNTER (OUTPATIENT)
Dept: PHYSICAL THERAPY | Facility: OTHER | Age: 70
Setting detail: THERAPIES SERIES
End: 2020-04-06
Attending: INTERNAL MEDICINE
Payer: OTHER GOVERNMENT

## 2020-04-06 PROCEDURE — 97140 MANUAL THERAPY 1/> REGIONS: CPT | Mod: GP

## 2020-04-06 PROCEDURE — 97112 NEUROMUSCULAR REEDUCATION: CPT | Mod: GP

## 2020-04-13 ENCOUNTER — HOSPITAL ENCOUNTER (OUTPATIENT)
Dept: PHYSICAL THERAPY | Facility: OTHER | Age: 70
Setting detail: THERAPIES SERIES
End: 2020-04-13
Attending: INTERNAL MEDICINE
Payer: OTHER GOVERNMENT

## 2020-04-13 PROCEDURE — 97140 MANUAL THERAPY 1/> REGIONS: CPT | Mod: GP

## 2020-04-13 PROCEDURE — 97112 NEUROMUSCULAR REEDUCATION: CPT | Mod: GP

## 2020-04-20 ENCOUNTER — HOSPITAL ENCOUNTER (OUTPATIENT)
Dept: PHYSICAL THERAPY | Facility: OTHER | Age: 70
Setting detail: THERAPIES SERIES
End: 2020-04-20
Attending: INTERNAL MEDICINE
Payer: OTHER GOVERNMENT

## 2020-04-20 PROCEDURE — 97140 MANUAL THERAPY 1/> REGIONS: CPT | Mod: GP

## 2020-04-20 PROCEDURE — 97112 NEUROMUSCULAR REEDUCATION: CPT | Mod: GP

## 2020-04-24 ENCOUNTER — HOSPITAL ENCOUNTER (OUTPATIENT)
Dept: PHYSICAL THERAPY | Facility: OTHER | Age: 70
Setting detail: THERAPIES SERIES
End: 2020-04-24
Attending: INTERNAL MEDICINE
Payer: OTHER GOVERNMENT

## 2020-04-24 PROCEDURE — 97140 MANUAL THERAPY 1/> REGIONS: CPT | Mod: GP

## 2020-04-24 PROCEDURE — 97112 NEUROMUSCULAR REEDUCATION: CPT | Mod: GP

## 2020-05-04 ENCOUNTER — HOSPITAL ENCOUNTER (OUTPATIENT)
Dept: PHYSICAL THERAPY | Facility: OTHER | Age: 70
Setting detail: THERAPIES SERIES
End: 2020-05-04
Attending: INTERNAL MEDICINE
Payer: OTHER GOVERNMENT

## 2020-05-04 PROCEDURE — 97140 MANUAL THERAPY 1/> REGIONS: CPT | Mod: GP

## 2020-05-04 PROCEDURE — 97112 NEUROMUSCULAR REEDUCATION: CPT | Mod: GP

## 2020-05-18 ENCOUNTER — HOSPITAL ENCOUNTER (OUTPATIENT)
Dept: PHYSICAL THERAPY | Facility: OTHER | Age: 70
Setting detail: THERAPIES SERIES
End: 2020-05-18
Attending: INTERNAL MEDICINE
Payer: OTHER GOVERNMENT

## 2020-05-18 PROCEDURE — 97112 NEUROMUSCULAR REEDUCATION: CPT | Mod: GP

## 2020-05-18 PROCEDURE — 97140 MANUAL THERAPY 1/> REGIONS: CPT | Mod: GP

## 2020-05-18 NOTE — PROGRESS NOTES
Outpatient Physical Therapy Progress Note     Patient: Monisha Velez  : 1950    Beginning/End Dates of Reporting Period:  20 to 2020    Referring Provider: Nafisa Escobar NP    Therapy Diagnosis:low back pain, pelvic dysfunction, muscle weakness      Client Self Report: Bladder has been more testy. Neck is main issue. Low back will act up. Being very careful with fire wood.     Objective Measurements:  Objective Measure: postural loading  Details:  general listening to left lower lumbar, left neck; local listening to L5  Objective Measure: joint mobility  Details: FRS left L5-S1  Objective Measure: myofascial  Details: C4, middle and deep cervical fascia, left SCM.      Goals:    Goal Identifier joint mobility   Goal Description Patient to demonstrate normal joint mechanics in lumbar spine and pelvis to allow forward bending, lift and carrying without pain.    Target Date 20   Date Met      Progress: symptoms off/on, likely related to arthritic changes in lumbar spine/neck     Goal Identifier MMT   Goal Description Patient to have 5/5/ MMT for core and hip musculature to stabilize spine during functional mobility tasks.    Target Date 20   Date Met      Progress: progressing     Goal Identifier HEP   Goal Description Patient to be compliant with HEP for self management of symptoms.    Target Date 20   Date Met      Progress: progressing       Progress Toward Goals:   Progress this reporting period: Patient's low back pain is off/on, not as consistent as it was but neck pain and tension continues. More stress lately due to family dynamics and COVID-19. Patient was able to walk 5 miles over the weekend.     Recertification Dates  20 through 20  1-2 times per week.       Plan:  Continue therapy per current plan of care.    Discharge:  No

## 2020-05-18 NOTE — PROGRESS NOTES
[facility]      OUTPATIENT PHYSICAL THERAPY  PLAN OF TREATMENT FOR OUTPATIENT REHABILITATION    Patient's Last Name, First Name, M.I.                YOB: 1950  Monisha Velez                        Provider's Name  [facility] Medical Record No.  [id number]                               Onset Date: 2/20/20   Start of Care Date: 2/27/20   Type:     _X_PT   ___OT   ___SLP Medical Diagnosis: acute bilateral low back pain with sciatica                       PT Diagnosis: low back pain, pelvic dysfunction, muscle weakness      _________________________________________________________________________________  Plan of Treatment:    Frequency/Duration: 1-2 times per week, 3 months     Goals:     Goal Identifier joint mobility   Goal Description Patient to demonstrate normal joint mechanics in lumbar spine and pelvis to allow forward bending, lift and carrying without pain.    Target Date 05/27/20   Date Met      Progress: symptoms off/on, likely related to arthritic changes in lumbar spine/neck      Goal Identifier MMT   Goal Description Patient to have 5/5/ MMT for core and hip musculature to stabilize spine during functional mobility tasks.    Target Date 05/27/20   Date Met      Progress: progressing      Goal Identifier HEP   Goal Description Patient to be compliant with HEP for self management of symptoms.    Target Date 05/27/20   Date Met      Progress: progressing         Progress Toward Goals:   Progress this reporting period: Patient's low back pain is off/on, not as consistent as it was but neck pain and tension continues. More stress lately due to family dynamics and COVID-19. Patient was able to walk 5 miles over the weekend.     Certification date from 5/28/20 to 8/27/20.    Heather Benavides, PT          I CERTIFY THE NEED FOR THESE SERVICES FURNISHED UNDER        THIS  PLAN OF TREATMENT AND WHILE UNDER MY CARE     (Physician co-signature of this document indicates review and certification of the therapy plan).                Referring Provider: Nafisa Escobar NP

## 2020-05-28 ENCOUNTER — TRANSFERRED RECORDS (OUTPATIENT)
Dept: HEALTH INFORMATION MANAGEMENT | Facility: OTHER | Age: 70
End: 2020-05-28

## 2020-06-01 ENCOUNTER — HOSPITAL ENCOUNTER (OUTPATIENT)
Dept: PHYSICAL THERAPY | Facility: OTHER | Age: 70
Setting detail: THERAPIES SERIES
End: 2020-06-01
Attending: INTERNAL MEDICINE
Payer: OTHER GOVERNMENT

## 2020-06-01 PROCEDURE — 97140 MANUAL THERAPY 1/> REGIONS: CPT | Mod: GP

## 2020-06-01 PROCEDURE — 97112 NEUROMUSCULAR REEDUCATION: CPT | Mod: GP

## 2020-06-17 ENCOUNTER — HOSPITAL ENCOUNTER (OUTPATIENT)
Dept: PHYSICAL THERAPY | Facility: OTHER | Age: 70
Setting detail: THERAPIES SERIES
End: 2020-06-17
Attending: INTERNAL MEDICINE
Payer: OTHER GOVERNMENT

## 2020-06-17 PROCEDURE — 97140 MANUAL THERAPY 1/> REGIONS: CPT | Mod: GP

## 2020-06-17 PROCEDURE — 97112 NEUROMUSCULAR REEDUCATION: CPT | Mod: GP

## 2020-07-01 DIAGNOSIS — Z00.00 ENCOUNTER FOR MEDICARE ANNUAL WELLNESS EXAM: ICD-10-CM

## 2020-07-01 DIAGNOSIS — R60.9 FLUID RETENTION: ICD-10-CM

## 2020-07-01 DIAGNOSIS — Z13.1 SCREENING FOR DIABETES MELLITUS: ICD-10-CM

## 2020-07-01 DIAGNOSIS — Z13.220 SCREENING FOR HYPERLIPIDEMIA: ICD-10-CM

## 2020-07-01 DIAGNOSIS — E66.3 OVERWEIGHT: ICD-10-CM

## 2020-07-01 LAB
ALBUMIN SERPL-MCNC: 4 G/DL (ref 3.5–5.7)
ALP SERPL-CCNC: 57 U/L (ref 34–104)
ALT SERPL W P-5'-P-CCNC: 15 U/L (ref 7–52)
ANION GAP SERPL CALCULATED.3IONS-SCNC: 4 MMOL/L (ref 3–14)
AST SERPL W P-5'-P-CCNC: 18 U/L (ref 13–39)
BILIRUB SERPL-MCNC: 0.5 MG/DL (ref 0.3–1)
BUN SERPL-MCNC: 15 MG/DL (ref 7–25)
CALCIUM SERPL-MCNC: 9.3 MG/DL (ref 8.6–10.3)
CHLORIDE SERPL-SCNC: 106 MMOL/L (ref 98–107)
CHOLEST SERPL-MCNC: 219 MG/DL
CO2 SERPL-SCNC: 29 MMOL/L (ref 21–31)
CREAT SERPL-MCNC: 1.08 MG/DL (ref 0.6–1.2)
ERYTHROCYTE [DISTWIDTH] IN BLOOD BY AUTOMATED COUNT: 12.3 % (ref 10–15)
GFR SERPL CREATININE-BSD FRML MDRD: 50 ML/MIN/{1.73_M2}
GLUCOSE SERPL-MCNC: 120 MG/DL (ref 70–105)
HBA1C MFR BLD: 6 % (ref 4–6)
HCT VFR BLD AUTO: 41.4 % (ref 35–47)
HDLC SERPL-MCNC: 62 MG/DL (ref 23–92)
HGB BLD-MCNC: 13.8 G/DL (ref 11.7–15.7)
LDLC SERPL CALC-MCNC: 127 MG/DL
MCH RBC QN AUTO: 30.6 PG (ref 26.5–33)
MCHC RBC AUTO-ENTMCNC: 33.3 G/DL (ref 31.5–36.5)
MCV RBC AUTO: 92 FL (ref 78–100)
NONHDLC SERPL-MCNC: 157 MG/DL
PLATELET # BLD AUTO: 207 10E9/L (ref 150–450)
POTASSIUM SERPL-SCNC: 4.2 MMOL/L (ref 3.5–5.1)
PROT SERPL-MCNC: 6.4 G/DL (ref 6.4–8.9)
RBC # BLD AUTO: 4.51 10E12/L (ref 3.8–5.2)
SODIUM SERPL-SCNC: 139 MMOL/L (ref 134–144)
TRIGL SERPL-MCNC: 148 MG/DL
WBC # BLD AUTO: 4.2 10E9/L (ref 4–11)

## 2020-07-01 PROCEDURE — 85027 COMPLETE CBC AUTOMATED: CPT | Mod: ZL | Performed by: NURSE PRACTITIONER

## 2020-07-01 PROCEDURE — 80061 LIPID PANEL: CPT | Mod: ZL | Performed by: NURSE PRACTITIONER

## 2020-07-01 PROCEDURE — 83036 HEMOGLOBIN GLYCOSYLATED A1C: CPT | Mod: ZL | Performed by: NURSE PRACTITIONER

## 2020-07-01 PROCEDURE — 80053 COMPREHEN METABOLIC PANEL: CPT | Mod: ZL | Performed by: NURSE PRACTITIONER

## 2020-07-01 PROCEDURE — 36415 COLL VENOUS BLD VENIPUNCTURE: CPT | Mod: ZL | Performed by: NURSE PRACTITIONER

## 2020-07-07 ENCOUNTER — HOSPITAL ENCOUNTER (OUTPATIENT)
Dept: PHYSICAL THERAPY | Facility: OTHER | Age: 70
Setting detail: THERAPIES SERIES
End: 2020-07-07
Attending: INTERNAL MEDICINE
Payer: OTHER GOVERNMENT

## 2020-07-07 PROCEDURE — 97140 MANUAL THERAPY 1/> REGIONS: CPT | Mod: GP

## 2020-07-07 PROCEDURE — 97112 NEUROMUSCULAR REEDUCATION: CPT | Mod: GP

## 2020-07-09 ENCOUNTER — OFFICE VISIT (OUTPATIENT)
Dept: INTERNAL MEDICINE | Facility: OTHER | Age: 70
End: 2020-07-09
Attending: INTERNAL MEDICINE
Payer: OTHER GOVERNMENT

## 2020-07-09 VITALS
DIASTOLIC BLOOD PRESSURE: 66 MMHG | BODY MASS INDEX: 29.84 KG/M2 | RESPIRATION RATE: 14 BRPM | HEART RATE: 70 BPM | OXYGEN SATURATION: 97 % | HEIGHT: 64 IN | TEMPERATURE: 97.6 F | SYSTOLIC BLOOD PRESSURE: 126 MMHG | WEIGHT: 174.8 LBS

## 2020-07-09 DIAGNOSIS — M54.2 CHRONIC NECK AND BACK PAIN: ICD-10-CM

## 2020-07-09 DIAGNOSIS — Z00.00 ENCOUNTER FOR MEDICARE ANNUAL WELLNESS EXAM: Primary | ICD-10-CM

## 2020-07-09 DIAGNOSIS — R25.2 LEG CRAMPS: ICD-10-CM

## 2020-07-09 DIAGNOSIS — H93.8X3 SENSATION OF PLUGGED EAR ON BOTH SIDES: ICD-10-CM

## 2020-07-09 DIAGNOSIS — N39.46 MIXED INCONTINENCE: ICD-10-CM

## 2020-07-09 DIAGNOSIS — M54.9 CHRONIC NECK AND BACK PAIN: ICD-10-CM

## 2020-07-09 DIAGNOSIS — R73.03 PREDIABETES: ICD-10-CM

## 2020-07-09 DIAGNOSIS — G89.29 CHRONIC NECK AND BACK PAIN: ICD-10-CM

## 2020-07-09 PROCEDURE — 99214 OFFICE O/P EST MOD 30 MIN: CPT | Mod: 25 | Performed by: INTERNAL MEDICINE

## 2020-07-09 PROCEDURE — G0439 PPPS, SUBSEQ VISIT: HCPCS | Performed by: INTERNAL MEDICINE

## 2020-07-09 RX ORDER — TOLTERODINE 2 MG/1
2 CAPSULE, EXTENDED RELEASE ORAL DAILY
Qty: 30 CAPSULE | Refills: 0 | Status: SHIPPED | OUTPATIENT
Start: 2020-07-09 | End: 2020-08-12

## 2020-07-09 SDOH — HEALTH STABILITY: MENTAL HEALTH: HOW OFTEN DO YOU HAVE A DRINK CONTAINING ALCOHOL?: 4 OR MORE TIMES A WEEK

## 2020-07-09 ASSESSMENT — MIFFLIN-ST. JEOR: SCORE: 1296.86

## 2020-07-09 ASSESSMENT — PAIN SCALES - GENERAL: PAINLEVEL: SEVERE PAIN (6)

## 2020-07-09 NOTE — PROGRESS NOTES
"Medicare Wellness Visit   Ms. Velez is a 70 year old female who presents today who presents for Preventive Visit.      Are you in the first 12 months of your Medicare coverage?  No    Health Risk Assessment     Do you feel safe in your environment? Yes    Physical Health:    In general, how would you rate your overall physical health? good    Outside of work, how many days during the week do you exercise? 6-7 days/week    Outside of work, approximately how many minutes a day do you exercise?45-60 minutes    If you drink alcohol do you typically have >3 drinks per day or >7 drinks per week? No    Do you usually eat at least 4 servings of fruit and vegetables a day, include whole grains & fiber and avoid regularly eating high fat or \"junk\" foods? Yes    Do you have any problems taking medications regularly?  No    Do you have any side effects from medications? none    Needs assistance for the following daily activities: no assistance needed    Which of the following safety concerns are present in your home?  throw rugs in the hallway and lack of grab bars in the bathroom     Hearing impairment: No, ears seem \"plugged\"    In the past 6 months, have you been bothered by leaking of urine? yes      Screening     Fall risk  Fallen 2 or more times in the past year?: No  Any fall with injury in the past year?: No    Cognitive Screening   1) Repeat 3 items (Leader, Season, Table)    2) Clock draw: NORMAL, originally drawn with hands backward, but corrected it without my prompting  3) 3 item recall: Recalls 1 object   Results: NORMAL clock, 1-2 items recalled: COGNITIVE IMPAIRMENT LESS LIKELY    Mini-CogTM Copyright [employer]. Licensed by the author for use in [facility]; reprinted with permission ([email or web address]). All rights reserved.      Do you have sleep apnea, excessive snoring or daytime drowsiness?: yes    Breast cancer screening: 10/16/19    Regular dental visits: biannually, going in September    Eye exam " "with in 2 years: had eye exam last month, has \"floaters\"      End of Life Planning     Have you ever done Advance Care Planning? (For example, a Health Directive, POLST, or a discussion with a medical provider or your loved ones about your wishes): No, advance care planning information given to patient to review.  Advanced care planning was discussed at today's visit.      End of Life Planning:  Patient currently has an advanced directive: No.  I have verified the patient's ablity to prepare an advanced directive/make health care decisions.  Literature was provided to assist patient in preparing an advanced directive.        Medical Record Update     Reviewed and updated as needed this visit by clinical staff  Tobacco  Allergies  Meds  Med Hx  Surg Hx  Fam Hx  Soc Hx      Reviewed and updated as needed this visit by Provider  Tobacco  Meds  Med Hx  Surg Hx  Fam Hx  Soc Hx          Current providers sharing in care for this patient include:   Patient Care Team:  Raeann Pearson DO as PCP - General (Internal Medicine)  Raeann Pearson DO as Assigned PCP     Subjective:   She has a couple concerns today.    She has had continued low back and neck pain.  She did see physical therapy for this and does not necessarily feel that it was helpful. She will feel well for a short while but pain and mobility limitations return.  It is felt by the physical therapist that the scoliotic curve and arthritic changes of her lumbar spine are causing most of her issues up the chain and that there is no further benefit from physical therapy that can be gained.    She also has continued to have urinary leakage and incontinence.  She does have some urgency with this.  Pelvic physical therapy were minimally beneficial.    She has had some leg cramps at night.  She reports that these at times can get significantly worse.    She has had some plugging in her ears.  She would like these looked at today.    She is on estradiol tablets.  " "She is trying to taper off of these gradually.  She does need a refill.      Review of Systems   GEN: -fevers/-chills/-night sweats/-wt change  NEURO: -headaches/+vision changes - floaters  EARS: +hearing changes/-tinnitus  NOSE: -drainage/-congestion  MOUTH/THROAT: - sore throat/-dysphagia/-sores  LUNGS: -sob/-cough  CARDIOVASCULAR: -cp/-palpitations  ABD: -pain/-change in bowels/-bloody stools  : +change in bladder/-sores/-vaginal discharge or bleeding  HEMATOLOGIC/LYMPHATIC: -swollen nodes  SKIN: -rashes/+lesions - on posterior left leg, sees derm this fall  MSK/RHEUM: +joint pain/-joint swelling  NEURO: -weakness/-parasthesias  PSYCH: -depression/-anxiety        OBJECTIVE:     Vitals:    07/09/20 0930   BP: 126/66   BP Location: Right arm   Patient Position: Sitting   Cuff Size: Adult Large   Pulse: 70   Resp: 14   Temp: 97.6  F (36.4  C)   TempSrc: Tympanic   SpO2: 97%   Weight: 79.3 kg (174 lb 12.8 oz)   Height: 1.632 m (5' 4.25\")        Estimated body mass index is 29.77 kg/m  as calculated from the following:    Height as of this encounter: 1.632 m (5' 4.25\").    Weight as of this encounter: 79.3 kg (174 lb 12.8 oz).     Physical Exam  GEN: Vitals reviewed. Healthy appearing. Patient is in no acute distress. Cooperative with exam.  HEENT: Normocephalic atraumatic.  Eyes grossly normal to inspection.  No discharge or erythema, or obvious scleral/conjunctival abnormalities. EACs clear bilaterally, TM gray with normal landmarks.  NECK: Supple; no thyromegaly or masses noted.  No cervical or supraclavicular lymphadenopathy.  CV: Heart regular in rate and rhythm with no murmur.    LUNGS: No audible wheeze, cough, or visible cyanosis.  No visible retractions or increased work of breathing.  Lungs clear to auscultation bilaterally.    ABD: Overweight, nondistended  SKIN: Warm and dry to touch.  Visible skin clear. No significant rash, abnormal pigmentation or lesions.  EXT: No clubbing or cyanosis.   Trace " bilateral lower extremity peripheral edema.  NEURO: Alert and oriented to person, place, and time.  Cranial nerves II-XII grossly intact with no focal or lateralizing deficits.  Muscle tone normal.  Gait normal. No tremor.   MSK: ROM of upper and lower ext symmetric and full.  PSYCH: Mood is good.  Mentation appears normal, affect normal/bright, judgement and insight intact, normal speech and appearance well-groomed.      Labs reviewed in EPIC    ASSESSMENT / PLAN:     Encounter for Medicare annual wellness exam    Chronic neck and back pain  -At this time given that she has had ongoing neck pain with minimal improvement with physical therapy and she feels that this is worse than her low back pain we will start with an MRI of the cervical spine.  Depending on results we will pursue referral to neurosurgery versus injection.  - MR Cervical Spine w/o Contrast    Mixed incontinence  We will trial Detrol.  She is to call with any side effects.  - tolterodine ER (DETROL LA) 2 MG 24 hr capsule  Dispense: 30 capsule; Refill: 0    Prediabetes  -She is noted today to have prediabetes.  She is interested in nutrition referral.  She is provided a handout on prediabetes and encouraged to work on her diet and increased activity.  - NUTRITION REFERRAL    Sensation of plugged ear on both sides  - conservative measures with OTC antihistamine as in patient instructions  - Return/call as needed for follow-up should any new symptoms develop, for worsening of current symptoms or if symptoms do not resolve with above plan.    Leg cramps  She can trial magnesium over-the-counter.  She is to call if she has continued issues.      Preventative Care Guidelines and Health Counseling     COUNSELING:  Reviewed preventive health counseling  Special attention given to:   Preventative screening  Regular exercise  Healthy diet/nutrition  Bladder control   Immunizations   Reviewed preventive health counseling, as reflected in patient  instructions    126/66   BP Screening:   Last 3 BP Readings:    BP Readings from Last 3 Encounters:   07/09/20 126/66   03/10/20 122/70   03/03/20 130/80       The following was recommended to the patient:  Re-screen BP within a year and recommended lifestyle modifications    Body mass index is 29.77 kg/m . Weight management plan: Discussed healthy diet and exercise guidelines      Follow-up in 1 months for follow-up on multiple issues including her back and neck pain.  Follow-up with me in 6 months for repeat A1c and renal function.    Appropriate preventive services were discussed with this patient, including applicable screening as appropriate for cardiovascular disease, diabetes, osteopenia/osteoporosis, and glaucoma.  As appropriate for age/gender, discussed screening for colorectal cancer, prostate cancer, breast cancer, and cervical cancer. Checklist reviewing preventive services available has been given to the patient.    Reviewed patients plan of care and provided an AVS. The Basic Care Plan (routine screening as documented in Health Maintenance) for patient meets the Care Plan requirement. This Care Plan has been established and reviewed with the Patient and any available family members.    Counseling Resources:  ATP IV Guidelines  Pooled Cohorts Equation Calculator  Breast Cancer Risk Calculator  FRAX Risk Assessment  ICSI Preventive Guidelines  Dietary Guidelines for Americans, 2010  USDA's MyPlate  ASA Prophylaxis  Lung CA Screening    Raeann Pearson DO  7/9/2020  9:37 AM  [facility]

## 2020-07-09 NOTE — PATIENT INSTRUCTIONS
Try magnesium oxide 400mg daily in evening for leg cramps    For your plugged ears, please try one of the following:  - Loratadine (Claritin) 10mg daily for 3-4 weeks and then as needed (least sedating, least effective)  - Fexofenadine (Allegra) 180mg daily for 3-4 weeks and then as needed   - Cetirizine (Zyrtec) 10mg daily for 3-4 weeks and then as needed (most sedating,most effective)    Please note that these can take up to 3-4 weeks to see a difference.        Patient Education   Personalized Prevention Plan  You are due for the preventive services outlined below.  Your care team is available to assist you in scheduling these services.  If you have already completed any of these items, please share that information with your care team to update in your medical record.  Health Maintenance Due   Topic Date Due     Annual Wellness Visit  01/21/2014     Discuss Advance Care Planning  02/25/2020     Patient Education     Prediabetes  You have been diagnosed with prediabetes. This means that the level of sugar (glucose) in your blood is too high. If you have prediabetes, you are at risk for developing type 2 diabetes. Type 2 diabetes is diagnosed when the level of glucose in the blood reaches a certain high level. With prediabetes, it hasn t reached this point yet, but it is higher than normal. It is vital to make lifestyle changes to lower your blood sugar, improve your health, and prevent diabetes. This sheet will tell you more.      Why worry about prediabetes?  Prediabetes is a conditionhere the body s cells have trouble using glucose in the blood for energy. As a result, too much glucose stays in the blood and can affect how your heart and blood vessels work. Without changes in diet and lifestyle, the problem can get worse. Once you have type 2 diabetes, it is chronic (ongoing) and needs to be managed for the rest of your life. Diabetes can harm the body and your health by damaging organs, such as your eyes and  kidneys. It makes you more likely to have heart disease. And it can damage nerves and blood vessels.  Who is a risk for prediabetes?  The exact cause of prediabetes is not clear. But certain risk factors make a person more likely to have it. These include:    A family history of type 2 diabetes    Being overweight    Being over age 45    Have hypertension or elevated cholesterol     Having had gestational diabetes    Not being physically active    Being ,  American, , , , or   Diagnosing prediabetes  Prediabetes may have no symptoms or you may have some of the symptoms of diabetes. The diagnosis is made with a blood test. You may have one or more of these blood tests:     Fasting glucose test. Blood is taken and tested after you have fasted (not eaten) for at least 8 hours. A normal test result is 99 milligrams per deciliter (mg/dL) or lower. Prediabetes is 100 mg/dL to 125 mg/dL. Diabetes is 126 mg/dL or higher.    Glucose tolerance test. Your blood sugar is measured before and after you drink a very sugary liquid. A normal test result is 139 milligrams per deciliter (mg/dL) or lower. Prediabetes is 140 mg/dL to 199 mg/dL. Diabetes is 200 mg/dL or higher.    Hemoglobin A1c (HbA1c). Your HbA1c is normal if it is below 5.7%. Prediabetes is 5.7% to 6.4%. Diabetes is 6.5% or higher.   Treating prediabetes  The best way to treat prediabetes is to lose at least 5% to 7% of your current weight and be more physically active by getting at least 150 minutes a week of physical activity (at least 30 minutes daily.) When sitting for long periods of time, get up for short sessions of light activity every 30 minutes. These changes help the body s cells use blood sugar better. Even a small amount of weight loss can help. Work with your healthcare provider to make a plan to eat well and be more active. Keep in mind that small changes can add up. Other changes  in your lifestyle (or even taking certain medicines, such as metformin) may make you less likely to develop diabetes. Your healthcare provider can talk with you about these. Stopping smoking will decrease your risk of developing diabetes, but you may gain some weight if you are not careful.  Follow-up  If it is untreated, prediabetes can turn into diabetes. This is a serious health condition. Take steps to stop this from happening. Follow the treatment plan you have been given. You may have your blood glucose tested again in about 12 to 18 months.  Symptoms of diabetes  Let your healthcare provider know if you have any of the following:    Always feel very tired    Feel very thirsty or hungry much of the time    Have to urinate often    Lose weight for no reason    Feel numbness or tingling in your fingers or toes    Have cuts or bruises that don t seem to heal    Have blurry vision  Date Last Reviewed: 5/1/2016 2000-2019 [employer]. [address] [place] [address], [place] [address]. All rights reserved. This information is not intended as a substitute for professional medical care. Always follow your healthcare professional's instructions.

## 2020-07-09 NOTE — NURSING NOTE
Patient presents to clinic today for annual exam and several things to discuss. She states her Medicare is only for hospitalizations.  Medication reconciliation completed.  Tamika Goldstein CMA([facility])..................7/9/2020   9:36 AM

## 2020-07-10 ENCOUNTER — TELEPHONE (OUTPATIENT)
Dept: INTERNAL MEDICINE | Facility: OTHER | Age: 70
End: 2020-07-10

## 2020-07-10 NOTE — TELEPHONE ENCOUNTER
Record from Dr Carr was received yesterday and placed in Dr Pearson's in-box.   Tamika Goldstein CMA([facility])..................7/10/2020   1:28 PM

## 2020-07-10 NOTE — TELEPHONE ENCOUNTER
[facility]-patient called and requested some information gets sent to [facility] nurse.     Her dermatologist is Dr. Rosangela Carr in [facility] phone #[phone].     Also that her MRI got scheduled on 7/11/20 at 9am.     Armando Phillips on 7/10/2020 at 12:41 PM

## 2020-07-11 ENCOUNTER — HOSPITAL ENCOUNTER (OUTPATIENT)
Dept: MRI IMAGING | Facility: OTHER | Age: 70
Discharge: HOME | End: 2020-07-11
Attending: INTERNAL MEDICINE | Admitting: INTERNAL MEDICINE
Payer: OTHER GOVERNMENT

## 2020-07-11 DIAGNOSIS — M54.9 CHRONIC NECK AND BACK PAIN: ICD-10-CM

## 2020-07-11 DIAGNOSIS — M54.2 CHRONIC NECK AND BACK PAIN: ICD-10-CM

## 2020-07-11 DIAGNOSIS — G89.29 CHRONIC NECK AND BACK PAIN: ICD-10-CM

## 2020-07-11 PROCEDURE — 72141 MRI NECK SPINE W/O DYE: CPT

## 2020-08-07 NOTE — PROGRESS NOTES
"Nursing Notes:   Anayeli Hammer LPN  8/12/2020  9:31 AM  Signed  Patient presents to the clinic for follow up with multiple concerns.      Chief Complaint   Patient presents with     Clinic Care Coordination - Follow-up       Initial /74 (BP Location: Left arm, Patient Position: Sitting, Cuff Size: Adult Regular)   Pulse 64   Temp 97.4  F (36.3  C) (Tympanic)   Resp 16   Wt 78.6 kg (173 lb 6 oz)   BMI 29.53 kg/m   Estimated body mass index is 29.53 kg/m  as calculated from the following:    Height as of 7/9/20: 1.632 m (5' 4.25\").    Weight as of this encounter: 78.6 kg (173 lb 6 oz).  Medication Reconciliation: complete    Anayeli Hammer LPN      Nursing note reviewed with patient.  Accuracy and completeness verified.     Monisha Velez is a 70 year old female who presents to the clinic today to discuss multiple issues.      Diet/weight loss: At her last visit she reports she was to be referred to a dietitian, she has not heard anything on that.  I will check into this for her.  She would like to lose some weight before winter, but admits to liking her sweets.  Her most recent A1c was 6.0% so she is concerned about the potential of her acquiring diabetes.  Her mom is diabetic and she does not want to end up like this.    Neck pain: She had an MRI completed on her neck and she has those results.  These were reviewed with her today.  She would like to pursue physical therapy first before trying any steroid injections.  Those orders will be placed today.  She would also like a referral to a chiropractor.    Urge incontinence of urine: She was started on Detrol LA for this and she reports she is having great success with this medication.    Dermatology issues: She reports she has seen her dermatologist and has decided that she is going to treat the lesions on her face with some cream from dermatology this fall.  She just wanted to update me.  She also has a lesion, quarter size, on her posterior left " "calf.  She said dermatology will be handling this also.    The patient has a past medical history which is reviewed and listed as below:     Past Medical History:   Diagnosis Date     Adnexal mass 08/22/2011     Contact dermatitis 12/16/2009     Diplopia     Episode of mild diplopia secondary to sinusitis, ophthalmology consult 07/2008.     H/O gastritis      History of adenomatous polyp of colon      Postmenopausal atrophic vaginitis 07/29/2011     Restless leg syndrome      Retention of urine 08/22/2011     Rosacea      Tinea corporis 11/19/2010     Urge incontinence of urine        SUBJECTIVE:                                                      REVIEW OF SYSTEMS:  Review of Systems   Genitourinary: Positive for urgency (Much improved on Detrol LA).   Musculoskeletal: Positive for back pain, neck pain and neck stiffness.   All other systems reviewed and are negative.      OBJECTIVE:                                                      /74 (BP Location: Left arm, Patient Position: Sitting, Cuff Size: Adult Regular)   Pulse 64   Temp 97.4  F (36.3  C) (Tympanic)   Resp 16   Wt 78.6 kg (173 lb 6 oz)   BMI 29.53 kg/m      Estimated body mass index is 29.53 kg/m  as calculated from the following:    Height as of 7/9/20: 1.632 m (5' 4.25\").    Weight as of this encounter: 78.6 kg (173 lb 6 oz).     PHYSICAL EXAM:  Physical Exam  Vitals signs and nursing note reviewed.   Constitutional:       Appearance: Normal appearance.   HENT:      Head: Normocephalic and atraumatic.   Neck:      Musculoskeletal: Decreased range of motion. Spinous process tenderness and muscular tenderness present.   Cardiovascular:      Rate and Rhythm: Normal rate and regular rhythm.      Heart sounds: Normal heart sounds.   Pulmonary:      Effort: Pulmonary effort is normal.      Breath sounds: Normal breath sounds.   Abdominal:      General: Bowel sounds are normal.      Palpations: Abdomen is soft.   Musculoskeletal:      Cervical " back: She exhibits decreased range of motion, tenderness, bony tenderness and pain.   Skin:     General: Skin is warm.      Findings: Lesion and rash present.      Comments: Posterior left leg has a quarter size lesion.  She is going to have dermatology follow-up on this.  She is also going to start face cream this fall for her facial issues.   Neurological:      Mental Status: She is alert and oriented to person, place, and time.   Psychiatric:         Mood and Affect: Mood normal.         Behavior: Behavior normal.         Thought Content: Thought content normal.         Judgment: Judgment normal.          ASSESSMENT/PLAN:                                                      1. Encounter for follow-up examination  Stable    2. Mixed incontinence  Much improved.  Refill of medication given.  - tolterodine ER (DETROL LA) 2 MG 24 hr capsule; Take 1 capsule (2 mg) by mouth daily  Dispense: 90 capsule; Refill: 3    3. Chronic neck and back pain  4. Degeneration of cervical intervertebral disc  We will refer her to physical therapy and chiropractor both at the professional building within [facility].  - PHYSICAL THERAPY REFERRAL; Future  - CHIROPRACTIC REFERRAL    5. Advanced directives, counseling/discussion  Patient has her forms completed but needs notary. Advised to go to her bank and have this done and return forms to clinic.    - Patient is to call if additional problems with this or if new symptoms develop.    Patient verbalizes understanding and is agreeable to plan of care.    Return if symptoms worsen or fail to improve.     JERRELL Ferrari, AGNP-C  Internal Medicine  [facility] and [facility]    Aug 12, 2020 11:05 AM

## 2020-08-12 ENCOUNTER — OFFICE VISIT (OUTPATIENT)
Dept: INTERNAL MEDICINE | Facility: OTHER | Age: 70
End: 2020-08-12
Attending: NURSE PRACTITIONER
Payer: OTHER GOVERNMENT

## 2020-08-12 VITALS
SYSTOLIC BLOOD PRESSURE: 120 MMHG | WEIGHT: 173.38 LBS | RESPIRATION RATE: 16 BRPM | HEART RATE: 64 BPM | BODY MASS INDEX: 29.53 KG/M2 | TEMPERATURE: 97.4 F | DIASTOLIC BLOOD PRESSURE: 74 MMHG

## 2020-08-12 DIAGNOSIS — M50.30 DEGENERATION OF CERVICAL INTERVERTEBRAL DISC: ICD-10-CM

## 2020-08-12 DIAGNOSIS — G89.29 CHRONIC NECK AND BACK PAIN: ICD-10-CM

## 2020-08-12 DIAGNOSIS — Z71.89 ADVANCED DIRECTIVES, COUNSELING/DISCUSSION: ICD-10-CM

## 2020-08-12 DIAGNOSIS — N39.46 MIXED INCONTINENCE: ICD-10-CM

## 2020-08-12 DIAGNOSIS — M54.9 CHRONIC NECK AND BACK PAIN: ICD-10-CM

## 2020-08-12 DIAGNOSIS — Z09 ENCOUNTER FOR FOLLOW-UP EXAMINATION: Primary | ICD-10-CM

## 2020-08-12 DIAGNOSIS — M54.2 CHRONIC NECK AND BACK PAIN: ICD-10-CM

## 2020-08-12 PROCEDURE — 99213 OFFICE O/P EST LOW 20 MIN: CPT | Performed by: NURSE PRACTITIONER

## 2020-08-12 RX ORDER — TOLTERODINE 2 MG/1
2 CAPSULE, EXTENDED RELEASE ORAL DAILY
Qty: 90 CAPSULE | Refills: 3 | Status: SHIPPED | OUTPATIENT
Start: 2020-08-12 | End: 2021-07-14

## 2020-08-12 ASSESSMENT — ANXIETY QUESTIONNAIRES
7. FEELING AFRAID AS IF SOMETHING AWFUL MIGHT HAPPEN: NOT AT ALL
5. BEING SO RESTLESS THAT IT IS HARD TO SIT STILL: NOT AT ALL
IF YOU CHECKED OFF ANY PROBLEMS ON THIS QUESTIONNAIRE, HOW DIFFICULT HAVE THESE PROBLEMS MADE IT FOR YOU TO DO YOUR WORK, TAKE CARE OF THINGS AT HOME, OR GET ALONG WITH OTHER PEOPLE: NOT DIFFICULT AT ALL
6. BECOMING EASILY ANNOYED OR IRRITABLE: NOT AT ALL
3. WORRYING TOO MUCH ABOUT DIFFERENT THINGS: NOT AT ALL
2. NOT BEING ABLE TO STOP OR CONTROL WORRYING: NOT AT ALL
1. FEELING NERVOUS, ANXIOUS, OR ON EDGE: NOT AT ALL
GAD7 TOTAL SCORE: 0

## 2020-08-12 ASSESSMENT — PAIN SCALES - GENERAL: PAINLEVEL: NO PAIN (0)

## 2020-08-12 ASSESSMENT — PATIENT HEALTH QUESTIONNAIRE - PHQ9
SUM OF ALL RESPONSES TO PHQ QUESTIONS 1-9: 0
5. POOR APPETITE OR OVEREATING: NOT AT ALL

## 2020-08-12 ASSESSMENT — ENCOUNTER SYMPTOMS
NECK PAIN: 1
NECK STIFFNESS: 1
BACK PAIN: 1

## 2020-08-12 NOTE — PATIENT INSTRUCTIONS
* Watch diet (no sucrose, high fructose corn syrup)  * Switch to low carb beer if you need to have your beer in the evening  * My Fitness Pal kathy on smartphone  * More protein, and calcium - limit carbs  * More water  * Check into Weight Watchers  * Increase your daily exercise, low weight strength training    * Chiropractor and PT will call you with appointment date/time    * Will check on referral to Nutritionist

## 2020-08-12 NOTE — NURSING NOTE
"Patient presents to the clinic for follow up with multiple concerns.      Chief Complaint   Patient presents with     Clinic Care Coordination - Follow-up       Initial /74 (BP Location: Left arm, Patient Position: Sitting, Cuff Size: Adult Regular)   Pulse 64   Temp 97.4  F (36.3  C) (Tympanic)   Resp 16   Wt 78.6 kg (173 lb 6 oz)   BMI 29.53 kg/m   Estimated body mass index is 29.53 kg/m  as calculated from the following:    Height as of 7/9/20: 1.632 m (5' 4.25\").    Weight as of this encounter: 78.6 kg (173 lb 6 oz).  Medication Reconciliation: complete    Anayeli Hammer LPN    "

## 2020-08-13 ASSESSMENT — ANXIETY QUESTIONNAIRES: GAD7 TOTAL SCORE: 0

## 2020-08-19 ENCOUNTER — DOCUMENTATION ONLY (OUTPATIENT)
Dept: OTHER | Facility: CLINIC | Age: 70
End: 2020-08-19

## 2020-08-27 ENCOUNTER — HOSPITAL ENCOUNTER (OUTPATIENT)
Dept: PHYSICAL THERAPY | Facility: OTHER | Age: 70
Setting detail: THERAPIES SERIES
End: 2020-08-27
Attending: NURSE PRACTITIONER
Payer: OTHER GOVERNMENT

## 2020-08-27 DIAGNOSIS — M54.9 CHRONIC NECK AND BACK PAIN: ICD-10-CM

## 2020-08-27 DIAGNOSIS — M50.30 DEGENERATION OF CERVICAL INTERVERTEBRAL DISC: ICD-10-CM

## 2020-08-27 DIAGNOSIS — M54.2 CHRONIC NECK AND BACK PAIN: ICD-10-CM

## 2020-08-27 DIAGNOSIS — G89.29 CHRONIC NECK AND BACK PAIN: ICD-10-CM

## 2020-08-27 PROCEDURE — 97140 MANUAL THERAPY 1/> REGIONS: CPT | Mod: GP

## 2020-08-27 PROCEDURE — 97161 PT EVAL LOW COMPLEX 20 MIN: CPT | Mod: GP

## 2020-08-28 NOTE — PROGRESS NOTES
"   08/27/20 1400   General Information   Type of Visit Initial OP Ortho PT Evaluation   Start of Care Date 08/27/20   Referring Physician Nafisa Escobar NP   Patient/Family Goals Statement Decrease pain and improve ROM    Orders Evaluate and Treat   Date of Order 08/12/20   Certification Required? No   Medical Diagnosis Degeneration of cervical intervertebral disc M50.30,  Chronic neck and back pain M54.2, M54.9, G89.29    Surgical/Medical history reviewed Yes   Body Part(s)   Body Part(s) Cervical Spine;Lumbar Spine/SI   Presentation and Etiology   Pertinent history of current problem (include personal factors and/or comorbidities that impact the POC) Patient states she has been experienced worsening left sided neck pain.  States the neck becomes \"really tight\".  She denies any radicular symptoms.  States she has increased neck pain at night.   She is also having issues with the low back.  States the low back feels achy. She feels like  she has lost strength.  She denies LE radicular symptoms.    Impairments A. Pain;D. Decreased ROM;E. Decreased flexibility;F. Decreased strength and endurance;G. Impaired balance;N. Headaches   Functional Limitations perform activities of daily living;perform desired leisure / sports activities   Chronicity Chronic   Pain rating (0-10 point scale) Best (/10);Worst (/10)   Best (/10) 2/10   Worst (/10) 9-10/10   Pain quality B. Dull;C. Aching   Frequency of pain/symptoms C. With activity   Pain/symptoms exacerbated by A. Sitting;C. Lifting;D. Carrying;E. Rest;G. Certain positions;J. ADL;K. Home tasks;M. Other  (Sleep)   Pain/symptoms eased by B. Walking;E. Changing positions   Prior Level of Function   Prior Level of Function-Mobility No limitations   Prior Level of Function-ADLs No limitations   Current Level of Function   Current Community Support Family/friend caregiver   Fall Risk Screen   Fall screen completed by PT   Have you fallen 2 or more times in the past year? No "   Have you fallen and had an injury in the past year? No   Is patient a fall risk? No   Lumbar Spine/SI Objective Findings   Gait/Locomotion Normal gait pattern    Flexion ROM Fingers to mid-shin--tightness noted    Extension ROM 20 degrees    Palpation Pain over the left SI joint    Cervical Spine   Posture Rounded shoulder and forward head posture    Cervical Flexion ROM 50--stiff at CT junction    Cervical Extension ROM 30--limited by pain and stiffness    Cervical Right Side Bending ROM 20-stiff   Cervical Left Side Bending ROM 10--stiff    Cervical Right Rotation ROM 40--limited by stiffness.    Cervical Left Rotation ROM 20--limited by pain and stiffness   Shoulder Shrug (C2-C4) Strength 5/5B    Shoulder Abd (C5) Strength 5/5B    Shoulder ER (C5, C6) Strength 5/5B    Shoulder IR (C5, C6) Strength 5/5B    Elbow Flexion (C5, C6) Strength 5/5B    Elbow Extension (C7) Strength 5/5B    Upper Trapezius Flexibility decreased bilaterally    Levator Scapula Flexibility decreased bilaterally    Palpation Pain and tightness over the bilateral upper trapezius and levator scapulae, Left is tighter than the right   Planned Therapy Interventions   Planned Therapy Interventions joint mobilization;manual therapy;neuromuscular re-education;ROM;strengthening;stretching   Clinical Impression   Criteria for Skilled Therapeutic Interventions Met yes, treatment indicated   PT Diagnosis Degeneration of cervical intervertebral disc M50.30,  Chronic neck and back pain M54.2, M54.9, G89.29    Influenced by the following impairments Pain, impaired ROM, postural dysfunction,    Functional limitations due to impairments sitting, walifting, carrying, rest, certain positions, ADL's, Household tasks, sleep,    Clinical Presentation Stable/Uncomplicated   Clinical Presentation Rationale Symptoms are consistent with the diagnosis    Clinical Decision Making (Complexity) Low complexity   Therapy Frequency   (16 visits )   Predicted Duration of  Therapy Intervention (days/wks) 8 weeks   Risk & Benefits of therapy have been explained Yes   Patient, Family & other staff in agreement with plan of care Yes   Education Assessment   Preferred Learning Style Listening;Pictures/video;Demonstration   Barriers to Learning No barriers   ORTHO GOALS   PT Ortho Eval Goals 1;2;3   Ortho Goal 1   Goal Identifier Neck pain    Goal Description Report a 75% or greater reduction in neck pain to allow sleeping at night without interruption from pain   Target Date 10/23/20   Ortho Goal 2   Goal Identifier Low back pain   Goal Description Report 75% or greater reduction on low back pain to allow riding a car for one hour without difficulty    Target Date 10/23/20   Ortho Goal 3   Goal Identifier ROM    Goal Description Improve cervical rotation by 10 degrees to allow turning head while driving with less difficulty    Target Date 10/23/20   Total Evaluation Time   PT Eval, Low Complexity Minutes (34352) 20

## 2020-08-31 NOTE — PROGRESS NOTES
Outpatient Physical Therapy Discharge Note     Patient: Monisha Velez  : 1950    Beginning/End Dates of Reporting Period:  20 to 2020    Referring Provider: Nafisa Escobar NP    Therapy Diagnosis: low back pain, pelvic dysfunction, muscle weakness     Client Self Report: Low back is more of an issue with lifting than before. Has to be more cautious with lifting and will feel more sore later. Neck is still an issue, now waking her at night. Slight headaches, not sure if it's related. No pain traveling down arms. Still having right elbow pain, tendinitis. No leg pain but will get malini horses. Doens't feet she is improving. Incontinence continues. Has physical with MD on .     Objective Measurements:  Objective Measure: postural loading  Details: limited loading through head R/L, shoulders L/L L/R, pelvis L/L R/L. General listenign to anterior chest wall, local listening to sterno manubrial joint  Objective Measure: joint mobility  Details: restricted sternomanubrial joint, FRS left C7, FRS left L4, restricted IR left hip  Objective Measure: CROM  Details: rotation right 50, left 20. Improved to right 55, left 30 after treatment.       Goals:      Goal Identifier joint mobility   Goal Description Patient to demonstrate normal joint mechanics in lumbar spine and pelvis to allow forward bending, lift and carrying without pain.    Target Date 20   Date Met      Progress:     Goal Identifier MMT   Goal Description Patient to have 5/5/ MMT for core and hip musculature to stabilize spine during functional mobility tasks.    Target Date 20   Date Met      Progress:     Goal Identifier HEP   Goal Description Patient to be compliant with HEP for self management of symptoms.    Target Date 20   Date Met      Progress:     Progress Toward Goals:   Progress this reporting period: Patient continues to be limited with back/neck pain, control of bladder urgency. Was initially making  progress but then began to worsen despite PT interventions. Likely due to arthritic changes of spine.           Plan:  Discharge from therapy.    Discharge:    Reason for Discharge: No further expectation of progress.    Equipment Issued: NA    Discharge Plan: Patient to continue home program.

## 2020-09-02 ENCOUNTER — HOSPITAL ENCOUNTER (OUTPATIENT)
Dept: PHYSICAL THERAPY | Facility: OTHER | Age: 70
Setting detail: THERAPIES SERIES
End: 2020-09-02
Attending: NURSE PRACTITIONER
Payer: OTHER GOVERNMENT

## 2020-09-02 PROCEDURE — 97140 MANUAL THERAPY 1/> REGIONS: CPT | Mod: GP

## 2020-09-04 ENCOUNTER — HOSPITAL ENCOUNTER (OUTPATIENT)
Dept: PHYSICAL THERAPY | Facility: OTHER | Age: 70
Setting detail: THERAPIES SERIES
End: 2020-09-04
Attending: NURSE PRACTITIONER
Payer: OTHER GOVERNMENT

## 2020-09-04 PROCEDURE — 97140 MANUAL THERAPY 1/> REGIONS: CPT | Mod: GP

## 2020-09-09 ENCOUNTER — HOSPITAL ENCOUNTER (OUTPATIENT)
Dept: PHYSICAL THERAPY | Facility: OTHER | Age: 70
Setting detail: THERAPIES SERIES
End: 2020-09-09
Attending: NURSE PRACTITIONER
Payer: OTHER GOVERNMENT

## 2020-09-09 PROCEDURE — 97140 MANUAL THERAPY 1/> REGIONS: CPT | Mod: GP

## 2020-09-11 ENCOUNTER — HOSPITAL ENCOUNTER (OUTPATIENT)
Dept: PHYSICAL THERAPY | Facility: OTHER | Age: 70
Setting detail: THERAPIES SERIES
End: 2020-09-11
Attending: NURSE PRACTITIONER
Payer: OTHER GOVERNMENT

## 2020-09-11 PROCEDURE — 97140 MANUAL THERAPY 1/> REGIONS: CPT | Mod: GP

## 2020-09-22 ENCOUNTER — OFFICE VISIT (OUTPATIENT)
Dept: FAMILY MEDICINE | Facility: OTHER | Age: 70
End: 2020-09-22
Attending: DIETITIAN, REGISTERED
Payer: OTHER GOVERNMENT

## 2020-09-22 VITALS — HEIGHT: 64 IN | BODY MASS INDEX: 29.53 KG/M2 | WEIGHT: 173 LBS

## 2020-09-22 DIAGNOSIS — R73.03 PREDIABETES: ICD-10-CM

## 2020-09-22 PROCEDURE — 97802 MEDICAL NUTRITION INDIV IN: CPT | Performed by: DIETITIAN, REGISTERED

## 2020-09-22 ASSESSMENT — MIFFLIN-ST. JEOR: SCORE: 1284.72

## 2020-09-22 NOTE — PROGRESS NOTES
"[facility]  Medical Nutrition Therapy    Visit Type: Initial Assessment    Monisha Velez referred by Dr. 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  She is stressed and lacks time and energy to have intense lifestyle change right now but is   Motivated to prevent DM. Her mom had Type 2 and he 4 sisters all say they have pre or DM.     Monisha ranks lifestyle change as a 5 on a importance scale of 5    Nutrition Assessment:  Anthropometrics  Height: .  162.6 cm (5' 4\") BMI:    29.7  Weight:  78.5 kg (173 lb) BSA:  1.88  IBW (kg):  Female: 54.6 Male: 59.6            Nutrition History   Eats 3 meals plus snacks. She used to do [employer] and lost 40#.  She says nighttime eating is a problem.      Physical Activity   none. Has a treadmill and is willing to start with FITT principle 10 minutes per day       Nutrition Prescription  Energy:   1200      Protein:      60 g      Fluid:      65 oz  Fat:   50 g       Carbohydrate:      30 grams per meal      Fiber:         20 grams per day        Nutrition Diagnosis:   prediabetes as evidenced by Lab Results       Component                Value               Date                       A1C                      6.0                 07/01/2020            Overweight as evidenced by Body mass index is 29.7 kg/m .      Nutrition Intervention:   6 small meals with controlled CHO.  She will work on exercise.  Gave instructions on meals, timing, portions and food groups  Gave instructions on FRANCHESCA virgen    Nutrition Goals:  (1) reduce portions using myplate  (2) Walk daily for 10 min and increase to 15 min after 2 weeks  (3) DPP virtual or in person as able. Referral to DPP program     Nutrition Follow Up / Monitoring:   as needed or DPP  Time spent: 60 min    Patient has RD contact information to call/email if needed.    KRISTIN K. KLINEFELTER, JM on 9/22/2020 at 10:29 " AM                        ROS      Physical Exam

## 2020-09-24 ENCOUNTER — HOSPITAL ENCOUNTER (OUTPATIENT)
Dept: PHYSICAL THERAPY | Facility: OTHER | Age: 70
Setting detail: THERAPIES SERIES
End: 2020-09-24
Attending: NURSE PRACTITIONER
Payer: OTHER GOVERNMENT

## 2020-09-24 PROCEDURE — 97140 MANUAL THERAPY 1/> REGIONS: CPT | Mod: GP

## 2020-09-24 PROCEDURE — 97110 THERAPEUTIC EXERCISES: CPT | Mod: GP

## 2020-10-01 ENCOUNTER — HOSPITAL ENCOUNTER (OUTPATIENT)
Dept: PHYSICAL THERAPY | Facility: OTHER | Age: 70
Setting detail: THERAPIES SERIES
End: 2020-10-01
Attending: NURSE PRACTITIONER
Payer: OTHER GOVERNMENT

## 2020-10-01 PROCEDURE — 97140 MANUAL THERAPY 1/> REGIONS: CPT | Mod: GP

## 2020-10-12 ENCOUNTER — ALLIED HEALTH/NURSE VISIT (OUTPATIENT)
Dept: FAMILY MEDICINE | Facility: OTHER | Age: 70
End: 2020-10-12
Attending: INTERNAL MEDICINE
Payer: OTHER GOVERNMENT

## 2020-10-12 DIAGNOSIS — Z23 NEED FOR PROPHYLACTIC VACCINATION AND INOCULATION AGAINST INFLUENZA: Primary | ICD-10-CM

## 2020-10-19 ENCOUNTER — HOSPITAL ENCOUNTER (OUTPATIENT)
Dept: PHYSICAL THERAPY | Facility: OTHER | Age: 70
Setting detail: THERAPIES SERIES
End: 2020-10-19
Attending: NURSE PRACTITIONER
Payer: OTHER GOVERNMENT

## 2020-10-19 PROCEDURE — 97140 MANUAL THERAPY 1/> REGIONS: CPT | Mod: GP

## 2020-10-23 ENCOUNTER — HOSPITAL ENCOUNTER (OUTPATIENT)
Dept: PHYSICAL THERAPY | Facility: OTHER | Age: 70
Setting detail: THERAPIES SERIES
End: 2020-10-23
Attending: NURSE PRACTITIONER
Payer: OTHER GOVERNMENT

## 2020-10-23 PROCEDURE — 97140 MANUAL THERAPY 1/> REGIONS: CPT | Mod: GP

## 2020-10-23 NOTE — PROGRESS NOTES
Outpatient Physical Therapy Progress Note     Patient: Monisha Velez  : 1950    Beginning/End Dates of Reporting Period:  2020 to 10/23/2020    Referring Provider: Nafisa Escobar NP    Therapy Diagnosis: Degeneration of cervical intervertebral disc M50.30,  Chronic neck and back pain M54.2, M54.9, G89.29      Client Self Report: Patient neck pain is a little better today. She has modified her methods for loading her wood stove. She is doing smaller loads more frequently which has helped decrease neck pain.  Patient states the neck has improved 25-30% overall. She would like  to continue PT to assist with reduction of symptoms.     Objective Measurements:  Objective Measure: Pain  Details: Neck Pain=4/10,      Objective Measure: Cervical ROM  Details:   Flexion= 55,  Extension=30-stiff,   SB left=20-stiff,    SB right=20-stiff  ,   Rot left= 30   Rot right=40      Goals:  Goal Identifier Neck pain    Goal Description Report a 75% or greater reduction in neck pain to allow sleeping at night without interruption from pain   Target Date 2020   Date Met      Progress: Progressing      Goal Identifier Low back pain   Goal Description Report 75% or greater reduction on low back pain to allow riding a car for one hour without difficulty    Target Date 2020   Date Met      Progress: Progressing     Goal Identifier ROM    Goal Description Improve cervical rotation by 10 degrees to allow turning head while driving with less difficulty    Target Date 2020   Date Met     Progress: Progressing     Progress Toward Goals:   Progress this reporting period: Patient will benefit from additional PT to decrease pain and improve ROM.     Plan:  Continue therapy per current plan of care.

## 2020-10-29 DIAGNOSIS — N39.46 MIXED INCONTINENCE: ICD-10-CM

## 2020-10-29 RX ORDER — TOLTERODINE 2 MG/1
2 CAPSULE, EXTENDED RELEASE ORAL DAILY
Qty: 90 CAPSULE | Refills: 3 | Status: CANCELLED | OUTPATIENT
Start: 2020-10-29

## 2020-10-29 NOTE — TELEPHONE ENCOUNTER
tolterodine ER (DETROL LA) 2 MG 24 hr capsule 90 capsule 3 8/12/2020  No   Sig - Route: Take 1 capsule (2 mg) by mouth daily - Oral     To [facility] requesting  Called [facility] and informed refills at [facility] if patient wants through them.  [facility] states that they have Rx ready for patient already for 90 day supply and they dont see that they send any messages.    Kendy Stroud RN on 10/29/2020 at 1:34 PM

## 2020-10-29 NOTE — TELEPHONE ENCOUNTER
Note from pharmacy: Pt requesting 90-day supply.    Jenelle Mallory RN .............. 10/29/2020  1:16 PM

## 2020-11-02 ENCOUNTER — HOSPITAL ENCOUNTER (OUTPATIENT)
Dept: PHYSICAL THERAPY | Facility: OTHER | Age: 70
Setting detail: THERAPIES SERIES
End: 2020-11-02
Attending: NURSE PRACTITIONER
Payer: OTHER GOVERNMENT

## 2020-11-02 PROCEDURE — 97140 MANUAL THERAPY 1/> REGIONS: CPT | Mod: GP

## 2020-11-12 ENCOUNTER — HOSPITAL ENCOUNTER (OUTPATIENT)
Dept: PHYSICAL THERAPY | Facility: OTHER | Age: 70
Setting detail: THERAPIES SERIES
End: 2020-11-12
Attending: NURSE PRACTITIONER
Payer: OTHER GOVERNMENT

## 2020-11-12 PROCEDURE — 97140 MANUAL THERAPY 1/> REGIONS: CPT | Mod: GP

## 2020-11-13 ENCOUNTER — HOSPITAL ENCOUNTER (OUTPATIENT)
Dept: MAMMOGRAPHY | Facility: OTHER | Age: 70
Discharge: HOME | End: 2020-11-13
Attending: INTERNAL MEDICINE | Admitting: INTERNAL MEDICINE
Payer: OTHER GOVERNMENT

## 2020-11-13 DIAGNOSIS — Z12.31 VISIT FOR SCREENING MAMMOGRAM: ICD-10-CM

## 2020-11-13 PROCEDURE — 77063 BREAST TOMOSYNTHESIS BI: CPT

## 2021-01-11 ENCOUNTER — TELEPHONE (OUTPATIENT)
Dept: INTERNAL MEDICINE | Facility: OTHER | Age: 71
End: 2021-01-11

## 2021-01-11 ENCOUNTER — OFFICE VISIT (OUTPATIENT)
Dept: INTERNAL MEDICINE | Facility: OTHER | Age: 71
End: 2021-01-11
Attending: INTERNAL MEDICINE
Payer: OTHER GOVERNMENT

## 2021-01-11 VITALS
RESPIRATION RATE: 16 BRPM | DIASTOLIC BLOOD PRESSURE: 88 MMHG | TEMPERATURE: 97.6 F | HEART RATE: 60 BPM | BODY MASS INDEX: 29.8 KG/M2 | WEIGHT: 173.6 LBS | OXYGEN SATURATION: 97 % | SYSTOLIC BLOOD PRESSURE: 154 MMHG

## 2021-01-11 DIAGNOSIS — R20.2 PARESTHESIA: ICD-10-CM

## 2021-01-11 DIAGNOSIS — N64.4 NIPPLE TENDERNESS: ICD-10-CM

## 2021-01-11 DIAGNOSIS — N18.31 STAGE 3A CHRONIC KIDNEY DISEASE (H): ICD-10-CM

## 2021-01-11 DIAGNOSIS — H91.93 DECREASED HEARING OF BOTH EARS: ICD-10-CM

## 2021-01-11 DIAGNOSIS — R03.0 ELEVATED BP WITHOUT DIAGNOSIS OF HYPERTENSION: Primary | ICD-10-CM

## 2021-01-11 DIAGNOSIS — R20.2 TINGLING SENSATION IN FACE: ICD-10-CM

## 2021-01-11 DIAGNOSIS — G47.00 INSOMNIA, UNSPECIFIED TYPE: ICD-10-CM

## 2021-01-11 DIAGNOSIS — E78.2 MIXED HYPERLIPIDEMIA: ICD-10-CM

## 2021-01-11 DIAGNOSIS — R73.03 PREDIABETES: ICD-10-CM

## 2021-01-11 LAB
ALBUMIN SERPL-MCNC: 4.1 G/DL (ref 3.5–5.7)
ANION GAP SERPL CALCULATED.3IONS-SCNC: 5 MMOL/L (ref 3–14)
BUN SERPL-MCNC: 14 MG/DL (ref 7–25)
CALCIUM SERPL-MCNC: 9.2 MG/DL (ref 8.6–10.3)
CHLORIDE SERPL-SCNC: 106 MMOL/L (ref 98–107)
CO2 SERPL-SCNC: 29 MMOL/L (ref 21–31)
CREAT SERPL-MCNC: 1.04 MG/DL (ref 0.6–1.2)
GFR SERPL CREATININE-BSD FRML MDRD: 52 ML/MIN/{1.73_M2}
GLUCOSE SERPL-MCNC: 111 MG/DL (ref 70–105)
HBA1C MFR BLD: 5.9 % (ref 4–6)
PHOSPHATE SERPL-MCNC: 3.4 MG/DL (ref 2.5–5)
POTASSIUM SERPL-SCNC: 4.4 MMOL/L (ref 3.5–5.1)
SODIUM SERPL-SCNC: 140 MMOL/L (ref 134–144)
TSH SERPL DL<=0.05 MIU/L-ACNC: 2.01 IU/ML (ref 0.34–5.6)
VIT B12 SERPL-MCNC: 363 PG/ML (ref 180–914)

## 2021-01-11 PROCEDURE — 84443 ASSAY THYROID STIM HORMONE: CPT | Mod: ZL | Performed by: INTERNAL MEDICINE

## 2021-01-11 PROCEDURE — 36415 COLL VENOUS BLD VENIPUNCTURE: CPT | Mod: ZL | Performed by: INTERNAL MEDICINE

## 2021-01-11 PROCEDURE — 99214 OFFICE O/P EST MOD 30 MIN: CPT | Performed by: INTERNAL MEDICINE

## 2021-01-11 PROCEDURE — 80069 RENAL FUNCTION PANEL: CPT | Mod: ZL | Performed by: INTERNAL MEDICINE

## 2021-01-11 PROCEDURE — 82607 VITAMIN B-12: CPT | Mod: ZL | Performed by: INTERNAL MEDICINE

## 2021-01-11 PROCEDURE — 83036 HEMOGLOBIN GLYCOSYLATED A1C: CPT | Mod: ZL | Performed by: INTERNAL MEDICINE

## 2021-01-11 RX ORDER — ATORVASTATIN CALCIUM 10 MG/1
10 TABLET, FILM COATED ORAL DAILY
Qty: 90 TABLET | Refills: 1 | Status: SHIPPED | OUTPATIENT
Start: 2021-01-11 | End: 2021-06-03

## 2021-01-11 RX ORDER — VITAMIN B COMPLEX
2-3 TABLET ORAL DAILY
COMMUNITY
End: 2023-03-10

## 2021-01-11 RX ORDER — FLUOROURACIL 50 MG/ML
SOLUTION TOPICAL 2 TIMES DAILY
COMMUNITY
Start: 2021-01-01 | End: 2021-01-29

## 2021-01-11 ASSESSMENT — PAIN SCALES - GENERAL: PAINLEVEL: MILD PAIN (2)

## 2021-01-11 NOTE — PATIENT INSTRUCTIONS
For sleep - consider use of Unisom which can bought over the counter    - maintain a routine, go to bed and wake up at the same time every day  - make sure that your bedroom is comfortable, keep your bedroom quiet, dark,comfortable and cool  - include a warm beverage and/or hot bath 1-2 hours before bedtime  - avoid naps  - expose yourself to bright light during the day  - do not watch the clock  - avoid caffeine and nicotine for at least 4-6 hours prior to bed and consider cutting out all caffeine (coffee, chocolate, tea) if able  - avoid alcohol before bedtime   - exercise regularly but avoid exercising right before bed  - avoid looking at illuminated screens (phones, computers, TV) in the 1-2 hours before bedtime.  - If you haven t been able to get to sleep after about 20 minutes or more, get up and do something calming or boring until you feel sleepy, then return to bed and try again. Avoid doing anything that is too stimulating or interesting, as this will wake you up even more.  - use bed only for sleep and sex, do not use it as a place to watch TV, eat, read, work on your laptop, pay bills, and other things      Please check your blood pressure daily at various times, recordthis and bring it to your follow up appointment.  Your blood pressure goal is greater than 110/50 and less than 140/90.  Please call if it is consistently outside this range.       High Blood Pressure: Essential Hypertension   ________________________________________________________________________  KEY POINTS    High blood pressure means that your blood pressure is higher than normal. It is called essential hypertension when no cause for it can be found.    Weight loss, changes in your diet, and exercise may be the only treatment you need. If lifestyle changes don t lower your blood pressure enough, yourhealthcare provider may prescribe medicine. Many people need to take 2 or more medicines to bring their blood pressure down to a healthy  "level.    Talk to your healthcare provider aboutyour personal and family medical history and your lifestyle habits. This will help you know what you can do to lower your risk for high blood pressure.  ________________________________________________________________________  What is high blood pressure?  Blood pressure is the force of bloodagainst artery walls as the heart pumps blood through the body. You may be told that you have high blood pressure (hypertension) if your blood pressure is higher than normal. Hypertension is called essential when no causefor it can be found. When the cause of hypertension is known, such as from kidney disease or a tumor, it is called secondary hypertension.  Blood pressure can rise and fall with exercise, rest, or emotions.    Normal resting blood pressure ranges up to 120/80 (\"120 over 80\"). The first number (120 in this example) is thepressure when the heart beats and pushes blood out to the rest of the body. The second number (80 in this example) is the pressure when the heart rests between beats.    Blood pressureis borderline high if it is 120/80 or higher but less than 140/90.    High blood pressure is 140/90 or higher for most people. If you have chronic kidney disease, 130/80 or higher isconsidered high blood pressure.    Why is high blood pressure a problem?  High blood pressure is a problem in many ways.    Your heart has to work harder to pumpblood through your body. The added workload on the heart causes thickening of the heart muscle. Over time, the thickening damages the heart muscle so that it can no longer pump normally. This can lead to a disease calledheart failure.    The higher pressure in your arteries may cause them to weaken and bleed, resulting in a stroke.    As you get older, bloodvessels may become hardened. High blood pressure speeds up this process. Hardened or narrowed arteries may not be able to supply enough blood to all parts of your body.    High " bloodpressure may lead to atherosclerosis, which is when deposits of cholesterol, fatty substances, and blood cells clog up an artery. Atherosclerosis is the leading cause of heart attacks. It can also cause strokes.    Your kidneys, brain, and eyes may be damaged.  You may need treatment for high blood pressure for the rest of your life.However, proper treatment can control your blood pressure and help prevent heart disease, heart attack, or stroke. It can also help prevent long-term health problems, such as heart failure, kidney failure, blindness, anddementia.  If you already have some complications, such as breathing problems or chest pain, lowering your blood pressure may make these problems less severe.    What is the cause?  There are no clear causes of essential hypertension. However, many things can increase blood pressure, such as:    Being overweight    Smoking    Eating a diet high in salt    Drinking a lot of alcohol  Other important factors include:    Race.  Americans are more likely to have high blood pressure.    Gender. Males have a greater chance of developing high blood pressure thanwomen until age 55. After the age of 75, women are more likely to develop high blood pressure than men.    Heredity. If you have parents with high blood pressure, you are more at risk.    Age. The older you get, the more likely you are to have high blood pressure.  Also, some medicines increase bloodpressure.  Stress and drinking caffeine can make blood pressure go up temporarily, but it s not clear that they have any long-term effects on blood pressure.    What are the symptoms?  You may have high blood pressure for a long time without symptoms. You may not be able to tell by the way you feel that your bloodpressure is high. The only way to find out if your blood pressure is high is to have it measured. That's why it s important to have your blood pressure checked at least once a year.  When high blood  pressure does cause symptoms, they may include:    Headaches    Nosebleeds    Getting tired easily    Blurred vision    Dizziness    Lightheadedness    Feeling like your heart is racing or fluttering    Shortness of breath    Chest pain    Memory problems    Daytime sleepiness    How is it diagnosed?  Blood pressure is checked at most healthcare visits. High blood pressure is usually discovered during one of these visits. Ifyour blood pressure is high, you will be asked to return for follow-up checks. Your healthcare provider will ask about your personal and family medical history and examine you.  Tests to look for a possible cause of highblood pressure may include:    Urine and blood tests    ChestX-ray    Electrocardiogram (ECG), which measures and records your heartbeat  You may be asked to use a portableblood-pressure measuring device, which will take your pressure at different times during day and night.    How is it treated?  If your bloodpressure is borderline high, you may be able to bring it down to a normal level without medicine. Weight loss, changes in your diet, and exercise may be the only treatment you need.  If lifestyle changes don t lower your blood pressure enough, your healthcare provider may prescribe medicine. Many people need to take 2 or more medicines to bring their blood pressure down to a healthy level. It may takeseveral weeks or months to find the best treatment for you.    How can I take care of myself?  If you have high blood pressure, there are thingsyou can do now to take care of yourself and to prevent problems in the future:    Follow your treatmentplan and know how to take your medicines.    Work with your healthcare provider to find what lifestylechanges and medicines are right for you.    Follow the directions that come with your medicine, including information about food or alcohol. Make sure you know how and when to take yourmedicine. Do not take more or less than you are  supposed to take.    Many medicines have side effects. A side effect is a symptom or problem that is caused by the medicine. Ask yourhealthcare provider or pharmacist what side effects your medicine may cause and what you should do if you have side effects. Ask if you should avoid some nonprescription medicines.    Be careful with nonprescription medicines or herbal supplements. Some can raise blood pressure. This includes diet pills, cold and pain medicines, and energy boosters. Read labels or ask your pharmacist if the medicine orsupplement affects blood pressure. Some illegal drugs, like cocaine, can also affect blood pressure.    Check your blood pressure (or have it checked) as often as your provider advises.Keep a diary of the readings. A diary is also a good place to note your exercise, weight, salt intake, types of food you are eating, and your feelings. This can help you learn how these things can affect your blood pressure.Take your diary with you when you visit your provider. It may help you and your provider manage your blood pressure and adjust your medicines if needed.    Don t smoke.    Eat a healthy diet that is low in salt, saturated fat, trans fat, andcholesterol. Include lots of fruits, vegetables, and fat-free or low-fat milk and milk products.    Get regular exercise, according to your healthcare provider's advice. For example,you might walk, bike, or swim at least 30 minutes 3 to 5 times a week.    Limit the amount of alcohol you drink. Moderate drinking is up to 1 drink a day for women and up to 2 drinksfor men.    Lose weight if you need to.    Try to reduce the stress in your life or learn how to deal better with situations that make you feelanxious.    Ask your healthcare provider:    How and when youwill get your test results    How long it will take to recover    If there are activities you should avoid and when you can return to your normalactivities    How to take care of yourself at  home    What symptoms or problems you should watch for and what to do if you have them    Make sure you know when you should come back for a checkup. Keep all appointments for provider visits or tests.    How can I help prevent high blood pressure?  You can helpprevent this disease with a heart-healthy lifestyle:    Eat a healthy diet and keep a healthy weight.    Stay fit with the right kind of exercise for you.    Decrease stress.    Don t smoke.    Limit your use of alcohol.  Talk to your healthcare provider about your personal and familymedical history and your lifestyle habits. This will help you know what you can do to lower your risk for high blood pressure.    Developed by [employer].  Adult Advisor 2016.3 published by [employer].  Lastmodified: 2016-04-18  Last reviewed: 2016-04-15  This content is reviewed periodically and is subject to change as new health information becomes available. The information is intended to inform and educate and is nota replacement for medical evaluation, advice, diagnosis or treatment by a healthcare professional.  References   Adult Advisor 2016.3 Index    Copyright   2016 [employer], a division of [employer]. All rights reserved.

## 2021-01-11 NOTE — PROGRESS NOTES
Chief Complaint   Patient presents with     History of Present Illness     Multiple Concerns         HPI: Ms. Velez is a 70 year old female who presents today for follow up of multiple issues.    She is due for recheck of several labs.  This includes an A1c due to history of prediabetes.  She is currently not on any medications for this.     She has been using tolterodine which has been beneficial for her urinary symptoms.    She has a history of chronic kidney disease.  She is due for recheck of this also.    Last labs done back in July show elevation of total cholesterol along with borderline elevation of LDL.  10-year ASCVD score is 14.9%.  She is open to statin therapy.    She has had a decrease in her hearing.  She is interested in referral to get this evaluated.    She has had ongoing issues with insomnia.  She reports having difficulty falling asleep.  She has not been taken anything for this.    She also has had some tingling in the side of her face along with some paresthesias off and on.    She has been cutting down on beer.      She  reports that she quit smoking about 48 years ago. She smoked 0.50 packs per day. She has never used smokeless tobacco.    Past medical history reviewed as below:     Past Medical History:   Diagnosis Date     Adnexal mass 08/22/2011     Contact dermatitis 12/16/2009     Diplopia     Episode of mild diplopia secondary to sinusitis, ophthalmology consult 07/2008.     H/O gastritis      History of adenomatous polyp of colon      Postmenopausal atrophic vaginitis 07/29/2011     Restless leg syndrome      Retention of urine 08/22/2011     Rosacea      Tinea corporis 11/19/2010     Urge incontinence of urine    .      ROS  Pertinent ROS was performed and was negative, including for fever, chills, chest pain, shortness of breath, increased lower extremity edema, changes in bowel or bladder, blood in the stool.  She has had some increased sensitivity of the nipples. No other  "concerns, with exception of HPI above.      EXAM:   BP (!) 154/88 (BP Location: Right arm, Patient Position: Sitting, Cuff Size: Adult Regular)   Pulse 60   Temp 97.6  F (36.4  C) (Tympanic)   Resp 16   Wt 78.7 kg (173 lb 9.6 oz)   SpO2 97%   BMI 29.80 kg/m      Estimated body mass index is 29.8 kg/m  as calculated from the following:    Height as of 9/22/20: 1.626 m (5' 4\").    Weight as of this encounter: 78.7 kg (173 lb 9.6 oz).      GEN: Vitals reviewed. Healthy appearing. Patient is in no acute distress. Cooperative with exam.  HEENT: Normocephalic atraumatic.  Eyes grossly normal to inspection.  No discharge or erythema, or obvious scleral/conjunctival abnormalities.   NECK: Supple; no thyromegaly or masses noted.  No cervical or supraclavicular lymphadenopathy.  CV: Heart regular in rate and rhythm with no murmur.    LUNGS: No audible wheeze, cough, or visible cyanosis.  No visible retractions or increased work of breathing.  Lungs clear to auscultation bilaterally.    BREAST:Bilateral exam shows no chest deformity, asymmetry. Normal contours. No nodules, masses, breast tenderness, or axillary adenopathy. No nipple discharge, no obvious abnormality of the nipples  ABD:  Nondistended  SKIN: Warm and dry to touch.  Visible skin clear. No significant rash, abnormal pigmentation or lesions.  EXT: No clubbing or cyanosis.  No peripheral edema.  PSYCH: Mood is good.  Mentation appears normal, affect normal/bright, judgement and insight intact, normal speech and appearance well-groomed.     ASSESSMENT AND PLAN:    Elevated BP without diagnosis of hypertension  - Blood pressure today of (!) 154/88   Is not at the goal of <140/90 with moderate exacerbation.  - Continue current regimen.  Instructed to check BP at home and call if remains elevated  - Cautioned patient to monitor with antibiotics, herbals and any OTC medications  - electrolytes and renal function done and stable    Tingling sensation in face  -See " below  - TSH Reflex GH  - Vitamin B12  - Renal Function Panel    Paresthesia  -Etiology is unknown.  Given low normal vitamin B12 recommend starting on daily supplementation.  Continue to monitor and call for any worsening symptoms.  - TSH Reflex GH  - Vitamin B12  - Renal Function Panel    Prediabetes  -Recheck A1c remains well controlled.  - atorvastatin (LIPITOR) 10 MG tablet  Dispense: 90 tablet; Refill: 1  - Renal Function Panel  - Hemoglobin A1c    Mixed hyperlipidemia  Given borderline ASCVD score and elevated triglycerides along with prediabetes will start on low-dose statin.  She is to call with any side effects.  Plan for repeat statin and follow-up.  - atorvastatin (LIPITOR) 10 MG tablet  Dispense: 90 tablet; Refill: 1    Nipple tenderness  No obvious breast abnormality today.  Encouraged to wear well fitting bra.  Recent mammogram reviewed and negative.    Insomnia, unspecified type  Can trial Unisom OTC.  Behavioral changes recommended and available on printout.    Decreased hearing of both ears  Referral placed  - AUDIOLOGY ADULT REFERRAL    Stage 3a chronic kidney disease  Renal function is stable from prior       Return in about 6 months (around 7/11/2021).        NATE DRUMMOND DO   1/11/2021 9:42 AM    This document was prepared using voice generated softwear. While every attempt was made for accuracy, grammatical errors may exist.

## 2021-01-11 NOTE — NURSING NOTE
Patient presents to clinic today for multiple concerns. She is concerns about numbness and tingling in her feet and lips.   Her ears seem plugged and she's been having some slight headaches.  Also breasts have been tender lately.  Medication reconciliation completed.  Tamika Goldstein CMA([facility])..................1/11/2021   9:06 AM

## 2021-01-12 ENCOUNTER — TELEPHONE (OUTPATIENT)
Dept: INTERNAL MEDICINE | Facility: OTHER | Age: 71
End: 2021-01-12

## 2021-01-12 NOTE — TELEPHONE ENCOUNTER
Patient could not get message and she hs additional questions. Please call back.      Pricilla Borden on 1/12/2021 at 10:18 AM

## 2021-01-12 NOTE — TELEPHONE ENCOUNTER
Patient did not get the message I left yesterday about the referral being placed. She will await a call from one of the [facility] eval clinics.   Tamika Goldstein CMA([facility])..................1/12/2021   12:32 PM

## 2021-01-13 PROBLEM — N18.30 CHRONIC KIDNEY DISEASE, STAGE 3 (H): Status: ACTIVE | Noted: 2021-01-13

## 2021-01-18 ENCOUNTER — TELEPHONE (OUTPATIENT)
Dept: SCHEDULING | Facility: OTHER | Age: 71
End: 2021-01-18

## 2021-01-18 NOTE — TELEPHONE ENCOUNTER
Patient was letting you know that she got hearing aids (both).  She also has questions on starting B-12. Please call.    Christine Rich on 1/18/2021 at 9:36 AM

## 2021-01-18 NOTE — TELEPHONE ENCOUNTER
"She was wondering about her labs. She said Dr Pearson checked a B12 level and was going to let her know if she needs a supplement. She hasn't read the My Chart result note yet. I read it to her.    Her blood pressures have been pretty good. 120s/70s.    She is very excited about her hearing aids. She \"hears everything\" now!    Tamika Goldstein CMA([facility])..................1/18/2021   5:37 PM         "

## 2021-01-20 ENCOUNTER — TELEPHONE (OUTPATIENT)
Dept: SURGERY | Facility: CLINIC | Age: 71
End: 2021-01-20

## 2021-01-20 NOTE — TELEPHONE ENCOUNTER
Reached out to pt to she if she enrolled in the [employer] program or still curious about enrolling. Pt was referred after October cohort had started.    Left  for pt regarding [facility] program.     Confirming if pt has Medicare.  BMI 29.80 kg/m   21 A1C 5.9%    Xenia Call, [facility], [facility], CPT  Direct Office Line: [phone]  [employer] [place] for [phone]

## 2021-02-01 NOTE — PROGRESS NOTES
Outpatient Physical Therapy Discharge Note     Patient: Monisha Velez  : 1950    Beginning/End Dates of Reporting Period:   10/23/2020 to 2020    Referring Provider: Nafisa Escobar NP     Therapy Diagnosis: Degeneration of cervical intervertebral disc M50.30,  Chronic neck and back pain M54.2, M54.9, G89.29      Client Self Report on 2020: Patient states PT has helped decrease neck pain. States the cervical ROM remains limited and turning her head is still difficult. She has noted increased back pain recently. She plans to proceeed with independent HEP after today, but will schedule a follow up in the next 3 weeks if pain does not improve.     Patient did not require any additional follow-up visits after 2020.    Objective Measurements on 2020:  Objective Measure: Pain  Details: Neck Pain=3-4/10,      Objective Measure: Cervical ROM  Details: Flexion= 55, Extension=30-stiff, SB left=20-stiff,  SB right=20-stiff  , Rot left= 30 Rot right=40       Goals:  Goal Identifier Neck pain    Goal Description Report a 75% or greater reduction in neck pain to allow sleeping at night without interruption from pain   Target Date 20   Date Met      Progress: Progressing at time of final visit     Goal Identifier Low back pain   Goal Description Report 75% or greater reduction on low back pain to allow riding a car for one hour without difficulty    Target Date 20   Date Met      Progress: Progressing at time of final visit     Goal Identifier ROM    Goal Description Improve cervical rotation by 10 degrees to allow turning head while driving with less difficulty    Target Date 20   Date Met      Progress: Progressing at time of final visit     Progress Toward Goals:   Progress this reporting period: Patient developed the ability to progress to home exercise program independent symptom management techniques.      Plan:  Discharge from therapy.    Discharge:    Reason for  Discharge: Patient chooses to discontinue therapy.    Discharge Plan: Patient to continue home program.

## 2021-02-09 ENCOUNTER — OFFICE VISIT (OUTPATIENT)
Dept: INTERNAL MEDICINE | Facility: OTHER | Age: 71
End: 2021-02-09
Attending: NURSE PRACTITIONER
Payer: OTHER GOVERNMENT

## 2021-02-09 ENCOUNTER — HOSPITAL ENCOUNTER (OUTPATIENT)
Dept: GENERAL RADIOLOGY | Facility: OTHER | Age: 71
End: 2021-02-09
Attending: NURSE PRACTITIONER
Payer: OTHER GOVERNMENT

## 2021-02-09 VITALS
DIASTOLIC BLOOD PRESSURE: 72 MMHG | RESPIRATION RATE: 16 BRPM | BODY MASS INDEX: 29.21 KG/M2 | HEART RATE: 81 BPM | WEIGHT: 170.2 LBS | SYSTOLIC BLOOD PRESSURE: 128 MMHG | OXYGEN SATURATION: 97 % | TEMPERATURE: 97.4 F

## 2021-02-09 DIAGNOSIS — R10.84 ABDOMINAL PAIN, GENERALIZED: ICD-10-CM

## 2021-02-09 DIAGNOSIS — H57.9 ITCHY, WATERY, AND RED EYE: ICD-10-CM

## 2021-02-09 DIAGNOSIS — N89.8 VAGINAL ITCHING: ICD-10-CM

## 2021-02-09 DIAGNOSIS — R10.84 ABDOMINAL PAIN, GENERALIZED: Primary | ICD-10-CM

## 2021-02-09 DIAGNOSIS — Z78.9 TAKES DIETARY SUPPLEMENTS: ICD-10-CM

## 2021-02-09 LAB
ALBUMIN SERPL-MCNC: 4 G/DL (ref 3.5–5.7)
ALBUMIN UR-MCNC: NEGATIVE MG/DL
ALP SERPL-CCNC: 52 U/L (ref 34–104)
ALT SERPL W P-5'-P-CCNC: 17 U/L (ref 7–52)
ANION GAP SERPL CALCULATED.3IONS-SCNC: 5 MMOL/L (ref 3–14)
APPEARANCE UR: CLEAR
AST SERPL W P-5'-P-CCNC: 18 U/L (ref 13–39)
BACTERIA #/AREA URNS HPF: ABNORMAL /HPF
BASOPHILS # BLD AUTO: 0 10E9/L (ref 0–0.2)
BASOPHILS NFR BLD AUTO: 0.3 %
BILIRUB SERPL-MCNC: 0.4 MG/DL (ref 0.3–1)
BILIRUB UR QL STRIP: NEGATIVE
BUN SERPL-MCNC: 16 MG/DL (ref 7–25)
CALCIUM SERPL-MCNC: 9.3 MG/DL (ref 8.6–10.3)
CHLORIDE SERPL-SCNC: 106 MMOL/L (ref 98–107)
CO2 SERPL-SCNC: 29 MMOL/L (ref 21–31)
COLOR UR AUTO: ABNORMAL
CREAT SERPL-MCNC: 1.11 MG/DL (ref 0.6–1.2)
DIFFERENTIAL METHOD BLD: NORMAL
EOSINOPHIL # BLD AUTO: 0.1 10E9/L (ref 0–0.7)
EOSINOPHIL NFR BLD AUTO: 1.6 %
ERYTHROCYTE [DISTWIDTH] IN BLOOD BY AUTOMATED COUNT: 11.9 % (ref 10–15)
GFR SERPL CREATININE-BSD FRML MDRD: 48 ML/MIN/{1.73_M2}
GLUCOSE SERPL-MCNC: 97 MG/DL (ref 70–105)
GLUCOSE UR STRIP-MCNC: NEGATIVE MG/DL
HCT VFR BLD AUTO: 42.9 % (ref 35–47)
HGB BLD-MCNC: 14.4 G/DL (ref 11.7–15.7)
HGB UR QL STRIP: NEGATIVE
IMM GRANULOCYTES # BLD: 0 10E9/L (ref 0–0.4)
IMM GRANULOCYTES NFR BLD: 0.2 %
KETONES UR STRIP-MCNC: NEGATIVE MG/DL
LEUKOCYTE ESTERASE UR QL STRIP: NEGATIVE
LYMPHOCYTES # BLD AUTO: 1.8 10E9/L (ref 0.8–5.3)
LYMPHOCYTES NFR BLD AUTO: 31.8 %
MCH RBC QN AUTO: 30.3 PG (ref 26.5–33)
MCHC RBC AUTO-ENTMCNC: 33.6 G/DL (ref 31.5–36.5)
MCV RBC AUTO: 90 FL (ref 78–100)
MONOCYTES # BLD AUTO: 0.4 10E9/L (ref 0–1.3)
MONOCYTES NFR BLD AUTO: 6.8 %
MUCOUS THREADS #/AREA URNS LPF: PRESENT /LPF
NEUTROPHILS # BLD AUTO: 3.4 10E9/L (ref 1.6–8.3)
NEUTROPHILS NFR BLD AUTO: 59.3 %
NITRATE UR QL: NEGATIVE
PH UR STRIP: 6.5 PH (ref 5–7)
PLATELET # BLD AUTO: 203 10E9/L (ref 150–450)
POTASSIUM SERPL-SCNC: 4.3 MMOL/L (ref 3.5–5.1)
PROT SERPL-MCNC: 6.5 G/DL (ref 6.4–8.9)
RBC # BLD AUTO: 4.75 10E12/L (ref 3.8–5.2)
RBC #/AREA URNS AUTO: 1 /HPF (ref 0–2)
SODIUM SERPL-SCNC: 140 MMOL/L (ref 134–144)
SOURCE: ABNORMAL
SP GR UR STRIP: 1.01 (ref 1–1.03)
SQUAMOUS #/AREA URNS AUTO: 2 /HPF (ref 0–1)
UROBILINOGEN UR STRIP-MCNC: NORMAL MG/DL (ref 0–2)
WBC # BLD AUTO: 5.7 10E9/L (ref 4–11)
WBC #/AREA URNS AUTO: 4 /HPF (ref 0–5)

## 2021-02-09 PROCEDURE — 74019 RADEX ABDOMEN 2 VIEWS: CPT

## 2021-02-09 PROCEDURE — 36415 COLL VENOUS BLD VENIPUNCTURE: CPT | Mod: ZL | Performed by: NURSE PRACTITIONER

## 2021-02-09 PROCEDURE — 80053 COMPREHEN METABOLIC PANEL: CPT | Mod: ZL | Performed by: NURSE PRACTITIONER

## 2021-02-09 PROCEDURE — 99215 OFFICE O/P EST HI 40 MIN: CPT | Performed by: NURSE PRACTITIONER

## 2021-02-09 PROCEDURE — 85025 COMPLETE CBC W/AUTO DIFF WBC: CPT | Mod: ZL | Performed by: NURSE PRACTITIONER

## 2021-02-09 PROCEDURE — 87086 URINE CULTURE/COLONY COUNT: CPT | Mod: ZL | Performed by: NURSE PRACTITIONER

## 2021-02-09 PROCEDURE — 81001 URINALYSIS AUTO W/SCOPE: CPT | Mod: ZL | Performed by: NURSE PRACTITIONER

## 2021-02-09 RX ORDER — FLUCONAZOLE 150 MG/1
150 TABLET ORAL ONCE
Qty: 1 TABLET | Refills: 0 | Status: SHIPPED | OUTPATIENT
Start: 2021-02-09 | End: 2021-02-09

## 2021-02-09 ASSESSMENT — ENCOUNTER SYMPTOMS
RECTAL PAIN: 0
EYE REDNESS: 1
EYE DISCHARGE: 1
COUGH: 0
BLOOD IN STOOL: 0
PHOTOPHOBIA: 0
NAUSEA: 0
DIARRHEA: 0
EYE ITCHING: 1
CONSTIPATION: 0
SHORTNESS OF BREATH: 0
ABDOMINAL PAIN: 1

## 2021-02-09 ASSESSMENT — PAIN SCALES - GENERAL: PAINLEVEL: MILD PAIN (3)

## 2021-02-09 NOTE — NURSING NOTE
Chief Complaint   Patient presents with     Urinary Problem   patient presents to the clinic today for urine burning, frequency, and abdominal pain Symptoms started 3 days ago. Pain at a 3    Medication Reconciliation: completed   Akua Fox LPN  2/9/2021 10:09 AM

## 2021-02-09 NOTE — PATIENT INSTRUCTIONS
Eye Irritation:    Apply a cool compress for 5 to 10 minutes, three to four times a day. Preservative-free artificial tears can be applied a few times a day. Be extremely careful not to share towels or washcloths with others so as not to spread any potential infection. You should also be careful about using the same cloths or drops between your two eyes so as not to transfer the infection to your other eye. Discard cosmetic eye products which may have been contaminated. Do not wear contact lenses.    Itching and affects both eyes would suggest an allergy. Place a cool compress on your closed eye and use nonprescription allergy or antihistamine eye drops to relieve itching and burning. If the condition worsens or does not improve in a few days, consult your doctor. Marked discomfort, loss of vision and involvement of one eye only suggests a more serious problem which needs to be medically evaluated by your health care provider or eye doctor.

## 2021-02-09 NOTE — PROGRESS NOTES
Monisha Velez  : 1950 Age: 71 year old Sex: female MRN: [id number]    CC:   Chief Complaint   Patient presents with     Urinary Problem      HPI:     Abdominal pain - left sided down to pubis for a good week.  Typically has daily bowel movements. Has not had BM today. Does not use any stool softeners. Denies any fevers, chills, shortness of breath.    Vaginal itching and burning, inside and outside for a good week.    Eye itchiness and burning, swelling for past two days - using EQUATE Eye Itch Relief drops.    Started new cholesterol medication on .  Patient was wondering if she could be having a reaction to that.  She was advised this is unlikely due to the length of time.    Patient also reports that the Unisom she was advised to take for her insomnia is working great.    ASSESSMENT AND PLAN:    Abdominal pain, generalized  Most likely related to being constipated. Advised to increase water intake, may take OTC stool softener. Follow up if not improving.  - UA with Microscopic reflex to Culture, bacteria, mucus and squamous epithelial cells noted.  - Urine Culture Aerobic Bacterial  - XR Abdomen 2 Views, FINDINGS: There is no free intraperitoneal air. Small - moderate amount of gas and stool are seen within the colon to the level of the  rectosigmoid. No dilated gas-filled small bowel loops are seen. No mass is seen and there are no suspicious calcifications. There is a right convex scoliosis with degenerative change in the spine. IMPRESSION: No significant bowel distention.  - Comprehensive metabolic panel, unremarkable  - CBC with platelets differential, GFR slightly low at 48, otherwise unremarkable. Encouraged increasing her fluid intake.    Vaginal itching  - fluconazole (DIFLUCAN) 150 MG tablet  Dispense: 1 tablet; Refill: 0  - miconazole (MONISTAT 1 DAY OR NIGHT) 1200 & 2 MG & % kit  Dispense: 1 kit; Refill: 0  If not improving, will pursue doing vaginal testing/exam.    Takes dietary  supplements  MAR updated. Patient advised about taking dietary supplements and herbs/OTC products. She should review with pharmacist or provider prior to starting.  - Stress Formula/Zinc tablet  Dispense:      Itchy, watery, and red eye  Patient to continue to use the drops she has at home which is an antihistamine.  Advised to apply a cool compress for 5 to 10 minutes, three to four times a day. Preservative-free artificial tears can be applied a few times a day. Be extremely careful not to share towels or washcloths with others so as not to spread any potential infection. You should also be careful about using the same cloths or drops between your two eyes so as not to transfer the infection to your other eye. Discard cosmetic eye products which may have been contaminated. Do not wear contact lenses.    JERRELL Ferrari, AGNP-C  Internal Medicine  02/09/2021 12:53 PM    I explained my diagnostic considerations and recommendations to the patient, who voiced understanding and agreement with the treatment plan. All questions were answered. We discussed potential side effects of any prescribed or recommended therapies, as well as expectations for response to treatments.     Return if symptoms worsen or fail to improve.           Nursing Notes:   Akua Fox LPN  2/9/2021 10:25 AM  Signed  Chief Complaint   Patient presents with     Urinary Problem   patient presents to the clinic today for urine burning, frequency, and abdominal pain Symptoms started 3 days ago. Pain at a 3    Medication Reconciliation: completed   Akua Fox LPN  2/9/2021 10:09 AM        Nursing note reviewed with patient.  Accuracy and completeness verified.      SUBJECTIVE:                                                      ELENA Score:    ELENA-7 SCORE 10/24/2019 12/17/2019 8/12/2020   Total Score 1 0 0        PHQ-2 Score:     PHQ-2 ( 1999 [employer]) 2/9/2021 1/11/2021   Q1: Little interest or pleasure in doing things 0 0   Q2: Feeling  down, depressed or hopeless 0 0   PHQ-2 Score 0 0      Problem List/PMH: Reviewed in EMR, and made relevant updates today.  Medications: Reviewed in EMR, and made relevant updates today.  Allergies: Reviewed in EMR, and made relevant updates today.    Current Code Status:  Full Code    REVIEW OF SYSTEMS:    Review of Systems   Eyes: Positive for discharge, redness and itching. Negative for photophobia and visual disturbance.   Respiratory: Negative for cough and shortness of breath.    Gastrointestinal: Positive for abdominal pain. Negative for blood in stool, constipation, diarrhea, nausea and rectal pain.   Genitourinary: Positive for pelvic pain and vaginal pain (itchiness).   All other systems reviewed and are negative.      OBJECTIVE:                                                      EXAM:     /72 (BP Location: Right arm, Patient Position: Sitting, Cuff Size: Adult Regular)   Pulse 81   Temp 97.4  F (36.3  C) (Tympanic)   Resp 16   Wt 77.2 kg (170 lb 3.2 oz)   SpO2 97%   BMI 29.21 kg/m      Current Pain Score: Mild Pain (3)     Physical Exam  Vitals signs and nursing note reviewed.   Constitutional:       Appearance: She is obese.   HENT:      Head: Normocephalic and atraumatic.      Mouth/Throat:      Mouth: Mucous membranes are moist.      Pharynx: Oropharynx is clear.   Eyes:      General: Lids are normal. Gaze aligned appropriately.         Right eye: Discharge (crusty, pale white) present.      Extraocular Movements: Extraocular movements intact.      Conjunctiva/sclera:      Right eye: Right conjunctiva is injected.      Left eye: Left conjunctiva is injected.      Pupils: Pupils are equal, round, and reactive to light.   Cardiovascular:      Rate and Rhythm: Normal rate and regular rhythm.      Heart sounds: Normal heart sounds.   Pulmonary:      Effort: Pulmonary effort is normal.      Breath sounds: Normal breath sounds.   Abdominal:      General: Bowel sounds are normal.       Palpations: Abdomen is soft. There is no mass.      Tenderness: There is abdominal tenderness (RLQ, LLQ, pubis). There is no right CVA tenderness, left CVA tenderness, guarding or rebound.   Musculoskeletal: Normal range of motion.   Skin:     General: Skin is warm and dry.   Neurological:      Mental Status: She is alert and oriented to person, place, and time. Mental status is at baseline.   Psychiatric:         Mood and Affect: Mood normal.         Behavior: Behavior normal.         Thought Content: Thought content normal.         Judgment: Judgment normal.        She also brings with her a month of blood pressure readings, pulse rate and blood sugar readings.  Blood pressures range from 124-151 systolic and 63-78 diastolic.  Pulse rate ranges from 60-84 bpm.  Blood glucose readings range from 110 -159.  Recording scanned into patient's chart.    Diagnostics Completed at this Visit:    Results for orders placed or performed during the hospital encounter of 02/09/21   XR Abdomen 2 Views     Status: None    Narrative    PROCEDURE: XR ABDOMEN 2 VW 2/9/2021 11:24 AM    HISTORY: Abdominal pain, generalized    COMPARISONS: None.    TECHNIQUE: Supine and upright views.    FINDINGS: There is no free intraperitoneal air. Small moderate amount  of gas and stool are seen within the colon to the level of the  rectosigmoid. No dilated gas-filled small bowel loops are seen. No  mass is seen and there are no suspicious calcifications. There is a  right convex scoliosis with degenerative change in the spine.         Impression    IMPRESSION: No significant bowel distention.    MARISSA LAWLER MD   Results for orders placed or performed in visit on 02/09/21   UA with Microscopic reflex to Culture     Status: Abnormal    Specimen: Midstream Urine   Result Value Ref Range    Color Urine Light Yellow     Appearance Urine Clear     Glucose Urine Negative NEG^Negative mg/dL    Bilirubin Urine Negative NEG^Negative    Ketones Urine  Negative NEG^Negative mg/dL    Specific Gravity Urine 1.011 1.003 - 1.035    Blood Urine Negative NEG^Negative    pH Urine 6.5 5.0 - 7.0 pH    Protein Albumin Urine Negative NEG^Negative mg/dL    Urobilinogen mg/dL Normal 0.0 - 2.0 mg/dL    Nitrite Urine Negative NEG^Negative    Leukocyte Esterase Urine Negative NEG^Negative    Source Midstream Urine     WBC Urine 4 0 - 5 /HPF    RBC Urine 1 0 - 2 /HPF    Bacteria Urine Few (A) NEG^Negative /HPF    Squamous Epithelial /HPF Urine 2 (H) 0 - 1 /HPF    Mucous Urine Present (A) NEG^Negative /LPF   Comprehensive metabolic panel     Status: Abnormal   Result Value Ref Range    Sodium 140 134 - 144 mmol/L    Potassium 4.3 3.5 - 5.1 mmol/L    Chloride 106 98 - 107 mmol/L    Carbon Dioxide 29 21 - 31 mmol/L    Anion Gap 5 3 - 14 mmol/L    Glucose 97 70 - 105 mg/dL    Urea Nitrogen 16 7 - 25 mg/dL    Creatinine 1.11 0.60 - 1.20 mg/dL    GFR Estimate 48 (L) >60 mL/min/[1.73_m2]    GFR Estimate If Black 58 (L) >60 mL/min/[1.73_m2]    Calcium 9.3 8.6 - 10.3 mg/dL    Bilirubin Total 0.4 0.3 - 1.0 mg/dL    Albumin 4.0 3.5 - 5.7 g/dL    Protein Total 6.5 6.4 - 8.9 g/dL    Alkaline Phosphatase 52 34 - 104 U/L    ALT 17 7 - 52 U/L    AST 18 13 - 39 U/L   CBC with platelets differential     Status: None   Result Value Ref Range    WBC 5.7 4.0 - 11.0 10e9/L    RBC Count 4.75 3.8 - 5.2 10e12/L    Hemoglobin 14.4 11.7 - 15.7 g/dL    Hematocrit 42.9 35.0 - 47.0 %    MCV 90 78 - 100 fl    MCH 30.3 26.5 - 33.0 pg    MCHC 33.6 31.5 - 36.5 g/dL    RDW 11.9 10.0 - 15.0 %    Platelet Count 203 150 - 450 10e9/L    Diff Method Automated Method     % Neutrophils 59.3 %    % Lymphocytes 31.8 %    % Monocytes 6.8 %    % Eosinophils 1.6 %    % Basophils 0.3 %    % Immature Granulocytes 0.2 %    Absolute Neutrophil 3.4 1.6 - 8.3 10e9/L    Absolute Lymphocytes 1.8 0.8 - 5.3 10e9/L    Absolute Monocytes 0.4 0.0 - 1.3 10e9/L    Absolute Eosinophils 0.1 0.0 - 0.7 10e9/L    Absolute Basophils 0.0 0.0 - 0.2  10e9/L    Abs Immature Granulocytes 0.0 0 - 0.4 10e9/L        Disclaimer:  This note consists of words and symbols derived from keyboarding, dictation, or using voice recognition software. As a result, there may be errors in the script that have gone undetected. Please consider this when interpreting information found in this note. Please contact me if there are any concerns regarding the accuracy of the dictation.     Total time spent with this patient was 59 minutes which included chart review, visualization and interpretation of labs and/or images, time spent with patient, and documentation.

## 2021-02-11 LAB
BACTERIA SPEC CULT: NORMAL
SPECIMEN SOURCE: NORMAL

## 2021-02-19 DIAGNOSIS — N95.1 PERIMENOPAUSAL VASOMOTOR SYMPTOMS: ICD-10-CM

## 2021-02-22 DIAGNOSIS — Z86.0101 H/O ADENOMATOUS POLYP OF COLON: Primary | ICD-10-CM

## 2021-02-22 RX ORDER — POLYETHYLENE GLYCOL 3350 17 G/17G
POWDER, FOR SOLUTION ORAL
Qty: 255 G | Refills: 0 | Status: ON HOLD | OUTPATIENT
Start: 2021-02-22 | End: 2021-04-12

## 2021-02-22 RX ORDER — ESTRADIOL 0.5 MG/1
.25-.5 TABLET ORAL DAILY
Qty: 90 TABLET | Refills: 0 | Status: SHIPPED | OUTPATIENT
Start: 2021-02-22 | End: 2021-05-28

## 2021-02-22 RX ORDER — BISACODYL 5 MG/1
TABLET, DELAYED RELEASE ORAL
Qty: 2 TABLET | Refills: 0 | Status: ON HOLD | OUTPATIENT
Start: 2021-02-22 | End: 2021-04-12

## 2021-02-22 NOTE — TELEPHONE ENCOUNTER
Prescription approved per [facility] Refill Protocol.  LVO: 7/9/2020 with Estradiol addressed    Kendy Stroud RN on 2/22/2021 at 8:41 AM

## 2021-02-22 NOTE — TELEPHONE ENCOUNTER
Screening Questions for the Scheduling of Screening Colonoscopies   (If Colonoscopy is diagnostic, Provider should review the chart before scheduling.)  Are you younger than 50 or older than 80?   NO  Do you take aspirin or fish oil?  YES - ASPIRIN   (if yes, tell patient to stop 1 week prior to Colonoscopy)  Do you take warfarin (Coumadin), clopidogrel (Plavix), apixaban (Eliquis), dabigatram (Pradaxa), rivaroxaban (Xarelto) or any blood thinner? NO   Do you use oxygen at home?  NO   Do you have kidney disease? NO   Are you on dialysis? NO   Have you had a stroke or heart attack in the last year? NO   Have you had a stent in your heart or any blood vessel in the last year? NO   Have you had a transplant of any organ? NO   Have you had a colonoscopy or upper endoscopy (EGD) before? YES          When?  04/12/2021  Date of scheduled Colonoscopy. 04/12/2021  Provider MEG   Pharmacy [facility]

## 2021-03-03 ENCOUNTER — IMMUNIZATION (OUTPATIENT)
Dept: FAMILY MEDICINE | Facility: OTHER | Age: 71
End: 2021-03-03
Attending: INTERNAL MEDICINE
Payer: OTHER GOVERNMENT

## 2021-03-03 PROCEDURE — 91300 PR COVID VAC PFIZER DIL RECON 30 MCG/0.3 ML IM: CPT

## 2021-03-03 PROCEDURE — 0001A PR COVID VAC PFIZER DIL RECON 30 MCG/0.3 ML IM: CPT

## 2021-03-24 ENCOUNTER — IMMUNIZATION (OUTPATIENT)
Dept: FAMILY MEDICINE | Facility: OTHER | Age: 71
End: 2021-03-24
Attending: FAMILY MEDICINE
Payer: OTHER GOVERNMENT

## 2021-03-24 PROCEDURE — 91300 PR COVID VAC PFIZER DIL RECON 30 MCG/0.3 ML IM: CPT

## 2021-03-24 PROCEDURE — 0002A PR COVID VAC PFIZER DIL RECON 30 MCG/0.3 ML IM: CPT

## 2021-04-08 ENCOUNTER — ALLIED HEALTH/NURSE VISIT (OUTPATIENT)
Dept: FAMILY MEDICINE | Facility: OTHER | Age: 71
End: 2021-04-08
Attending: SURGERY
Payer: OTHER GOVERNMENT

## 2021-04-08 DIAGNOSIS — Z20.822 COVID-19 RULED OUT: Primary | ICD-10-CM

## 2021-04-08 DIAGNOSIS — Z86.0101 H/O ADENOMATOUS POLYP OF COLON: ICD-10-CM

## 2021-04-08 LAB
SARS-COV-2 RNA RESP QL NAA+PROBE: NORMAL
SPECIMEN SOURCE: NORMAL

## 2021-04-08 PROCEDURE — C9803 HOPD COVID-19 SPEC COLLECT: HCPCS

## 2021-04-08 PROCEDURE — U0003 INFECTIOUS AGENT DETECTION BY NUCLEIC ACID (DNA OR RNA); SEVERE ACUTE RESPIRATORY SYNDROME CORONAVIRUS 2 (SARS-COV-2) (CORONAVIRUS DISEASE [COVID-19]), AMPLIFIED PROBE TECHNIQUE, MAKING USE OF HIGH THROUGHPUT TECHNOLOGIES AS DESCRIBED BY CMS-2020-01-R: HCPCS | Mod: ZL | Performed by: SURGERY

## 2021-04-08 PROCEDURE — U0005 INFEC AGEN DETEC AMPLI PROBE: HCPCS | Mod: ZL | Performed by: SURGERY

## 2021-04-08 NOTE — PROCEDURES
Patient swabbed for COVID-19 testing.  Shira Hunt LPN on 4/8/2021 at 9:54 AM    Surgery on 4-12-21 at [facility].

## 2021-04-09 ENCOUNTER — TRANSFERRED RECORDS (OUTPATIENT)
Dept: HEALTH INFORMATION MANAGEMENT | Facility: OTHER | Age: 71
End: 2021-04-09

## 2021-04-09 LAB
LABORATORY COMMENT REPORT: NORMAL
SARS-COV-2 RNA RESP QL NAA+PROBE: NEGATIVE
SPECIMEN SOURCE: NORMAL

## 2021-04-12 ENCOUNTER — ANESTHESIA (OUTPATIENT)
Dept: SURGERY | Facility: OTHER | Age: 71
End: 2021-04-12
Payer: OTHER GOVERNMENT

## 2021-04-12 ENCOUNTER — ANESTHESIA EVENT (OUTPATIENT)
Dept: SURGERY | Facility: OTHER | Age: 71
End: 2021-04-12
Payer: OTHER GOVERNMENT

## 2021-04-12 ENCOUNTER — HOSPITAL ENCOUNTER (OUTPATIENT)
Facility: OTHER | Age: 71
Discharge: HOME | End: 2021-04-12
Attending: SURGERY | Admitting: SURGERY
Payer: OTHER GOVERNMENT

## 2021-04-12 VITALS
HEART RATE: 83 BPM | HEIGHT: 64 IN | TEMPERATURE: 97.2 F | DIASTOLIC BLOOD PRESSURE: 51 MMHG | RESPIRATION RATE: 16 BRPM | WEIGHT: 170 LBS | SYSTOLIC BLOOD PRESSURE: 133 MMHG | OXYGEN SATURATION: 94 % | BODY MASS INDEX: 29.02 KG/M2

## 2021-04-12 PROCEDURE — 258N000003 HC RX IP 258 OP 636: Performed by: SURGERY

## 2021-04-12 PROCEDURE — 45378 DIAGNOSTIC COLONOSCOPY: CPT | Performed by: SURGERY

## 2021-04-12 PROCEDURE — 99100 ANES PT EXTEME AGE<1 YR&>70: CPT | Performed by: NURSE ANESTHETIST, CERTIFIED REGISTERED

## 2021-04-12 PROCEDURE — 250N000009 HC RX 250: Performed by: NURSE ANESTHETIST, CERTIFIED REGISTERED

## 2021-04-12 PROCEDURE — 250N000011 HC RX IP 250 OP 636: Performed by: NURSE ANESTHETIST, CERTIFIED REGISTERED

## 2021-04-12 PROCEDURE — 45378 DIAGNOSTIC COLONOSCOPY: CPT | Performed by: NURSE ANESTHETIST, CERTIFIED REGISTERED

## 2021-04-12 PROCEDURE — G0105 COLORECTAL SCRN; HI RISK IND: HCPCS | Performed by: SURGERY

## 2021-04-12 PROCEDURE — 999N000010 HC STATISTIC ANES STAT CODE-CRNA PER MINUTE: Performed by: SURGERY

## 2021-04-12 RX ORDER — NALOXONE HYDROCHLORIDE 0.4 MG/ML
0.4 INJECTION, SOLUTION INTRAMUSCULAR; INTRAVENOUS; SUBCUTANEOUS
Status: DISCONTINUED | OUTPATIENT
Start: 2021-04-12 | End: 2021-04-12 | Stop reason: HOSPADM

## 2021-04-12 RX ORDER — FLUMAZENIL 0.1 MG/ML
0.2 INJECTION, SOLUTION INTRAVENOUS
Status: DISCONTINUED | OUTPATIENT
Start: 2021-04-12 | End: 2021-04-12 | Stop reason: HOSPADM

## 2021-04-12 RX ORDER — LIDOCAINE 40 MG/G
CREAM TOPICAL
Status: DISCONTINUED | OUTPATIENT
Start: 2021-04-12 | End: 2021-04-12 | Stop reason: HOSPADM

## 2021-04-12 RX ORDER — PROPOFOL 10 MG/ML
INJECTION, EMULSION INTRAVENOUS CONTINUOUS PRN
Status: DISCONTINUED | OUTPATIENT
Start: 2021-04-12 | End: 2021-04-12

## 2021-04-12 RX ORDER — SODIUM CHLORIDE, SODIUM LACTATE, POTASSIUM CHLORIDE, CALCIUM CHLORIDE 600; 310; 30; 20 MG/100ML; MG/100ML; MG/100ML; MG/100ML
INJECTION, SOLUTION INTRAVENOUS CONTINUOUS
Status: DISCONTINUED | OUTPATIENT
Start: 2021-04-12 | End: 2021-04-12 | Stop reason: HOSPADM

## 2021-04-12 RX ORDER — LIDOCAINE HYDROCHLORIDE 20 MG/ML
INJECTION, SOLUTION INFILTRATION; PERINEURAL PRN
Status: DISCONTINUED | OUTPATIENT
Start: 2021-04-12 | End: 2021-04-12

## 2021-04-12 RX ORDER — NALOXONE HYDROCHLORIDE 0.4 MG/ML
0.2 INJECTION, SOLUTION INTRAMUSCULAR; INTRAVENOUS; SUBCUTANEOUS
Status: DISCONTINUED | OUTPATIENT
Start: 2021-04-12 | End: 2021-04-12 | Stop reason: HOSPADM

## 2021-04-12 RX ORDER — PROPOFOL 10 MG/ML
INJECTION, EMULSION INTRAVENOUS PRN
Status: DISCONTINUED | OUTPATIENT
Start: 2021-04-12 | End: 2021-04-12

## 2021-04-12 RX ORDER — ONDANSETRON 2 MG/ML
4 INJECTION INTRAMUSCULAR; INTRAVENOUS
Status: DISCONTINUED | OUTPATIENT
Start: 2021-04-12 | End: 2021-04-12 | Stop reason: HOSPADM

## 2021-04-12 RX ADMIN — LIDOCAINE HYDROCHLORIDE 40 MG: 20 INJECTION, SOLUTION INFILTRATION; PERINEURAL at 09:38

## 2021-04-12 RX ADMIN — PROPOFOL 100 MG: 10 INJECTION, EMULSION INTRAVENOUS at 09:38

## 2021-04-12 RX ADMIN — SODIUM CHLORIDE, POTASSIUM CHLORIDE, SODIUM LACTATE AND CALCIUM CHLORIDE: 600; 310; 30; 20 INJECTION, SOLUTION INTRAVENOUS at 08:48

## 2021-04-12 RX ADMIN — PROPOFOL 140 MCG/KG/MIN: 10 INJECTION, EMULSION INTRAVENOUS at 09:38

## 2021-04-12 ASSESSMENT — MIFFLIN-ST. JEOR: SCORE: 1266.11

## 2021-04-12 NOTE — ANESTHESIA POSTPROCEDURE EVALUATION
Patient: Monisha Velez    Procedure(s):  COLONOSCOPY    Diagnosis:Screening for colon cancer [Z12.11]  Diagnosis Additional Information: No value filed.    Anesthesia Type:  MAC    Note:  Disposition: Outpatient   Postop Pain Control: Uneventful            Sign Out: Well controlled pain   PONV: No   Neuro/Psych: Uneventful            Sign Out: Acceptable/Baseline neuro status   Airway/Respiratory: Uneventful            Sign Out: Acceptable/Baseline resp. status   CV/Hemodynamics: Uneventful            Sign Out: Acceptable CV status   Other NRE: NONE   DID A NON-ROUTINE EVENT OCCUR? No         Last vitals:  Vitals:    04/12/21 0953 04/12/21 1000 04/12/21 1015   BP: 114/65 114/77 130/71   Pulse: 62 72 60   Resp: 16     Temp: 97.2  F (36.2  C)     SpO2: 99% 95% 96%       Last vitals prior to Anesthesia Care Transfer:  CRNA VITALS  4/12/2021 0920 - 4/12/2021 1020      4/12/2021             Resp Rate (set):  10          Electronically Signed By: JERRELL Camargo CRNA  April 12, 2021  10:48 AM

## 2021-04-12 NOTE — OP NOTE
PROCEDURE NOTE    DATE OF SERVICE: 4/12/2021    SURGEON: Roberto Anderson MD    PRE-OP DIAGNOSIS:    History of Polyps        POST-OP DIAGNOSIS:  Same  Normal Exam      PROCEDURE:   Colonoscopy      ANESTHESIA:  GAGE Downey CRNA    INDICATION FOR THE PROCEDURE: The patient is a 71 year old female with h/o polyps . The patient has no other complaints  . After explaining the risks to include bleeding, perforation, potential inability toreach the cecum, the patient wished to proceed.    PROCEDURE:After adequate sedation, the patient was in the left lateral decubitus position.  Rectal exam was performed.  There was normal tone and no palpable masses .  The colonoscope was introduced into the rectum and advanced to the cecum with Mild difficulty.  The patient's prep was excellent.  The terminal cecum was reached.  The cecum, ascending, transverse, descending and sigmoid colon was unremarkable .  The scope was retroflexed in the rectum.  The rectum was unremarkable  .  The scope was straightened and removed.  The patient tolerated the procedure well.     ESTIMATED BLOOD LOSS: none    COMPLICATIONS:  None    TISSUE REMOVED:  No    RECOMMEND:      Follow-up N/A as would be over 79 in 10 years    Roberto Anderson MD FACS

## 2021-04-12 NOTE — ANESTHESIA PREPROCEDURE EVALUATION
Anesthesia Pre-Procedure Evaluation    Patient: Monisha Velez   MRN: [id number] : 1950        Preoperative Diagnosis: Screening for colon cancer [Z12.11]   Procedure : Procedure(s):  COLONOSCOPY     Past Medical History:   Diagnosis Date     Adnexal mass 2011     Contact dermatitis 2009     Diplopia     Episode of mild diplopia secondary to sinusitis, ophthalmology consult 2008.     H/O gastritis      History of adenomatous polyp of colon      Postmenopausal atrophic vaginitis 2011     Restless leg syndrome      Retention of urine 2011     Rosacea      Tinea corporis 2010     Urge incontinence of urine       Past Surgical History:   Procedure Laterality Date     COLONOSCOPY  2016    Tubular adenoma, Next in 5 years     CYSTOSCOPY  2013    [facility]     DENTAL SURGERY      Root canal,  Dr. Olmos.     ENDOSCOPIC SINUS SURGERY       ESOPHAGOSCOPY, GASTROSCOPY, DUODENOSCOPY (EGD), COMBINED  2016     HYSTERECTOMY VAGINAL      Endometriosis     LAPAROSCOPIC CYSTECTOMY OVARIAN (BENIGN)  2011    cystectomy with lysis of adhesions and possible oophrectomy  by Dr. Gay Mcgovern  at [facility]     ROTATOR CUFF REPAIR RT/LT Right 2017    Dr John     SALPINGO-OOPHORECTOMY BILATERAL  10/2007    bilateral ovarian serous cystadenofibromas, benign.      Allergies   Allergen Reactions     Penicillins Hives     Sulfa Drugs Hives     Codeine Sulfate Other (See Comments)     nightmares     Morphine Sulfate Other (See Comments)     Cant sleep     Venlafaxine Hives     'bad reaction      Zolpidem Tartrate      Paresthesias; stomach upset        Social History     Tobacco Use     Smoking status: Former Smoker     Packs/day: 0.50     Quit date: 1972     Years since quittin.7     Smokeless tobacco: Never Used   Substance Use Topics     Alcohol use: Yes     Alcohol/week: 0.0 standard drinks     Frequency: 4 or more times a  week     Comment:  has slowed down a lot since huband's injury      Wt Readings from Last 1 Encounters:   04/12/21 77.1 kg (170 lb)        Anesthesia Evaluation   Pt has had prior anesthetic.         ROS/MED HX  ENT/Pulmonary: Comment: Reactive airway disease that is not asthma      Neurologic:  - neg neurologic ROS     Cardiovascular:       METS/Exercise Tolerance: 4 - Raking leaves, gardening    Hematologic:  - neg hematologic  ROS     Musculoskeletal: Comment: Restless leg syndrome    (+) arthritis,     GI/Hepatic: Comment: H/O adenomatous polyp of colon    (+) GERD,     Renal/Genitourinary: Comment: Stage III renal disease    (+) renal disease, type: CRI, Pt does not require dialysis,     Endo:  - neg endo ROS     Psychiatric/Substance Use:     (+) psychiatric history anxiety     Infectious Disease:  - neg infectious disease ROS     Malignancy:  - neg malignancy ROS     Other:            Physical Exam    Airway        Mallampati: II   TM distance: > 3 FB   Neck ROM: full   Mouth opening: > 3 cm    Respiratory Devices and Support         Dental  no notable dental history     (+) caps      Cardiovascular   cardiovascular exam normal       Rhythm and rate: regular and normal     Pulmonary   pulmonary exam normal        breath sounds clear to auscultation           OUTSIDE LABS:  CBC:   Lab Results   Component Value Date    WBC 5.7 02/09/2021    WBC 4.2 07/01/2020    HGB 14.4 02/09/2021    HGB 13.8 07/01/2020    HCT 42.9 02/09/2021    HCT 41.4 07/01/2020     02/09/2021     07/01/2020     BMP:   Lab Results   Component Value Date     02/09/2021     01/11/2021    POTASSIUM 4.3 02/09/2021    POTASSIUM 4.4 01/11/2021    CHLORIDE 106 02/09/2021    CHLORIDE 106 01/11/2021    CO2 29 02/09/2021    CO2 29 01/11/2021    BUN 16 02/09/2021    BUN 14 01/11/2021    CR 1.11 02/09/2021    CR 1.04 01/11/2021    GLC 97 02/09/2021     (H) 01/11/2021     COAGS:   Lab Results   Component Value Date     PTT 27 10/17/2007    INR 1.04 10/17/2007     POC: No results found for: BGM, HCG, HCGS  HEPATIC:   Lab Results   Component Value Date    ALBUMIN 4.0 02/09/2021    PROTTOTAL 6.5 02/09/2021    ALT 17 02/09/2021    AST 18 02/09/2021    ALKPHOS 52 02/09/2021    BILITOTAL 0.4 02/09/2021     OTHER:   Lab Results   Component Value Date    A1C 5.9 01/11/2021    DENISSE 9.3 02/09/2021    PHOS 3.4 01/11/2021    T4 8.85 05/21/2015       Anesthesia Plan    ASA Status:  2   NPO Status:  NPO Appropriate    Anesthesia Type: MAC.              Consents    Anesthesia Plan(s) and associated risks, benefits, and realistic alternatives discussed. Questions answered and patient/representative(s) expressed understanding.     - Discussed with:  Patient         Postoperative Care            Comments:                David Kellerman, APRN CRNA

## 2021-04-12 NOTE — H&P
History and Physical    CHIEF COMPLAINT / REASON FOR PROCEDURE:  H/o polyps    PERTINENT HISTORY   Patient is a 71 year old female who presents today for colonoscopy for h/o polyps.   Last colonoscopy 2016.   Patient has no complaints.    Past Medical History:   Diagnosis Date     Adnexal mass 08/22/2011     Contact dermatitis 12/16/2009     Diplopia     Episode of mild diplopia secondary to sinusitis, ophthalmology consult 07/2008.     H/O gastritis      History of adenomatous polyp of colon      Postmenopausal atrophic vaginitis 07/29/2011     Restless leg syndrome      Retention of urine 08/22/2011     Rosacea      Tinea corporis 11/19/2010     Urge incontinence of urine      Past Surgical History:   Procedure Laterality Date     COLONOSCOPY  03/18/2016    Tubular adenoma, Next in 5 years     CY[facility]COPY  01/2013    [facility]     DENTAL SURGERY  2005    Root canal,  Dr. Olmos.     ENDOSCOPIC SINUS SURGERY  1993     ESOPHAGOSCOPY, GASTROSCOPY, DUODENOSCOPY (EGD), COMBINED  03/18/2016     HYSTERECTOMY VAGINAL  1990    Endometriosis     LAPAROSCOPIC CYSTECTOMY OVARIAN (BENIGN)  09/27/2011    cystectomy with lysis of adhesions and possible oophrectomy  by Dr. Gay Mcgovern  at [facility]     ROTATOR CUFF REPAIR RT/LT Right 12/28/2017    Dr John     SALPINGO-OOPHORECTOMY BILATERAL  10/2007    bilateral ovarian serous cystadenofibromas, benign.       Bleeding tendencies:  No    ALLERGIES/SENSITIVITIES:   Allergies   Allergen Reactions     Penicillins Hives     Sulfa Drugs Hives     Codeine Sulfate Other (See Comments)     nightmares     Morphine Sulfate Other (See Comments)     Cant sleep     Venlafaxine Hives     'bad reaction      Zolpidem Tartrate      Paresthesias; stomach upset          CURRENT MEDICATIONS:    Prior to Admission medications    Medication Sig Start Date End Date Taking? Authorizing Provider   aspirin EC 81 MG EC tablet Take 81 mg by mouth daily with food 8/20/12  Yes  "Reported, Patient   atorvastatin (LIPITOR) 10 MG tablet Take 1 tablet (10 mg) by mouth daily 1/11/21  Yes Raeann Pearson DO   estradiol (ESTRACE) 0.5 MG tablet Take 0.5-1 tablets (0.25-0.5 mg) by mouth daily 2/22/21  Yes Nafisa Escobar NP   melatonin 5 MG tablet Take 5 mg by mouth nightly as needed for sleep   Yes Reported, Patient   Multiple Vitamin (MULTIVITAMIN OR) Take 1 tablet by mouth daily.   Yes Reported, Patient   Stress Formula/Zinc tablet Take 1 tablet by mouth daily 2/9/21  Yes Nafisa Escobar NP   tolterodine ER (DETROL LA) 2 MG 24 hr capsule Take 1 capsule (2 mg) by mouth daily 8/12/20  Yes Nafisa Escobar NP   Vitamin D3 (CHOLECALCIFEROL) 25 mcg (1000 units) tablet Take 2-3 tablets by mouth daily   Yes Reported, Patient       Physical Exam:  /56   Pulse 79   Temp 97.1  F (36.2  C) (Tympanic)   Resp 20   Ht 1.626 m (5' 4\")   Wt 77.1 kg (170 lb)   SpO2 98%   BMI 29.18 kg/m    EXAM:  Chest/Respiratory Exam: Normal - Clear to auscultation without rales, rhonchi, or wheezing.  Cardiovascular Exam: normal, regular rate and rhythm      PLAN: COLONOSCOPY .  Patient understands risks of bleeding, perforation, potential inability to reach cecum, aspiration and wishes to proceed. MAC needed for age.    "

## 2021-04-12 NOTE — DISCHARGE INSTRUCTIONS
[facility]  Adult Discharge Orders & Instructions       For 12 hours after surgery  1. Get plenty of rest.  A responsible adult must stay with you for at least 12 hours after you leave the hospital.   2. You may feel lightheaded.  IF so, sit for a few minutes before standing.  Have someone help you get up.   3. You may have a slight fever. Call the doctor if your fever is over 101 F (38.3 C) (taken under the tongue) or lasts longer than 24 hours.  4. You may have a dry mouth, a sore throat, muscle aches or trouble sleeping.  These should go away after 24 hours.  5. Do not make important or legal decisions.  6.   Do not drive or use heavy equipment.  If you have weakness or tingling, don't drive or use heavy equipment until this feeling goes away.    To contact a doctor, call   [phone]

## 2021-04-19 ENCOUNTER — TELEPHONE (OUTPATIENT)
Dept: INTERNAL MEDICINE | Facility: OTHER | Age: 71
End: 2021-04-19

## 2021-04-19 NOTE — TELEPHONE ENCOUNTER
SK-patient is asking about tests that were done last week and the letter that was sent out regarding her left ventricl      Please call and advise    Thank You    Jessy Ramirez on 4/19/2021 at 12:50 PM

## 2021-04-19 NOTE — TELEPHONE ENCOUNTER
She had some blood drawn for her insurance company ([employer]) and brought a copy to us (scanned in under Media and dated 4/14/21)  She has concerns about the BNP and the Triglycerides. She says she is not drinking more than maybe one -two beers per week.  Tamika Goldstein CMA([facility])..................4/19/2021   1:54 PM

## 2021-04-20 NOTE — TELEPHONE ENCOUNTER
These levels are just minimally elevated and at this time I am not concerned.  She should reduce her intake of carbs and refined sugars to help the triglycerides.  She should follow up in clinic as recommended and we can plan to recheck the labs in the future.

## 2021-04-20 NOTE — TELEPHONE ENCOUNTER
After proper verification, patient was relayed message from below.  Akua Fox LPN on 4/20/2021 at 1:44 PM

## 2021-05-28 DIAGNOSIS — N95.1 PERIMENOPAUSAL VASOMOTOR SYMPTOMS: ICD-10-CM

## 2021-05-28 RX ORDER — ESTRADIOL 0.5 MG/1
TABLET ORAL
Qty: 90 TABLET | Refills: 0 | Status: SHIPPED | OUTPATIENT
Start: 2021-05-28 | End: 2021-07-14

## 2021-05-28 NOTE — TELEPHONE ENCOUNTER
Prescription refilled per RN Medication Refill Policy..................Ramona Sue RN 5/28/2021 11:29 AM

## 2021-06-03 DIAGNOSIS — E78.2 MIXED HYPERLIPIDEMIA: ICD-10-CM

## 2021-06-03 DIAGNOSIS — R73.03 PREDIABETES: ICD-10-CM

## 2021-06-03 RX ORDER — ATORVASTATIN CALCIUM 10 MG/1
TABLET, FILM COATED ORAL
Qty: 90 TABLET | Refills: 0 | Status: SHIPPED | OUTPATIENT
Start: 2021-06-03 | End: 2021-07-14

## 2021-06-03 NOTE — TELEPHONE ENCOUNTER
Prescription approved per [facility] Refill Protocol.  LOV; 2/9/2021  Last Lipid: 7/1/2020    Kendy Stroud RN on 6/3/2021 at 3:40 PM

## 2021-07-13 ENCOUNTER — TELEPHONE (OUTPATIENT)
Dept: CARDIOLOGY | Facility: OTHER | Age: 71
End: 2021-07-13

## 2021-07-13 NOTE — TELEPHONE ENCOUNTER
Pt called verified her  and had questions re:  Date and time of her next appointment.  This was given to her.

## 2021-07-14 ENCOUNTER — OFFICE VISIT (OUTPATIENT)
Dept: INTERNAL MEDICINE | Facility: OTHER | Age: 71
End: 2021-07-14
Attending: INTERNAL MEDICINE
Payer: OTHER GOVERNMENT

## 2021-07-14 VITALS
DIASTOLIC BLOOD PRESSURE: 82 MMHG | SYSTOLIC BLOOD PRESSURE: 140 MMHG | RESPIRATION RATE: 16 BRPM | BODY MASS INDEX: 28.75 KG/M2 | HEIGHT: 64 IN | HEART RATE: 59 BPM | OXYGEN SATURATION: 96 % | TEMPERATURE: 97.7 F | WEIGHT: 168.4 LBS

## 2021-07-14 DIAGNOSIS — Z23 NEED FOR DIPHTHERIA-TETANUS-PERTUSSIS (TDAP) VACCINE: ICD-10-CM

## 2021-07-14 DIAGNOSIS — R06.83 SNORING: ICD-10-CM

## 2021-07-14 DIAGNOSIS — N95.1 PERIMENOPAUSAL VASOMOTOR SYMPTOMS: ICD-10-CM

## 2021-07-14 DIAGNOSIS — M65.341 TRIGGER RING FINGER OF RIGHT HAND: ICD-10-CM

## 2021-07-14 DIAGNOSIS — R03.0 ELEVATED BP WITHOUT DIAGNOSIS OF HYPERTENSION: ICD-10-CM

## 2021-07-14 DIAGNOSIS — E78.2 MIXED HYPERLIPIDEMIA: ICD-10-CM

## 2021-07-14 DIAGNOSIS — R73.03 PREDIABETES: ICD-10-CM

## 2021-07-14 DIAGNOSIS — Z00.00 ENCOUNTER FOR MEDICARE ANNUAL WELLNESS EXAM: Primary | ICD-10-CM

## 2021-07-14 DIAGNOSIS — N39.46 MIXED INCONTINENCE: ICD-10-CM

## 2021-07-14 LAB
ALBUMIN SERPL-MCNC: 4.3 G/DL (ref 3.5–5.7)
ALP SERPL-CCNC: 49 U/L (ref 34–104)
ALT SERPL W P-5'-P-CCNC: 19 U/L (ref 7–52)
ANION GAP SERPL CALCULATED.3IONS-SCNC: 8 MMOL/L (ref 3–14)
AST SERPL W P-5'-P-CCNC: 20 U/L (ref 13–39)
BILIRUB SERPL-MCNC: 0.4 MG/DL (ref 0.3–1)
BUN SERPL-MCNC: 17 MG/DL (ref 7–25)
CALCIUM SERPL-MCNC: 9.1 MG/DL (ref 8.6–10.3)
CHLORIDE BLD-SCNC: 106 MMOL/L (ref 98–107)
CHOLEST SERPL-MCNC: 163 MG/DL
CO2 SERPL-SCNC: 25 MMOL/L (ref 21–31)
CREAT SERPL-MCNC: 1.06 MG/DL (ref 0.6–1.2)
FASTING STATUS PATIENT QL REPORTED: NORMAL
GFR SERPL CREATININE-BSD FRML MDRD: 53 ML/MIN/1.73M2
GLUCOSE BLD-MCNC: 101 MG/DL (ref 70–105)
HDLC SERPL-MCNC: 69 MG/DL (ref 23–92)
HOLD SPECIMEN: NORMAL
HOLD SPECIMEN: NORMAL
LDLC SERPL CALC-MCNC: 75 MG/DL
NONHDLC SERPL-MCNC: 94 MG/DL
POTASSIUM BLD-SCNC: 4.4 MMOL/L (ref 3.5–5.1)
PROT SERPL-MCNC: 7 G/DL (ref 6.4–8.9)
SODIUM SERPL-SCNC: 139 MMOL/L (ref 134–144)
TRIGL SERPL-MCNC: 97 MG/DL

## 2021-07-14 PROCEDURE — 90715 TDAP VACCINE 7 YRS/> IM: CPT | Performed by: INTERNAL MEDICINE

## 2021-07-14 PROCEDURE — 36415 COLL VENOUS BLD VENIPUNCTURE: CPT | Mod: ZL | Performed by: INTERNAL MEDICINE

## 2021-07-14 PROCEDURE — 90471 IMMUNIZATION ADMIN: CPT | Performed by: INTERNAL MEDICINE

## 2021-07-14 PROCEDURE — 80061 LIPID PANEL: CPT | Mod: ZL | Performed by: INTERNAL MEDICINE

## 2021-07-14 PROCEDURE — 99214 OFFICE O/P EST MOD 30 MIN: CPT | Mod: 25 | Performed by: INTERNAL MEDICINE

## 2021-07-14 PROCEDURE — G0439 PPPS, SUBSEQ VISIT: HCPCS | Performed by: INTERNAL MEDICINE

## 2021-07-14 PROCEDURE — 82040 ASSAY OF SERUM ALBUMIN: CPT | Mod: ZL | Performed by: INTERNAL MEDICINE

## 2021-07-14 RX ORDER — TRIAMCINOLONE ACETONIDE 1 MG/G
CREAM TOPICAL
COMMUNITY
Start: 2021-05-27 | End: 2023-03-10

## 2021-07-14 RX ORDER — ATORVASTATIN CALCIUM 10 MG/1
10 TABLET, FILM COATED ORAL
Qty: 90 TABLET | Refills: 3 | Status: SHIPPED | OUTPATIENT
Start: 2021-07-14 | End: 2022-02-15

## 2021-07-14 RX ORDER — TOLTERODINE 2 MG/1
2 CAPSULE, EXTENDED RELEASE ORAL DAILY
Qty: 90 CAPSULE | Refills: 3 | Status: SHIPPED | OUTPATIENT
Start: 2021-07-14 | End: 2022-07-14

## 2021-07-14 RX ORDER — CLOSTRIDIUM TETANI TOXOID ANTIGEN (FORMALDEHYDE INACTIVATED), CORYNEBACTERIUM DIPHTHERIAE TOXOID ANTIGEN (FORMALDEHYDE INACTIVATED), BORDETELLA PERTUSSIS TOXOID ANTIGEN (GLUTARALDEHYDE INACTIVATED), BORDETELLA PERTUSSIS FILAMENTOUS HEMAGGLUTININ ANTIGEN (FORMALDEHYDE INACTIVATED), BORDETELLA PERTUSSIS PERTACTIN ANTIGEN, AND BORDETELLA PERTUSSIS FIMBRIAE 2/3 ANTIGEN 5; 2; 2.5; 5; 3; 5 [LF]/.5ML; [LF]/.5ML; UG/.5ML; UG/.5ML; UG/.5ML; UG/.5ML
0.5 INJECTION, SUSPENSION INTRAMUSCULAR ONCE
Qty: 0.5 ML | Refills: 0 | Status: SHIPPED | OUTPATIENT
Start: 2021-07-14 | End: 2021-07-14

## 2021-07-14 RX ORDER — ESTRADIOL 0.5 MG/1
0.5 TABLET ORAL DAILY
Qty: 90 TABLET | Refills: 3 | Status: SHIPPED | OUTPATIENT
Start: 2021-07-14 | End: 2022-07-25

## 2021-07-14 RX ORDER — DIPHENHYDRAMINE HCL 25 MG
25 TABLET ORAL
COMMUNITY
End: 2022-05-17

## 2021-07-14 ASSESSMENT — ANXIETY QUESTIONNAIRES
7. FEELING AFRAID AS IF SOMETHING AWFUL MIGHT HAPPEN: NOT AT ALL
GAD7 TOTAL SCORE: 5
6. BECOMING EASILY ANNOYED OR IRRITABLE: NOT AT ALL
5. BEING SO RESTLESS THAT IT IS HARD TO SIT STILL: NOT AT ALL
IF YOU CHECKED OFF ANY PROBLEMS ON THIS QUESTIONNAIRE, HOW DIFFICULT HAVE THESE PROBLEMS MADE IT FOR YOU TO DO YOUR WORK, TAKE CARE OF THINGS AT HOME, OR GET ALONG WITH OTHER PEOPLE: NOT DIFFICULT AT ALL
3. WORRYING TOO MUCH ABOUT DIFFERENT THINGS: NEARLY EVERY DAY
2. NOT BEING ABLE TO STOP OR CONTROL WORRYING: NOT AT ALL
1. FEELING NERVOUS, ANXIOUS, OR ON EDGE: MORE THAN HALF THE DAYS

## 2021-07-14 ASSESSMENT — MIFFLIN-ST. JEOR: SCORE: 1262.83

## 2021-07-14 ASSESSMENT — PAIN SCALES - GENERAL: PAINLEVEL: NO PAIN (0)

## 2021-07-14 ASSESSMENT — PATIENT HEALTH QUESTIONNAIRE - PHQ9: 5. POOR APPETITE OR OVEREATING: NOT AT ALL

## 2021-07-14 NOTE — NURSING NOTE
Immunization Documentation    Prior to TDAP Immunization administration, verified patients identity using patient's name and date of birth. Please see IMMUNIZATIONS  and order for additional information.  Patient / Parent instructed to remain in clinic for 15 minutes and report any adverse reaction to staff immediately.    Was the entire amount of vaccines given used? Yes    Anayeli Alarcon LPN  7/14/2021   12:23 PM

## 2021-07-14 NOTE — PATIENT INSTRUCTIONS
Switch Atorvastatin to three times weekly.  If this doesn't reduce your cough, hold your Tolterodine for 1-2 weeks and see if this helps.    - Return/call as needed for follow-up should any new symptoms develop, for worsening of current symptoms or if symptoms do not resolve with above plan.      Patient Education   Personalized Prevention Plan  You are due for the preventive services outlined below.  Your care team is available to assist you in scheduling these services.  If you have already completed any of these items, please share that information with your care team to update in your medical record.  Health Maintenance Due   Topic Date Due     Diptheria Tetanus Pertussis (DTAP/TDAP/TD) Vaccine (2 - Td or Tdap) 01/20/2021     Annual Wellness Visit  07/09/2021         Patient Education     Treating Trigger Finger    Trigger finger occurs when the tissue inside your finger or thumb becomes inflamed. Mild cases can be treated without surgery. If the problem is severe, surgery may be needed. Your healthcare provider will talk with you about your options.  Nonsurgical treatment  For mild symptoms, your healthcare provider may have you rest the finger or thumb. You may also be told to take anti-inflammatory medicines. These include ibuprofen or aspirin. You may be given an injection of medicine in the base of the finger or thumb. This typically is a steroid, such as cortisone.  Surgery  If nonsurgical treatments don t ease your symptoms, you may need surgery. A tendon is a cordlike fiber that attaches muscle to bone and allows joints to bend. The tendon is surrounded by a protective cover called a sheath. During surgery, the sheath in your finger or thumb is opened to enlarge the space and release the swollen tendon. This allows the finger or thumb to bend and straighten normally. Surgery takes about 20 minutes. It can often be done using a local anesthetic. You may also be sedated. You will likely be able to go home  the same day. Your hand will be wrapped in a soft bandage. You may need to wear a plaster splint for a short time to keep the finger or thumb still as it heals. The stitches will be removed in about 2 weeks. Your healthcare provider will talk with you about the risks and benefits of surgery.  Naty last reviewed this educational content on 1/1/2018 2000-2021 [employer]. All rights reserved. This information is not intended as a substitute for professional medical care. Always follow your healthcare professional's instructions.

## 2021-07-14 NOTE — NURSING NOTE
Patient presents to clinic today for annual Medicare wellness visit.    Medication reconciliation completed.    FOOD SECURITY SCREENING QUESTIONS  Hunger Vital Signs:  Within the past 12 months we worried whether our food would run out before we got money to buy more. Never  Within the past 12 months the food we bought just didn't last and we didn't have money to get more. Never    Tamika Goldstein CMA([facility])..................7/14/2021   10:33 AM

## 2021-07-14 NOTE — PROGRESS NOTES
"Medicare Wellness Visit   Ms. Velez is a 71 year old female who presents today who presents for Preventive Visit.      Are you in the first 12 months of your Medicare coverage?  No    Health Risk Assessment     Do you feel safe in your environment? Yes    Physical Health:    In general, how would you rate your overall physical health? good    Outside of work, how many days during the week do you exercise? none    Outside of work, approximately how many minutes a day do you exercise?not applicable    If you drink alcohol do you typically have >3 drinks per day or >7 drinks per week? No    Do you usually eat at least 4 servings of fruit and vegetables a day, include whole grains & fiber and avoid regularly eating high fat or \"junk\" foods? Yes    Do you have any problems taking medications regularly?  No    Do you have any side effects from medications? maybe a slight cough    Needs assistance for the following daily activities: no assistance needed    Which of the following safety concerns are present in your home?  lack of grab bars in the bathroom     Hearing impairment: Yes, wears hearing aids    In the past 6 months, have you been bothered by leaking of urine? no      Screening     Fall risk  Fallen 2 or more times in the past year?: No  Any fall with injury in the past year?: No    Cognitive Screening   1) Repeat 3 items (Apple, Table, Catia)    2) Clock draw: NORMAL  3) 3 item recall: Recalls 3 objects  Results: 3 items recalled: COGNITIVE IMPAIRMENT LESS LIKELY    Mini-CogTM Copyright S Aron. Licensed by the author for use in [facility]; reprinted with permission ([email or web address]). All rights reserved.      Do you have sleep apnea, excessive snoring or daytime drowsiness?: yes, snores a lot     Breast cancer screenin2020    Regular dental visits: every six months or more    Eye exam with in 2 years: scheduled for next Friday      End of Life Planning     Have you ever done Advance Care " Planning? (For example, a Health Directive, POLST, or a discussion with a medical provider or your loved ones about your wishes): Yes, advance care planning is on file.      End of Life Planning:  Patient currently has an advanced directive: Yes.  Practitioner is supportive of decision.        Medical Record Update     Reviewed and updated as needed this visit by clinical staff  Tobacco  Allergies  Meds  Med Hx  Surg Hx  Fam Hx  Soc Hx      Reviewed and updated as needed this visit by Provider  Tobacco  Allergies  Meds  Problems  Med Hx  Surg Hx  Fam Hx          Current Outpatient Medications   Medication     aspirin EC 81 MG EC tablet     atorvastatin (LIPITOR) 10 MG tablet     diphenhydrAMINE (BENADRYL) 25 MG tablet     estradiol (ESTRACE) 0.5 MG tablet     Multiple Vitamin (MULTIVITAMIN OR)     Stress Formula/Zinc tablet     Tdap, tetanus-diphtheria-acell pertussis, (ADACEL) 5-2-15.5 LF-MCG/0.5 injection     tolterodine ER (DETROL LA) 2 MG 24 hr capsule     triamcinolone (KENALOG) 0.1 % external cream     Vitamin D3 (CHOLECALCIFEROL) 25 mcg (1000 units) tablet     melatonin 5 MG tablet     No current facility-administered medications for this visit.          Current providers sharing in care for this patient include:   Patient Care Team:  Raeann Pearson DO as PCP - General (Internal Medicine)  Jamison Zimmerman MD as Assigned Surgical Provider  Nafisa Escobar NP as Nurse Practitioner (Internal Medicine)  Raeann Pearson DO as Assigned PCP     Subjective:   She is due for renewal of her medications.  She continues on estradiol 0.5 mg daily for perimenopausal symptoms.  She denies any obvious side effects.  She has been on this a long time.  She continues on tolterodine for her bladder symptoms.  She does have some dry mouth with this.    She is having trigger finger on the right hand, 4th finger.  She can get it straightened.      She is waking a lot at night.  She snores loudly.  No obvious  "apnea episodes.  She does have some depression off and on.  She is curious about having a sleep study.    She has had a cough for the past 4-6 months.  No real SOB, no chest pain.  No significant acid reflux.  She does get post nasal drainage.  She is curious if her cholesterol medication is contributing as it seems like her symptoms started after starting on the atorvastatin.    She has a history of prediabetes and is due for recheck of her blood sugars.  She is noted to have an elevated blood pressure today.    Review of Systems     GEN: -fevers/-chills/-night sweats/+wt change with intentional 5 lb decrease  NEURO: -headaches/-vision changes  EARS: -hearing changes; has hearing aides now  NOSE: -drainage/-congestion  MOUTH/THROAT: - sore throat/+occasional dysphagia/-sores  LUNGS: -sob/-cough  CARDIOVASCULAR: -cp/-palpitations  ABD: -pain/-change in bowels/-bloody stools  : -change in bladder/-sores/-vaginal discharge or bleeding  HEMATOLOGIC/LYMPHATIC: -swollen nodes  SKIN: -rashes/-lesions - some spider veins on legs  MSK/RHEUM: +joint pain/-joint swelling  NEURO: -weakness/-parasthesias  PSYCH: -depression/-anxiety - depends on the day, but overall ok          OBJECTIVE:     Vitals:    07/14/21 1037   BP: (!) 144/76   BP Location: Right arm   Patient Position: Sitting   Cuff Size: Adult Regular   Pulse: 59   Resp: 16   Temp: 97.7  F (36.5  C)   TempSrc: Tympanic   SpO2: 96%   Weight: 76.4 kg (168 lb 6.4 oz)   Height: 1.632 m (5' 4.25\")        Estimated body mass index is 28.68 kg/m  as calculated from the following:    Height as of this encounter: 1.632 m (5' 4.25\").    Weight as of this encounter: 76.4 kg (168 lb 6.4 oz).     Physical Exam  GEN: Vitals reviewed. Healthy appearing. Patient is in no acute distress. Cooperative with exam.  HEENT: Normocephalic atraumatic.  Eyes grossly normal to inspection.  No discharge or erythema, or obvious scleral/conjunctival abnormalities.   NECK: Supple; no thyromegaly " or masses noted.  No cervical or supraclavicular lymphadenopathy.  CV: Heart regular in rate and rhythm with no murmur.    LUNGS: No audible wheeze, cough, or visible cyanosis.  No visible retractions or increased work of breathing.  Lungs clear to auscultation bilaterally.    ABD:  Nondistended  SKIN: Warm and dry to touch.  Visible skin clear. No significant rash, abnormal pigmentation or lesions.  EXT: No clubbing or cyanosis.  No peripheral edema.  NEURO: Alert and oriented to person, place, and time.  Cranial nerves II-XII grossly intact with no focal or lateralizing deficits.  Muscle tone normal.  Gait normal. No tremor.   MSK: ROM of upper and lower ext symmetric and full.  PSYCH: Mood is good.  Mentation appears normal, affect normal/bright, judgement and insight intact, normal speech and appearance well-groomed.      Labs reviewed in EPIC    ASSESSMENT / PLAN:     Encounter for Medicare annual wellness exam    Prediabetes  -Plan for recheck of blood sugars.  - atorvastatin (LIPITOR) 10 MG tablet  Dispense: 90 tablet; Refill: 3  - Comprehensive Metabolic Panel  - SLEEP EVALUATION & MANAGEMENT REFERRAL - ADULT -    Mixed hyperlipidemia  -At this time we will decrease atorvastatin to 3 times weekly to see if this helps with her cough.  We discussed that it is unlikely to be due to atorvastatin however she will monitor her symptoms.  Plan for recheck of her cholesterol now and in 6 months.  - Lipid Panel  - atorvastatin (LIPITOR) 10 MG tablet  Dispense: 90 tablet; Refill: 3  - SLEEP EVALUATION & MANAGEMENT REFERRAL - ADULT -    Mixed incontinence  We did discuss whether her dry mouth from this medication is causing her cough.  She is to continue on it for now but may benefit from holding it for a period of time.  - tolterodine ER (DETROL LA) 2 MG 24 hr capsule  Dispense: 90 capsule; Refill: 3    Perimenopausal vasomotor symptoms  - Controlled.  Continue current regimen.  Discussed pros and cons of hormonal  therapy ongoing.  - estradiol (ESTRACE) 0.5 MG tablet  Dispense: 90 tablet; Refill: 3    Need for diphtheria-tetanus-pertussis (Tdap) vaccine  Rx given  - Tdap, tetanus-diphtheria-acell pertussis, (ADACEL) 5-2-15.5 LF-MCG/0.5 injection  Dispense: 0.5 mL; Refill: 0    Snoring  - with snoring, daytime somnolence, elevated blood sugars and blood pressure will send for polysomnogram  - this was discussed today in person and patient is agreeable  - patient has never had a sleep study, she is not on oxygen during day or night and has no special needs regarding medications or other  - educated on obesity and need for diet, exercise and weight loss  - SLEEP EVALUATION & MANAGEMENT REFERRAL - ADULT -    Trigger ring finger of right hand  Discussed options including injections, surgery, therapy.  At this time she would like to pursue conservative treatment.  She will call if interested in any referrals.    Elevated BP without diagnosis of hypertension  - Blood pressure today of (!) 146/78   is not at the goal of <140/90 with mild to moderate exacerbation.  - Continue off of medication.  Instructed to check BP at home.  She is to call if it remains elevated at home  - Cautioned patient to monitor with antibiotics, herbals and any OTC medications  - electrolytes and renal function done and pending        Preventative Care Guidelines and Health Counseling     COUNSELING:  Reviewed preventive health counseling  Special attention given to:   Preventative screening  Regular exercise  Healthy diet/nutrition  Bladder control   Immunizations   Reviewed preventive health counseling, as reflected in patient instructions    (!) 144/76       Body mass index is 28.68 kg/m . Weight management plan: Discussed healthy diet and exercise guidelines        Appropriate preventive services were discussed with this patient, including applicable screening as appropriate for cardiovascular disease, diabetes, osteopenia/osteoporosis, and glaucoma.  As  appropriate for age/gender, discussed screening for colorectal cancer, prostate cancer, breast cancer, and cervical cancer. Checklist reviewing preventive services available has been given to the patient.    Reviewed patients plan of care and provided an AVS. The Basic Care Plan (routine screening as documented in Health Maintenance) for patient meets the Care Plan requirement. This Care Plan has been established and reviewed with the Patient and any available family members.    Counseling Resources:  ATP IV Guidelines  Pooled Cohorts Equation Calculator  Breast Cancer Risk Calculator  FRAX Risk Assessment  ICSI Preventive Guidelines  Dietary Guidelines for Americans, 2010  USDA's MyPlate  ASA Prophylaxis  Lung CA Screening    Raeann Pearson DO  7/14/2021  10:40 AM  [facility] AND [facility]

## 2021-07-15 ASSESSMENT — ANXIETY QUESTIONNAIRES: GAD7 TOTAL SCORE: 5

## 2021-08-09 ENCOUNTER — TELEPHONE (OUTPATIENT)
Dept: INTERNAL MEDICINE | Facility: OTHER | Age: 71
End: 2021-08-09

## 2021-08-09 NOTE — TELEPHONE ENCOUNTER
She has been much better since she stopped taking Tolterodine (Detrol) and cut back on the Atorvastatin to three times a week. Her cough is completely gone and as far as urinary issues she is not having any. She has increased her water intake.    She also wanted to let you know that the order for the Sleep study was denied by insurance.  Tamika Goldstein CMA([facility])..................8/9/2021   12:38 PM

## 2021-08-09 NOTE — TELEPHONE ENCOUNTER
Patient is calling to let you know how it has been she has been doing since her med change.   Please call patient back. Thank you   Shelia Orantes on 8/9/2021 at 8:14 AM

## 2021-08-10 NOTE — TELEPHONE ENCOUNTER
Appreciate the update. Is she interested in looking into additional options for the sleep study? We can discuss it when I see her in January otherwise she can set up an appointment sooner if she would like.

## 2021-08-10 NOTE — TELEPHONE ENCOUNTER
Okay to continue the atorvastatin 10 mg tablet 3 times a week.  Okay to stop taking the Detrol LA.    Follow-up sooner if symptoms change.  Otherwise Dr. Amos will see her in January.

## 2021-08-10 NOTE — TELEPHONE ENCOUNTER
Spoke with patient and she states she will wait until January.  Patient would like to know if she should continue taking the  atorvastatin (LIPITOR) 10 MG tablet three times a week and is it okay to stop taking the tolterodine ER (DETROL LA) 2 MG 24 hr capsule. Samina Proctor LPN .............8/10/2021  9:53 AM

## 2021-08-17 ENCOUNTER — TELEPHONE (OUTPATIENT)
Dept: SURGERY | Facility: CLINIC | Age: 71
End: 2021-08-17

## 2021-08-30 ENCOUNTER — TELEPHONE (OUTPATIENT)
Dept: SURGERY | Facility: CLINIC | Age: 71
End: 2021-08-30

## 2021-08-30 NOTE — TELEPHONE ENCOUNTER
Left vm per pt's ?'s about MDPP coverage. After connecting w/my supervisor, invited pt to call her primary insurance to clarify coverage.    -GN

## 2021-08-31 ENCOUNTER — TELEPHONE (OUTPATIENT)
Dept: SURGERY | Facility: CLINIC | Age: 71
End: 2021-08-31

## 2021-09-08 ENCOUNTER — TELEPHONE (OUTPATIENT)
Dept: SURGERY | Facility: CLINIC | Age: 71
End: 2021-09-08

## 2021-10-07 ENCOUNTER — IMMUNIZATION (OUTPATIENT)
Dept: FAMILY MEDICINE | Facility: OTHER | Age: 71
End: 2021-10-07
Attending: FAMILY MEDICINE
Payer: OTHER GOVERNMENT

## 2021-10-07 PROCEDURE — 91300 PR COVID VAC PFIZER DIL RECON 30 MCG/0.3 ML IM: CPT

## 2021-10-07 PROCEDURE — 0004A PR COVID VAC PFIZER DIL RECON 30 MCG/0.3 ML IM: CPT

## 2021-10-20 ENCOUNTER — HOSPITAL ENCOUNTER (OUTPATIENT)
Dept: GENERAL RADIOLOGY | Facility: OTHER | Age: 71
End: 2021-10-20
Attending: INTERNAL MEDICINE
Payer: OTHER GOVERNMENT

## 2021-10-20 ENCOUNTER — OFFICE VISIT (OUTPATIENT)
Dept: INTERNAL MEDICINE | Facility: OTHER | Age: 71
End: 2021-10-20
Attending: INTERNAL MEDICINE
Payer: OTHER GOVERNMENT

## 2021-10-20 VITALS
WEIGHT: 167.4 LBS | HEART RATE: 68 BPM | BODY MASS INDEX: 28.51 KG/M2 | SYSTOLIC BLOOD PRESSURE: 156 MMHG | DIASTOLIC BLOOD PRESSURE: 84 MMHG | TEMPERATURE: 97.7 F | OXYGEN SATURATION: 97 % | RESPIRATION RATE: 16 BRPM

## 2021-10-20 DIAGNOSIS — R05.9 COUGH: ICD-10-CM

## 2021-10-20 DIAGNOSIS — J40 BRONCHITIS: ICD-10-CM

## 2021-10-20 DIAGNOSIS — R05.9 COUGH: Primary | ICD-10-CM

## 2021-10-20 DIAGNOSIS — R03.0 ELEVATED BP WITHOUT DIAGNOSIS OF HYPERTENSION: ICD-10-CM

## 2021-10-20 LAB — SARS-COV-2 RNA RESP QL NAA+PROBE: NEGATIVE

## 2021-10-20 PROCEDURE — C9803 HOPD COVID-19 SPEC COLLECT: HCPCS

## 2021-10-20 PROCEDURE — 99214 OFFICE O/P EST MOD 30 MIN: CPT | Performed by: INTERNAL MEDICINE

## 2021-10-20 PROCEDURE — U0003 INFECTIOUS AGENT DETECTION BY NUCLEIC ACID (DNA OR RNA); SEVERE ACUTE RESPIRATORY SYNDROME CORONAVIRUS 2 (SARS-COV-2) (CORONAVIRUS DISEASE [COVID-19]), AMPLIFIED PROBE TECHNIQUE, MAKING USE OF HIGH THROUGHPUT TECHNOLOGIES AS DESCRIBED BY CMS-2020-01-R: HCPCS | Mod: ZL | Performed by: INTERNAL MEDICINE

## 2021-10-20 PROCEDURE — 71046 X-RAY EXAM CHEST 2 VIEWS: CPT

## 2021-10-20 RX ORDER — AZITHROMYCIN 250 MG/1
TABLET, FILM COATED ORAL
Qty: 6 TABLET | Refills: 0 | Status: SHIPPED | OUTPATIENT
Start: 2021-10-20 | End: 2021-10-25

## 2021-10-20 ASSESSMENT — PAIN SCALES - GENERAL: PAINLEVEL: MILD PAIN (3)

## 2021-10-20 NOTE — PROGRESS NOTES
Chief Complaint   Patient presents with     Cough          Subjective:   Ms. Velez is a 71 year old female  seen for the acute concern today of cough.    Chest tightness and wheezing and cough.  It was worse over this weekend.  She has been taking some benadryl and cough syrup.  She has had this for 2 weeks.  She has had a headaches over the last couple weeks.  She has had some sweats, no outright fever.      Her blood pressure here is elevated.  She reports that this morning at home her blood pressure was 179/80.  She is not currently on any medications for blood pressure.    Past medical history reviewed as below:     Past Medical History:   Diagnosis Date     Adnexal mass 08/22/2011     Contact dermatitis 12/16/2009     Diplopia     Episode of mild diplopia secondary to sinusitis, ophthalmology consult 07/2008.     H/O gastritis      History of adenomatous polyp of colon      Postmenopausal atrophic vaginitis 07/29/2011     Restless leg syndrome      Retention of urine 08/22/2011     Rosacea      Tinea corporis 11/19/2010     Urge incontinence of urine    .      ROS:   Pertinent  ROS was performed and was negative, including for fevers, chills. No other concerns, with exception of HPI above.      Objective:    BP (!) 156/84 (BP Location: Left arm, Patient Position: Sitting, Cuff Size: Adult Regular)   Pulse 68   Temp 97.7  F (36.5  C) (Tympanic)   Resp 16   Wt 75.9 kg (167 lb 6.4 oz)   SpO2 97%   BMI 28.51 kg/m    GEN: Vitals reviewed.  Patient is in no acute distress. Cooperative with exam.  HEENT: Normocephalic atraumatic.  Eyes grossly normal to inspection.  No discharge or erythema, or obvious scleral/conjunctival abnormalities.    CV: Heart regular in rate and rhythm with no murmur.   LUNGS: No audible wheeze, cough, or visible cyanosis.  No visible retractions or increased work of breathing.  Lungs clear to auscultation bilaterally.    SKIN: Warm and dry to touch.  Visible skin clear. No significant  rash, abnormal pigmentation or lesions.  EXT: No clubbing or cyanosis.  No peripheral edema.       Assessment/Plan:   Cough  -Likely bronchitis however will check for Covid.  And given duration of cough chest x-ray is obtained to assess for underlining pneumonia or other abnormality.  - XR Chest 2 Views  - Symptomatic COVID-19 Virus (Coronavirus) by PCR Nose    Bronchitis  -With duration of illness we will treat with antibiotics.  She is to call with continuing symptoms.  - azithromycin (ZITHROMAX) 250 MG tablet  Dispense: 6 tablet; Refill: 0    Elevated BP without diagnosis of hypertension  -Suspect her blood pressure is elevated/uncontrolled secondary to acute illness.  Encouraged to check at home and follow-up on her blood pressure in approximately 1 month at which time medications may be needed.      - Return/call as needed for follow-up should any new symptoms develop, for worsening of current symptoms or if symptoms do not resolve with above plan.     NATE DRUMMOND,    10/20/2021 3:17 PM

## 2021-10-20 NOTE — NURSING NOTE
Patient presents to clinic today for possible bronchitis or pneumonia. She has had a cough for about two weeks but it's getting worse. Had COVID booster shot on 10/7/21.  Medication reconciliation completed.    ACP on file? yes    FOOD SECURITY SCREENING QUESTIONS  Hunger Vital Signs:  Within the past 12 months we worried whether our food would run out before we got money to buy more. Never  Within the past 12 months the food we bought just didn't last and we didn't have money to get more. Never    Tamika Goldstein CMA([facility])..................10/20/2021   2:49 PM

## 2021-10-29 ENCOUNTER — OFFICE VISIT (OUTPATIENT)
Dept: INTERNAL MEDICINE | Facility: OTHER | Age: 71
End: 2021-10-29
Attending: NURSE PRACTITIONER
Payer: OTHER GOVERNMENT

## 2021-10-29 VITALS
BODY MASS INDEX: 28.58 KG/M2 | OXYGEN SATURATION: 97 % | DIASTOLIC BLOOD PRESSURE: 98 MMHG | HEART RATE: 64 BPM | SYSTOLIC BLOOD PRESSURE: 136 MMHG | WEIGHT: 167.8 LBS | TEMPERATURE: 98.7 F

## 2021-10-29 DIAGNOSIS — J40 BRONCHITIS: Primary | ICD-10-CM

## 2021-10-29 DIAGNOSIS — R05.9 COUGH: ICD-10-CM

## 2021-10-29 PROCEDURE — 99213 OFFICE O/P EST LOW 20 MIN: CPT | Performed by: NURSE PRACTITIONER

## 2021-10-29 RX ORDER — AZITHROMYCIN 250 MG/1
TABLET, FILM COATED ORAL
Qty: 6 TABLET | Refills: 0 | Status: SHIPPED | OUTPATIENT
Start: 2021-10-29 | End: 2021-11-03

## 2021-10-29 RX ORDER — BENZONATATE 100 MG/1
100 CAPSULE ORAL 3 TIMES DAILY PRN
Qty: 21 CAPSULE | Refills: 0 | Status: SHIPPED | OUTPATIENT
Start: 2021-10-29 | End: 2021-11-05

## 2021-10-29 ASSESSMENT — ENCOUNTER SYMPTOMS
CHILLS: 1
COUGH: 1
NERVOUS/ANXIOUS: 1
SINUS PRESSURE: 1
FATIGUE: 1
DIAPHORESIS: 1
SINUS PAIN: 1
RHINORRHEA: 1
ACTIVITY CHANGE: 1

## 2021-10-29 ASSESSMENT — PAIN SCALES - GENERAL: PAINLEVEL: MILD PAIN (3)

## 2021-10-29 NOTE — NURSING NOTE
"Chief Complaint   Patient presents with     RECHECK     congestion, headache, cough   Patient presents for a follow up for cough, congestion, headache that is in its 3rd week, patient had negative Covid test 10/20/21    Initial BP (!) 142/102 (BP Location: Left arm, Patient Position: Sitting, Cuff Size: Adult Regular)   Pulse 64   Temp 98.7  F (37.1  C) (Tympanic)   Wt 76.1 kg (167 lb 12.8 oz)   LMP  (LMP Unknown)   SpO2 97%   Breastfeeding No   BMI 28.58 kg/m   Estimated body mass index is 28.58 kg/m  as calculated from the following:    Height as of 7/14/21: 1.632 m (5' 4.25\").    Weight as of this encounter: 76.1 kg (167 lb 12.8 oz).  Medication Reconciliation: complete  FOOD SECURITY SCREENING QUESTIONS  Hunger Vital Signs:  Within the past 12 months we worried whether our food would run out before we got money to buy more. Never  Within the past 12 months the food we bought just didn't last and we didn't have money to get more. Never    Advance care plan reviewed      Chika Desouza LPN    "

## 2021-10-29 NOTE — PROGRESS NOTES
"Monisha Velez  : 1950 Age: 71 year old Sex: female MRN: [id number]      ASSESSMENT/PLAN:     Bronchitis  Sinusitis  - azithromycin (ZITHROMAX) 250 MG tablet  Dispense: 6 tablet; Refill: 0    Cough  - benzonatate (TESSALON) 100 MG capsule  Dispense: 21 capsule; Refill: 0      Recommend conservative treatment including symptomatic relief as below.      Patient to call if symptoms are not improving in 2-3 days; will consider antibiotics at that time.      Patient verbalizes understanding and is agreeable to plan of care.    JERRELL Ferrari, AGNP-C  Internal Medicine  [facility]  Oct 29, 2021 6:17 PM     Total time spent with this patient was 20 minutes which included chart review, visualization and interpretation of labs/images, time spent with the patient and documentation.          Nursing Notes:   Chika Desouza LPN  10/29/2021 11:28 AM  Sign at exiting of workspace  Chief Complaint   Patient presents with     RECHECK     congestion, headache, cough   Patient presents for a follow up for cough, congestion, headache that is in its 3rd week, patient had negative Covid test 10/20/21    Initial BP (!) 142/102 (BP Location: Left arm, Patient Position: Sitting, Cuff Size: Adult Regular)   Pulse 64   Temp 98.7  F (37.1  C) (Tympanic)   Wt 76.1 kg (167 lb 12.8 oz)   LMP  (LMP Unknown)   SpO2 97%   Breastfeeding No   BMI 28.58 kg/m   Estimated body mass index is 28.58 kg/m  as calculated from the following:    Height as of 21: 1.632 m (5' 4.25\").    Weight as of this encounter: 76.1 kg (167 lb 12.8 oz).  Medication Reconciliation: complete  FOOD SECURITY SCREENING QUESTIONS  Hunger Vital Signs:  Within the past 12 months we worried whether our food would run out before we got money to buy more. Never  Within the past 12 months the food we bought just didn't last and we didn't have money to get more. Never    Advance care plan reviewed      Chika Desouza LPN    Nursing note " reviewed with patient.  Accuracy and completeness verified.      SUBJECTIVE:                                                      Monisha Velez is a 71 year old female patient who presents to the clinic today with complaints of URI symptoms for over 3 weeks.  She was just swabbed and tested negative for Covid on 10/20/2021.  She was seen by Dr. Pearson on 10/20/2021 for acute chest tightness, wheezing and cough. She had been taking some benadryl and cough syrup.  She had this for 2 weeks.  She had a headaches over the last couple weeks.  Nasal sputum is clear.  She reports she had some improvement with the Zpak from last visit.     Blood pressure in clinic at that appointment was elevated.  It is again elevated upon presentation today with both checks. She is not on blood pressure medication.  Her chest x-ray was negative.     Denies any sick contacts.    She does have a history of increased anxiety regarding her health. Just needs reassurance.    The patient has a past medical history which is reviewed and listed as below:  Past Medical History:   Diagnosis Date     Adnexal mass 08/22/2011     Contact dermatitis 12/16/2009     Diplopia     Episode of mild diplopia secondary to sinusitis, ophthalmology consult 07/2008.     H/O gastritis      History of adenomatous polyp of colon      Postmenopausal atrophic vaginitis 07/29/2011     Restless leg syndrome      Retention of urine 08/22/2011     Rosacea      Tinea corporis 11/19/2010     Urge incontinence of urine       REVIEW OF SYSTEMS:    Review of Systems   Constitutional: Positive for activity change, chills, diaphoresis and fatigue.   HENT: Positive for postnasal drip, rhinorrhea, sinus pressure (frontal) and sinus pain.    Respiratory: Positive for cough (improving).    Psychiatric/Behavioral: The patient is nervous/anxious.    All other systems reviewed and are negative.      OBJECTIVE:     EXAM:     BP (!) 136/98 (BP Location: Left arm, Patient Position: Sitting,  "Cuff Size: Adult Regular)   Pulse 64   Temp 98.7  F (37.1  C) (Tympanic)   Wt 76.1 kg (167 lb 12.8 oz)   LMP  (LMP Unknown)   SpO2 97%   Breastfeeding No   BMI 28.58 kg/m      Estimated body mass index is 28.58 kg/m  as calculated from the following:    Height as of 7/14/21: 1.632 m (5' 4.25\").    Weight as of this encounter: 76.1 kg (167 lb 12.8 oz).     Physical Exam  Vitals and nursing note reviewed.   Constitutional:       Appearance: Normal appearance.   HENT:      Head: Normocephalic and atraumatic.   Abdominal:      General: Bowel sounds are normal.      Palpations: Abdomen is soft.   Musculoskeletal:         General: Normal range of motion.   Skin:     General: Skin is warm and dry.   Neurological:      Mental Status: She is alert and oriented to person, place, and time. Mental status is at baseline.   Psychiatric:         Mood and Affect: Mood normal.         Behavior: Behavior normal.         Thought Content: Thought content normal.         Judgment: Judgment normal.         "

## 2021-10-29 NOTE — PATIENT INSTRUCTIONS
Recommend more water intake, use of Netti pot daily, continue OTC meds as prior. Be careful with use of diphenhydramine during the day and do not drive while taking this.    Rest, more water.

## 2021-11-16 ENCOUNTER — OFFICE VISIT (OUTPATIENT)
Dept: INTERNAL MEDICINE | Facility: OTHER | Age: 71
End: 2021-11-16
Attending: NURSE PRACTITIONER
Payer: OTHER GOVERNMENT

## 2021-11-16 VITALS
DIASTOLIC BLOOD PRESSURE: 70 MMHG | OXYGEN SATURATION: 97 % | TEMPERATURE: 96.8 F | HEART RATE: 64 BPM | RESPIRATION RATE: 18 BRPM | HEIGHT: 63 IN | BODY MASS INDEX: 29.68 KG/M2 | SYSTOLIC BLOOD PRESSURE: 114 MMHG | WEIGHT: 167.5 LBS

## 2021-11-16 DIAGNOSIS — R06.81 APNEA: Chronic | ICD-10-CM

## 2021-11-16 DIAGNOSIS — G47.19 EXCESSIVE DAYTIME SLEEPINESS: Chronic | ICD-10-CM

## 2021-11-16 DIAGNOSIS — R06.83 LOUD SNORING: Chronic | ICD-10-CM

## 2021-11-16 DIAGNOSIS — R05.9 COUGH: ICD-10-CM

## 2021-11-16 DIAGNOSIS — Z23 NEED FOR INFLUENZA VACCINATION: Primary | ICD-10-CM

## 2021-11-16 DIAGNOSIS — I10 HYPERTENSION, UNSPECIFIED TYPE: Chronic | ICD-10-CM

## 2021-11-16 DIAGNOSIS — J40 BRONCHITIS: ICD-10-CM

## 2021-11-16 DIAGNOSIS — R09.82 PND (POST-NASAL DRIP): ICD-10-CM

## 2021-11-16 PROCEDURE — 90662 IIV NO PRSV INCREASED AG IM: CPT | Performed by: NURSE PRACTITIONER

## 2021-11-16 PROCEDURE — 90471 IMMUNIZATION ADMIN: CPT | Performed by: NURSE PRACTITIONER

## 2021-11-16 PROCEDURE — 99213 OFFICE O/P EST LOW 20 MIN: CPT | Mod: 25 | Performed by: NURSE PRACTITIONER

## 2021-11-16 RX ORDER — BENZONATATE 100 MG/1
100 CAPSULE ORAL 3 TIMES DAILY PRN
Qty: 21 CAPSULE | Refills: 0 | Status: SHIPPED | OUTPATIENT
Start: 2021-11-16 | End: 2021-12-31

## 2021-11-16 RX ORDER — LORATADINE 10 MG/1
10 TABLET ORAL DAILY
COMMUNITY
Start: 2021-11-16 | End: 2023-03-10

## 2021-11-16 ASSESSMENT — MIFFLIN-ST. JEOR: SCORE: 1238.91

## 2021-11-16 ASSESSMENT — PAIN SCALES - GENERAL: PAINLEVEL: MILD PAIN (2)

## 2021-11-16 NOTE — NURSING NOTE
"Chief Complaint   Patient presents with     RECHECK     Follow up B/P; refill       Initial /70 (BP Location: Right arm, Patient Position: Sitting, Cuff Size: Adult Regular)   Pulse 64   Temp 96.8  F (36  C) (Tympanic)   Resp 18   Wt 76 kg (167 lb 8 oz)   LMP  (LMP Unknown)   SpO2 97%   Breastfeeding No   BMI 28.53 kg/m   Estimated body mass index is 28.53 kg/m  as calculated from the following:    Height as of 7/14/21: 1.632 m (5' 4.25\").    Weight as of this encounter: 76 kg (167 lb 8 oz).  Medication Reconciliation: complete  FOOD SECURITY SCREENING QUESTIONS  Hunger Vital Signs:  Within the past 12 months we worried whether our food would run out before we got money to buy more. Never  Within the past 12 months the food we bought just didn't last and we didn't have money to get more. Never    Advance care plan reviewed      Chika Desouza LPN    "

## 2021-11-16 NOTE — PATIENT INSTRUCTIONS
Start taking a daily antihistamine such as Zyrtec, Allegra, or Claritin to help with post nasal drip and cough.

## 2021-11-16 NOTE — PROGRESS NOTES
Assessment & Plan   Bronchitis  Continues to struggle with night time coughing. Using humidifier at night. Recommend daily antihistamine to help with drainage.  - benzonatate (TESSALON) 100 MG capsule; Take 1 capsule (100 mg) by mouth 3 times daily as needed for cough    Loud snoring  Excessive daytime sleepiness  Apnea  - SLEEP EVALUATION & MANAGEMENT REFERRAL - ADULT -; Future    PND (post-nasal drip)  Continues to struggle with night time coughing. Using humidifier at night. Recommend daily antihistamine to help with drainage.  - loratadine (CLARITIN) 10 MG tablet; Take 1 tablet (10 mg) by mouth daily    Cough  Continues to struggle with night time coughing. Using humidifier at night. Recommend daily antihistamine to help with drainage.  - loratadine (CLARITIN) 10 MG tablet; Take 1 tablet (10 mg) by mouth daily    Need for influenza vaccination  - FLU SHOT 65+ (FLUZONE HD)    Hypertension, unspecified type  At goal in clinic today.    20 minutes spent on the date of the encounter doing chart review, history and exam, documentation and further activities per the note     SELF MONITORING:       - Please check blood pressure readings daily  Work on weight loss  Regular exercise    Return in about 3 months (around 2/16/2022) for BP Recheck.    Nafisa Escobar NP  [facility]    Susanne Bearden is a 71 year old who presents for the following health issues     HPI     Hypertension Follow-up      Do you check your blood pressure regularly outside of the clinic? Yes     Are you following a low salt diet? Yes    Are your blood pressures ever more than 140 on the top number (systolic) OR more   than 90 on the bottom number (diastolic), for example 140/90? No      How many servings of fruits and vegetables do you eat daily?  2-3    On average, how many sweetened beverages do you drink each day (Examples: soda, juice, sweet tea, etc.  Do NOT count diet or artificially sweetened beverages)?    "0    How many days per week do you exercise enough to make your heart beat faster? 3 or less    How many minutes a day do you exercise enough to make your heart beat faster? 30 - 60    How many days per week do you miss taking your medication? 0    Review of Systems   CONSTITUTIONAL: NEGATIVE for fever, chills, change in weight  ENT/MOUTH: NEGATIVE for ear, mouth and throat problems  RESP: NEGATIVE for significant cough or SOB  CV: NEGATIVE for chest pain, palpitations or peripheral edema      Objective    /70 (BP Location: Right arm, Patient Position: Sitting, Cuff Size: Adult Regular)   Pulse 64   Temp 96.8  F (36  C) (Tympanic)   Resp 18   Ht 1.6 m (5' 3\")   Wt 76 kg (167 lb 8 oz)   LMP  (LMP Unknown)   SpO2 97%   Breastfeeding No   BMI 29.67 kg/m    Body mass index is 28.53 kg/m .  Physical Exam   GENERAL: healthy, alert and no distress  EYES: Eyes grossly normal to inspection, PERRL and conjunctivae and sclerae normal  HENT: ear canals and TM's normal, nose and mouth without ulcers or lesions  NECK: no adenopathy, no asymmetry, masses, or scars and thyroid normal to palpation  RESP: lungs clear to auscultation - no rales, rhonchi or wheezes  BREAST: normal without masses, tenderness or nipple discharge and no palpable axillary masses or adenopathy  CV: regular rate and rhythm, normal S1 S2, no S3 or S4, no murmur, click or rub, no peripheral edema and peripheral pulses strong  ABDOMEN: soft, nontender, no hepatosplenomegaly, no masses and bowel sounds normal  MS: no gross musculoskeletal defects noted, no edema  SKIN: no suspicious lesions or rashes  NEURO: Normal strength and tone, mentation intact and speech normal  PSYCH: mentation appears normal, affect normal/bright                  Documentation of Face-to-Face and Certification for Medical Necessity of Sleep Study:    Patient: Monisha Velez   YOB: 1950  MR Number: [id number]  Today's Date: 11/16/2021     Monisha Velez is " being seen today for a FACE TO FACE Examination for medical necessity of a sleep study.    Monisha Velez has been noted to have:  [] positive for FELIZ noted in sleep study on   [x] witnessed sleep apnea  [x] excessive snoring, habitual snoring, gasping/choking episodes associated with awakenings  [x] excessive daytime sleepiness  [x] documented hypertension OR ischemic heart disease  [] obesity (BMI greater than or equal to 35)  [] other: (CHF, atrial fibrillation, history of stroke/TIA, CAD, Type II diabetes, depression)    Height: Data Unavailable  Weight: 167 lbs 8 oz  BMI: Body mass index is Body mass index is 28.53 kg/m .  Gender: female  [place] Score:  9    Monisha Velez has a past medical history significant for:  Past Medical History:   Diagnosis Date     Adnexal mass 08/22/2011     Contact dermatitis 12/16/2009     Diplopia     Episode of mild diplopia secondary to sinusitis, ophthalmology consult 07/2008.     H/O gastritis      History of adenomatous polyp of colon      Postmenopausal atrophic vaginitis 07/29/2011     Restless leg syndrome      Retention of urine 08/22/2011     Rosacea      Tinea corporis 11/19/2010     Urge incontinence of urine           I certify that the patient, Monisha Velez, is under my care and that I, or a MD/DO/APRN/PA working with me, had a face-to-face encounter that meets the physician face-to-face encounter requirements with this patient on: 11/16/2021.    I certify that, based on my findings, a sleep study is medically necessary.    The patient is under my care, and I have initiated the establishment of the plan of care.  This patient will be followed by a physician who will periodically review the plan of care.  Physician/Provider to provide follow up care: Raeann Pearson Stephanie    Responsible Medicare certified PECOS Physician: Raeann Pearson Stephanie  Physician Signature: See electronic signature associated with these orders.  Date: 11/16/2021

## 2021-11-17 PROBLEM — I10 HTN (HYPERTENSION): Chronic | Status: ACTIVE | Noted: 2021-11-17

## 2021-12-01 DIAGNOSIS — G47.33 OSA (OBSTRUCTIVE SLEEP APNEA): Primary | ICD-10-CM

## 2021-12-07 ENCOUNTER — HOSPITAL ENCOUNTER (OUTPATIENT)
Dept: MAMMOGRAPHY | Facility: OTHER | Age: 71
Discharge: HOME | End: 2021-12-07
Attending: INTERNAL MEDICINE | Admitting: INTERNAL MEDICINE
Payer: OTHER GOVERNMENT

## 2021-12-07 DIAGNOSIS — Z12.31 VISIT FOR SCREENING MAMMOGRAM: ICD-10-CM

## 2021-12-07 PROCEDURE — 77063 BREAST TOMOSYNTHESIS BI: CPT

## 2021-12-10 ENCOUNTER — ALLIED HEALTH/NURSE VISIT (OUTPATIENT)
Dept: FAMILY MEDICINE | Facility: OTHER | Age: 71
End: 2021-12-10
Attending: FAMILY MEDICINE
Payer: OTHER GOVERNMENT

## 2021-12-10 DIAGNOSIS — R05.9 COUGH: Primary | ICD-10-CM

## 2021-12-10 DIAGNOSIS — R51.9 HEAD ACHE: ICD-10-CM

## 2021-12-10 PROCEDURE — C9803 HOPD COVID-19 SPEC COLLECT: HCPCS

## 2021-12-10 PROCEDURE — U0005 INFEC AGEN DETEC AMPLI PROBE: HCPCS | Mod: ZL

## 2021-12-11 LAB — SARS-COV-2 RNA RESP QL NAA+PROBE: NEGATIVE

## 2021-12-31 ENCOUNTER — OFFICE VISIT (OUTPATIENT)
Dept: FAMILY MEDICINE | Facility: OTHER | Age: 71
End: 2021-12-31
Attending: FAMILY MEDICINE
Payer: OTHER GOVERNMENT

## 2021-12-31 VITALS
SYSTOLIC BLOOD PRESSURE: 128 MMHG | OXYGEN SATURATION: 97 % | DIASTOLIC BLOOD PRESSURE: 86 MMHG | WEIGHT: 168 LBS | TEMPERATURE: 96.8 F | BODY MASS INDEX: 29.76 KG/M2 | HEART RATE: 64 BPM | RESPIRATION RATE: 18 BRPM

## 2021-12-31 DIAGNOSIS — N32.81 OAB (OVERACTIVE BLADDER): ICD-10-CM

## 2021-12-31 DIAGNOSIS — R39.9 UTI SYMPTOMS: Primary | ICD-10-CM

## 2021-12-31 DIAGNOSIS — N39.46 MIXED STRESS AND URGE URINARY INCONTINENCE: ICD-10-CM

## 2021-12-31 LAB
ALBUMIN UR-MCNC: NEGATIVE MG/DL
APPEARANCE UR: CLEAR
BILIRUB UR QL STRIP: NEGATIVE
COLOR UR AUTO: YELLOW
GLUCOSE UR STRIP-MCNC: NEGATIVE MG/DL
HGB UR QL STRIP: NEGATIVE
KETONES UR STRIP-MCNC: NEGATIVE MG/DL
LEUKOCYTE ESTERASE UR QL STRIP: NEGATIVE
NITRATE UR QL: NEGATIVE
PH UR STRIP: 6 [PH] (ref 5–9)
SP GR UR STRIP: 1 (ref 1–1.03)
UROBILINOGEN UR STRIP-MCNC: NORMAL MG/DL

## 2021-12-31 PROCEDURE — 81003 URINALYSIS AUTO W/O SCOPE: CPT | Mod: ZL | Performed by: FAMILY MEDICINE

## 2021-12-31 PROCEDURE — 99213 OFFICE O/P EST LOW 20 MIN: CPT | Performed by: FAMILY MEDICINE

## 2021-12-31 ASSESSMENT — PAIN SCALES - GENERAL: PAINLEVEL: MILD PAIN (3)

## 2021-12-31 NOTE — PROGRESS NOTES
"ASSESSMENT/PLAN:     1. UTI symptoms    2. OAB (overactive bladder)    3. Mixed stress and urge urinary incontinence        Assessment & Plan  1.  Urinalysis results reviewed with patient.  We discussed other potential causes of her symptoms, in particular as it relates to her known overactive bladder.  She did have cystoscopy done 3 years ago which was unremarkable.  At that point in time, she had undergone physical therapy, had good/positive response to this and she would like to pursue that again.  Physical therapy referral was placed.  If following this, she continues to have symptoms she should consider urology follow-up.  Problem List Items Addressed This Visit     None      Visit Diagnoses     UTI symptoms    -  Primary    Relevant Orders    UA reflex to Microscopic and Culture (Completed)    OAB (overactive bladder)        Relevant Orders    Physical Therapy Referral    Mixed stress and urge urinary incontinence        Relevant Orders    Physical Therapy Referral          Review of the result(s) of each unique test - I have personally reviewed the labs listed below.       PDMP Review     None          HUONG OLSEN MD, FAAFP  [facility]      NURSING NOTES:  Nursing Notes:   Gay Wiseman MA  12/31/2021  2:16 PM  Signed  Chief Complaint   Patient presents with     Back Pain     lower     UTI     Patient is here for a possible UTI. She has low back pain (sharp pains on and off) and urinary frequency.     Initial /86 (BP Location: Right arm, Patient Position: Sitting, Cuff Size: Adult Large)   Pulse 64   Temp 96.8  F (36  C) (Tympanic)   Resp 18   Wt 76.2 kg (168 lb)   LMP  (LMP Unknown)   SpO2 97%   Breastfeeding No   BMI 29.76 kg/m   Estimated body mass index is 29.76 kg/m  as calculated from the following:    Height as of 11/16/21: 1.6 m (5' 3\").    Weight as of this encounter: 76.2 kg (168 lb).  Medication Reconciliation: complete    Gay Wiseman MA "       FOOD SECURITY SCREENING QUESTIONS:    The next two questions are to help us understand your food security.  If you are feeling you need any assistance in this area, we have resources available to support you today.    Hunger Vital Signs:  Within the past 12 months we worried whether our food would run out before we got money to buy more. Never  Within the past 12 months the food we bought just didn't last and we didn't have money to get more. Never  Gay Wiseman MA,LPN on 12/31/2021 at 2:12 PM           SUBJECTIVE:    Monisha Velez is a 71 year old female  who presents for the following health issues:  Possible bladder infection    HPI  Monisha Velez is a 71 year old female presents for possible bladder infection.  Onset 2 weeks ago.  Had increased urgency.  Has episodes of sharp suprapubic pain.  Has had overactive bladder and is medication for this.  Has felt hot and cold but not had a fever.  No blood in her urine.  Has had been on medication for overactive bladder.  Is on detrol.  Denies constipation side effects. She typically has a BM mid-morning.      She is worried as with her recent symptoms she's had incontinence.    She's had a hysterectomy - a lot of adhesions at that time.    She does have some time that she feels like she needs to urinate again right after going and sits back down and dribbles.      She's had physical therapy for her bladder.       Allergies   Allergen Reactions     Penicillins Hives     Sulfa Drugs Hives     Codeine Sulfate Other (See Comments)     nightmares     Morphine Sulfate Other (See Comments)     Cant sleep     Venlafaxine Hives     'bad reaction      Zolpidem Tartrate      Paresthesias; stomach upset       Current Outpatient Medications   Medication     aspirin EC 81 MG EC tablet     atorvastatin (LIPITOR) 10 MG tablet     diphenhydrAMINE (BENADRYL) 25 MG tablet     estradiol (ESTRACE) 0.5 MG tablet     melatonin 5 MG tablet     Multiple Vitamin (MULTIVITAMIN OR)      Stress Formula/Zinc tablet     tolterodine ER (DETROL LA) 2 MG 24 hr capsule     triamcinolone (KENALOG) 0.1 % external cream     Vitamin D3 (CHOLECALCIFEROL) 25 mcg (1000 units) tablet     loratadine (CLARITIN) 10 MG tablet     No current facility-administered medications for this visit.      Past Medical History:   Diagnosis Date     Adnexal mass 08/22/2011     Contact dermatitis 12/16/2009     Diplopia     Episode of mild diplopia secondary to sinusitis, ophthalmology consult 07/2008.     H/O gastritis      History of adenomatous polyp of colon      Postmenopausal atrophic vaginitis 07/29/2011     Restless leg syndrome      Retention of urine 08/22/2011     Rosacea      Tinea corporis 11/19/2010     Urge incontinence of urine       Past Surgical History:   Procedure Laterality Date     COLONOSCOPY  03/18/2016    Tubular adenoma, Next in 5 years     COLONOSCOPY  04/12/2021    F/U N/A normal     COLONOSCOPY N/A 4/12/2021    Procedure: COLONOSCOPY;  Surgeon: Roberto Anderson MD;  Location:  OR     CYSTOSCOPY  01/2013    [facility]     DENTAL SURGERY  2005    Root canal,  Dr. Olmos.     ENDOSCOPIC SINUS SURGERY  1993     ESOPHAGOSCOPY, GASTROSCOPY, DUODENOSCOPY (EGD), COMBINED  03/18/2016     HYSTERECTOMY VAGINAL  1990    Endometriosis     LAPAROSCOPIC CYSTECTOMY OVARIAN (BENIGN)  09/27/2011    cystectomy with lysis of adhesions and possible oophrectomy  by Dr. Gay Mcgovern  at [facility]     ROTATOR CUFF REPAIR RT/LT Right 12/28/2017    Dr John     SALPINGO-OOPHORECTOMY BILATERAL  10/2007    bilateral ovarian serous cystadenofibromas, benign.       Review of Systems     PHQ-2 Score:     PHQ-2 ( 1999 [employer]) 12/31/2021 11/16/2021   Q1: Little interest or pleasure in doing things 0 0   Q2: Feeling down, depressed or hopeless 0 0   PHQ-2 Score 0 0   PHQ-2 Total Score (12-17 Years)- Positive if 3 or more points; Administer PHQ-A if positive - 0         PHQ-9 SCORE 3/27/2019 4/30/2019  8/12/2020   PHQ-9 Total Score 0 0 0     ELENA-7 SCORE 12/17/2019 8/12/2020 7/14/2021   Total Score 0 0 5           OBJECTIVE:     Objective  /86 (BP Location: Right arm, Patient Position: Sitting, Cuff Size: Adult Large)   Pulse 64   Temp 96.8  F (36  C) (Tympanic)   Resp 18   Wt 76.2 kg (168 lb)   LMP  (LMP Unknown)   SpO2 97%   Breastfeeding No   BMI 29.76 kg/m   Body mass index is 29.76 kg/m .    Wt Readings from Last 4 Encounters:   12/31/21 76.2 kg (168 lb)   11/16/21 76 kg (167 lb 8 oz)   10/29/21 76.1 kg (167 lb 12.8 oz)   10/20/21 75.9 kg (167 lb 6.4 oz)       Nursing notes and VS reviewed    Physical Exam   GENERAL: healthy, alert and no distress          Results for orders placed or performed in visit on 12/31/21   UA reflex to Microscopic and Culture     Status: Normal    Specimen: Urine, Clean Catch   Result Value Ref Range    Color Urine Yellow Colorless, Straw, Light Yellow, Yellow    Appearance Urine Clear Clear    Glucose Urine Negative Negative mg/dL    Bilirubin Urine Negative Negative    Ketones Urine Negative Negative mg/dL    Specific Gravity Urine 1.003 1.000 - 1.030    Blood Urine Negative Negative    pH Urine 6.0 5.0 - 9.0    Protein Albumin Urine Negative Negative mg/dL    Urobilinogen Urine Normal Normal, 2.0 mg/dL    Nitrite Urine Negative Negative    Leukocyte Esterase Urine Negative Negative    Narrative    Microscopic not indicated

## 2021-12-31 NOTE — NURSING NOTE
"Chief Complaint   Patient presents with     Back Pain     lower     UTI     Patient is here for a possible UTI. She has low back pain (sharp pains on and off) and urinary frequency.     Initial /86 (BP Location: Right arm, Patient Position: Sitting, Cuff Size: Adult Large)   Pulse 64   Temp 96.8  F (36  C) (Tympanic)   Resp 18   Wt 76.2 kg (168 lb)   LMP  (LMP Unknown)   SpO2 97%   Breastfeeding No   BMI 29.76 kg/m   Estimated body mass index is 29.76 kg/m  as calculated from the following:    Height as of 11/16/21: 1.6 m (5' 3\").    Weight as of this encounter: 76.2 kg (168 lb).  Medication Reconciliation: complete    Gay Wiseman MA       FOOD SECURITY SCREENING QUESTIONS:    The next two questions are to help us understand your food security.  If you are feeling you need any assistance in this area, we have resources available to support you today.    Hunger Vital Signs:  Within the past 12 months we worried whether our food would run out before we got money to buy more. Never  Within the past 12 months the food we bought just didn't last and we didn't have money to get more. Never  Gay Wiseman MA,LPN on 12/31/2021 at 2:12 PM      "

## 2022-01-03 ENCOUNTER — HOSPITAL ENCOUNTER (OUTPATIENT)
Dept: PHYSICAL THERAPY | Facility: OTHER | Age: 72
Setting detail: THERAPIES SERIES
End: 2022-01-03
Attending: FAMILY MEDICINE
Payer: OTHER GOVERNMENT

## 2022-01-03 DIAGNOSIS — N39.46 MIXED STRESS AND URGE URINARY INCONTINENCE: ICD-10-CM

## 2022-01-03 DIAGNOSIS — N32.81 OAB (OVERACTIVE BLADDER): ICD-10-CM

## 2022-01-03 PROCEDURE — 97112 NEUROMUSCULAR REEDUCATION: CPT | Mod: GP

## 2022-01-03 PROCEDURE — 97161 PT EVAL LOW COMPLEX 20 MIN: CPT | Mod: GP

## 2022-01-03 PROCEDURE — 97140 MANUAL THERAPY 1/> REGIONS: CPT | Mod: GP

## 2022-01-03 NOTE — PROGRESS NOTES
[facility]    OUTPATIENT PHYSICAL THERAPY ORTHOPEDIC EVALUATION  PLAN OF TREATMENT FOR OUTPATIENT REHABILITATION  (COMPLETE FOR INITIAL CLAIMS ONLY)  Patient's Last Name, First Name, M.I.  YOB: 1950  VelezMonisha  MULU    Provider s Name:  [facility]   Medical Record No.  [id number]   Start of Care Date:  01/03/22   Onset Date:  12/31/21   Type:     _X__PT   ___OT   ___SLP Medical Diagnosis:  over active bladder; mixed stress/urge urinary incontinence     PT Diagnosis:  low back pain, pelvic pain, urinary frequency,    Visits from SOC:  1      _________________________________________________________________________________  Plan of Treatment/Functional Goals:  manual therapy,neuromuscular re-education,strengthening,stretching  will continue pelvic floor assessment at later date, if indicated  Biofeedback,Cryotherapy,Hot packs     Goals  Goal Identifier: lifing/bending  Goal Description: Patient to demonstrate normal mechanics of spine and pelvic to allow lifting objects without increase back pain.   Target Date: 04/03/22    Goal Identifier: urinary frequency  Goal Description: Patient to report reduced urinary frequency to increase time between voids to 2-3 hours, instead of hourly.   Target Date: 04/03/22    Goal Identifier: constipation  Goal Description: Patient to report reduced constipation with daily bm's and stool consistency at 4 on Meeker scale.   Target Date: 04/03/22                                                           Therapy Frequency:  2 times/Week  Predicted Duration of Therapy Intervention:  3 months    Heather Benavides, PT                 I CERTIFY THE NEED FOR THESE SERVICES FURNISHED UNDER        THIS PLAN OF TREATMENT AND WHILE UNDER MY CARE     (Physician co-signature of this document indicates review and certification of the therapy plan).                        Certification Date From:  01/03/22   Certification Date To:  04/03/22    Referring Provider:  Elzbieta Chacko MD    Initial Assessment        See Epic Evaluation Start of Care Date: 01/03/22

## 2022-01-03 NOTE — PROGRESS NOTES
01/03/22 1300   General Information   Type of Visit Initial OP Ortho PT Evaluation   Start of Care Date 01/03/22   Referring Physician Elzbieta Chacko MD   Patient/Family Goals Statement reduce bladder irritation, reduce back pain   Orders Evaluate and Treat   Date of Order 12/31/21   Certification Required? Yes   Medical Diagnosis N32.81 (ICD-10-CM) - OAB (overactive bladder); N39.46 (ICD-10-CM) - Mixed stress and urge urinary incontinence   Surgical/Medical history reviewed Yes   Body Part(s)   Body Part(s) Lumbar Spine/SI;Pelvic Floor Dysfunction   Presentation and Etiology   Pertinent history of current problem (include personal factors and/or comorbidities that impact the POC) Dealing with bladder irriation, frequency (voiding 1-2 times per hour), low back pain that she feel is related. No new injruy.    Impairments A. Pain;N. Headaches;P. Bowel or bladder problems   Functional Limitations perform activities of daily living   Symptom Location low back, low abdomen   Onset date of current episode/exacerbation 12/31/21   Chronicity Recurrent   Pain rating (0-10 point scale) Worst (/10)   Worst (/10) 4   Pain quality A. Sharp;G. Cramping   Frequency of pain/symptoms A. Constant   Pain/symptoms are: The same all the time   Pain/symptoms exacerbated by C. Lifting   Progression of symptoms since onset: Worsened   Prior Level of Function   Prior Level of Function-Mobility prior back and neck pain   Prior Level of Function-ADLs prior issues with bladder irriation and frequency   Current Level of Function   Patient role/employment history F. Retired   Fall Risk Screen   Fall screen completed by PT   Have you fallen 2 or more times in the past year? No   Have you fallen and had an injury in the past year? No   Is patient a fall risk? No   Abuse Screen (yes response referral indicated)   Feels Unsafe at Home or Work/School no   Feels Threatened by Someone no   Does Anyone Try to Keep You From Having Contact with  Others or Doing Things Outside Your Home? no   Physical Signs of Abuse Present no   Lumbar Spine/SI Objective Findings   Posture left superior pelvis   Flexion ROM limited   Extension ROM limited   Pelvic Screen + FFT left, left superior pubic shear   Segmental Mobility FRS right L5, ERS left L5   Palpation Postural loading limited left and right shoulder, left pelvis; general listening to left anterior pelvis local listenign to left pubic bone. Myofascial tightness with left pubovesical ligament.    Pelvic Floor Dysfunction Objective Findings   Type of Storage Problem frequency;urgency   Type of Emptying Problem constipation   Planned Therapy Interventions   Planned Therapy Interventions manual therapy;neuromuscular re-education;strengthening;stretching   Planned Therapy Interventions Comment will continue pelvic floor assessment at later date, if indicated   Planned Modality Interventions   Planned Modality Interventions Biofeedback;Cryotherapy;Hot packs   Clinical Impression   Criteria for Skilled Therapeutic Interventions Met yes, treatment indicated   PT Diagnosis low back pain, pelvic pain, urinary frequency,    Influenced by the following impairments pain, back pain, headaches, urinary frequency   Functional limitations due to impairments bending, lifting, urinary frequency   Clinical Presentation Evolving/Changing   Clinical Decision Making (Complexity) Low complexity   Therapy Frequency 2 times/Week   Predicted Duration of Therapy Intervention (days/wks) 3 months   Risk & Benefits of therapy have been explained Yes   Patient, Family & other staff in agreement with plan of care Yes   Pelvic Health Informed Consent Statement Discussed with patient/guardian reason for referral regarding pelvic health needs and external/internal pelvic floor muscle examination.  Opportunity provided to ask questions and verbal consent for assessment and intervention was given.   Clinical Impression Comments Patient with pubic  shear, L5 dysfunction with myofascial tightness adding to bladder irriation, as well as life stressors   ORTHO GOALS   PT Ortho Eval Goals 1;2;3;4   Ortho Goal 1   Goal Identifier lifing/bending   Goal Description Patient to demonstrate normal mechanics of spine and pelvic to allow lifting objects without increase back pain.    Target Date 04/03/22   Ortho Goal 2   Goal Identifier urinary frequency   Goal Description Patient to report reduced urinary frequency to increase time between voids to 2-3 hours, instead of hourly.    Target Date 04/03/22   Ortho Goal 3   Goal Identifier constipation   Goal Description Patient to report reduced constipation with daily bm's and stool consistency at 4 on Towner scale.    Target Date 04/03/22   Total Evaluation Time   PT Eval, Low Complexity Minutes (22037) 20   Therapy Certification   Certification date from 01/03/22   Certification date to 04/03/22   Medical Diagnosis over active bladder; mixed stress/urge urinary incontinence

## 2022-01-06 ENCOUNTER — HOSPITAL ENCOUNTER (OUTPATIENT)
Dept: PHYSICAL THERAPY | Facility: OTHER | Age: 72
Setting detail: THERAPIES SERIES
End: 2022-01-06
Attending: FAMILY MEDICINE
Payer: OTHER GOVERNMENT

## 2022-01-06 PROCEDURE — 97112 NEUROMUSCULAR REEDUCATION: CPT | Mod: GP

## 2022-01-06 PROCEDURE — 97140 MANUAL THERAPY 1/> REGIONS: CPT | Mod: GP

## 2022-01-11 ENCOUNTER — HOSPITAL ENCOUNTER (OUTPATIENT)
Dept: PHYSICAL THERAPY | Facility: OTHER | Age: 72
Setting detail: THERAPIES SERIES
End: 2022-01-11
Attending: FAMILY MEDICINE
Payer: OTHER GOVERNMENT

## 2022-01-11 PROCEDURE — 97140 MANUAL THERAPY 1/> REGIONS: CPT | Mod: GP

## 2022-01-20 ENCOUNTER — HOSPITAL ENCOUNTER (OUTPATIENT)
Dept: PHYSICAL THERAPY | Facility: OTHER | Age: 72
Setting detail: THERAPIES SERIES
End: 2022-01-20
Attending: FAMILY MEDICINE
Payer: OTHER GOVERNMENT

## 2022-01-20 PROCEDURE — 97112 NEUROMUSCULAR REEDUCATION: CPT | Mod: GP

## 2022-01-20 PROCEDURE — 97140 MANUAL THERAPY 1/> REGIONS: CPT | Mod: GP

## 2022-01-25 ENCOUNTER — HOSPITAL ENCOUNTER (OUTPATIENT)
Dept: PHYSICAL THERAPY | Facility: OTHER | Age: 72
Setting detail: THERAPIES SERIES
End: 2022-01-25
Attending: FAMILY MEDICINE
Payer: OTHER GOVERNMENT

## 2022-01-25 PROCEDURE — 97112 NEUROMUSCULAR REEDUCATION: CPT | Mod: GP

## 2022-01-25 PROCEDURE — 97140 MANUAL THERAPY 1/> REGIONS: CPT | Mod: GP

## 2022-01-27 ENCOUNTER — HOSPITAL ENCOUNTER (OUTPATIENT)
Dept: PHYSICAL THERAPY | Facility: OTHER | Age: 72
Setting detail: THERAPIES SERIES
End: 2022-01-27
Attending: FAMILY MEDICINE
Payer: OTHER GOVERNMENT

## 2022-01-27 PROCEDURE — 97140 MANUAL THERAPY 1/> REGIONS: CPT | Mod: GP

## 2022-01-27 PROCEDURE — 97112 NEUROMUSCULAR REEDUCATION: CPT | Mod: GP

## 2022-02-08 ENCOUNTER — HOSPITAL ENCOUNTER (OUTPATIENT)
Dept: PHYSICAL THERAPY | Facility: OTHER | Age: 72
Setting detail: THERAPIES SERIES
End: 2022-02-08
Attending: FAMILY MEDICINE
Payer: OTHER GOVERNMENT

## 2022-02-08 PROCEDURE — 97112 NEUROMUSCULAR REEDUCATION: CPT | Mod: GP

## 2022-02-08 PROCEDURE — 97140 MANUAL THERAPY 1/> REGIONS: CPT | Mod: GP

## 2022-02-08 NOTE — PROGRESS NOTES
[facility]    Outpatient Physical Therapy Discharge Note  Patient: Monisha Velez  : 1950    Beginning/End Dates of Reporting Period:  1/3/22 to 22    Referring Provider: Poppy Chacko MD    Therapy Diagnosis:    low back pain, pelvic pain, urinary frequency,           Client Self Report: More urinary leakage lately, neck hurts, back hurts. No injuries. Has noticed that when she gets cold, it's worse. Worse with urgency as of late.     Objective Measurements:  Objective Measure: postural loading  Details: limited loading right shoulder left pelvis; general listening to right anteiror chest wall then anterior left pelvis; local listneing to   Objective Measure: myofascial  Details: pubovesical ligament left, urachus medial, bladder wall  Objective Measure: joint  Details: FRS left L5, C4, C5       Goals:  Goal Identifier lifing/bending   Goal Description Patient to demonstrate normal mechanics of spine and pelvic to allow lifting objects without increase back pain.    Target Date 22   Date Met      Progress (detail required for progress note):  continues to be an issues due to arthritic changes of spine with scoliosis, this is quite painful for the patient. Goal not met     Goal Identifier urinary frequency   Goal Description Patient to report reduced urinary frequency to increase time between voids to 2-3 hours, instead of hourly.    Target Date 22   Date Met      Progress (detail required for progress note):  Was progressing with urgency reduction and this has worsened as of late; likely related to spinal changes, continues myofascial restrictions related to past surgeries and life stressors. Goal not met.      Goal Identifier constipation   Goal Description Patient to report reduced constipation with daily bm's and stool consistency at 4 on Elliott scale.    Target Date 22    Date Met   2/8/22   Progress (detail required for progress note):         Discussed with the patient that she shoulder continue with seated pelvic tilts, urgency reduction steps; Urology suggested Botox to bladder to help reduce symptoms, she may consider this. No further PT indicated for bladder issues due to limited progress.     Plan:  Discharge from therapy.    Discharge:    Reason for Discharge: No further expectation of progress.    Equipment Issued: NA    Discharge Plan: Patient to continue home program.

## 2022-02-14 NOTE — PROGRESS NOTES
"  Assessment & Plan     Hypertension, unspecified type  At goal in clinic today. Continue meds as prior.    Stage 3a chronic kidney disease (H)  We will obtain albumin random urine quantitative today. We will also get basic metabolic profile.  - Albumin Random Urine Quantitative with Creat Ratio  - Basic Metabolic Panel    Lower abdominal pain  Most likely related to hard stool passing through adhesions. We discussed she needs to increase her water intake.  - UA with Microscopic reflex to Culture, negative  - CBC and Differential, unremarkable    Prediabetes  Patient will try 1/2 tab three times a week and see if she is able to tolerate this.  - atorvastatin (LIPITOR) 10 MG tablet  Dispense: 60 tablet; Refill: 0    Mixed hyperlipidemia  - atorvastatin (LIPITOR) 10 MG tablet  Dispense: 60 tablet; Refill: 0    Chronic midline low back pain without sciatica  - Physical Therapy Referral    Ordering of each unique test  Prescription drug management  22 minutes spent on the date of the encounter doing chart review, history and exam, documentation and further activities per the note     BMI:   Estimated body mass index is 29.76 kg/m  as calculated from the following:    Height as of 11/16/21: 1.6 m (5' 3\").    Weight as of 12/31/21: 76.2 kg (168 lb).     Return in about 3 months (around 5/15/2022) for BP Recheck.    Nafisa Escobar NP  [facility]   Monisha is a 72 year old who presents for the following health issues :  F/U hypertension; Lower abdominal pain; chronic back pain    History of Present Illness       Back Pain:  She presents for follow up of back pain. Patient's back pain is a recurring problem.  Location of back pain:  Right lower back, left lower back, right middle of back and left side of neck  Description of back pain: gnawing and sharp  Back pain spreads: right buttocks, left buttocks, right shoulder, left shoulder and left side of neck    Since patient first noticed " back pain, pain is: always present, but gets better and worse  Does back pain interfere with her job:  Not applicable      She eats 2-3 servings of fruits and vegetables daily.She consumes 1 sweetened beverage(s) daily.She exercises with enough effort to increase her heart rate 9 or less minutes per day.  She is missing 3 dose(s) of medications per week.     Hypertension Follow-up      Do you check your blood pressure regularly outside of the clinic? Yes     Are you following a low salt diet? Yes    Are your blood pressures ever more than 140 on the top number (systolic) OR more   than 90 on the bottom number (diastolic), for example 140/90? Yes    Lower abdominal pain  Reports she has had this pain for about three months now. Imaging obtained in 2/2021 and indicated no free intraperitoneal air. Small moderate amount of gas and stool are seen within the colon to the level of the rectosigmoid. No dilated gas-filled small bowel loops are seen. No mass is seen and there are no suspicious calcifications. There is a right convex scoliosis with degenerative change in the spine.   IMPRESSION: No significant bowel distention.    Back pain  Chronic. Imaging obtained in 2/2020 and indicated there is a moderate right convex scoliosis with degenerative change in the lumbar spine. This is most severe at the L3-4 level  where disc space narrowing is seen. There is some mild spur formation. There is no acute compression fracture. There is mild vascular calcification.                                                             IMPRESSION: Scoliosis. No acute abnormality.    Depression  Feels her depression worsens when she is on atorvastatin. Has been taking 10 mg three times a week. Discussed she needs this med to manage high cholesterol. We discussed taking 1/2 tablet three times a week and she is agreeable to this.    Review of Systems   CONSTITUTIONAL: NEGATIVE for fever, chills, change in weight  RESP: NEGATIVE for significant  cough or SOB  CV: NEGATIVE for chest pain, palpitations or peripheral edema  GI: POSITIVE for abdominal pain lower abdomen, intermittent; wondering if it has to do with bowels.  : incontinence, completed pelvic floor therapy   MUSCULOSKELETAL: POSITIVE  for back pain - would like new referral for PT  ROS otherwise negative      Objective    /84 (BP Location: Left arm, Patient Position: Sitting, Cuff Size: Adult Regular)   Pulse 72   Temp 96.9  F (36.1  C) (Tympanic)   Resp 18   Wt 77.1 kg (170 lb)   LMP  (LMP Unknown)   SpO2 96%   Breastfeeding No   BMI 30.11 kg/m    Body mass index is 30.11 kg/m .     Physical Exam   GENERAL: healthy, alert and no distress  RESP: lungs clear to auscultation - no rales, rhonchi or wheezes  CV: regular rates and rhythm  ABDOMEN: soft, nontender and bowel sounds normal  MS: no gross musculoskeletal defects noted, no edema  NEURO: Normal strength and tone, mentation intact and speech normal  PSYCH: mentation appears normal, affect normal/bright    Results for orders placed or performed in visit on 02/15/22   UA with Microscopic reflex to Culture     Status: Abnormal    Specimen: Urine, Clean Catch   Result Value Ref Range    Color Urine Light Yellow Colorless, Straw, Light Yellow, Yellow    Appearance Urine Clear Clear    Glucose Urine Negative Negative mg/dL    Bilirubin Urine Negative Negative    Ketones Urine Negative Negative mg/dL    Specific Gravity Urine 1.007 1.000 - 1.030    Blood Urine Negative Negative    pH Urine 5.5 5.0 - 9.0    Protein Albumin Urine Negative Negative mg/dL    Urobilinogen Urine Normal Normal, 2.0 mg/dL    Nitrite Urine Negative Negative    Leukocyte Esterase Urine Negative Negative    Bacteria Urine Few (A) None Seen /HPF    Mucus Urine Present (A) None Seen /LPF    RBC Urine 1 <=2 /HPF    WBC Urine 2 <=5 /HPF    Narrative    Urine Culture not indicated   Basic Metabolic Panel     Status: Abnormal   Result Value Ref Range    Sodium 144 134  - 144 mmol/L    Potassium 4.4 3.5 - 5.1 mmol/L    Chloride 106 98 - 107 mmol/L    Carbon Dioxide (CO2) 32 (H) 21 - 31 mmol/L    Anion Gap 6 3 - 14 mmol/L    Urea Nitrogen 12 7 - 25 mg/dL    Creatinine 0.99 0.60 - 1.20 mg/dL    Calcium 9.6 8.6 - 10.3 mg/dL    Glucose 96 70 - 105 mg/dL    GFR Estimate 60 (L) >60 mL/min/1.73m2   CBC with platelets and differential     Status: None   Result Value Ref Range    WBC Count 5.6 4.0 - 11.0 10e3/uL    RBC Count 4.67 3.80 - 5.20 10e6/uL    Hemoglobin 14.4 11.7 - 15.7 g/dL    Hematocrit 43.5 35.0 - 47.0 %    MCV 93 78 - 100 fL    MCH 30.8 26.5 - 33.0 pg    MCHC 33.1 31.5 - 36.5 g/dL    RDW 12.1 10.0 - 15.0 %    Platelet Count 216 150 - 450 10e3/uL    % Neutrophils 62 %    % Lymphocytes 30 %    % Monocytes 7 %    % Eosinophils 1 %    % Basophils 0 %    % Immature Granulocytes 0 %    NRBCs per 100 WBC 0 <1 /100    Absolute Neutrophils 3.4 1.6 - 8.3 10e3/uL    Absolute Lymphocytes 1.7 0.8 - 5.3 10e3/uL    Absolute Monocytes 0.4 0.0 - 1.3 10e3/uL    Absolute Eosinophils 0.1 0.0 - 0.7 10e3/uL    Absolute Basophils 0.0 0.0 - 0.2 10e3/uL    Absolute Immature Granulocytes 0.0 <=0.4 10e3/uL    Absolute NRBCs 0.0 10e3/uL   CBC and Differential     Status: None    Narrative    The following orders were created for panel order CBC and Differential.  Procedure                               Abnormality         Status                     ---------                               -----------         ------                     CBC with platelets and d...[[id number]]                      Final result                 Please view results for these tests on the individual orders.       Nursing Notes:   Chika Desouza LPN  2/15/2022 10:02 AM  Sign at exiting of workspace  Chief Complaint   Patient presents with     Hypertension     3 month F/U; multiple issues   Patient presents for F/U for hypertension; states that she has been having lower abdominal pain for about 3 months    Initial /84 (BP  "Location: Left arm, Patient Position: Sitting, Cuff Size: Adult Regular)   Pulse 72   Temp 96.9  F (36.1  C) (Tympanic)   Resp 18   Wt 77.1 kg (170 lb)   LMP  (LMP Unknown)   SpO2 96%   Breastfeeding No   BMI 30.11 kg/m   Estimated body mass index is 30.11 kg/m  as calculated from the following:    Height as of 11/16/21: 1.6 m (5' 3\").    Weight as of this encounter: 77.1 kg (170 lb).  Medication Reconciliation: complete      FOOD SECURITY SCREENING QUESTIONS:    The next two questions are to help us understand your food security.  If you are feeling you need any assistance in this area, we have resources available to support you today.    Hunger Vital Signs:  Within the past 12 months we worried whether our food would run out before we got money to buy more. Never  Within the past 12 months the food we bought just didn't last and we didn't have money to get more. Never  Chika Desouza LPN,LPN on 2/15/2022 at 10:01 AM        Advance care plan reviewed      Chika Desouza LPN              "

## 2022-02-15 ENCOUNTER — OFFICE VISIT (OUTPATIENT)
Dept: INTERNAL MEDICINE | Facility: OTHER | Age: 72
End: 2022-02-15
Attending: NURSE PRACTITIONER
Payer: OTHER GOVERNMENT

## 2022-02-15 VITALS
SYSTOLIC BLOOD PRESSURE: 126 MMHG | WEIGHT: 170 LBS | HEART RATE: 72 BPM | TEMPERATURE: 96.9 F | RESPIRATION RATE: 18 BRPM | DIASTOLIC BLOOD PRESSURE: 84 MMHG | BODY MASS INDEX: 30.11 KG/M2 | OXYGEN SATURATION: 96 %

## 2022-02-15 DIAGNOSIS — E78.2 MIXED HYPERLIPIDEMIA: ICD-10-CM

## 2022-02-15 DIAGNOSIS — G89.29 CHRONIC MIDLINE LOW BACK PAIN WITHOUT SCIATICA: ICD-10-CM

## 2022-02-15 DIAGNOSIS — M54.50 CHRONIC MIDLINE LOW BACK PAIN WITHOUT SCIATICA: ICD-10-CM

## 2022-02-15 DIAGNOSIS — N18.31 STAGE 3A CHRONIC KIDNEY DISEASE (H): ICD-10-CM

## 2022-02-15 DIAGNOSIS — I10 HYPERTENSION, UNSPECIFIED TYPE: Primary | Chronic | ICD-10-CM

## 2022-02-15 DIAGNOSIS — R10.30 LOWER ABDOMINAL PAIN: ICD-10-CM

## 2022-02-15 DIAGNOSIS — R73.03 PREDIABETES: ICD-10-CM

## 2022-02-15 LAB
ALBUMIN UR-MCNC: NEGATIVE MG/DL
ANION GAP SERPL CALCULATED.3IONS-SCNC: 6 MMOL/L (ref 3–14)
APPEARANCE UR: CLEAR
BACTERIA #/AREA URNS HPF: ABNORMAL /HPF
BASOPHILS # BLD AUTO: 0 10E3/UL (ref 0–0.2)
BASOPHILS NFR BLD AUTO: 0 %
BILIRUB UR QL STRIP: NEGATIVE
BUN SERPL-MCNC: 12 MG/DL (ref 7–25)
CALCIUM SERPL-MCNC: 9.6 MG/DL (ref 8.6–10.3)
CHLORIDE BLD-SCNC: 106 MMOL/L (ref 98–107)
CO2 SERPL-SCNC: 32 MMOL/L (ref 21–31)
COLOR UR AUTO: ABNORMAL
CREAT SERPL-MCNC: 0.99 MG/DL (ref 0.6–1.2)
CREAT UR-MCNC: 61 MG/DL
EOSINOPHIL # BLD AUTO: 0.1 10E3/UL (ref 0–0.7)
EOSINOPHIL NFR BLD AUTO: 1 %
ERYTHROCYTE [DISTWIDTH] IN BLOOD BY AUTOMATED COUNT: 12.1 % (ref 10–15)
GFR SERPL CREATININE-BSD FRML MDRD: 60 ML/MIN/1.73M2
GLUCOSE BLD-MCNC: 96 MG/DL (ref 70–105)
GLUCOSE UR STRIP-MCNC: NEGATIVE MG/DL
HCT VFR BLD AUTO: 43.5 % (ref 35–47)
HGB BLD-MCNC: 14.4 G/DL (ref 11.7–15.7)
HGB UR QL STRIP: NEGATIVE
IMM GRANULOCYTES # BLD: 0 10E3/UL
IMM GRANULOCYTES NFR BLD: 0 %
KETONES UR STRIP-MCNC: NEGATIVE MG/DL
LEUKOCYTE ESTERASE UR QL STRIP: NEGATIVE
LYMPHOCYTES # BLD AUTO: 1.7 10E3/UL (ref 0.8–5.3)
LYMPHOCYTES NFR BLD AUTO: 30 %
MCH RBC QN AUTO: 30.8 PG (ref 26.5–33)
MCHC RBC AUTO-ENTMCNC: 33.1 G/DL (ref 31.5–36.5)
MCV RBC AUTO: 93 FL (ref 78–100)
MICROALBUMIN UR-MCNC: <5 MG/L
MICROALBUMIN/CREAT UR: NORMAL MG/G{CREAT}
MONOCYTES # BLD AUTO: 0.4 10E3/UL (ref 0–1.3)
MONOCYTES NFR BLD AUTO: 7 %
MUCOUS THREADS #/AREA URNS LPF: PRESENT /LPF
NEUTROPHILS # BLD AUTO: 3.4 10E3/UL (ref 1.6–8.3)
NEUTROPHILS NFR BLD AUTO: 62 %
NITRATE UR QL: NEGATIVE
NRBC # BLD AUTO: 0 10E3/UL
NRBC BLD AUTO-RTO: 0 /100
PH UR STRIP: 5.5 [PH] (ref 5–9)
PLATELET # BLD AUTO: 216 10E3/UL (ref 150–450)
POTASSIUM BLD-SCNC: 4.4 MMOL/L (ref 3.5–5.1)
RBC # BLD AUTO: 4.67 10E6/UL (ref 3.8–5.2)
RBC URINE: 1 /HPF
SODIUM SERPL-SCNC: 144 MMOL/L (ref 134–144)
SP GR UR STRIP: 1.01 (ref 1–1.03)
UROBILINOGEN UR STRIP-MCNC: NORMAL MG/DL
WBC # BLD AUTO: 5.6 10E3/UL (ref 4–11)
WBC URINE: 2 /HPF

## 2022-02-15 PROCEDURE — 99213 OFFICE O/P EST LOW 20 MIN: CPT | Performed by: NURSE PRACTITIONER

## 2022-02-15 PROCEDURE — 36415 COLL VENOUS BLD VENIPUNCTURE: CPT | Mod: ZL | Performed by: NURSE PRACTITIONER

## 2022-02-15 PROCEDURE — 82043 UR ALBUMIN QUANTITATIVE: CPT | Mod: ZL | Performed by: NURSE PRACTITIONER

## 2022-02-15 PROCEDURE — 85025 COMPLETE CBC W/AUTO DIFF WBC: CPT | Mod: ZL | Performed by: NURSE PRACTITIONER

## 2022-02-15 PROCEDURE — 82310 ASSAY OF CALCIUM: CPT | Mod: ZL | Performed by: NURSE PRACTITIONER

## 2022-02-15 PROCEDURE — 81001 URINALYSIS AUTO W/SCOPE: CPT | Mod: ZL | Performed by: NURSE PRACTITIONER

## 2022-02-15 RX ORDER — ATORVASTATIN CALCIUM 10 MG/1
5 TABLET, FILM COATED ORAL
Qty: 60 TABLET | Refills: 0 | COMMUNITY
Start: 2022-02-16 | End: 2022-07-15

## 2022-02-15 ASSESSMENT — PAIN SCALES - GENERAL: PAINLEVEL: SEVERE PAIN (6)

## 2022-02-15 NOTE — NURSING NOTE
"Chief Complaint   Patient presents with     Hypertension     3 month F/U; multiple issues   Patient presents for F/U for hypertension; states that she has been having lower abdominal pain for about 3 months    Initial /84 (BP Location: Left arm, Patient Position: Sitting, Cuff Size: Adult Regular)   Pulse 72   Temp 96.9  F (36.1  C) (Tympanic)   Resp 18   Wt 77.1 kg (170 lb)   LMP  (LMP Unknown)   SpO2 96%   Breastfeeding No   BMI 30.11 kg/m   Estimated body mass index is 30.11 kg/m  as calculated from the following:    Height as of 11/16/21: 1.6 m (5' 3\").    Weight as of this encounter: 77.1 kg (170 lb).  Medication Reconciliation: complete      FOOD SECURITY SCREENING QUESTIONS:    The next two questions are to help us understand your food security.  If you are feeling you need any assistance in this area, we have resources available to support you today.    Hunger Vital Signs:  Within the past 12 months we worried whether our food would run out before we got money to buy more. Never  Within the past 12 months the food we bought just didn't last and we didn't have money to get more. Never  Chika Desouza LPN,LPN on 2/15/2022 at 10:01 AM        Advance care plan reviewed      Chika Desouza LPN    "

## 2022-02-23 ENCOUNTER — HOSPITAL ENCOUNTER (OUTPATIENT)
Dept: PHYSICAL THERAPY | Facility: OTHER | Age: 72
Setting detail: THERAPIES SERIES
End: 2022-02-23
Attending: NURSE PRACTITIONER
Payer: OTHER GOVERNMENT

## 2022-02-23 DIAGNOSIS — M54.50 CHRONIC MIDLINE LOW BACK PAIN WITHOUT SCIATICA: ICD-10-CM

## 2022-02-23 DIAGNOSIS — G89.29 CHRONIC MIDLINE LOW BACK PAIN WITHOUT SCIATICA: ICD-10-CM

## 2022-02-23 PROCEDURE — 97161 PT EVAL LOW COMPLEX 20 MIN: CPT | Mod: GP

## 2022-02-23 PROCEDURE — 97110 THERAPEUTIC EXERCISES: CPT | Mod: GP

## 2022-02-23 PROCEDURE — 97140 MANUAL THERAPY 1/> REGIONS: CPT | Mod: GP

## 2022-02-23 NOTE — PROGRESS NOTES
[facility]    OUTPATIENT PHYSICAL THERAPY ORTHOPEDIC EVALUATION  PLAN OF TREATMENT FOR OUTPATIENT REHABILITATION  (COMPLETE FOR INITIAL CLAIMS ONLY)  Patient's Last Name, First Name, M.I.  YOB: 1950  Monisha Velez    Provider s Name:  [facility]   Medical Record No.  [id number]   Start of Care Date:  02/23/22   Onset Date:  02/23/22   Type:     _X__PT   ___OT   ___SLP Medical Diagnosis:  chronic midline LBP without sciatica (M54.50, G89.29)     PT Diagnosis:  Impaired mobility, weakness secondary to chronic LBP and neck pain   Visits from SOC:  1      _________________________________________________________________________________  Plan of Treatment/Functional Goals:  joint mobilization, neuromuscular re-education, ROM, strengthening, stretching           Goals  Goal Identifier: HEP  Goal Description: Pt to display independence and 75% compliance with HEP to assist in self management of her pain, improving ROM for ADLs  Target Date: 05/21/22    Goal Identifier: Myofascial  Goal Description: Pt to display improved myofascial mobility in the  lumbar and pelvic regions to allow for symmetrical postural loading to allow for 50% less pain with standing and sitting tasks at home.  Target Date: 05/21/22    Goal Identifier: cervical  Goal Description: Pt to display improved cervical joint mobility and myofascial mobility to allow her to report 50% improvement in level of cervical pain  Target Date: 05/21/22    Goal Identifier: strength  Goal Description: Pt to display improved LE strength to at least 4+/5 to allow for improved ability to lift things from the floor with safe mechanics.  Target Date: 05/21/22                                                Therapy Frequency:   (1-2 times per week)  Predicted Duration of Therapy Intervention:  3 months    Sindy Siddiqi, PT                  I CERTIFY THE NEED FOR THESE SERVICES FURNISHED UNDER        THIS PLAN OF TREATMENT AND WHILE UNDER MY CARE     (Physician co-signature of this document indicates review and certification of the therapy plan).                       Certification Date From:  02/23/22   Certification Date To:  05/21/22    Referring Provider:  Nafisa Escobar NP    Initial Assessment        See Epic Evaluation Start of Care Date: 02/23/22

## 2022-02-23 NOTE — PROGRESS NOTES
[facility]    OUTPATIENT PHYSICAL THERAPY ORTHOPEDIC EVALUATION  PLAN OF TREATMENT FOR OUTPATIENT REHABILITATION  (COMPLETE FOR INITIAL CLAIMS ONLY)  Patient's Last Name, First Name, M.I.  YOB: 1950  Monisha Velez    Provider s Name:  [facility]   Medical Record No.  [id number]   Start of Care Date:  02/23/22   Onset Date:  02/23/22   Type:     _X__PT   ___OT   ___SLP Medical Diagnosis:  chronic midline LBP without sciatica (M54.50, G89.29)     PT Diagnosis:  Impaired mobility, weakness secondary to chronic LBP and neck pain   Visits from SOC:  1      _________________________________________________________________________________  Plan of Treatment/Functional Goals:  joint mobilization, neuromuscular re-education, ROM, strengthening, stretching           Goals  Goal Identifier: HEP  Goal Description: Pt to display independence and 75% compliance with HEP to assist in self management of her pain, improving ROM for ADLs  Target Date: 05/21/22    Goal Identifier: Myofascial  Goal Description: Pt to display improved myofascial mobility in the  lumbar and pelvic regions to allow for symmetrical postural loading to allow for 50% less pain with standing and sitting tasks at home.  Target Date: 05/21/22    Goal Identifier: cervical  Goal Description: Pt to display improved cervical joint mobility and myofascial mobility to allow her to report 50% improvement in level of cervical pain  Target Date: 05/21/22    Goal Identifier: strength  Goal Description: Pt to display improved LE strength to at least 4+/5 to allow for improved ability to lift things from the floor with safe mechanics.  Target Date: 05/21/22                                                Therapy Frequency:   (1-2 times per week)  Predicted Duration of Therapy Intervention:  3 months    Sindy Siddiqi, PT                  I CERTIFY THE NEED FOR THESE SERVICES FURNISHED UNDER        THIS PLAN OF TREATMENT AND WHILE UNDER MY CARE     (Physician co-signature of this document indicates review and certification of the therapy plan).                       Certification Date From:  02/23/22   Certification Date To:  05/21/22    Referring Provider:  Nafisa Escobar NP    Initial Assessment        See Epic Evaluation Start of Care Date: 02/23/22

## 2022-02-23 NOTE — PROGRESS NOTES
02/23/22 0900   General Information   Type of Visit Initial OP Ortho PT Evaluation   Start of Care Date 02/23/22   Referring Physician Nafisa Escobar NP   Patient/Family Goals Statement To decrease my back and neck pain and to get my legs stronger   Orders Evaluate and Treat   Date of Order 02/15/22   Certification Required? Yes   Medical Diagnosis chronic midline LBP without sciatica (M54.50, G89.29)   Surgical/Medical history reviewed Yes   Body Part(s)   Body Part(s) Lumbar Spine/SI   Presentation and Etiology   Pertinent history of current problem (include personal factors and/or comorbidities that impact the POC) Pt is referred to PT due to chronic midline LBP without sciatica (M54.50, G89.29). She has just recently finished an episode of PT with Heather Makaylatad for over active bladder; mixed stress/urge urinary incontinence. Notes she is having pain in her lower back and her neck. Notes that she has always felt better after PT. Notes that she is doing better with her bladder control;  notes she cut one of her meds in half and things are better (cholesterol medication). Notes that she has pain on both sides on of her lower back into the buttocks. Notes that it is mainly along the spine. Notes she has L sided neck pain. Notes that manual stretching has helped. Has not been able to walk outside due to the winter weather so this is contributing. Has frontal headaches which she thinks are sinus or stress related. Frequency of headaches: daily. Intensity is moderate. Uses an allergy pill which helps and will take OTC meds if they are really bad. Aggravating factors: Lying down at night, standing up for a long time, lifting and carrying, standing, bending forward, household tasks. Easing factors: rest and heat.  Notes she tries sitting down for a little while and then get up and keeps on going. Past medical hx/comorbidities: adnexal mass, gastritis, restless leg syndrome, mixed stress/urge urinary incontinence,  over-active bladder, hysterectomy (vaginal), laparoscopic cystectomy with adhesion lysis, bilateral salpingo-oophorectomy.   Impairments A. Pain;F. Decreased strength and endurance;N. Headaches   Functional Limitations perform activities of daily living;perform required work activities;perform desired leisure / sports activities   Symptom Location bilateral low back pain and L sided neck pain   How/Where did it occur From insidious onset   Onset date of current episode/exacerbation 02/23/22   Chronicity Chronic   Pain rating (0-10 point scale) Best (/10);Worst (/10)   Best (/10) 2   Worst (/10) 5   Pain quality B. Dull;C. Aching;G. Cramping   Frequency of pain/symptoms A. Constant   Pain/symptoms are: Other   Pain symptoms comment constant   Pain/symptoms exacerbated by A. Sitting;C. Lifting;D. Carrying;E. Rest;I. Bending;J. ADL;K. Home tasks   Pain/symptoms eased by A. Sitting;B. Walking;E. Changing positions;F. Certain positions;G. Heat;H. Cold   Progression of symptoms since onset: Improved   Current / Previous Interventions   Diagnostic Tests: X-ray   X-ray Results Results   X-ray results 2/2020: a moderate right convex scoliosis with degenerative change in the lumbar spine. This is most severe at the L3-4 level where disc space narrowing is seen. There is some mild spur formation. There is no acute compression fracture. There is mild vascular calcification. IMPRESSION: Scoliosis. No acute abnormality.   Prior Level of Function   Functional Level Prior Comment Notes longstanding issues with back pain limiting her activities   Current Level of Function   Patient role/employment history F. Retired   Living environment House/townhome   Home/community accessibility one level home   Fall Risk Screen   Fall screen completed by PT   Have you fallen 2 or more times in the past year? No   Abuse Screen (yes response referral indicated)   Feels Unsafe at Home or Work/School no   Feels Threatened by Someone no   Does Anyone  Try to Keep You From Having Contact with Others or Doing Things Outside Your Home? no   Physical Signs of Abuse Present no   Functional Scales   Functional Scales   (PSFS: 57% ability)   Lumbar Spine/SI Objective Findings   Observation General listening to the right anterior pelvis. Postural loading: Head R/R, shoulder Limited R/L and L/R. Pelvis limited L/L   Posture forward head, forward and elevated shoulders.    Flexion ROM limited to just below knees--painful   Extension ROM limited   Right Side Bending ROM limited and painful   Left Side Bending ROM limited   Pelvic Screen Standing flexion R, stork R, seated flexion R. Supine: ASIS and pube inferior R, R leg long. Prone: ILAs posterior L, ITS level, L leg long   Hip Screen Hamstring tightness bilaterally. Flexion WFL with groin pain. Neg FADIRs, neg FABERs.    Hip Flexion (L2) Strength LE strength WFL unless noted: L 4+/5   Hip Abduction Strength 4/5 bilateral   Hip Extension Strength 4/5   Knee Extension (L3) Strength 4+/5   Ankle Dorsiflexion (L4) Strength 4/5   Hamstring Flexibility SLR to 65 degrees R and L with HS tightness   Hip Flexor Flexibility decreased   SLR neg for neurological involvement: HS tightness as noted above   Slump Test neg   Segmental Mobility FRS L L5S1, R L45. Decreased lateral glides L to R C45, C56, C67   Palpation Local listening at the anterior pelvis to the L pubovesical ligament with extended listening to the R pubovesical ligament and the R obturator fascia/hip adductors. Restricted in the bilateral iliopsoas. Tender and tight in the L sacrotuberous and sacrospinous ligament. Myofascial restriction in thoracolumbar fascia, low lumbar paraspinals. Restricted in the deep anterior cervical fascia, lower. Tight with inferior loading of the thorax and in the thoracic inlet. Hypertonicity in the LS, UT, SCMS bilaterally.    Planned Therapy Interventions   Planned Therapy Interventions joint mobilization;neuromuscular  re-education;ROM;strengthening;stretching   Clinical Impression   Criteria for Skilled Therapeutic Interventions Met yes, treatment indicated   PT Diagnosis Impaired mobility, weakness secondary to chronic LBP and neck pain   Influenced by the following impairments Postural dysfunction, limited ROM/flexibility, weakness, facet restrictions, SI joint dysfunction with pubic shear, myofascial restrictions.    Functional limitations due to impairments sitting, standing, lifting and carrying, bending, ADLS, household tasks, lying down   Clinical Presentation Stable/Uncomplicated   Clinical Decision Making (Complexity) Low complexity   Therapy Frequency   (1-2 times per week)   Predicted Duration of Therapy Intervention (days/wks) 3 months   Risk & Benefits of therapy have been explained Yes   Patient, Family & other staff in agreement with plan of care Yes   Clinical Impression Comments Pt is referred to PT due to chronic LBP and neck pain. She has just recently completed an episode of PT due to bladdery dysfunction. PT is indicated to address the above noted impairments and functional limitations.    Education Assessment   Preferred Learning Style Listening   Barriers to Learning No barriers   ORTHO GOALS   PT Ortho Eval Goals 1;2;3;4   Ortho Goal 1   Goal Identifier HEP   Goal Description Pt to display independence and 75% compliance with HEP to assist in self management of her pain, improving ROM for ADLs   Target Date 05/21/22   Ortho Goal 2   Goal Identifier Myofascial   Goal Description Pt to display improved myofascial mobility in the  lumbar and pelvic regions to allow for symmetrical postural loading to allow for 50% less pain with standing and sitting tasks at home.   Target Date 05/21/22   Ortho Goal 3   Goal Identifier cervical   Goal Description Pt to display improved cervical joint mobility and myofascial mobility to allow her to report 50% improvement in level of cervical pain   Target Date 05/21/22    Ortho Goal 4   Goal Identifier strength   Goal Description Pt to display improved LE strength to at least 4+/5 to allow for improved ability to lift things from the floor with safe mechanics.   Target Date 05/21/22   Total Evaluation Time   PT Eval, Low Complexity Minutes (55714) 25   Therapy Certification   Certification date from 02/23/22   Certification date to 05/21/22   Medical Diagnosis chronic midline LBP without sciatica (M54.50, G89.29)   Sindy Siddiqi, PT on 2/23/2022 at 2:31 PM

## 2022-03-09 ENCOUNTER — HOSPITAL ENCOUNTER (OUTPATIENT)
Dept: PHYSICAL THERAPY | Facility: OTHER | Age: 72
Setting detail: THERAPIES SERIES
End: 2022-03-09
Attending: NURSE PRACTITIONER
Payer: OTHER GOVERNMENT

## 2022-03-09 PROCEDURE — 97140 MANUAL THERAPY 1/> REGIONS: CPT | Mod: GP

## 2022-03-09 PROCEDURE — 97112 NEUROMUSCULAR REEDUCATION: CPT | Mod: GP

## 2022-03-23 ENCOUNTER — HOSPITAL ENCOUNTER (OUTPATIENT)
Dept: PHYSICAL THERAPY | Facility: OTHER | Age: 72
Setting detail: THERAPIES SERIES
Discharge: HOME | End: 2022-03-23
Attending: NURSE PRACTITIONER
Payer: OTHER GOVERNMENT

## 2022-03-23 PROCEDURE — 97112 NEUROMUSCULAR REEDUCATION: CPT | Mod: GP

## 2022-03-23 PROCEDURE — 97140 MANUAL THERAPY 1/> REGIONS: CPT | Mod: GP

## 2022-03-30 ENCOUNTER — HOSPITAL ENCOUNTER (OUTPATIENT)
Dept: PHYSICAL THERAPY | Facility: OTHER | Age: 72
Setting detail: THERAPIES SERIES
Discharge: HOME | End: 2022-03-30
Attending: NURSE PRACTITIONER
Payer: OTHER GOVERNMENT

## 2022-03-30 PROCEDURE — 97112 NEUROMUSCULAR REEDUCATION: CPT | Mod: GP

## 2022-03-30 PROCEDURE — 97140 MANUAL THERAPY 1/> REGIONS: CPT | Mod: GP

## 2022-04-13 ENCOUNTER — HOSPITAL ENCOUNTER (OUTPATIENT)
Dept: PHYSICAL THERAPY | Facility: OTHER | Age: 72
Setting detail: THERAPIES SERIES
Discharge: HOME | End: 2022-04-13
Attending: NURSE PRACTITIONER
Payer: OTHER GOVERNMENT

## 2022-04-13 PROCEDURE — 97140 MANUAL THERAPY 1/> REGIONS: CPT | Mod: GP

## 2022-04-13 PROCEDURE — 97112 NEUROMUSCULAR REEDUCATION: CPT | Mod: GP

## 2022-04-20 ENCOUNTER — HOSPITAL ENCOUNTER (OUTPATIENT)
Dept: PHYSICAL THERAPY | Facility: OTHER | Age: 72
Setting detail: THERAPIES SERIES
Discharge: HOME | End: 2022-04-20
Attending: NURSE PRACTITIONER
Payer: OTHER GOVERNMENT

## 2022-04-20 PROCEDURE — 97112 NEUROMUSCULAR REEDUCATION: CPT | Mod: GP

## 2022-04-20 PROCEDURE — 97140 MANUAL THERAPY 1/> REGIONS: CPT | Mod: GP

## 2022-05-04 ENCOUNTER — HOSPITAL ENCOUNTER (OUTPATIENT)
Dept: PHYSICAL THERAPY | Facility: OTHER | Age: 72
Setting detail: THERAPIES SERIES
Discharge: HOME | End: 2022-05-04
Attending: NURSE PRACTITIONER
Payer: OTHER GOVERNMENT

## 2022-05-04 PROCEDURE — 97112 NEUROMUSCULAR REEDUCATION: CPT | Mod: GP

## 2022-05-04 PROCEDURE — 97140 MANUAL THERAPY 1/> REGIONS: CPT | Mod: GP

## 2022-05-05 ENCOUNTER — OFFICE VISIT (OUTPATIENT)
Dept: INTERNAL MEDICINE | Facility: OTHER | Age: 72
End: 2022-05-05
Attending: NURSE PRACTITIONER
Payer: OTHER GOVERNMENT

## 2022-05-05 VITALS
TEMPERATURE: 97 F | RESPIRATION RATE: 16 BRPM | WEIGHT: 175.2 LBS | OXYGEN SATURATION: 97 % | HEART RATE: 63 BPM | BODY MASS INDEX: 31.04 KG/M2 | SYSTOLIC BLOOD PRESSURE: 136 MMHG | DIASTOLIC BLOOD PRESSURE: 78 MMHG

## 2022-05-05 DIAGNOSIS — L60.3 NAIL DYSTROPHY: Primary | Chronic | ICD-10-CM

## 2022-05-05 DIAGNOSIS — M79.605 PAIN OF LEFT LOWER EXTREMITY: ICD-10-CM

## 2022-05-05 DIAGNOSIS — B35.1 ONYCHOMYCOSIS: Chronic | ICD-10-CM

## 2022-05-05 DIAGNOSIS — G25.81 RESTLESS LEG SYNDROME: ICD-10-CM

## 2022-05-05 DIAGNOSIS — M79.604 PAIN OF RIGHT LOWER EXTREMITY: ICD-10-CM

## 2022-05-05 PROCEDURE — G0127 TRIM NAIL(S): HCPCS | Mod: Q8 | Performed by: NURSE PRACTITIONER

## 2022-05-05 ASSESSMENT — PAIN SCALES - GENERAL: PAINLEVEL: MODERATE PAIN (5)

## 2022-05-05 NOTE — PROGRESS NOTES
"Answers for HPI/ROS submitted by the patient on 2022  How many servings of fruits and vegetables do you eat daily?: 0-1  On average, how many sweetened beverages do you drink each day (Examples: soda, juice, sweet tea, etc.  Do NOT count diet or artificially sweetened beverages)?: 1  How many minutes a day do you exercise enough to make your heart beat faster?: 10 to 19  How many days per week do you miss taking your medication?: 7  What is the reason for your visit today?: Nails    Monisha Velez  : 1950 Age: 72 year old Sex: female MRN: [id number]    Nursing Notes:   Chika Desouza LPN  2022 10:20 AM  Signed  Chief Complaint   Patient presents with     Musculoskeletal Problem     F/N Cares         Initial /78 (BP Location: Right arm, Patient Position: Sitting, Cuff Size: Adult Regular)   Pulse 63   Temp 97  F (36.1  C) (Temporal)   Resp 16   Wt 79.5 kg (175 lb 3.2 oz)   LMP  (LMP Unknown)   SpO2 97%   Breastfeeding No   BMI 31.04 kg/m   Estimated body mass index is 31.04 kg/m  as calculated from the following:    Height as of 21: 1.6 m (5' 3\").    Weight as of this encounter: 79.5 kg (175 lb 3.2 oz).     Medication Reconciliation: complete      FOOD SECURITY SCREENING QUESTIONS:    The next two questions are to help us understand your food security.  If you are feeling you need any assistance in this area, we have resources available to support you today.    Hunger Vital Signs:  Within the past 12 months we worried whether our food would run out before we got money to buy more. Never  Within the past 12 months the food we bought just didn't last and we didn't have money to get more. Never      Advance care plan reviewed      Chika Desouza LPN on 2022 at 10:19 AM           Nursing note reviewed with patient.  Accuracy and completeness verified.     Encounter Date:  2022    Patient Name:  Monisha Velez  Patient MRN:   [id number]     Last Footcare Appt: 2/15/2022 "    PCP: Raeann Pearson    Other Providers Seen for Foot/Nail Care (name/location/date): [] Yes [x] No List (if applicable):    ABN Given to patient and signed in clinic today:    [x] Yes [] No    SUBJECTIVE:    Patient Reported Symptoms: RIGHT  LEFT   Neuropathic symptoms []  []  DESCRIBE:   Pain in foot/toes at rest []  []  DESCRIBE:   Intermittent claudication []  []  DESCRIBE:   Previous foot ulcer/injury []  []  DESCRIBE:   Amputation []  []  DESCRIBE:    OBJECTIVE:    Nurse/Provider Inspection of Feet/Nails: RIGHT  LEFT   Infection []  []  DESCRIBE:   Ulcerations []  []  DESCRIBE:   Calluses/Corns []  []  DESCRIBE:   Fissures/Cracks []  []  DESCRIBE: bilateral edges of both great toenails have been removed prior, growing pieces that are jagged;   Nail Disorders/Changes [x]  [x]  DESCRIBE: all 10 toenails are dystrophic; 5th toes bilaterally have fungus   Other []      []  DESCRIBE:    VASCULAR TESTING:    Foot Pulses:  RIGHT  LEFT   Dorsalis pedis [x]  [x]    Posterior tibial [x]  [x]   Capillary refill of the hallux (<3 seconds): [x] [x]  Digital hair present: [] []  Varicosities (+/- presents): [x] [x]  Edema (pitting vs. non pitting): [] []     DESCRIBE: [] 1+ minimal   [] 2+ moderate   [] 3+ severe  Skin Temperature:  [x] [x]     DESCRIBE: [x] Cold   [] Hot    [] Warm to touch    NEUROLOGIC TESTING:        RIGHT LEFT  Sharp/dull Testing: (the blunt end and sharp end of swab stick) [x] [x]  Proprioception: (moves hallux up/down in space) [x] [x]  Superficial Reflexes: (Babinski sign) [x] [x]    Monofilament Testing: RIGHT  LEFT  RIGHT LEFT    Site 1 [x]  [x]  Site 6 [x] [x]    Site 2 [x]  [x]  Site 7 [x] [x]    Site 3 [x]  [x]  Site 8 [x] [x]    Site 4 [x]  [x]  Site 9 [x] [x]    Site 5 [x]  [x]  Site 10 [x] [x]      MUSCULOSKELETAL: RIGHT LEFT  Foot Type: [x] []  DESCRIBE: [] Pes cavus (high arch) [x] pes rectus (normal) [] pes planus (flat)  Digital deformity: [] []  DESCRIBE: [] bunion [] claw toe []  hammer toe [] mallet toe [] hallux limitus [] other  Joint ROM: [x] [x]  DESCRIBE: [x] adequate ROM to MTPH, STJ, and AJ without crepitus  Adequate Muscle Strength: [x] [x]   Other [] []  DESCRIBE:     DERMATOLOGIC:      Nails: RIGHT LEFT  Onychauxis (thickened, long nail): [x] [x]  DESCRIBE: all 10 toenails  Onycholysis ( nail): [] []  DESCRIBE:   Onychocryptosis (ingrown toenail): [] []  DESCRIBE:    Onychogryphosis (ros's horn nail): [] []  DESCRIBE:   Onchomycosis (fungal nail): [x] [x]  DESCRIBE: bilateral 5th toenails    Skin: RIGHT LEFT  [] Hyperkeratotic lesions [] verruca tissue [] foreign body: [] []  DESCRIBE:   [] Mild edema [] erythema [] open lesions [] interdigit maceration: [] []  DESCRIBE:   [x] Varicosities [] telangiectasis [] pigmented lesion [] venous stasis:  [x] [x]  DESCRIBE:   Adequate padding to the plantar aspect of the foot: [x] [x]  DESCRIBE:     Footwear Assessment:  Type: casual shoes   Condition Good [x] Fair [] Poor []   Fit Good [x] Fair [] Poor []            Yes No  Does the patient use an ambulatory aid?      [] [x] DESCRIBE:  Does the patient understand the effects of diabetes on foot health? [x] []  Can the patient identify appropriate foot care practices?    [x] []  Are the patient's feet adequately cared for?      [x] []            Yes No  Does the patient have impaired vision?      [x] []  Can the patient reach own feet for safe self care?     [] [x]  Are there other factors influencing ability to safely care for own feet? [x] []     Pertinent Past Medical History:  Patient Active Problem List   Diagnosis     Anxiety state     Family history of malignant melanoma of skin     Fundic gastritis     Gastroesophageal reflux disease without esophagitis     H/O adenomatous polyp of colon     Chronic insomnia     Reactive airway disease that is not asthma     Restless leg syndrome     Rosacea     Pain of right lower extremity     Pain of left lower extremity     PND  (post-nasal drip)     Chronic kidney disease, stage 3 (H)     Itchy, watery, and red eye     Loud snoring     Apnea     Excessive daytime sleepiness     HTN (hypertension)     Past Medical History:   Diagnosis Date     Adnexal mass 08/22/2011     Contact dermatitis 12/16/2009     Diplopia     Episode of mild diplopia secondary to sinusitis, ophthalmology consult 07/2008.     H/O gastritis      History of adenomatous polyp of colon      Postmenopausal atrophic vaginitis 07/29/2011     Restless leg syndrome      Retention of urine 08/22/2011     Rosacea      Tinea corporis 11/19/2010     Urge incontinence of urine        Diagnostics:  Hemoglobin A1C POCT   Date Value Ref Range Status   01/11/2021 5.9 4.0 - 6.0 % Final       ASSESSMENT/PLAN:    Restless leg syndrome  Nail dystrophy  Onychomycosis  Pain of right lower extremity  Pain of left lower extremity  - TRIMMING DYSTROPHIC NAILS,ANY NUMBER  All toenails filed down to manageable thickness with Dremel tool and angled Frederic.  Toenails were then trimmed with clippers.  Lotion applied to bilateral lower extremities.  Patient tolerated well. Time spent doing foot/nail care was 25 minutes.    [x] Patient is unable to trim own toenails due to lower extremity pain and limited mobility.    [] Patient is on chronic anticoagulation and chronic use of this medication poses a increased risk of bleeding should patient sustain a nail/skin injury.    [x] Patient has noted onychomycosis for some time now and has tried various OTC treatments without success.     Educated Patient On:   [x] Proper healthy diet. Eliminate soda, high fructose corn syrup products, artificial sweeteners, and high sodium, high cholesterol foods. Encouraged more fruits and   vegetables and an increase in daily water intake.  [x] Educated on importance of diet and exercise for weight loss and reduction on symptoms.   [x] Daily exercise for at least 30 minutes is recommended. This can be walking, riding a  bike, low impact aerobic activity, or yoga.    [x] Importance of daily skin assessments.   [x] Importance of not walking barefoot. Recommend wearing Crocs in house versus going barefoot.   [x] Importance of good, supportive shoes.   [x] Importance of smoking cessation. Greater than 3 minutes were spent on smoking cessation including encouragement to reduce cigarette use and discussion of modalities to assist with this. Cessation was encouraged in order to improve pain, reduce mortality, improve quality of life, and long term outcomes.     Recommended:      YES NO   Diabetic socks:      [] [] [x] Not applicable   Compression stockings/sleeves:   [x] [] [] Not applicable   Diabetic/supportive shoegear with wide toe box:  [x] [] [] Not applicable   Use of Se's VapoRub daily to feet and/or toenails: [x] [] [] Not applicable   Smoking Cessation:     [] [] [x] Not applicable    FOLLOW-UP:    Return to clinic: [] One week [] 30 Days (wounds) [x] 63+ Days   [] As directed by Provider    I explained my diagnostic considerations and recommendations to the patient, who voiced understanding and agreement with the treatment plan. All questions were answered. We discussed potential side effects of any prescribed or recommended therapies, as well as expectations for response to treatments.     JERRELL Ferrari, AGNP-C  Internal Medicine  [facility]    05/05/2022 11:01 AM    Total time spent with this patient was 30 minutes which included chart review, procedures, patient education, visualization and interpretation of labs/images, time spent with the patient and documentation.     [x]  TRIMMING DYSTROPHIC NAILS,ANY NUMBER  [] 93494 TRIM HYPERKERATOTIC SKIN LESION, 1  [] 71424 TRIM HYPERKERATOTIC SKIN LESION,2-4  [] 47085 TRIM HYPERKERATOTIC SKIN LESION,>4  [] 63018 TRIM NON DYSTROPHIC NAIL(S)  [] 24953 DEBRIDEMENT OF NAIL(S) 1-5  [] 29300 DEBRIDEMENT OF NAIL(S) 6 OR MORE  [] 77733 REMOVAL  NAIL/NAIL BED, PARTIAL OR COMPLETE

## 2022-05-05 NOTE — NURSING NOTE
"Chief Complaint   Patient presents with     Musculoskeletal Problem     F/N Cares         Initial /78 (BP Location: Right arm, Patient Position: Sitting, Cuff Size: Adult Regular)   Pulse 63   Temp 97  F (36.1  C) (Temporal)   Resp 16   Wt 79.5 kg (175 lb 3.2 oz)   LMP  (LMP Unknown)   SpO2 97%   Breastfeeding No   BMI 31.04 kg/m   Estimated body mass index is 31.04 kg/m  as calculated from the following:    Height as of 11/16/21: 1.6 m (5' 3\").    Weight as of this encounter: 79.5 kg (175 lb 3.2 oz).     Medication Reconciliation: complete      FOOD SECURITY SCREENING QUESTIONS:    The next two questions are to help us understand your food security.  If you are feeling you need any assistance in this area, we have resources available to support you today.    Hunger Vital Signs:  Within the past 12 months we worried whether our food would run out before we got money to buy more. Never  Within the past 12 months the food we bought just didn't last and we didn't have money to get more. Never      Advance care plan reviewed      Chika Desouza LPN on 5/5/2022 at 10:19 AM      "

## 2022-05-15 NOTE — TELEPHONE ENCOUNTER
Left message to let her know she will be getting a call regarding setting up a hearing test.   Call back if haven't heard within two weeks or if other questions.  Tamika Goldstein CMA([facility])..................1/11/2021   3:48 PM   
Patient was just seen today and forgot to talk to the doctor about her hearing.  Please call    Christine Rich on 1/11/2021 at 10:33 AM    
She stated during rooming that her ears felt plugged and mentioned that she would like to have a hearing test.  Would you be willing to order a referral for this?  Tamika Goldstein CMA([facility])..................1/11/2021   10:56 AM   
Yes order placed  
- - -

## 2022-05-17 ENCOUNTER — OFFICE VISIT (OUTPATIENT)
Dept: INTERNAL MEDICINE | Facility: OTHER | Age: 72
End: 2022-05-17
Attending: NURSE PRACTITIONER
Payer: OTHER GOVERNMENT

## 2022-05-17 VITALS
OXYGEN SATURATION: 98 % | BODY MASS INDEX: 30.01 KG/M2 | HEART RATE: 60 BPM | DIASTOLIC BLOOD PRESSURE: 86 MMHG | SYSTOLIC BLOOD PRESSURE: 136 MMHG | RESPIRATION RATE: 14 BRPM | WEIGHT: 169.4 LBS | TEMPERATURE: 97 F

## 2022-05-17 DIAGNOSIS — I10 HYPERTENSION, UNSPECIFIED TYPE: Primary | Chronic | ICD-10-CM

## 2022-05-17 DIAGNOSIS — M54.50 CHRONIC MIDLINE LOW BACK PAIN WITHOUT SCIATICA: ICD-10-CM

## 2022-05-17 DIAGNOSIS — Z71.85 IMMUNIZATION COUNSELING: ICD-10-CM

## 2022-05-17 DIAGNOSIS — E66.811 CLASS 1 OBESITY WITH BODY MASS INDEX (BMI) OF 30.0 TO 30.9 IN ADULT, UNSPECIFIED OBESITY TYPE, UNSPECIFIED WHETHER SERIOUS COMORBIDITY PRESENT: ICD-10-CM

## 2022-05-17 DIAGNOSIS — N39.46 MIXED INCONTINENCE: ICD-10-CM

## 2022-05-17 DIAGNOSIS — J30.2 SEASONAL ALLERGIC RHINITIS, UNSPECIFIED TRIGGER: ICD-10-CM

## 2022-05-17 DIAGNOSIS — G89.29 CHRONIC MIDLINE LOW BACK PAIN WITHOUT SCIATICA: ICD-10-CM

## 2022-05-17 PROCEDURE — 99213 OFFICE O/P EST LOW 20 MIN: CPT | Performed by: NURSE PRACTITIONER

## 2022-05-17 ASSESSMENT — ENCOUNTER SYMPTOMS
DIFFICULTY URINATING: 0
BACK PAIN: 1
FATIGUE: 0
HEADACHES: 1
EYE REDNESS: 1
DYSURIA: 0
WEAKNESS: 0
SINUS PAIN: 1
SORE THROAT: 1
NUMBNESS: 0
SHORTNESS OF BREATH: 0
SINUS PRESSURE: 1
HEMATURIA: 0
COUGH: 1
TROUBLE SWALLOWING: 1
FEVER: 0
CHILLS: 0

## 2022-05-17 ASSESSMENT — PAIN SCALES - GENERAL: PAINLEVEL: SEVERE PAIN (6)

## 2022-05-17 NOTE — PATIENT INSTRUCTIONS
Stop benadryl     Try tylenol 1000 mg by mouth every 6 hours. Max of 4,000 mg per day     Ibuprofen occasionally okay to use for headache. 600-800 mg every 8 hours as needed. Take with food.     For allergies: Zyrtec or loratadine -  some generic zyrtec.     Drink plenty of fluids

## 2022-05-17 NOTE — PROGRESS NOTES
Assessment & Plan     Hypertension, unspecified type  Blood pressure stable at 136/86  Continue with healthy eating, low salt diet, exercise, and checking blood pressure at home.    Chronic midline low back pain without sciatica  Continue with physical therapy for now as she does feel improvement.  We did discuss avoiding heavy lifting, and aggravating movements. May take occasional ibuprofen for back pain.     Mixed incontinence  We discussed seeing urology to consider botox injection; however, she would like to hold off on this for now.  Plan to continue physical therapy.  We discussed her cranberry supplement whether to continue it or stop, I had a discussion with her that research is inconclusive if cranberry supplement is beneficial in prevention for urinary tract infections however if she feels it is beneficial she may continue it, I did encourage increasing fluid intake.    Seasonal allergic rhinitis, unspecified trigger  Seasonal rhinitis most likely due to similar symptoms every year during the spring, nasal mucosa slightly swollen and blue tinge, itchy, scratchy throat and dry cough [has been present for over a month with negative COVID test].  Discussed stopping Benadryl due to risk of adverse side effects in the elderly population.  Discussed other alternatives such as loratadine or Zyrtec.    Immunization counseling  She is able to receive the fourth COVID booster shot but she is traveling to [place] tomorrow for a procedure and she would like to hold off on the fourth booster until she is back.    Class 1 obesity  We discussed trying protein drinks to help sustain fullness, kidney function is stable and we asked her to drink plenty of water. Encouraged green leafy vegetables.     20 minutes spent on the date of the encounter doing chart review, history and exam, documentation and further activities per the note     BMI:   Estimated body mass index is 30.01 kg/m  as calculated from the following:     "Height as of 11/16/21: 1.6 m (5' 3\").    Weight as of this encounter: 76.8 kg (169 lb 6.4 oz).     Return in about 6 months (around 11/17/2022).     [facility]      I was present with the nurse practitioner student who participated in the service and in the documentation of the note. I have verified the history and personally performed the physical exam and medical decision making. I agree with the assessment and plan of care as documented in the note.     Nafisa Escobar NP  [facility] [place]    Susanne Bearden is a 72 year old who presents for the following health issues :  F/U HTN     HPI     Hypertension Follow-up      Do you check your blood pressure regularly outside of the clinic? Yes     Are you following a low salt diet? Yes    Are your blood pressures ever more than 140 on the top number (systolic) OR more   than 90 on the bottom number (diastolic), for example 140/90? No      How many servings of fruits and vegetables do you eat daily?  2-3    On average, how many sweetened beverages do you drink each day (Examples: soda, juice, sweet tea, etc.  Do NOT count diet or artificially sweetened beverages)?   0    How many days per week do you exercise enough to make your heart beat faster? 4    How many minutes a day do you exercise enough to make your heart beat faster? 20 - 29    How many days per week do you miss taking your medication? 0      Review of Systems   Constitutional: Negative for chills, fatigue and fever.   HENT: Positive for sinus pressure, sinus pain, sore throat and trouble swallowing (History: States it is resolved now). Negative for ear discharge and ear pain.    Eyes: Positive for redness.   Respiratory: Positive for cough. Negative for shortness of breath.    Genitourinary: Negative for difficulty urinating, dysuria and hematuria.        Incontinence    Musculoskeletal: Positive for back pain.        Occasional left ankle pain  "   Neurological: Positive for headaches. Negative for weakness and numbness.          Objective    /86 (BP Location: Right arm, Patient Position: Sitting, Cuff Size: Adult Regular)   Pulse 60   Temp 97  F (36.1  C) (Temporal)   Resp 14   Wt 76.8 kg (169 lb 6.4 oz)   LMP  (LMP Unknown)   SpO2 98%   Breastfeeding No   BMI 30.01 kg/m    Body mass index is 30.01 kg/m .     Physical Exam  Vitals reviewed.   Constitutional:       General: She is awake.      Appearance: Normal appearance. She is well-developed and well-groomed. She is obese. She is not ill-appearing.   HENT:      Nose: Congestion (Rt nare mucosal swelling ) present.      Right Turbinates: Swollen and pale.      Left Turbinates: Pale.      Right Sinus: Maxillary sinus tenderness and frontal sinus tenderness present.      Left Sinus: Maxillary sinus tenderness present.      Mouth/Throat:      Lips: Pink.      Mouth: Mucous membranes are moist.      Pharynx: Oropharynx is clear.   Cardiovascular:      Rate and Rhythm: Normal rate and regular rhythm.      Pulses: Normal pulses.      Heart sounds: Normal heart sounds.   Pulmonary:      Effort: Pulmonary effort is normal.      Breath sounds: Normal breath sounds and air entry.   Skin:     General: Skin is warm and dry.   Neurological:      General: No focal deficit present.      Mental Status: She is alert and oriented to person, place, and time.      Gait: Gait is intact.   Psychiatric:         Mood and Affect: Mood normal.         Behavior: Behavior normal. Behavior is cooperative.         Thought Content: Thought content normal.         Judgment: Judgment normal.          #Headache    Location: right frontal radiates lateral. Not over the temporal.   Left below eye appears puffy  Had a right eye broken blood vessel that is resolved.   Duration: Started last night.   Quality: dull   Vision: might be a little blurry   Headache not made worse with position changes.   She has not taken anything for  "the headaches yet.     #Sinus pressure   She takes an OTC sinus pill. (Asked her to let us know what it is)   Did Sinus spray yesterday: helped     #Sore throat:   Itchy, and scratchy  Started last night.   States she thinks it is allergies.   \"It was windy outside\"  Reports yearly symptoms   Dry cough for a month and a half (neg covid test per patient report)     #History of dysphagia   Eating more slowly which has helped with swallowing.   Has coughed on thin liquids but not recently.       Back Pain    Onset: several year(s) ago     No known injury    HX Scoliosis     Description:   Location of pain: low back bilateral  Radiation:None  Character of pain: Dull ache  Pain free between episodes: {:[id number]  Any injury? 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     Any previous evaluations for back pain: X-ray  Any previous MRI or X-rays: YES  Any surgery: no  Any cancer history: YES- on face.     Accompanying Signs & Symptoms:   Fever: no  Numbness or tingling in arms, legs, feet: no  Weakness in arms, legs, feet: no  Dysuria: no  Blood in urine: no  Urinary incontinence: YES  Bowel incontinence: no  Weight loss: no    Precipitating and/or Alleviating factors:    Does rest help: yes  Certain positions make it better or worse: yes  Does bending over, or twisting make it worse: YES    Progression of Symptoms since onset: (better, worse, same): same    Therapies tried and outcome:  physical therapy with improvement    "   #Urine Incontinence   Has a day or two where she is more incontinent   Sees PT for this and it helps.   Botox: She met with Dr. Ha (10/17/2018) to discuss options. She does not want to try botox injections.     #Protein drinks   Would like to start drinking protein drinks to help with weight loss   Herbalife drinks     # Wellness:  - Pap: NA  - Mammogram: Completed 12/2021: Annual routine screening mammogram recommended. Next due 12/2022   - Immunizations: Due for 4th booster- Covid. (Deferred until she is back from [place])   - Lipids: Due around July 2022   - Dexa scan: T score -0.7 (2019)   - Colon cancer screening: Completed 4/12/2021? Unable to see the findings and follow up recommedation    - Lung cancer screening: former smoker, quit 1972

## 2022-05-17 NOTE — NURSING NOTE
"Chief Complaint   Patient presents with     Hypertension     3 month F/U     Patient presents for a F/U for HTN; also states that she has a headache today 6/10, as well as symptoms of sore throat, fatigue, runny nose, dry cough      Initial /86 (BP Location: Right arm, Patient Position: Sitting, Cuff Size: Adult Regular)   Pulse 60   Temp 97  F (36.1  C) (Temporal)   Resp 14   Wt 76.8 kg (169 lb 6.4 oz)   LMP  (LMP Unknown)   SpO2 98%   Breastfeeding No   BMI 30.01 kg/m   Estimated body mass index is 30.01 kg/m  as calculated from the following:    Height as of 11/16/21: 1.6 m (5' 3\").    Weight as of this encounter: 76.8 kg (169 lb 6.4 oz).     Medication Reconciliation: complete      FOOD SECURITY SCREENING QUESTIONS:    The next two questions are to help us understand your food security.  If you are feeling you need any assistance in this area, we have resources available to support you today.    Hunger Vital Signs:  Within the past 12 months we worried whether our food would run out before we got money to buy more. Never  Within the past 12 months the food we bought just didn't last and we didn't have money to get more. Never      Advance care plan reviewed      Chika Desouza LPN on 5/17/2022 at 10:13 AM      "

## 2022-06-08 ENCOUNTER — HOSPITAL ENCOUNTER (OUTPATIENT)
Dept: PHYSICAL THERAPY | Facility: OTHER | Age: 72
Setting detail: THERAPIES SERIES
Discharge: HOME | End: 2022-06-08
Attending: NURSE PRACTITIONER
Payer: OTHER GOVERNMENT

## 2022-06-08 PROCEDURE — 97112 NEUROMUSCULAR REEDUCATION: CPT | Mod: GP

## 2022-06-08 PROCEDURE — 97140 MANUAL THERAPY 1/> REGIONS: CPT | Mod: GP

## 2022-06-08 NOTE — PROGRESS NOTES
[facility]    OUTPATIENT PHYSICAL THERAPY  PLAN OF TREATMENT FOR OUTPATIENT REHABILITATION AND PROGRESS NOTE           Patient's Last Name, First Name, Monisha Huerta Date of Birth  1950   Provider's Name  [facility] Medical Record No.  [id number]    Onset Date  2-15-22 Start of Care Date  2-23-22   Type:     _X_PT   ___OT   ___SLP Medical Diagnosis  hronic midline LBP without sciatica (M54.50, G89.29)   PT Diagnosis  Impaired mobility, weakness secondary to chronic LBP and neck pain Plan of Treatment  Frequency/Duration: Continue PT 1-6 times per month for 3 additional months. Continue with manual therapy, N-M re-ed, therapeutc exercise and HEP. Recert period: 5-21-22 to 8-16-22. Will address pelvic restricitons, lumbar spine, cervical spine, core and LE strength.  Certification date from 5-21-22 to 8-16-22     Goals:  Goal Identifier HEP   Goal Description Pt to display independence and 75% compliance with HEP to assist in self management of her pain, improving ROM for ADLs   Target Date 05/21/22   Date Met      Progress (detail required for progress note): Compliant with HEP as able recently. Will continue to develop, work on strengthening.     Goal Identifier Myofascial   Goal Description Pt to display improved myofascial mobility in the  lumbar and pelvic regions to allow for symmetrical postural loading to allow for 50% less pain with standing and sitting tasks at home.   Target Date 05/21/22   Date Met      Progress (detail required for progress note): Continued myofascial restrictions--has not had consistent appointments recently. Pain still as high as 5/10, particularly in the evenings     Goal Identifier cervical   Goal Description Pt to display improved cervical joint mobility and myofascial mobility to allow her to report 50% improvement in  "level of cervical pain   Target Date 05/21/22   Date Met      Progress (detail required for progress note): not addressed recently--goal remains appropriate     Goal Identifier strength   Goal Description Pt to display improved LE strength to at least 4+/5 to allow for improved ability to lift things from the floor with safe mechanics.   Target Date 05/21/22   Date Met      Progress (detail required for progress note): Working on strengthening HEP; notes difficulty yet with lifting things, getting up and down from the ground.           Beginning/End Dates of Progress Note Reporting Period:  2-23-22 to 6/8/2022     Progress Toward Goals:   Progress this reporting period: less severe issues with incontinence, less LBP/pelvic pain.     Client Self (Subjective) Report for Progress Note Reporting Period: Pt has been seen 8 times to date in PT. She was last seen greater than one month ago due to scheduling difficulties and being out of town, at the [facility] ER with their daughter. \"I am ok. I have been doing a lot of gardening.\" They started planting on Monday so she is more sore and tired. Notes that she is doing ok as far as pain. My lower back is painful, just at night. \"I had some sharp pain when I went to the bathroom this morning. My bladder behaved while I was at [place].\" Has mild incontinence 3-4 times per day. Mostly related to urgency. Notes her back pain is up to 5/10.\" I know I am out of alignment.\" Notes is is hard to get up and down from the ground with gardening--has to push on something stable. Continues to be cautious with activities.        Objective Measurements:   Objective Measure: Postural loading  Details: General listening to the R anterior pelvis. Postural loading: Head R/R mild, shoulder Limited R/L and L/R mild. Pelvis limited in all planes  Objective Measure: myofascial  Details: Local listening to the R side of the bladder with extended listening to the L side of the bladder and the R psoas. " Specific fascial restrictions in the pubovesical ligament, urachus, obturator fascia, hip adductors, L and R endopelvic fascia, R psoas and R fascia of toldt. Very tender  right and left pelvis anteriorly today, particularly pubo. Significant restriction in the pelvic diaphragm, lower thoracolumbar fascia.  Objective Measure: segmental mobility/pelvic screen  Details: Standing flexion R, stork R, Supine: R pube and ASIS inferior, R leg long. Tender at the pubes R and L. Pelvic asymmetry resolved with myofascial release/OSFM                              I CERTIFY THE NEED FOR THESE SERVICES FURNISHED UNDER        THIS PLAN OF TREATMENT AND WHILE UNDER MY CARE     (Physician co-signature of this document indicates review and certification of the therapy plan).                Referring Provider: YUDY Ferrari, PT

## 2022-06-13 ENCOUNTER — APPOINTMENT (OUTPATIENT)
Dept: GENERAL RADIOLOGY | Facility: OTHER | Age: 72
End: 2022-06-13
Attending: STUDENT IN AN ORGANIZED HEALTH CARE EDUCATION/TRAINING PROGRAM
Payer: OTHER GOVERNMENT

## 2022-06-13 ENCOUNTER — NURSE TRIAGE (OUTPATIENT)
Dept: INTERNAL MEDICINE | Facility: OTHER | Age: 72
End: 2022-06-13
Payer: OTHER GOVERNMENT

## 2022-06-13 ENCOUNTER — HOSPITAL ENCOUNTER (EMERGENCY)
Facility: OTHER | Age: 72
Discharge: HOME | End: 2022-06-13
Attending: STUDENT IN AN ORGANIZED HEALTH CARE EDUCATION/TRAINING PROGRAM | Admitting: STUDENT IN AN ORGANIZED HEALTH CARE EDUCATION/TRAINING PROGRAM
Payer: OTHER GOVERNMENT

## 2022-06-13 VITALS
TEMPERATURE: 97.3 F | BODY MASS INDEX: 28.65 KG/M2 | DIASTOLIC BLOOD PRESSURE: 105 MMHG | OXYGEN SATURATION: 97 % | HEIGHT: 64 IN | SYSTOLIC BLOOD PRESSURE: 148 MMHG | RESPIRATION RATE: 21 BRPM | WEIGHT: 167.8 LBS | HEART RATE: 59 BPM

## 2022-06-13 DIAGNOSIS — R07.9 RIGHT-SIDED CHEST PAIN: ICD-10-CM

## 2022-06-13 LAB
ALBUMIN SERPL-MCNC: 4.2 G/DL (ref 3.5–5.7)
ALP SERPL-CCNC: 54 U/L (ref 34–104)
ALT SERPL W P-5'-P-CCNC: 17 U/L (ref 7–52)
ANION GAP SERPL CALCULATED.3IONS-SCNC: 7 MMOL/L (ref 3–14)
AST SERPL W P-5'-P-CCNC: 19 U/L (ref 13–39)
ATRIAL RATE - MUSE: 63 BPM
BASOPHILS # BLD AUTO: 0 10E3/UL (ref 0–0.2)
BASOPHILS NFR BLD AUTO: 0 %
BILIRUB SERPL-MCNC: 0.4 MG/DL (ref 0.3–1)
BUN SERPL-MCNC: 15 MG/DL (ref 7–25)
CALCIUM SERPL-MCNC: 9 MG/DL (ref 8.6–10.3)
CHLORIDE BLD-SCNC: 107 MMOL/L (ref 98–107)
CO2 SERPL-SCNC: 27 MMOL/L (ref 21–31)
CREAT SERPL-MCNC: 0.98 MG/DL (ref 0.6–1.2)
D DIMER PPP FEU-MCNC: <=0.27 UG/ML FEU (ref 0–0.5)
DIASTOLIC BLOOD PRESSURE - MUSE: NORMAL MMHG
EOSINOPHIL # BLD AUTO: 0.1 10E3/UL (ref 0–0.7)
EOSINOPHIL NFR BLD AUTO: 1 %
ERYTHROCYTE [DISTWIDTH] IN BLOOD BY AUTOMATED COUNT: 12.4 % (ref 10–15)
GFR SERPL CREATININE-BSD FRML MDRD: 61 ML/MIN/1.73M2
GLUCOSE BLD-MCNC: 91 MG/DL (ref 70–105)
HCT VFR BLD AUTO: 40.6 % (ref 35–47)
HGB BLD-MCNC: 13.8 G/DL (ref 11.7–15.7)
IMM GRANULOCYTES # BLD: 0 10E3/UL
IMM GRANULOCYTES NFR BLD: 0 %
INTERPRETATION ECG - MUSE: NORMAL
LYMPHOCYTES # BLD AUTO: 1.7 10E3/UL (ref 0.8–5.3)
LYMPHOCYTES NFR BLD AUTO: 28 %
MCH RBC QN AUTO: 31 PG (ref 26.5–33)
MCHC RBC AUTO-ENTMCNC: 34 G/DL (ref 31.5–36.5)
MCV RBC AUTO: 91 FL (ref 78–100)
MONOCYTES # BLD AUTO: 0.4 10E3/UL (ref 0–1.3)
MONOCYTES NFR BLD AUTO: 7 %
NEUTROPHILS # BLD AUTO: 3.9 10E3/UL (ref 1.6–8.3)
NEUTROPHILS NFR BLD AUTO: 64 %
NRBC # BLD AUTO: 0 10E3/UL
NRBC BLD AUTO-RTO: 0 /100
P AXIS - MUSE: 38 DEGREES
PLATELET # BLD AUTO: 190 10E3/UL (ref 150–450)
POTASSIUM BLD-SCNC: 4 MMOL/L (ref 3.5–5.1)
PR INTERVAL - MUSE: 186 MS
PROT SERPL-MCNC: 6.4 G/DL (ref 6.4–8.9)
QRS DURATION - MUSE: 76 MS
QT - MUSE: 422 MS
QTC - MUSE: 431 MS
R AXIS - MUSE: 20 DEGREES
RBC # BLD AUTO: 4.45 10E6/UL (ref 3.8–5.2)
SODIUM SERPL-SCNC: 141 MMOL/L (ref 134–144)
SYSTOLIC BLOOD PRESSURE - MUSE: NORMAL MMHG
T AXIS - MUSE: 22 DEGREES
TROPONIN I SERPL-MCNC: 2.9 PG/ML (ref 0–34)
VENTRICULAR RATE- MUSE: 63 BPM
WBC # BLD AUTO: 6.2 10E3/UL (ref 4–11)

## 2022-06-13 PROCEDURE — 99285 EMERGENCY DEPT VISIT HI MDM: CPT | Mod: 25 | Performed by: STUDENT IN AN ORGANIZED HEALTH CARE EDUCATION/TRAINING PROGRAM

## 2022-06-13 PROCEDURE — 36415 COLL VENOUS BLD VENIPUNCTURE: CPT | Performed by: STUDENT IN AN ORGANIZED HEALTH CARE EDUCATION/TRAINING PROGRAM

## 2022-06-13 PROCEDURE — 99285 EMERGENCY DEPT VISIT HI MDM: CPT | Performed by: STUDENT IN AN ORGANIZED HEALTH CARE EDUCATION/TRAINING PROGRAM

## 2022-06-13 PROCEDURE — 80053 COMPREHEN METABOLIC PANEL: CPT | Performed by: STUDENT IN AN ORGANIZED HEALTH CARE EDUCATION/TRAINING PROGRAM

## 2022-06-13 PROCEDURE — 99283 EMERGENCY DEPT VISIT LOW MDM: CPT | Performed by: STUDENT IN AN ORGANIZED HEALTH CARE EDUCATION/TRAINING PROGRAM

## 2022-06-13 PROCEDURE — 250N000011 HC RX IP 250 OP 636: Performed by: STUDENT IN AN ORGANIZED HEALTH CARE EDUCATION/TRAINING PROGRAM

## 2022-06-13 PROCEDURE — 93005 ELECTROCARDIOGRAM TRACING: CPT | Performed by: STUDENT IN AN ORGANIZED HEALTH CARE EDUCATION/TRAINING PROGRAM

## 2022-06-13 PROCEDURE — 85025 COMPLETE CBC W/AUTO DIFF WBC: CPT | Performed by: STUDENT IN AN ORGANIZED HEALTH CARE EDUCATION/TRAINING PROGRAM

## 2022-06-13 PROCEDURE — 84484 ASSAY OF TROPONIN QUANT: CPT | Performed by: STUDENT IN AN ORGANIZED HEALTH CARE EDUCATION/TRAINING PROGRAM

## 2022-06-13 PROCEDURE — 93010 ELECTROCARDIOGRAM REPORT: CPT | Performed by: INTERNAL MEDICINE

## 2022-06-13 PROCEDURE — 96372 THER/PROPH/DIAG INJ SC/IM: CPT | Performed by: STUDENT IN AN ORGANIZED HEALTH CARE EDUCATION/TRAINING PROGRAM

## 2022-06-13 PROCEDURE — 71045 X-RAY EXAM CHEST 1 VIEW: CPT

## 2022-06-13 PROCEDURE — 85379 FIBRIN DEGRADATION QUANT: CPT | Performed by: STUDENT IN AN ORGANIZED HEALTH CARE EDUCATION/TRAINING PROGRAM

## 2022-06-13 RX ORDER — DOXYCYCLINE HYCLATE 100 MG
100 TABLET ORAL 2 TIMES DAILY
Qty: 10 TABLET | Refills: 0 | Status: SHIPPED | OUTPATIENT
Start: 2022-06-13 | End: 2022-06-18

## 2022-06-13 RX ORDER — KETOROLAC TROMETHAMINE 30 MG/ML
30 INJECTION, SOLUTION INTRAMUSCULAR; INTRAVENOUS ONCE
Status: COMPLETED | OUTPATIENT
Start: 2022-06-13 | End: 2022-06-13

## 2022-06-13 RX ORDER — KETOROLAC TROMETHAMINE 10 MG/1
10 TABLET, FILM COATED ORAL EVERY 6 HOURS PRN
Qty: 15 TABLET | Refills: 0 | Status: SHIPPED | OUTPATIENT
Start: 2022-06-13 | End: 2023-03-10

## 2022-06-13 RX ADMIN — KETOROLAC TROMETHAMINE 30 MG: 30 INJECTION, SOLUTION INTRAMUSCULAR at 16:05

## 2022-06-13 NOTE — TELEPHONE ENCOUNTER
Patient called back and did not get a call from nurse but should have went to triage- I am calling warm transfer

## 2022-06-13 NOTE — TELEPHONE ENCOUNTER
"S-(situation): RUQ abdominal pain    B-(background): Started Friday    A-(assessment): Patient states that she has had RUQ pain under her right breast since Friday. It had a gradual onset and was getting better yesterday but has persisted again today. She denies chest pain, shortness of breath, dizziness, fever, or vomiting. She has some mild nausea and chills and a headache from the pain. She denies any aggravating factors and has not taken anything for the pain. She is concerned that it may be her gallbladder.     R-(recommendations): Per protocol advised patient to be seen in ED due to severity of pain, her age, and persistence of the pain. Patient agreed and stated that she would come to the ED soon.     Rikki Ravi RN, BSN  ....................  6/13/2022   2:00 PM      Reason for Disposition    SEVERE abdominal pain (e.g., excruciating)    Pain lasting > 10 minutes and over 50 years old    Additional Information    Negative: Passed out (i.e., fainted, collapsed and was not responding)    Negative: Shock suspected (e.g., cold/pale/clammy skin, too weak to stand, low BP, rapid pulse)    Negative: Visible sweat on face or sweat is dripping down    Negative: Chest pain    Answer Assessment - Initial Assessment Questions  1. LOCATION: \"Where does it hurt?\"       Upper right quadrant abdominal pain under her right breast    2. RADIATION: \"Does the pain shoot anywhere else?\" (e.g., chest, back)      The pain is giving her a headache but isn't radiating    3. ONSET: \"When did the pain begin?\" (e.g., minutes, hours or days ago)       Friday    4. SUDDEN: \"Gradual or sudden onset?\"      Gradual    5. PATTERN \"Does the pain come and go, or is it constant?\"     - If constant: \"Is it getting better, staying the same, or worsening?\"       (Note: Constant means the pain never goes away completely; most serious pain is constant and it progresses)      - If intermittent: \"How long does it last?\" \"Do you have pain now?\"     " " (Note: Intermittent means the pain goes away completely between bouts)      Constant    6. SEVERITY: \"How bad is the pain?\"  (e.g., Scale 1-10; mild, moderate, or severe)     - MILD (1-3): doesn't interfere with normal activities, abdomen soft and not tender to touch      - MODERATE (4-7): interferes with normal activities or awakens from sleep, tender to touch      - SEVERE (8-10): excruciating pain, doubled over, unable to do any normal activities        8/10    7. RECURRENT SYMPTOM: \"Have you ever had this type of abdominal pain before?\" If so, ask: \"When was the last time?\" and \"What happened that time?\"       No    8. AGGRAVATING FACTORS: \"Does anything seem to cause this pain?\" (e.g., foods, stress, alcohol)      No    9. CARDIAC SYMPTOMS: \"Do you have any of the following symptoms: chest pain, difficulty breathing, sweating, nausea?\"      A little nausea    10. OTHER SYMPTOMS: \"Do you have any other symptoms?\" (e.g., fever, vomiting, diarrhea)        Chills    11. PREGNANCY: \"Is there any chance you are pregnant?\" \"When was your last menstrual period?\"        no    Protocols used: ABDOMINAL PAIN - UPPER-A-OH    "

## 2022-06-13 NOTE — TELEPHONE ENCOUNTER
Patient has questions about gas and pain on her RT side under her breast. She is not having trouble with breathing. Feels may be gall bladder related.  She would like to speak to the nurse or [facility].     Chary Awan on 6/13/2022 at 11:34 AM

## 2022-06-13 NOTE — DISCHARGE INSTRUCTIONS
Complete doxycycline antibiotic. Recommend daily yogurt or probiotic while taking antibiotics to avoid potential antibiotic side effects including diarrhea. If you do not start to improve after a full 2 days of taking your antibiotics or have not fully recovered after completing your antibiotic prescription, please follow up with a primary care provider. Please review attached instructions including reasons to return to the emergency department.    Use toradol pain medication as needed. Always take with food as this medication can be tough on your stomach if taken without food.

## 2022-06-13 NOTE — ED PROVIDER NOTES
History     Chief Complaint   Patient presents with     Chest Pain       Monisha Velez is a 72 year old female who presents with right-sided chest pain.  Right-sided chest pain started 3 days ago and has been constant, severe, sharp and aching, with radiation towards her back.  Pain feels like it is located deep in her axilla.  Pain worsened with deep breaths, coughing, arm movements.  Associated symptoms include dry cough and some chills.  She has a history of GERD but this feels different than that.  Denies any change of pain with eating.  Denies any upper abdominal surgical history.  She has tried Tums without relief.  Denies fever, dyspnea, other pain, nausea, vomiting, dysuria, diarrhea, rashes.  Denies blood clot history.  Recent long road trip to [facility] 2 weeks ago.    Allergies   Allergen Reactions     Penicillins Hives     Sulfa Drugs Hives     Codeine Sulfate Other (See Comments)     nightmares     Morphine Sulfate Other (See Comments)     Cant sleep     Venlafaxine Hives     'bad reaction      Zolpidem Tartrate      Paresthesias; stomach upset         Patient Active Problem List    Diagnosis Date Noted     Chronic midline low back pain without sciatica 05/17/2022     Priority: Medium     Mixed incontinence 05/17/2022     Priority: Medium     Seasonal allergic rhinitis, unspecified trigger 05/17/2022     Priority: Medium     Immunization counseling 05/17/2022     Priority: Medium     Class 1 obesity with body mass index (BMI) of 30.0 to 30.9 in adult, unspecified obesity type, unspecified whether serious comorbidity present 05/17/2022     Priority: Medium     Nail dystrophy 05/05/2022     Priority: Medium     Onychomycosis 05/05/2022     Priority: Medium     HTN (hypertension) 11/17/2021     Priority: Medium     Loud snoring 11/16/2021     Priority: Medium     Apnea 11/16/2021     Priority: Medium     Excessive daytime sleepiness 11/16/2021     Priority: Medium     Itchy, watery, and red  eye 02/09/2021     Priority: Medium     Chronic kidney disease, stage 3 (H) 01/13/2021     Priority: Medium     Pain of right lower extremity 12/31/2019     Priority: Medium     Pain of left lower extremity 12/31/2019     Priority: Medium     PND (post-nasal drip) 12/31/2019     Priority: Medium     Chronic insomnia 02/28/2018     Priority: Medium     Restless leg syndrome 02/28/2018     Priority: Medium     Reactive airway disease that is not asthma 11/11/2016     Priority: Medium     Replacing diagnoses that were inactivated after the 10/1/2021 regulatory import.       Fundic gastritis 03/18/2016     Priority: Medium     H/O adenomatous polyp of colon 03/18/2016     Priority: Medium     Gastroesophageal reflux disease without esophagitis 03/16/2016     Priority: Medium     Family history of malignant melanoma of skin 12/09/2015     Priority: Medium     Overview:   Father       Anxiety state 11/03/2011     Priority: Medium     Rosacea 09/29/2010     Priority: Medium       Past Medical History:   Diagnosis Date     Adnexal mass 08/22/2011     Contact dermatitis 12/16/2009     Diplopia      H/O gastritis      History of adenomatous polyp of colon      Postmenopausal atrophic vaginitis 07/29/2011     Restless leg syndrome      Retention of urine 08/22/2011     Rosacea      Tinea corporis 11/19/2010     Urge incontinence of urine        Past Surgical History:   Procedure Laterality Date     COLONOSCOPY  03/18/2016    Tubular adenoma, Next in 5 years     COLONOSCOPY  04/12/2021    F/U N/A normal     COLONOSCOPY N/A 4/12/2021    Procedure: COLONOSCOPY;  Surgeon: Roberto Anderson MD;  Location:  OR     CYSTOSCOPY  01/2013    [facility]     DENTAL SURGERY  2005    Root canal,  Dr. Olmos.     ENDOSCOPIC SINUS SURGERY  1993     ESOPHAGOSCOPY, GASTROSCOPY, DUODENOSCOPY (EGD), COMBINED  03/18/2016     HYSTERECTOMY VAGINAL  1990    Endometriosis     LAPAROSCOPIC CYSTECTOMY OVARIAN (BENIGN)  09/27/2011    cystectomy with  lysis of adhesions and possible oophrectomy  by Dr. Gay Mcgovern  at [facility]     ROTATOR CUFF REPAIR RT/LT Right 2017    Dr John     SALPINGO-OOPHORECTOMY BILATERAL  10/2007    bilateral ovarian serous cystadenofibromas, benign.       Family History   Problem Relation Age of Onset     Hypertension Father      Melanoma Father      Asthma Mother      Hypertension Mother      Diabetes Mother         insulin dependence     Peripheral Vascular Disease Mother      Diabetes Sister      Hypertension Sister      Diabetes Sister      Hypertension Sister      Brain Hemorrhage Sister         after fall     Other - See Comments Sister         bladder issues and repeat breast biopsies (benign)     GI problems Daughter      LUNG DISEASE Daughter         environmental mold intolerance     Obesity Son      Hypertension Son      Breast Cancer No family hx of         Cancer-breast       Social History     Tobacco Use     Smoking status: Former Smoker     Packs/day: 0.50     Types: Cigarettes     Quit date: 1972     Years since quittin.8     Smokeless tobacco: Never Used   Vaping Use     Vaping Use: Never used   Substance Use Topics     Alcohol use: Yes     Comment: has slowed down a lot since huband's injury; aiming for occassional     Drug use: No       Medications:    doxycycline hyclate (VIBRA-TABS) 100 MG tablet  ketorolac (TORADOL) 10 MG tablet  aspirin EC 81 MG EC tablet  atorvastatin (LIPITOR) 10 MG tablet  Cranberry 500 MG TABS  estradiol (ESTRACE) 0.5 MG tablet  loratadine (CLARITIN) 10 MG tablet  melatonin 5 MG tablet  Multiple Vitamin (MULTIVITAMIN OR)  Stress Formula/Zinc tablet  tolterodine ER (DETROL LA) 2 MG 24 hr capsule  triamcinolone (KENALOG) 0.1 % external cream  Vitamin D3 (CHOLECALCIFEROL) 25 mcg (1000 units) tablet        Review of Systems: See HPI for pertinent negatives and positives. All other systems reviewed and found to be negative.    Physical Exam   BP (!)  "148/105   Pulse 59   Temp 97.3  F (36.3  C) (Tympanic)   Resp 21   Ht 1.626 m (5' 4\")   Wt 76.1 kg (167 lb 12.8 oz)   LMP  (LMP Unknown)   SpO2 97%   BMI 28.80 kg/m       General: awake, no acute distress  HEENT: atraumatic  Respiratory: normal effort, clear to auscultation bilaterally, periodic dry cough with discomfort  Cardiovascular: regular rate and rhythm, no murmurs  Abdomen: soft, nondistended, nontender  Extremities: no deformities, edema, or tenderness  Skin: warm, dry, no rashes  Neuro: alert, no focal deficits  Psych: appropriate mood and affect    ED Course           Results for orders placed or performed during the hospital encounter of 06/13/22 (from the past 24 hour(s))   CBC with platelets differential    Narrative    The following orders were created for panel order CBC with platelets differential.  Procedure                               Abnormality         Status                     ---------                               -----------         ------                     CBC with platelets and d...[[id number]]                      Final result                 Please view results for these tests on the individual orders.   D dimer quantitative   Result Value Ref Range    D-Dimer Quantitative <=0.27 0.00 - 0.50 ug/mL FEU    Narrative    This D-dimer assay is intended for use in conjunction with a clinical pretest probability assessment model to exclude pulmonary embolism (PE) and deep venous thrombosis (DVT) in outpatients suspected of PE or DVT. The cut-off value is 0.50 ug/mL FEU.   Comprehensive metabolic panel   Result Value Ref Range    Sodium 141 134 - 144 mmol/L    Potassium 4.0 3.5 - 5.1 mmol/L    Chloride 107 98 - 107 mmol/L    Carbon Dioxide (CO2) 27 21 - 31 mmol/L    Anion Gap 7 3 - 14 mmol/L    Urea Nitrogen 15 7 - 25 mg/dL    Creatinine 0.98 0.60 - 1.20 mg/dL    Calcium 9.0 8.6 - 10.3 mg/dL    Glucose 91 70 - 105 mg/dL    Alkaline Phosphatase 54 34 - 104 U/L    AST 19 13 - 39 U/L "    ALT 17 7 - 52 U/L    Protein Total 6.4 6.4 - 8.9 g/dL    Albumin 4.2 3.5 - 5.7 g/dL    Bilirubin Total 0.4 0.3 - 1.0 mg/dL    GFR Estimate 61 >60 mL/min/1.73m2   Troponin I   Result Value Ref Range    Troponin I 2.9 0.0 - 34.0 pg/mL   CBC with platelets and differential   Result Value Ref Range    WBC Count 6.2 4.0 - 11.0 10e3/uL    RBC Count 4.45 3.80 - 5.20 10e6/uL    Hemoglobin 13.8 11.7 - 15.7 g/dL    Hematocrit 40.6 35.0 - 47.0 %    MCV 91 78 - 100 fL    MCH 31.0 26.5 - 33.0 pg    MCHC 34.0 31.5 - 36.5 g/dL    RDW 12.4 10.0 - 15.0 %    Platelet Count 190 150 - 450 10e3/uL    % Neutrophils 64 %    % Lymphocytes 28 %    % Monocytes 7 %    % Eosinophils 1 %    % Basophils 0 %    % Immature Granulocytes 0 %    NRBCs per 100 WBC 0 <1 /100    Absolute Neutrophils 3.9 1.6 - 8.3 10e3/uL    Absolute Lymphocytes 1.7 0.8 - 5.3 10e3/uL    Absolute Monocytes 0.4 0.0 - 1.3 10e3/uL    Absolute Eosinophils 0.1 0.0 - 0.7 10e3/uL    Absolute Basophils 0.0 0.0 - 0.2 10e3/uL    Absolute Immature Granulocytes 0.0 <=0.4 10e3/uL    Absolute NRBCs 0.0 10e3/uL   XR Chest Port 1 View    Narrative    Procedure:XR CHEST PORT 1 VIEW    Clinical history:Female, 72 years, right side chest pain x3d    Technique: Single view was obtained.    Comparison: 10/20/2021    Findings: The cardiac silhouette is normal. The pulmonary vasculature  is normal.    The lungs demonstrates subtle opacification and silhouetting involving  the periphery of the left hemidiaphragm. Bony structures demonstrate  changes of the right AC joint suggesting previous subacromial  decompression..      Impression    Impression:   Slight silhouetting involving the periphery of the left hemidiaphragm  may represent subtle atelectasis however could also represent a subtle  left basilar pneumonia.     No apparent abnormality that would account for the patient's  right-sided symptoms.    JOHN POWELL MD         SYSTEM ID:  [id number]       Medications   ketorolac (TORADOL)  injection 30 mg (30 mg Intramuscular Given 6/13/22 2423)       Assessments & Plan (with Medical Decision Making)     I have reviewed the nursing notes.    72 year old female evaluated for right chest pain with cough, chills. Evaluation unremarkable except except for possible questionable left hemidiaphragm CXR finding possibly being early pneumonia, however does not correlate with right side symptoms. EKG, troponin, dimer all reassuring. Repeat examination there is pain with palpation of axillary muscles. Unclear etiology, but includes very early pneumonia not seen on CXR vs MSK vs pleurisy vs other. Significant improvement s/p toradol. Discussed chest CT vs empiric treatment vs watchful observation with patient preferring empiric treatment. Prescribing doxycycline and toradol. Attached instructions on diagnosis including ED return precautions given. Patient verbalizes understanding of plan and is comfortable with discharge home. Discharged home in stable condition.    I have reviewed the findings, diagnosis, plan, and need for any follow up with the patient.    Patient instructions:   Complete doxycycline antibiotic. Recommend daily yogurt or probiotic while taking antibiotics to avoid potential antibiotic side effects including diarrhea. If you do not start to improve after a full 2 days of taking your antibiotics or have not fully recovered after completing your antibiotic prescription, please follow up with a primary care provider. Please review attached instructions including reasons to return to the emergency department.    Use toradol pain medication as needed. Always take with food as this medication can be tough on your stomach if taken without food.      Discharge Medication List as of 6/13/2022  5:10 PM      START taking these medications    Details   doxycycline hyclate (VIBRA-TABS) 100 MG tablet Take 1 tablet (100 mg) by mouth 2 times daily for 5 days, Disp-10 tablet, R-0, E-Prescribe      ketorolac  (TORADOL) 10 MG tablet Take 1 tablet (10 mg) by mouth every 6 hours as needed for moderate pain, Disp-15 tablet, R-0, E-Prescribe             Final diagnoses:   Right-sided chest pain       6/13/2022   [facility] AND [facility]     Alexy Solis MD  06/13/22 1944

## 2022-06-13 NOTE — ED TRIAGE NOTES
Patient having chest pain starting Friday that was relieved with tums. Saturday, Sunday, and today she is having right sided chest pain 7/10 constant sharp. Worse with coughing and activity involving the right side.      Triage Assessment     Row Name 06/13/22 1441       Triage Assessment (Adult)    Airway WDL WDL       Respiratory WDL    Respiratory WDL X;cough    Cough Frequency frequent    Cough Type nonproductive;dry       Skin Circulation/Temperature WDL    Skin Circulation/Temperature WDL WDL       Cardiac WDL    Cardiac WDL X;chest pain       Chest Pain Assessment    Chest Pain Location anterior chest, right    Precipitating Factors unknown       Peripheral/Neurovascular WDL    Peripheral Neurovascular WDL WDL       Cognitive/Neuro/Behavioral WDL    Cognitive/Neuro/Behavioral WDL WDL

## 2022-07-05 ENCOUNTER — HOSPITAL ENCOUNTER (OUTPATIENT)
Dept: PHYSICAL THERAPY | Facility: OTHER | Age: 72
Setting detail: THERAPIES SERIES
Discharge: HOME | End: 2022-07-05
Attending: NURSE PRACTITIONER
Payer: OTHER GOVERNMENT

## 2022-07-05 PROCEDURE — 97140 MANUAL THERAPY 1/> REGIONS: CPT | Mod: GP

## 2022-07-05 PROCEDURE — 97112 NEUROMUSCULAR REEDUCATION: CPT | Mod: GP

## 2022-07-14 ENCOUNTER — OFFICE VISIT (OUTPATIENT)
Dept: INTERNAL MEDICINE | Facility: OTHER | Age: 72
End: 2022-07-14
Attending: NURSE PRACTITIONER
Payer: OTHER GOVERNMENT

## 2022-07-14 VITALS
OXYGEN SATURATION: 97 % | BODY MASS INDEX: 28.87 KG/M2 | TEMPERATURE: 97.1 F | DIASTOLIC BLOOD PRESSURE: 78 MMHG | HEART RATE: 59 BPM | RESPIRATION RATE: 16 BRPM | WEIGHT: 168.2 LBS | SYSTOLIC BLOOD PRESSURE: 134 MMHG

## 2022-07-14 DIAGNOSIS — L60.3 NAIL DYSTROPHY: Primary | Chronic | ICD-10-CM

## 2022-07-14 DIAGNOSIS — G25.81 RESTLESS LEG SYNDROME: ICD-10-CM

## 2022-07-14 DIAGNOSIS — L84 CALLUS OF FOOT: ICD-10-CM

## 2022-07-14 DIAGNOSIS — M79.604 PAIN OF RIGHT LOWER EXTREMITY: Chronic | ICD-10-CM

## 2022-07-14 DIAGNOSIS — N39.46 MIXED INCONTINENCE: ICD-10-CM

## 2022-07-14 DIAGNOSIS — B35.1 ONYCHOMYCOSIS: Chronic | ICD-10-CM

## 2022-07-14 DIAGNOSIS — M79.605 PAIN OF LEFT LOWER EXTREMITY: Chronic | ICD-10-CM

## 2022-07-14 PROCEDURE — G0127 TRIM NAIL(S): HCPCS | Mod: Q8 | Performed by: NURSE PRACTITIONER

## 2022-07-14 PROCEDURE — 11055 PARING/CUTG B9 HYPRKER LES 1: CPT | Mod: GA | Performed by: NURSE PRACTITIONER

## 2022-07-14 RX ORDER — TOLTERODINE 2 MG/1
2 CAPSULE, EXTENDED RELEASE ORAL EVERY OTHER DAY
COMMUNITY
Start: 2022-07-14 | End: 2022-10-25

## 2022-07-14 ASSESSMENT — PAIN SCALES - GENERAL: PAINLEVEL: NO PAIN (0)

## 2022-07-14 NOTE — PROGRESS NOTES
"  Monisha Velez  : 1950 Age: 72 year old Sex: female MRN: [id number]    Nursing Notes:   Chika Desouza LPN  2022 12:32 PM  Signed  Chief Complaint   Patient presents with     Musculoskeletal Problem     F/N Cares         Initial /78 (BP Location: Right arm, Patient Position: Sitting, Cuff Size: Adult Regular)   Pulse 59   Temp 97.1  F (36.2  C) (Temporal)   Resp 16   Wt 76.3 kg (168 lb 3.2 oz)   LMP  (LMP Unknown)   SpO2 97%   Breastfeeding No   BMI 28.87 kg/m   Estimated body mass index is 28.87 kg/m  as calculated from the following:    Height as of 22: 1.626 m (5' 4\").    Weight as of this encounter: 76.3 kg (168 lb 3.2 oz).     Medication Reconciliation: complete      FOOD SECURITY SCREENING QUESTIONS:    The next two questions are to help us understand your food security.  If you are feeling you need any assistance in this area, we have resources available to support you today.    Hunger Vital Signs:  Within the past 12 months we worried whether our food would run out before we got money to buy more. Never  Within the past 12 months the food we bought just didn't last and we didn't have money to get more. Never        Advance care plan reviewed      Chika Desouza LPN on 2022 at 11:31 AM           Nursing note reviewed with patient.  Accuracy and completeness verified.     Encounter Date:  2022    Patient Name:  Monisha Velez  Patient MRN:   [id number]     Last Footcare Appt: 2022    PCP: Raeann Pearson    Other Providers Seen for Foot/Nail Care (name/location/date): [] Yes [x] No List (if applicable):    ABN Given to patient and signed in clinic today:    [x] Yes [] No    SUBJECTIVE:    Patient Reported Symptoms: RIGHT  LEFT   Neuropathic symptoms []  []  DESCRIBE:   Pain in foot/toes at rest [x]  [x]  DESCRIBE:bilateral lower extremities   Intermittent claudication []  []  DESCRIBE:   Previous foot " ulcer/injury []  []  DESCRIBE:   Amputation []  []  DESCRIBE:    OBJECTIVE:    Nurse/Provider Inspection of Feet/Nails: RIGHT  LEFT   Infection []  []  DESCRIBE:   Ulcerations []  []  DESCRIBE:   Calluses/Corns [x]  []  DESCRIBE: right great toe, medial aspect   Fissures/Cracks []  []  DESCRIBE: bilateral edges of both great toenails have been removed prior, growing pieces that are jagged;   Nail Disorders/Changes [x]  [x]  DESCRIBE: all 10 toenails are dystrophic; 5th toes bilaterally have fungus   Other []      []  DESCRIBE:    VASCULAR TESTING:    Foot Pulses:  RIGHT  LEFT   Dorsalis pedis [x]  [x]    Posterior tibial [x]  [x]   Capillary refill of the hallux (<3 seconds): [x] [x]  Digital hair present: [] []  Varicosities (+/- presents): [x] [x]  Edema (pitting vs. non pitting): [] []     DESCRIBE: [] 1+ minimal   [] 2+ moderate   [] 3+ severe  Skin Temperature:  [x] [x]     DESCRIBE: [x] Cold   [] Hot    [] Warm to touch    NEUROLOGIC TESTING:        RIGHT LEFT  Sharp/dull Testing: (the blunt end and sharp end of swab stick) [x] [x]  Proprioception: (moves hallux up/down in space) [x] [x]  Superficial Reflexes: (Babinski sign) [x] [x]    Monofilament Testing: RIGHT  LEFT  RIGHT LEFT    Site 1 [x]  [x]  Site 6 [x] [x]    Site 2 [x]  [x]  Site 7 [x] [x]    Site 3 [x]  [x]  Site 8 [x] [x]    Site 4 [x]  [x]  Site 9 [x] [x]    Site 5 [x]  [x]  Site 10 [x] [x]      MUSCULOSKELETAL: RIGHT LEFT  Foot Type: [x] []  DESCRIBE: [] Pes cavus (high arch) [x] pes rectus (normal) [] pes planus (flat)  Digital deformity: [] []  DESCRIBE: [] bunion [] claw toe [] hammer toe [] mallet toe [] hallux limitus [] other  Joint ROM: [x] [x]  DESCRIBE: [x] adequate ROM to MTPH, STJ, and AJ without crepitus  Adequate Muscle Strength: [x] [x]   Other [] []  DESCRIBE:     DERMATOLOGIC:      Nails: RIGHT LEFT  Onychauxis (thickened, long nail): [x] [x]  DESCRIBE: all 10 toenails  Onycholysis ( nail): [] []  DESCRIBE:    Onychocryptosis (ingrown toenail): [] []  DESCRIBE:    Onychogryphosis (ros's horn nail): [] []  DESCRIBE:   Onchomycosis (fungal nail): [x] [x]  DESCRIBE: bilateral 5th toenails    Skin: RIGHT LEFT  [x] Hyperkeratotic lesions [] verruca tissue [] foreign body: [x] []  DESCRIBE: medial right great toe  [] Mild edema [] erythema [] open lesions [] interdigit maceration: [] []  DESCRIBE:   [x] Varicosities [] telangiectasis [] pigmented lesion [] venous stasis:  [x] [x]  DESCRIBE:   Adequate padding to the plantar aspect of the foot: [x] [x]  DESCRIBE:     Footwear Assessment:  Type: casual shoes   Condition Good [x] Fair [] Poor []   Fit Good [x] Fair [] Poor []            Yes No  Does the patient use an ambulatory aid?      [] [x] DESCRIBE:  Does the patient understand the effects of diabetes on foot health? [x] []  Can the patient identify appropriate foot care practices?    [x] []  Are the patient's feet adequately cared for?      [x] []            Yes No  Does the patient have impaired vision?      [x] []  Can the patient reach own feet for safe self care?     [] [x]  Are there other factors influencing ability to safely care for own feet? [x] []     Pertinent Past Medical History:  Patient Active Problem List   Diagnosis     Anxiety state     Family history of malignant melanoma of skin     Fundic gastritis     Gastroesophageal reflux disease without esophagitis     H/O adenomatous polyp of colon     Chronic insomnia     Reactive airway disease that is not asthma     Restless leg syndrome     Rosacea     Pain of right lower extremity     Pain of left lower extremity     PND (post-nasal drip)     Chronic kidney disease, stage 3 (H)     Itchy, watery, and red eye     Loud snoring     Apnea     Excessive daytime sleepiness     HTN (hypertension)     Nail dystrophy     Onychomycosis     Chronic midline low back pain without sciatica     Mixed incontinence     Seasonal allergic rhinitis, unspecified trigger      Immunization counseling     Class 1 obesity with body mass index (BMI) of 30.0 to 30.9 in adult, unspecified obesity type, unspecified whether serious comorbidity present     Past Medical History:   Diagnosis Date     Adnexal mass 08/22/2011     Contact dermatitis 12/16/2009     Diplopia     Episode of mild diplopia secondary to sinusitis, ophthalmology consult 07/2008.     H/O gastritis      History of adenomatous polyp of colon      Postmenopausal atrophic vaginitis 07/29/2011     Restless leg syndrome      Retention of urine 08/22/2011     Rosacea      Tinea corporis 11/19/2010     Urge incontinence of urine        Diagnostics:  Hemoglobin A1C POCT   Date Value Ref Range Status   01/11/2021 5.9 4.0 - 6.0 % Final       ASSESSMENT/PLAN:    Restless leg syndrome  Nail dystrophy  Onychomycosis  Pain of right lower extremity  Pain of left lower extremity  - TRIMMING DYSTROPHIC NAILS,ANY NUMBER    Callus of foot  Calluses shaved down with #15 blade and then sanded smooth with Dremel tool and angled [place].  - TRIM HYPERKERATOTIC SKIN LESION, ONE    All toenails filed down to manageable thickness with Dremel tool and angled [place].  Toenails were then trimmed with clippers.  Lotion applied to bilateral lower extremities.  Patient tolerated well. Time spent doing foot/nail care was 25 minutes.    [x] Patient is unable to trim own toenails due to lower extremity pain and limited mobility.    [] Patient is on chronic anticoagulation and chronic use of this medication poses a increased risk of bleeding should patient sustain a nail/skin injury.    [x] Patient has noted onychomycosis for some time now and has tried various OTC treatments without success.     Educated Patient On:   [x] Proper healthy diet. Eliminate soda, high fructose corn syrup products, artificial sweeteners, and high sodium, high cholesterol foods. Encouraged more fruits and   vegetables and an increase in daily water intake.  [x] Educated on importance of  diet and exercise for weight loss and reduction on symptoms.   [x] Daily exercise for at least 30 minutes is recommended. This can be walking, riding a bike, low impact aerobic activity, or yoga.    [x] Importance of daily skin assessments.   [x] Importance of not walking barefoot. Recommend wearing Crocs in house versus going barefoot.   [x] Importance of good, supportive shoes.   [x] Importance of smoking cessation. Greater than 3 minutes were spent on smoking cessation including encouragement to reduce cigarette use and discussion of modalities to assist with this. Cessation was encouraged in order to improve pain, reduce mortality, improve quality of life, and long term outcomes.     Recommended:      YES NO   Diabetic socks:      [] [] [x] Not applicable   Compression stockings/sleeves:   [x] [] [] Not applicable   Diabetic/supportive shoegear with wide toe box:  [x] [] [] Not applicable   Use of Se's VapoRub daily to feet and/or toenails: [x] [] [] Not applicable   Smoking Cessation:     [] [] [x] Not applicable    FOLLOW-UP:    Return to clinic: [] One week [] 30 Days (wounds) [x] 63+ Days   [] As directed by Provider    I explained my diagnostic considerations and recommendations to the patient, who voiced understanding and agreement with the treatment plan. All questions were answered. We discussed potential side effects of any prescribed or recommended therapies, as well as expectations for response to treatments.     JERRELL Ferrari, AGNP-C  Internal Medicine  [facility]    07/14/2022 1:59 PM    Total time spent with this patient was 30 minutes which included chart review, procedures, patient education, visualization and interpretation of labs/images, time spent with the patient and documentation.     [x]  TRIMMING DYSTROPHIC NAILS,ANY NUMBER  [] 55114 TRIM HYPERKERATOTIC SKIN LESION, 1  [] 90712 TRIM HYPERKERATOTIC SKIN LESION,2-4  [] 71753 TRIM HYPERKERATOTIC SKIN  LESION,>4  [] 02333 TRIM NON DYSTROPHIC NAIL(S)  [] 83880 DEBRIDEMENT OF NAIL(S) 1-5  [] 69552 DEBRIDEMENT OF NAIL(S) 6 OR MORE  [] 01247 REMOVAL NAIL/NAIL BED, PARTIAL OR COMPLETE

## 2022-07-14 NOTE — NURSING NOTE
"Chief Complaint   Patient presents with     Musculoskeletal Problem     F/N Cares         Initial /78 (BP Location: Right arm, Patient Position: Sitting, Cuff Size: Adult Regular)   Pulse 59   Temp 97.1  F (36.2  C) (Temporal)   Resp 16   Wt 76.3 kg (168 lb 3.2 oz)   LMP  (LMP Unknown)   SpO2 97%   Breastfeeding No   BMI 28.87 kg/m   Estimated body mass index is 28.87 kg/m  as calculated from the following:    Height as of 6/13/22: 1.626 m (5' 4\").    Weight as of this encounter: 76.3 kg (168 lb 3.2 oz).     Medication Reconciliation: complete      FOOD SECURITY SCREENING QUESTIONS:    The next two questions are to help us understand your food security.  If you are feeling you need any assistance in this area, we have resources available to support you today.    Hunger Vital Signs:  Within the past 12 months we worried whether our food would run out before we got money to buy more. Never  Within the past 12 months the food we bought just didn't last and we didn't have money to get more. Never        Advance care plan reviewed      Chika Desouza LPN on 7/14/2022 at 11:31 AM      "

## 2022-07-26 ENCOUNTER — HOSPITAL ENCOUNTER (OUTPATIENT)
Dept: PHYSICAL THERAPY | Facility: OTHER | Age: 72
Setting detail: THERAPIES SERIES
Discharge: HOME | End: 2022-07-26
Attending: NURSE PRACTITIONER
Payer: OTHER GOVERNMENT

## 2022-07-26 PROCEDURE — 97112 NEUROMUSCULAR REEDUCATION: CPT | Mod: GP

## 2022-07-26 PROCEDURE — 97140 MANUAL THERAPY 1/> REGIONS: CPT | Mod: GP

## 2022-08-02 ENCOUNTER — HOSPITAL ENCOUNTER (OUTPATIENT)
Dept: PHYSICAL THERAPY | Facility: OTHER | Age: 72
Setting detail: THERAPIES SERIES
Discharge: HOME | End: 2022-08-02
Attending: NURSE PRACTITIONER
Payer: OTHER GOVERNMENT

## 2022-08-02 PROCEDURE — 97112 NEUROMUSCULAR REEDUCATION: CPT | Mod: GP

## 2022-08-02 PROCEDURE — 97140 MANUAL THERAPY 1/> REGIONS: CPT | Mod: GP

## 2022-08-09 ENCOUNTER — HOSPITAL ENCOUNTER (OUTPATIENT)
Dept: PHYSICAL THERAPY | Facility: OTHER | Age: 72
Setting detail: THERAPIES SERIES
Discharge: HOME | End: 2022-08-09
Attending: NURSE PRACTITIONER
Payer: OTHER GOVERNMENT

## 2022-08-09 PROCEDURE — 97140 MANUAL THERAPY 1/> REGIONS: CPT | Mod: GP

## 2022-08-09 PROCEDURE — 97112 NEUROMUSCULAR REEDUCATION: CPT | Mod: GP

## 2022-08-16 ENCOUNTER — HOSPITAL ENCOUNTER (OUTPATIENT)
Dept: PHYSICAL THERAPY | Facility: OTHER | Age: 72
Setting detail: THERAPIES SERIES
Discharge: HOME | End: 2022-08-16
Attending: NURSE PRACTITIONER
Payer: OTHER GOVERNMENT

## 2022-08-16 PROCEDURE — 97112 NEUROMUSCULAR REEDUCATION: CPT | Mod: GP

## 2022-08-16 PROCEDURE — 97140 MANUAL THERAPY 1/> REGIONS: CPT | Mod: GP

## 2022-08-16 NOTE — PROGRESS NOTES
[facility]    OUTPATIENT PHYSICAL THERAPY  PLAN OF TREATMENT FOR OUTPATIENT REHABILITATION AND PROGRESS NOTE           Patient's Last Name, First Name, Monisha Huerta Date of Birth  1950   Provider's Name  [facility] Medical Record No.  [id number]    Onset Date  2-15-22 Start of Care Date  2-23-22   Type:     _X_PT   ___OT   ___SLP Medical Diagnosis  Chronic midline LBP without sciatica (M54.50, G89.29)   PT Diagnosis  Impaired mobility, weakness secondary to chronic LBP and neck pain Plan of Treatment  Frequency/Duration: Continue PT 1-6 times per month for 3 additional months. Consistency of appointments may be disrupted again as pt will be out of town to care for her daughter. Continue with manual therapy, N-M re-ed, therapeutc exercise and HEP. Will address pelvic restricitons, lumbar spine, cervical spine, core and LE strength. Certification dates 8-16-22 to 11-16-22       Goals:  Goal Identifier HEP   Goal Description Pt to display independence and 75% compliance with HEP to assist in self management of her pain, improving ROM for ADLs   Target Date 05/21/22   Date Met      Progress (detail required for progress note): Compliant with HEP daily in the last week, less consistent prior to this. Will continue to develop, work on strengthening.     Goal Identifier Myofascial   Goal Description Pt to display improved myofascial mobility in the  lumbar and pelvic regions to allow for symmetrical postural loading to allow for 50% less pain with standing and sitting tasks at home.   Target Date 05/21/22   Date Met      Progress (detail required for progress note): Continued myofascial restrictions--states she is at least 25% better. Pain still as high as 5/10, particularly in the evenings     Goal Identifier cervical   Goal Description Pt to display improved  "cervical joint mobility and myofascial mobility to allow her to report 50% improvement in level of cervical pain   Target Date 05/21/22   Date Met      Progress (detail required for progress note): In progress. Neck has been sore on the left side and was \"lumpy\" but now is not as tight     Goal Identifier strength   Goal Description Pt to display improved LE strength to at least 4+/5 to allow for improved ability to lift things from the floor with safe mechanics.   Target Date 05/21/22   Date Met      Progress (detail required for progress note): Working on strengthening HEP with better compliance and good understanding of rational in improving LE strength to help manage pelvic pain; notes difficulty yet with lifting things; somewhat better getting up and down from the ground as she is often kneeling in the garden.           Beginning/End Dates of Progress Note Reporting Period:  6-8-22 to 8/16/2022     Progress Toward Goals:   Progress this reporting period:  Pelvic pain is improving slowly. Better compliance with HEP recently.  Better ability to get up and down from the ground.    Client Self (Subjective) Report for Progress Note Reporting Period: Pt has been seen 13 times in PT to date. Her schedule has allowed her to come in on a weekly basis over the last 4 weeks which she finds helpful, but she will now, again be out of town for a period of time, caring for her daughter. Notes that she has had a lot of incontinence this week and is not sure why--wonders if it is related to heavy lifting or weather changes. Notes she did her exercises all week and feels good about this. Overall, states her pelvic pain is at least 25% better. Notes that neck and lumbar pain is getting better slowly      Objective Measurements:   Objective Measure: Postural loading  Details: General listening to the R anterior pelvis. Postural loading: Head R/R mild, shoulder Limited R/L and L/R. Pelvis limited in all planes. Tends to stand with " weight more over the R LE.  Objective Measure: myofascial  Details: Local listening to the R side of the bladder with extended listening to the L side of the bladder, R endopelvic fascia. Specific fascial restrictions in the pubovesical ligament, urachus, obturator fascia, hip adductors, L and R endopelvic fascia, R fascia of toldt--all are more tight today. Localized sharp tenderness with palpation in the pelvis anteriorly today, particularly pubovesical ligament R. Guarding in the R UT, LS, SCM, upper thoracic fascia.  Objective Measure: segmental mobility/pelvic screen  Details: pube and ASIS inferior R initially, level after MFR. FRS R L5S1, ERS R L23.              I CERTIFY THE NEED FOR THESE SERVICES FURNISHED UNDER        THIS PLAN OF TREATMENT AND WHILE UNDER MY CARE     (Physician co-signature of this document indicates review and certification of the therapy plan).                Referring Provider: YUDY Ferrari, PT

## 2022-08-26 ENCOUNTER — TELEPHONE (OUTPATIENT)
Dept: INTERNAL MEDICINE | Facility: OTHER | Age: 72
End: 2022-08-26

## 2022-08-26 DIAGNOSIS — R73.03 PREDIABETES: ICD-10-CM

## 2022-08-26 DIAGNOSIS — E78.2 MIXED HYPERLIPIDEMIA: ICD-10-CM

## 2022-08-26 DIAGNOSIS — N39.46 MIXED INCONTINENCE: Primary | ICD-10-CM

## 2022-08-26 DIAGNOSIS — N39.46 MIXED INCONTINENCE: ICD-10-CM

## 2022-08-26 RX ORDER — ATORVASTATIN CALCIUM 10 MG/1
TABLET, FILM COATED ORAL
Qty: 36 TABLET | Refills: 0 | Status: SHIPPED | OUTPATIENT
Start: 2022-08-26 | End: 2023-02-14

## 2022-08-26 RX ORDER — TOLTERODINE 2 MG/1
2 CAPSULE, EXTENDED RELEASE ORAL DAILY
Qty: 90 CAPSULE | Refills: 3 | Status: SHIPPED | OUTPATIENT
Start: 2022-08-26 | End: 2023-03-10

## 2022-08-26 RX ORDER — TOLTERODINE 2 MG/1
2 CAPSULE, EXTENDED RELEASE ORAL EVERY OTHER DAY
Qty: 45 CAPSULE | Refills: 0 | Status: CANCELLED | OUTPATIENT
Start: 2022-08-26

## 2022-08-26 NOTE — TELEPHONE ENCOUNTER
Reason for call: Medication or medication refill    Name of medication requested: tolterodine ER (DETROL LA) 2 MG 24 hr capsule-----[facility]     atorvastatin (LIPITOR) 10 MG tablet-----[facility]        Are you out of the medication? no    What [facility] do you use[facility]    Preferred method for responding to this message: Telephone Call    Phone number patient can be reached at: Home number on file [phone] (home)    If we cannot reach you directly, may we leave a detailed response at the number you provided? Yes      Shelia Orantes on 8/26/2022 at 9:47 AM

## 2022-08-27 ENCOUNTER — HEALTH MAINTENANCE LETTER (OUTPATIENT)
Age: 72
End: 2022-08-27

## 2022-09-12 ENCOUNTER — HOSPITAL ENCOUNTER (OUTPATIENT)
Dept: PHYSICAL THERAPY | Facility: OTHER | Age: 72
Setting detail: THERAPIES SERIES
Discharge: HOME | End: 2022-09-12
Attending: NURSE PRACTITIONER
Payer: OTHER GOVERNMENT

## 2022-09-12 PROCEDURE — 97140 MANUAL THERAPY 1/> REGIONS: CPT | Mod: GP,PO

## 2022-09-12 PROCEDURE — 97112 NEUROMUSCULAR REEDUCATION: CPT | Mod: GP,PO

## 2022-09-26 ENCOUNTER — HOSPITAL ENCOUNTER (OUTPATIENT)
Dept: PHYSICAL THERAPY | Facility: OTHER | Age: 72
Setting detail: THERAPIES SERIES
Discharge: HOME | End: 2022-09-26
Attending: NURSE PRACTITIONER
Payer: OTHER GOVERNMENT

## 2022-09-26 PROCEDURE — 97140 MANUAL THERAPY 1/> REGIONS: CPT | Mod: GP,PO

## 2022-09-26 PROCEDURE — 97112 NEUROMUSCULAR REEDUCATION: CPT | Mod: GP,PO

## 2022-10-13 ENCOUNTER — HOSPITAL ENCOUNTER (OUTPATIENT)
Dept: PHYSICAL THERAPY | Facility: OTHER | Age: 72
Setting detail: THERAPIES SERIES
Discharge: HOME | End: 2022-10-13
Attending: NURSE PRACTITIONER
Payer: OTHER GOVERNMENT

## 2022-10-13 PROCEDURE — 97140 MANUAL THERAPY 1/> REGIONS: CPT | Mod: GP,PO

## 2022-10-13 PROCEDURE — 97112 NEUROMUSCULAR REEDUCATION: CPT | Mod: GP

## 2022-10-25 ENCOUNTER — OFFICE VISIT (OUTPATIENT)
Dept: INTERNAL MEDICINE | Facility: OTHER | Age: 72
End: 2022-10-25
Attending: NURSE PRACTITIONER
Payer: OTHER GOVERNMENT

## 2022-10-25 VITALS
DIASTOLIC BLOOD PRESSURE: 78 MMHG | BODY MASS INDEX: 28.61 KG/M2 | OXYGEN SATURATION: 97 % | TEMPERATURE: 97.5 F | WEIGHT: 166.7 LBS | SYSTOLIC BLOOD PRESSURE: 130 MMHG | RESPIRATION RATE: 18 BRPM | HEART RATE: 61 BPM

## 2022-10-25 DIAGNOSIS — H35.30 MACULAR DEGENERATION (SENILE) OF RETINA: Chronic | ICD-10-CM

## 2022-10-25 DIAGNOSIS — R05.1 ACUTE COUGH: ICD-10-CM

## 2022-10-25 DIAGNOSIS — N39.46 MIXED INCONTINENCE: Primary | Chronic | ICD-10-CM

## 2022-10-25 PROBLEM — N18.30 CHRONIC KIDNEY DISEASE, STAGE 3 (H): Chronic | Status: ACTIVE | Noted: 2021-01-13

## 2022-10-25 PROCEDURE — 99213 OFFICE O/P EST LOW 20 MIN: CPT | Performed by: NURSE PRACTITIONER

## 2022-10-25 RX ORDER — BENZONATATE 100 MG/1
100 CAPSULE ORAL 3 TIMES DAILY PRN
Qty: 90 CAPSULE | Refills: 1 | Status: SHIPPED | OUTPATIENT
Start: 2022-10-25 | End: 2023-03-10

## 2022-10-25 RX ORDER — DEXTROMETHORPHAN HBR. AND GUAIFENESIN 10; 100 MG/5ML; MG/5ML
5 SOLUTION ORAL 4 TIMES DAILY PRN
Qty: 118 ML | Refills: 0 | Status: SHIPPED | OUTPATIENT
Start: 2022-10-25 | End: 2023-03-10

## 2022-10-25 ASSESSMENT — ENCOUNTER SYMPTOMS
SINUS PRESSURE: 1
NERVOUS/ANXIOUS: 1
SLEEP DISTURBANCE: 1
RHINORRHEA: 1

## 2022-10-25 ASSESSMENT — PAIN SCALES - GENERAL: PAINLEVEL: MODERATE PAIN (5)

## 2022-10-25 NOTE — NURSING NOTE
"Chief Complaint   Patient presents with     RECHECK     F/U - Multiple Issues - bladder issues; vaccines, cough, sinus        Initial /78 (BP Location: Right arm, Patient Position: Sitting, Cuff Size: Adult Regular)   Pulse 61   Temp 97.5  F (36.4  C) (Temporal)   Resp 18   Wt 75.6 kg (166 lb 11.2 oz)   LMP  (LMP Unknown)   SpO2 97%   BMI 28.61 kg/m   Estimated body mass index is 28.61 kg/m  as calculated from the following:    Height as of 6/13/22: 1.626 m (5' 4\").    Weight as of this encounter: 75.6 kg (166 lb 11.2 oz).     Medication Reconciliation: complete      FOOD SECURITY SCREENING QUESTIONS:    The next two questions are to help us understand your food security.  If you are feeling you need any assistance in this area, we have resources available to support you today.    Hunger Vital Signs:  Within the past 12 months we worried whether our food would run out before we got money to buy more. Never  Within the past 12 months the food we bought just didn't last and we didn't have money to get more. Never        Advance care plan reviewed      Chika Desouza LPN on 10/25/2022 at 10:00 AM      "

## 2022-10-25 NOTE — PROGRESS NOTES
"  Assessment & Plan     Mixed incontinence  Managed currently with PHYSICAL THERAPY. Patient encouraged to reach out if she wishes to pursue any surgical interventions.    Macular degeneration (senile) of retina - Left  New finding by eye exam.    Acute cough  Lung sounds are clear. Most likely that she has been outside recently and weather changes. Will give Tessalon perles and Tussin DM. Follow up if worsening.  - benzonatate (TESSALON) 100 MG capsule  Dispense: 90 capsule; Refill: 1  - dextromethorphan-guaiFENesin (TUSSIN DM)  MG/5ML liquid  Dispense: 118 mL; Refill: 0    Prescription drug management  20 minutes spent on the date of the encounter doing chart review, history and exam, documentation and further activities per the note     BMI:   Estimated body mass index is 28.61 kg/m  as calculated from the following:    Height as of 6/13/22: 1.626 m (5' 4\").    Weight as of this encounter: 75.6 kg (166 lb 11.2 oz).     Return if symptoms worsen or fail to improve.    Nafisa Escobar NP  [facility]    Susanne Bearden is a 72 year old, presenting for the following health issues: Multiple Issues - Bladder issues, cough, sinus congestion    History of Present Illness       Headaches:   Since the patient's last clinic visit, headaches are: worsened  The patient is getting headaches:  Daily  She is able to do normal daily activities when she has a migraine.  The patient is taking the following rescue/relief medications:  Tylenol   Patient states \"I get some relief\" from the rescue/relief medications.   The patient is taking the following medications to prevent migraines:  No medications to prevent migraines  In the past 4 weeks, the patient has gone to an Urgent Care or Emergency Room 0 times times due to headaches.    She eats 2-3 servings of fruits and vegetables daily.She consumes 2 sweetened beverage(s) daily.She exercises with enough effort to increase her heart rate 9 or less " minutes per day.  She exercises with enough effort to increase her heart rate 3 or less days per week.   She is taking medications regularly.    Last eye exam was one month ago, macular degeneration in her left eye. Using vitamins and eye drops at this time.    Urinary issues - MIXED INCONTINENCE  Therapy is helping. She is going every two weeks.  She wonders how long this continues until things go further, such as surgical interventions.     Cough  Productive of light yellow, but has now cleared.  reports coughing at night is terrible.    Sinus issues  Using Claritin and isn't getting results.    Mental health issues  Daughter's health is declining. She has additional stress and sadness because of this.    Review of Systems   HENT: Positive for congestion, postnasal drip, rhinorrhea and sinus pressure.    Psychiatric/Behavioral: Positive for mood changes and sleep disturbance (takes 10 mg of melatonin). The patient is nervous/anxious.    All other systems reviewed and are negative.         Objective    /78 (BP Location: Right arm, Patient Position: Sitting, Cuff Size: Adult Regular)   Pulse 61   Temp 97.5  F (36.4  C) (Temporal)   Resp 18   Wt 75.6 kg (166 lb 11.2 oz)   LMP  (LMP Unknown)   SpO2 97%   BMI 28.61 kg/m    Body mass index is 28.61 kg/m .  Physical Exam  Vitals and nursing note reviewed.   Constitutional:       Appearance: Normal appearance.   HENT:      Head: Normocephalic and atraumatic.   Eyes:      Extraocular Movements: Extraocular movements intact.      Conjunctiva/sclera: Conjunctivae normal.   Cardiovascular:      Rate and Rhythm: Normal rate and regular rhythm.      Heart sounds: Normal heart sounds.   Pulmonary:      Effort: Pulmonary effort is normal.      Breath sounds: Normal breath sounds.   Musculoskeletal:         General: Normal range of motion.   Skin:     General: Skin is warm and dry.   Neurological:      Mental Status: She is alert and oriented to person, place,  and time. Mental status is at baseline.   Psychiatric:         Behavior: Behavior normal.         Thought Content: Thought content normal.         Judgment: Judgment normal.        No results found for any visits on 10/25/22.

## 2022-10-26 ENCOUNTER — HOSPITAL ENCOUNTER (OUTPATIENT)
Dept: PHYSICAL THERAPY | Facility: OTHER | Age: 72
Setting detail: THERAPIES SERIES
Discharge: HOME | End: 2022-10-26
Attending: NURSE PRACTITIONER
Payer: OTHER GOVERNMENT

## 2022-10-26 PROCEDURE — 97112 NEUROMUSCULAR REEDUCATION: CPT | Mod: GP,PO

## 2022-10-26 PROCEDURE — 97140 MANUAL THERAPY 1/> REGIONS: CPT | Mod: GP,PO

## 2022-11-03 ENCOUNTER — OFFICE VISIT (OUTPATIENT)
Dept: INTERNAL MEDICINE | Facility: OTHER | Age: 72
End: 2022-11-03
Attending: NURSE PRACTITIONER
Payer: OTHER GOVERNMENT

## 2022-11-03 VITALS
OXYGEN SATURATION: 97 % | BODY MASS INDEX: 28.15 KG/M2 | WEIGHT: 164 LBS | SYSTOLIC BLOOD PRESSURE: 128 MMHG | RESPIRATION RATE: 16 BRPM | HEART RATE: 63 BPM | DIASTOLIC BLOOD PRESSURE: 68 MMHG | TEMPERATURE: 98.5 F

## 2022-11-03 DIAGNOSIS — L60.3 NAIL DYSTROPHY: Primary | ICD-10-CM

## 2022-11-03 DIAGNOSIS — M79.605 PAIN OF LEFT LOWER EXTREMITY: ICD-10-CM

## 2022-11-03 DIAGNOSIS — M79.604 PAIN OF RIGHT LOWER EXTREMITY: ICD-10-CM

## 2022-11-03 DIAGNOSIS — B35.1 ONYCHOMYCOSIS: ICD-10-CM

## 2022-11-03 DIAGNOSIS — H35.30 MACULAR DEGENERATION (SENILE) OF RETINA: ICD-10-CM

## 2022-11-03 DIAGNOSIS — G25.81 RESTLESS LEG SYNDROME: ICD-10-CM

## 2022-11-03 PROCEDURE — G0127 TRIM NAIL(S): HCPCS | Mod: Q8 | Performed by: NURSE PRACTITIONER

## 2022-11-03 ASSESSMENT — PAIN SCALES - GENERAL: PAINLEVEL: NO PAIN (0)

## 2022-11-03 NOTE — NURSING NOTE
"Chief Complaint   Patient presents with     Musculoskeletal Problem     F/N Cares       Initial /68 (BP Location: Right arm, Patient Position: Sitting, Cuff Size: Adult Regular)   Pulse 63   Temp 98.5  F (36.9  C) (Temporal)   Resp 16   Wt 74.4 kg (164 lb)   LMP  (LMP Unknown)   SpO2 97%   BMI 28.15 kg/m   Estimated body mass index is 28.15 kg/m  as calculated from the following:    Height as of 6/13/22: 1.626 m (5' 4\").    Weight as of this encounter: 74.4 kg (164 lb).     Medication Reconciliation: complete      FOOD SECURITY SCREENING QUESTIONS:    The next two questions are to help us understand your food security.  If you are feeling you need any assistance in this area, we have resources available to support you today.    Hunger Vital Signs:  Within the past 12 months we worried whether our food would run out before we got money to buy more. Never  Within the past 12 months the food we bought just didn't last and we didn't have money to get more. Never        Advance care plan reviewed      Chika Desouza LPN on 11/3/2022 at 9:41 AM      "
yes

## 2022-11-03 NOTE — PROGRESS NOTES
"  Monisha Velez  : 1950 Age: 72 year old Sex: female MRN: [id number]    Nursing Notes:   Chika Desouza LPN  11/3/2022  9:44 AM  Signed  Chief Complaint   Patient presents with     Musculoskeletal Problem     F/N Cares       Initial /68 (BP Location: Right arm, Patient Position: Sitting, Cuff Size: Adult Regular)   Pulse 63   Temp 98.5  F (36.9  C) (Temporal)   Resp 16   Wt 74.4 kg (164 lb)   LMP  (LMP Unknown)   SpO2 97%   BMI 28.15 kg/m   Estimated body mass index is 28.15 kg/m  as calculated from the following:    Height as of 22: 1.626 m (5' 4\").    Weight as of this encounter: 74.4 kg (164 lb).     Medication Reconciliation: complete      FOOD SECURITY SCREENING QUESTIONS:    The next two questions are to help us understand your food security.  If you are feeling you need any assistance in this area, we have resources available to support you today.    Hunger Vital Signs:  Within the past 12 months we worried whether our food would run out before we got money to buy more. Never  Within the past 12 months the food we bought just didn't last and we didn't have money to get more. Never        Advance care plan reviewed      Chika Desouza LPN on 11/3/2022 at 9:41 AM      Nursing note reviewed with patient.  Accuracy and completeness verified.     Encounter Date:  2022    Patient Name:  Monisha Velez  Patient MRN:   [id number]     Last Footcare Appt: 2022    PCP: Raeann Pearson    Other Providers Seen for Foot/Nail Care (name/location/date): [] Yes [x] No List (if applicable):    ABN Given to patient and signed in clinic today:    [x] Yes [] No    SUBJECTIVE:    Patient Reported Symptoms: RIGHT  LEFT   Neuropathic symptoms []  []  DESCRIBE:   Pain in foot/toes at rest [x]  [x]  DESCRIBE:bilateral lower extremities   Intermittent claudication []  []  DESCRIBE:   Previous foot ulcer/injury []  []  DESCRIBE:   Amputation []  []  DESCRIBE:    OBJECTIVE:    Nurse/Provider " Inspection of Feet/Nails: RIGHT  LEFT   Infection []  []  DESCRIBE:   Ulcerations []  []  DESCRIBE:   Calluses/Corns [x]  []  DESCRIBE: right great toe, medial aspect   Fissures/Cracks []  []  DESCRIBE: bilateral edges of both great toenails have been removed prior, growing pieces that are jagged;   Nail Disorders/Changes [x]  [x]  DESCRIBE: all 10 toenails are dystrophic; 5th toes bilaterally have fungus   Other []      []  DESCRIBE:    VASCULAR TESTING:    Foot Pulses:  RIGHT  LEFT   Dorsalis pedis [x]  [x]    Posterior tibial [x]  [x]   Capillary refill of the hallux (<3 seconds): [x] [x]  Digital hair present: [] []  Varicosities (+/- presents): [x] [x]  Edema (pitting vs. non pitting): [] []     DESCRIBE: [] 1+ minimal   [] 2+ moderate   [] 3+ severe  Skin Temperature:  [x] [x]     DESCRIBE: [x] Cold   [] Hot    [] Warm to touch    NEUROLOGIC TESTING:        RIGHT LEFT  Sharp/dull Testing: (the blunt end and sharp end of swab stick) [x] [x]  Proprioception: (moves hallux up/down in space) [x] [x]  Superficial Reflexes: (Babinski sign) [x] [x]    Monofilament Testing: RIGHT  LEFT  RIGHT LEFT    Site 1 [x]  [x]  Site 6 [x] [x]    Site 2 [x]  [x]  Site 7 [x] [x]    Site 3 [x]  [x]  Site 8 [x] [x]    Site 4 [x]  [x]  Site 9 [x] [x]    Site 5 [x]  [x]  Site 10 [x] [x]      MUSCULOSKELETAL: RIGHT LEFT  Foot Type: [x] []  DESCRIBE: [] Pes cavus (high arch) [x] pes rectus (normal) [] pes planus (flat)  Digital deformity: [] []  DESCRIBE: [] bunion [] claw toe [] hammer toe [] mallet toe [] hallux limitus [] other  Joint ROM: [x] [x]  DESCRIBE: [x] adequate ROM to MTPH, STJ, and AJ without crepitus  Adequate Muscle Strength: [x] [x]   Other [] []  DESCRIBE:     DERMATOLOGIC:      Nails: RIGHT LEFT  Onychauxis (thickened, long nail): [x] [x]  DESCRIBE: all 10 toenails  Onycholysis ( nail): [] []  DESCRIBE:   Onychocryptosis (ingrown toenail): [] []  DESCRIBE:    Onychogryphosis (ros's horn  nail): [] []  DESCRIBE:   Onchomycosis (fungal nail): [x] [x]  DESCRIBE: bilateral 5th toenails    Skin: RIGHT LEFT  [x] Hyperkeratotic lesions [] verruca tissue [] foreign body: [x] []  DESCRIBE: medial right great toe  [] Mild edema [] erythema [] open lesions [] interdigit maceration: [] []  DESCRIBE:   [x] Varicosities [] telangiectasis [] pigmented lesion [] venous stasis:  [x] [x]  DESCRIBE:   Adequate padding to the plantar aspect of the foot: [x] [x]  DESCRIBE:     Footwear Assessment:  Type: casual shoes   Condition Good [x] Fair [] Poor []   Fit Good [x] Fair [] Poor []            Yes No  Does the patient use an ambulatory aid?      [] [x] DESCRIBE:  Does the patient understand the effects of diabetes on foot health? [x] []  Can the patient identify appropriate foot care practices?    [x] []  Are the patient's feet adequately cared for?      [x] []            Yes No  Does the patient have impaired vision?      [x] []  Can the patient reach own feet for safe self care?     [] [x]  Are there other factors influencing ability to safely care for own feet? [x] []     Pertinent Past Medical History:  Patient Active Problem List   Diagnosis     Anxiety state     Family history of malignant melanoma of skin     Fundic gastritis     Gastroesophageal reflux disease without esophagitis     H/O adenomatous polyp of colon     Chronic insomnia     Reactive airway disease that is not asthma     Restless leg syndrome     Rosacea     Pain of right lower extremity     Pain of left lower extremity     PND (post-nasal drip)     Chronic kidney disease, stage 3 (H)     Itchy, watery, and red eye     Loud snoring     Apnea     Excessive daytime sleepiness     HTN (hypertension)     Nail dystrophy     Onychomycosis     Chronic midline low back pain without sciatica     Mixed incontinence     Seasonal allergic rhinitis, unspecified trigger     Immunization counseling     Class 1 obesity with body mass index (BMI) of 30.0 to 30.9  in adult, unspecified obesity type, unspecified whether serious comorbidity present     Macular degeneration (senile) of retina - Left     Past Medical History:   Diagnosis Date     Adnexal mass 08/22/2011     Contact dermatitis 12/16/2009     Diplopia     Episode of mild diplopia secondary to sinusitis, ophthalmology consult 07/2008.     H/O gastritis      History of adenomatous polyp of colon      Postmenopausal atrophic vaginitis 07/29/2011     Restless leg syndrome      Retention of urine 08/22/2011     Rosacea      Tinea corporis 11/19/2010     Urge incontinence of urine        Diagnostics:  Hemoglobin A1C   Date Value Ref Range Status   01/11/2021 5.9 4.0 - 6.0 % Final       ASSESSMENT/PLAN:    Nail dystrophy  Restless leg syndrome  Onychomycosis  Macular degeneration (senile) of retina  Pain of right lower extremity  Pain of left lower extremity  - TRIMMING DYSTROPHIC NAILS,ANY NUMBER  All toenails filed down to manageable thickness with Dremel tool and angled Frederic.  Toenails were then trimmed with clippers.  Lotion applied to bilateral lower extremities.  Patient tolerated well. Time spent doing foot/nail care was 25 minutes.    [x] Patient is unable to trim own toenails due to lower extremity pain and limited mobility. Visual impairment due to macular degeneration.    [x] Patient is on chronic ASA anticoagulation and chronic use of this medication poses a increased risk of bleeding should patient sustain a nail/skin injury.    [x] Patient has noted onychomycosis for some time now and has tried various OTC treatments without success.     Educated Patient On:   [x] Proper healthy diet. Eliminate soda, high fructose corn syrup products, artificial sweeteners, and high sodium, high cholesterol foods. Encouraged more fruits and   vegetables and an increase in daily water intake.  [x] Educated on importance of diet and exercise for weight loss and reduction on symptoms.   [x] Daily exercise for at least 30  minutes is recommended. This can be walking, riding a bike, low impact aerobic activity, or yoga.    [x] Importance of daily skin assessments.   [x] Importance of not walking barefoot. Recommend wearing Crocs in house versus going barefoot.   [x] Importance of good, supportive shoes.   [x] Importance of smoking cessation. Greater than 3 minutes were spent on smoking cessation including encouragement to reduce cigarette use and discussion of modalities to assist with this. Cessation was encouraged in order to improve pain, reduce mortality, improve quality of life, and long term outcomes.     Recommended:      YES NO   Diabetic socks:      [] [] [x] Not applicable   Compression stockings/sleeves:   [x] [] [] Not applicable   Diabetic/supportive shoegear with wide toe box:  [x] [] [] Not applicable   Use of Se's VapoRub daily to feet and/or toenails: [x] [] [] Not applicable   Smoking Cessation:     [] [] [x] Not applicable    FOLLOW-UP:    Return to clinic: [] One week [] 30 Days (wounds) [x] 63+ Days   [] As directed by Provider    I explained my diagnostic considerations and recommendations to the patient, who voiced understanding and agreement with the treatment plan. All questions were answered. We discussed potential side effects of any prescribed or recommended therapies, as well as expectations for response to treatments.     JERRELL Ferrari, AGNP-C  Internal Medicine  [facility]    11/03/2022 10:16 AM    Total time spent with this patient was 30 minutes which included chart review, procedures, patient education, visualization and interpretation of labs/images, time spent with the patient and documentation.     [x]  TRIMMING DYSTROPHIC NAILS,ANY NUMBER  [] 90561 TRIM HYPERKERATOTIC SKIN LESION, 1  [] 26002 TRIM HYPERKERATOTIC SKIN LESION,2-4  [] 89413 TRIM HYPERKERATOTIC SKIN LESION,>4  [] 07051 TRIM NON DYSTROPHIC NAIL(S)  [] 86645 DEBRIDEMENT OF NAIL(S) 1-5  [] 41167  DEBRIDEMENT OF NAIL(S) 6 OR MORE  [] 62492 REMOVAL NAIL/NAIL BED, PARTIAL OR COMPLETE    Answers for HPI/ROS submitted by the patient on 10/25/2022  Headache Symptoms are: worsened  How often are you getting headaches or migraines? : daily  Are you able to do normal daily activities when you have a migraine?: Yes  Migraine Rescue/Relief Medications:: Tylenol  Effectiveness of rescue/relief medications:: I get some relief  Migraine Preventative Medications:: no medications to prevent migraines  ER or UC Visits:: 0 times  How many servings of fruits and vegetables do you eat daily?: 2-3  On average, how many sweetened beverages do you drink each day (Examples: soda, juice, sweet tea, etc.  Do NOT count diet or artificially sweetened beverages)?: 2  How many minutes a day do you exercise enough to make your heart beat faster?: 9 or less  How many days a week do you exercise enough to make your heart beat faster?: 3 or less  How many days per week do you miss taking your medication?: 0

## 2022-12-20 ENCOUNTER — HOSPITAL ENCOUNTER (OUTPATIENT)
Dept: PHYSICAL THERAPY | Facility: OTHER | Age: 72
Setting detail: THERAPIES SERIES
Discharge: HOME | End: 2022-12-20
Attending: NURSE PRACTITIONER
Payer: OTHER GOVERNMENT

## 2022-12-20 PROCEDURE — 97112 NEUROMUSCULAR REEDUCATION: CPT | Mod: GP

## 2022-12-20 PROCEDURE — 97140 MANUAL THERAPY 1/> REGIONS: CPT | Mod: GP

## 2022-12-20 NOTE — PROGRESS NOTES
[facility]    OUTPATIENT PHYSICAL THERAPY  PLAN OF TREATMENT FOR OUTPATIENT REHABILITATION AND PROGRESS NOTE           Patient's Last Name, First Name, Monihsa Huerta Date of Birth  1950   Provider's Name  [facility] Medical Record No.  [id number]    Onset Date  2-15-22 Start of Care Date  2-23-22   Type:     _X_PT   ___OT   ___SLP Medical Diagnosis  Chronic midline LBP without sciatica (M54.50, G89.29)   PT Diagnosis  Impaired mobility, weakness secondary to chronic LBP and neck pain Plan of Treatment  Frequency/Duration: Resume PT services 1-6 times per month to address LBP, pelvic pain, cervical pain. Continue with manual therapy, N-M re-ed, therapeutic exercise and HEP.  Certification date from 11-16-22 to 2-11-23     Goals:  Goal Identifier HEP   Goal Description Pt to display independence and 75% compliance with HEP to assist in self management of her pain, improving ROM for ADLs   Target Date 02/11/23   Date Met      Progress (detail required for progress note): Has been doing some stretching/movement exercises every day.     Goal Identifier Myofascial   Goal Description Pt to display improved myofascial mobility in the  lumbar and pelvic regions to allow for symmetrical postural loading to allow for 50% less pain with standing and sitting tasks at home.   Target Date 02/11/23   Date Met      Progress (detail required for progress note): Notes that her pain level is 4-5/10. Has had to do a lot of sitting and made it through that--was not comfortable. Notes that she still has trouble standing. Notes she has to move often.     Goal Identifier cervical   Goal Description Pt to display improved cervical joint mobility and myofascial mobility to allow her to report 50% improvement in level of cervical pain   Target Date 02/11/23   Date Met      Progress  (detail required for progress note): Not reassessed today other than limited rotation bilaterally to 40 degrees, limited extension. Notes neck is painful. Hurts at the base. Feels her motion is more limited.     Goal Identifier strength   Goal Description Pt to display improved LE strength to at least 4+/5 to allow for improved ability to lift things from the floor with safe mechanics.   Target Date 02/11/23   Date Met      Progress (detail required for progress note): Notes lifitng is still hard to do, painful--strength is still an issue. Has not really focused on her strengthening exercises, just working on moving         Beginning/End Dates of Progress Note Reporting Period:  8-16-22 to 12/20/2022     Progress Toward Goals:   Pt was not seen in the last 2 months as she was frequently out of the area, helping her daughter. Has continued with some stretching, movement exercises in that timeframe. Other progress has been limited. Sitting tolerance is better.     Client Self (Subjective) Report for Progress Note Reporting Period: Pt was last seen in PT on 10-26-22; she was then out of town a fair amount helping her daughter who is ill. Notes that she did ok over the time since she was last in. Notes that she only had a couple of incidents of incontinence. Notes that lifting is still difficult: she  lifted heavy things yesterday and is glad she came in  for PT today. Notes that she has been tolerating things,  just getting through everything that is going on. Notes that she is trying to do movement and stretches daily. Neck is sore and she feels more stiff. Lower back is sore. Rates pain at 4 or 5/10.      Objective Measurements:   Objective Measure: Postural loading  Details: General listening to the L anterior pelvis. Postural loading: Remains difficult to load as pt shifts posture frequently when trying to load at all 3 locations. . Head R/R mild, shoulder Limited R/L and L/R. Pelvis limited in all planes. Tends to  stand with weight more over the R LE. Cervical ROM: limited extension to 50%, rotation is limited to 40 degrees but symmetrical; flexion WNL.  Objective Measure: myofascial  Details: Local listening to the L side of the bladder with extended listening to the R side of the bladder, L endopelvic fascia. Specific fascial restrictions in the pubovesical ligament, urachus, L and R endopelvic fascia, pelvic diaphragm. More tender with pain behaviors noted with palpation today, particularlythe endopelvic fascia.  Objective Measure: segmental mobility/pelvic screen  Details: Pubes/ASIS inferior R after myofascial work,  FRS R L5S1. Very tender with lumbar mobility testing, particularly L45, L5S1--pain behaviors noted.                              I CERTIFY THE NEED FOR THESE SERVICES FURNISHED UNDER        THIS PLAN OF TREATMENT AND WHILE UNDER MY CARE     (Physician co-signature of this document indicates review and certification of the therapy plan).                Referring Provider: YUDY Ferrari, PT

## 2022-12-28 ENCOUNTER — HOSPITAL ENCOUNTER (OUTPATIENT)
Dept: PHYSICAL THERAPY | Facility: OTHER | Age: 72
Setting detail: THERAPIES SERIES
Discharge: HOME | End: 2022-12-28
Attending: NURSE PRACTITIONER
Payer: OTHER GOVERNMENT

## 2022-12-28 PROCEDURE — 97140 MANUAL THERAPY 1/> REGIONS: CPT | Mod: GP

## 2022-12-28 PROCEDURE — 97112 NEUROMUSCULAR REEDUCATION: CPT | Mod: GP

## 2023-01-02 ENCOUNTER — ANCILLARY ORDERS (OUTPATIENT)
Dept: INTERNAL MEDICINE | Facility: OTHER | Age: 73
End: 2023-01-02

## 2023-01-02 DIAGNOSIS — Z12.31 VISIT FOR SCREENING MAMMOGRAM: ICD-10-CM

## 2023-01-05 ENCOUNTER — OFFICE VISIT (OUTPATIENT)
Dept: INTERNAL MEDICINE | Facility: OTHER | Age: 73
End: 2023-01-05
Attending: NURSE PRACTITIONER
Payer: OTHER GOVERNMENT

## 2023-01-05 VITALS
TEMPERATURE: 96.8 F | OXYGEN SATURATION: 97 % | BODY MASS INDEX: 28.15 KG/M2 | WEIGHT: 164 LBS | SYSTOLIC BLOOD PRESSURE: 130 MMHG | RESPIRATION RATE: 16 BRPM | DIASTOLIC BLOOD PRESSURE: 76 MMHG | HEART RATE: 62 BPM

## 2023-01-05 DIAGNOSIS — R73.03 PREDIABETES: ICD-10-CM

## 2023-01-05 DIAGNOSIS — M79.604 PAIN OF RIGHT LOWER EXTREMITY: ICD-10-CM

## 2023-01-05 DIAGNOSIS — L60.3 NAIL DYSTROPHY: Primary | ICD-10-CM

## 2023-01-05 DIAGNOSIS — G25.81 RESTLESS LEG SYNDROME: ICD-10-CM

## 2023-01-05 DIAGNOSIS — M79.605 PAIN OF LEFT LOWER EXTREMITY: ICD-10-CM

## 2023-01-05 DIAGNOSIS — B35.1 ONYCHOMYCOSIS: ICD-10-CM

## 2023-01-05 PROCEDURE — G0127 TRIM NAIL(S): HCPCS | Mod: Q8 | Performed by: NURSE PRACTITIONER

## 2023-01-05 ASSESSMENT — PAIN SCALES - GENERAL: PAINLEVEL: NO PAIN (0)

## 2023-01-05 NOTE — NURSING NOTE
"Chief Complaint   Patient presents with     Musculoskeletal Problem     F/N Cares       Initial /76 (BP Location: Right arm, Patient Position: Sitting, Cuff Size: Adult Regular)   Pulse 62   Temp 96.8  F (36  C) (Temporal)   Resp 16   Wt 74.4 kg (164 lb)   LMP  (LMP Unknown)   SpO2 97%   BMI 28.15 kg/m   Estimated body mass index is 28.15 kg/m  as calculated from the following:    Height as of 6/13/22: 1.626 m (5' 4\").    Weight as of this encounter: 74.4 kg (164 lb).     Medication Reconciliation: complete      FOOD SECURITY SCREENING QUESTIONS:    The next two questions are to help us understand your food security.  If you are feeling you need any assistance in this area, we have resources available to support you today.    Hunger Vital Signs:  Within the past 12 months we worried whether our food would run out before we got money to buy more. Never  Within the past 12 months the food we bought just didn't last and we didn't have money to get more. Never        Advance care plan reviewed      Chika Desouza LPN on 1/5/2023 at 8:17 AM      "

## 2023-01-05 NOTE — PROGRESS NOTES
"  Monisha Velez  : 1950 Age: 72 year old Sex: female MRN: [id number]    Nursing Notes:   Chika Desouza LPN  2023  8:17 AM  Sign at exiting of workspace  Chief Complaint   Patient presents with     Musculoskeletal Problem     F/N Cares       Initial /76 (BP Location: Right arm, Patient Position: Sitting, Cuff Size: Adult Regular)   Pulse 62   Temp 96.8  F (36  C) (Temporal)   Resp 16   Wt 74.4 kg (164 lb)   LMP  (LMP Unknown)   SpO2 97%   BMI 28.15 kg/m   Estimated body mass index is 28.15 kg/m  as calculated from the following:    Height as of 22: 1.626 m (5' 4\").    Weight as of this encounter: 74.4 kg (164 lb).     Medication Reconciliation: complete      FOOD SECURITY SCREENING QUESTIONS:    The next two questions are to help us understand your food security.  If you are feeling you need any assistance in this area, we have resources available to support you today.    Hunger Vital Signs:  Within the past 12 months we worried whether our food would run out before we got money to buy more. Never  Within the past 12 months the food we bought just didn't last and we didn't have money to get more. Never        Advance care plan reviewed      Chika Desouza LPN on 2023 at 8:17 AM      Nursing note reviewed with patient.  Accuracy and completeness verified.     Encounter Date:  2023    Patient Name:  Monisha Velez  Patient MRN:   [id number]     Last Footcare Appt: 11/3/22   PCP: Raeann Pearson    Other Providers Seen for Foot/Nail Care (name/location/date): [] Yes [x] No List (if applicable):    ABN Given to patient and signed in clinic today:    [x] Yes [] No    SUBJECTIVE:    Patient Reported Symptoms: RIGHT  LEFT   Neuropathic symptoms []  []  DESCRIBE:   Pain in foot/toes at rest [x]  [x]  DESCRIBE: bilateral lower extremities   Intermittent claudication []  []  DESCRIBE:   Previous foot ulcer/injury []  []  DESCRIBE:   Amputation []  []  DESCRIBE:    New onset of right " knee pain - encouraged to schedule an appointment for further evaluation.    OBJECTIVE:    Nurse/Provider Inspection of Feet/Nails: RIGHT  LEFT   Infection []  []  DESCRIBE:   Ulcerations []  []  DESCRIBE:   Calluses/Corns [x]  []  DESCRIBE: right great toe, medial aspect   Fissures/Cracks []  []  DESCRIBE: bilateral edges of both great toenails have been removed prior, growing pieces that are jagged;   Nail Disorders/Changes [x]  [x]  DESCRIBE: all 10 toenails are dystrophic; 5th toes bilaterally have fungus   Other []      []  DESCRIBE:    VASCULAR TESTING:    Foot Pulses:  RIGHT  LEFT   Dorsalis pedis [x]  [x]    Posterior tibial [x]  [x]   Capillary refill of the hallux (<3 seconds): [x] [x]  Digital hair present: [] []  Varicosities (+/- presents): [x] [x]  Edema (pitting vs. non pitting): [] []     DESCRIBE: [] 1+ minimal   [] 2+ moderate   [] 3+ severe  Skin Temperature:  [x] [x]     DESCRIBE: [x] Cold   [] Hot    [] Warm to touch    NEUROLOGIC TESTING:        RIGHT LEFT  Sharp/dull Testing: (the blunt end and sharp end of swab stick) [x] [x]  Proprioception: (moves hallux up/down in space) [x] [x]  Superficial Reflexes: (Babinski sign) [x] [x]    Monofilament Testing: RIGHT  LEFT  RIGHT LEFT    Site 1 [x]  [x]  Site 6 [x] [x]    Site 2 [x]  [x]  Site 7 [x] [x]    Site 3 [x]  [x]  Site 8 [x] [x]    Site 4 [x]  [x]  Site 9 [x] [x]    Site 5 [x]  [x]  Site 10 [x] [x]      MUSCULOSKELETAL: RIGHT LEFT  Foot Type: [x] []  DESCRIBE: [] Pes cavus (high arch) [x] pes rectus (normal) [] pes planus (flat)  Digital deformity: [] []  DESCRIBE: [] bunion [] claw toe [] hammer toe [] mallet toe [] hallux limitus [] other  Joint ROM: [x] [x]  DESCRIBE: [x] adequate ROM to MTPH, STJ, and AJ without crepitus  Adequate Muscle Strength: [x] [x]   Other [] []  DESCRIBE:     DERMATOLOGIC:      Nails: RIGHT LEFT  Onychauxis (thickened, long nail): [x] [x]  DESCRIBE: all 10 toenails  Onycholysis ( nail): [] []  DESCRIBE:    Onychocryptosis (ingrown toenail): [] []  DESCRIBE:    Onychogryphosis (ros's horn nail): [] []  DESCRIBE:   Onchomycosis (fungal nail): [x] [x]  DESCRIBE: bilateral 5th toenails    Skin: RIGHT LEFT  [x] Hyperkeratotic lesions [] verruca tissue [] foreign body: [x] []  DESCRIBE: medial right great toe  [] Mild edema [] erythema [] open lesions [] interdigit maceration: [] []  DESCRIBE:   [x] Varicosities [] telangiectasis [] pigmented lesion [] venous stasis:  [x] [x]  DESCRIBE:   Adequate padding to the plantar aspect of the foot: [x] [x]  DESCRIBE:     Footwear Assessment:  Type: casual shoes   Condition Good [x] Fair [] Poor []   Fit Good [x] Fair [] Poor []            Yes No  Does the patient use an ambulatory aid?      [] [x] DESCRIBE:  Does the patient understand the effects of diabetes on foot health? [x] []  Can the patient identify appropriate foot care practices?    [x] []  Are the patient's feet adequately cared for?      [x] []            Yes No  Does the patient have impaired vision?      [x] []  Can the patient reach own feet for safe self care?     [] [x]  Are there other factors influencing ability to safely care for own feet? [x] []     Pertinent Past Medical History:  Patient Active Problem List   Diagnosis     Anxiety state     Family history of malignant melanoma of skin     Fundic gastritis     Gastroesophageal reflux disease without esophagitis     H/O adenomatous polyp of colon     Chronic insomnia     Reactive airway disease that is not asthma     Restless leg syndrome     Rosacea     Pain of right lower extremity     Pain of left lower extremity     PND (post-nasal drip)     Chronic kidney disease, stage 3 (H)     Itchy, watery, and red eye     Loud snoring     Apnea     Excessive daytime sleepiness     HTN (hypertension)     Nail dystrophy     Onychomycosis     Chronic midline low back pain without sciatica     Mixed incontinence     Seasonal allergic rhinitis, unspecified trigger      Immunization counseling     Class 1 obesity with body mass index (BMI) of 30.0 to 30.9 in adult, unspecified obesity type, unspecified whether serious comorbidity present     Macular degeneration (senile) of retina - Left     Past Medical History:   Diagnosis Date     Adnexal mass 08/22/2011     Contact dermatitis 12/16/2009     Diplopia     Episode of mild diplopia secondary to sinusitis, ophthalmology consult 07/2008.     H/O gastritis      History of adenomatous polyp of colon      Postmenopausal atrophic vaginitis 07/29/2011     Restless leg syndrome      Retention of urine 08/22/2011     Rosacea      Tinea corporis 11/19/2010     Urge incontinence of urine        Diagnostics:  Hemoglobin A1C   Date Value Ref Range Status   01/11/2021 5.9 4.0 - 6.0 % Final       ASSESSMENT/PLAN:    Nail dystrophy  Restless leg syndrome  Onychomycosis  Macular degeneration (senile) of retina  Pain of right lower extremity  Pain of left lower extremity  - TRIMMING DYSTROPHIC NAILS,ANY NUMBER  All toenails filed down to manageable thickness with Dremel tool and angled Frederic.  Toenails were then trimmed with clippers.  Lotion applied to bilateral lower extremities.  Patient tolerated well. Time spent doing foot/nail care was 15 minutes.    [x] Patient is unable to trim own toenails due to lower extremity pain and limited mobility. Visual impairment due to macular degeneration.    [x] Patient is on chronic ASA anticoagulation and chronic use of this medication poses a increased risk of bleeding should patient sustain a nail/skin injury.    [x] Patient has noted onychomycosis for some time now and has tried various OTC treatments without success.     Educated Patient On:   [x] Proper healthy diet. Eliminate soda, high fructose corn syrup products, artificial sweeteners, and high sodium, high cholesterol foods. Encouraged more fruits and   vegetables and an increase in daily water intake.  [x] Educated on importance of diet and exercise  for weight loss and reduction on symptoms.   [x] Daily exercise for at least 30 minutes is recommended. This can be walking, riding a bike, low impact aerobic activity, or yoga.    [x] Importance of daily skin assessments.   [x] Importance of not walking barefoot. Recommend wearing Crocs in house versus going barefoot.   [x] Importance of good, supportive shoes.   [x] Importance of smoking cessation. Greater than 3 minutes were spent on smoking cessation including encouragement to reduce cigarette use and discussion of modalities to assist with this. Cessation was encouraged in order to improve pain, reduce mortality, improve quality of life, and long term outcomes.     Recommended:      YES NO   Diabetic socks:      [] [] [x] Not applicable   Compression stockings/sleeves:   [x] [] [] Not applicable   Diabetic/supportive shoegear with wide toe box:  [x] [] [] Not applicable   Use of Se's VapoRub daily to feet and/or toenails: [x] [] [] Not applicable   Smoking Cessation:     [] [] [x] Not applicable      JERRELL Ferrari, AGNP-C  Internal Medicine  [facility]    01/05/2023 8:19 AM    Total time spent with this patient was 20 minutes which included chart review, procedures, patient education, visualization and interpretation of labs/images, time spent with the patient and documentation.     [x]  TRIMMING DYSTROPHIC NAILS,ANY NUMBER  [] 69165 TRIM HYPERKERATOTIC SKIN LESION, 1  [] 86903 TRIM HYPERKERATOTIC SKIN LESION,2-4  [] 14885 TRIM HYPERKERATOTIC SKIN LESION,>4  [] 75850 TRIM NON DYSTROPHIC NAIL(S)  [] 15905 DEBRIDEMENT OF NAIL(S) 1-5  [] 40193 DEBRIDEMENT OF NAIL(S) 6 OR MORE  [] 22344 REMOVAL NAIL/NAIL BED, PARTIAL OR COMPLETE    Answers for HPI/ROS submitted by the patient on 10/25/2022  Headache Symptoms are: worsened  How often are you getting headaches or migraines? : daily  Are you able to do normal daily activities when you have a migraine?: Yes  Migraine Rescue/Relief  Medications:: Tylenol  Effectiveness of rescue/relief medications:: I get some relief  Migraine Preventative Medications:: no medications to prevent migraines  ER or UC Visits:: 0 times  How many servings of fruits and vegetables do you eat daily?: 2-3  On average, how many sweetened beverages do you drink each day (Examples: soda, juice, sweet tea, etc.  Do NOT count diet or artificially sweetened beverages)?: 2  How many minutes a day do you exercise enough to make your heart beat faster?: 9 or less  How many days a week do you exercise enough to make your heart beat faster?: 3 or less  How many days per week do you miss taking your medication?: 0

## 2023-01-07 ENCOUNTER — HEALTH MAINTENANCE LETTER (OUTPATIENT)
Age: 73
End: 2023-01-07

## 2023-01-12 ENCOUNTER — HOSPITAL ENCOUNTER (OUTPATIENT)
Dept: PHYSICAL THERAPY | Facility: OTHER | Age: 73
Setting detail: THERAPIES SERIES
Discharge: HOME | End: 2023-01-12
Attending: NURSE PRACTITIONER
Payer: OTHER GOVERNMENT

## 2023-01-12 PROCEDURE — 97140 MANUAL THERAPY 1/> REGIONS: CPT | Mod: GP

## 2023-01-12 PROCEDURE — 97112 NEUROMUSCULAR REEDUCATION: CPT | Mod: GP

## 2023-01-19 ENCOUNTER — HOSPITAL ENCOUNTER (OUTPATIENT)
Dept: PHYSICAL THERAPY | Facility: OTHER | Age: 73
Setting detail: THERAPIES SERIES
Discharge: HOME | End: 2023-01-19
Attending: NURSE PRACTITIONER
Payer: OTHER GOVERNMENT

## 2023-01-19 PROCEDURE — 97112 NEUROMUSCULAR REEDUCATION: CPT | Mod: GP

## 2023-01-19 PROCEDURE — 97140 MANUAL THERAPY 1/> REGIONS: CPT | Mod: GP

## 2023-01-24 ENCOUNTER — TELEPHONE (OUTPATIENT)
Dept: INTERNAL MEDICINE | Facility: OTHER | Age: 73
End: 2023-01-24
Payer: OTHER GOVERNMENT

## 2023-01-24 NOTE — TELEPHONE ENCOUNTER
Spoke with patient. She will let us know where she would like her foot and nail care referral sent.    Cyndee Trujillo RN on 1/24/2023 at 3:50 PM

## 2023-01-26 ENCOUNTER — HOSPITAL ENCOUNTER (OUTPATIENT)
Dept: PHYSICAL THERAPY | Facility: OTHER | Age: 73
Setting detail: THERAPIES SERIES
Discharge: HOME | End: 2023-01-26
Attending: NURSE PRACTITIONER
Payer: OTHER GOVERNMENT

## 2023-01-26 PROCEDURE — 97140 MANUAL THERAPY 1/> REGIONS: CPT | Mod: GP

## 2023-01-26 PROCEDURE — 97112 NEUROMUSCULAR REEDUCATION: CPT | Mod: GP

## 2023-02-03 ENCOUNTER — HOSPITAL ENCOUNTER (OUTPATIENT)
Dept: MAMMOGRAPHY | Facility: OTHER | Age: 73
Discharge: HOME | End: 2023-02-03
Attending: INTERNAL MEDICINE | Admitting: INTERNAL MEDICINE
Payer: OTHER GOVERNMENT

## 2023-02-03 DIAGNOSIS — Z12.31 VISIT FOR SCREENING MAMMOGRAM: ICD-10-CM

## 2023-02-03 PROCEDURE — 77067 SCR MAMMO BI INCL CAD: CPT

## 2023-02-09 ENCOUNTER — OFFICE VISIT (OUTPATIENT)
Dept: FAMILY MEDICINE | Facility: OTHER | Age: 73
End: 2023-02-09
Attending: FAMILY MEDICINE
Payer: OTHER GOVERNMENT

## 2023-02-09 VITALS
RESPIRATION RATE: 15 BRPM | WEIGHT: 168 LBS | BODY MASS INDEX: 28.84 KG/M2 | HEART RATE: 66 BPM | OXYGEN SATURATION: 97 % | TEMPERATURE: 97.7 F | DIASTOLIC BLOOD PRESSURE: 60 MMHG | SYSTOLIC BLOOD PRESSURE: 118 MMHG

## 2023-02-09 DIAGNOSIS — R30.9 URINARY PAIN: Primary | ICD-10-CM

## 2023-02-09 DIAGNOSIS — N30.00 ACUTE CYSTITIS WITHOUT HEMATURIA: ICD-10-CM

## 2023-02-09 LAB
ALBUMIN UR-MCNC: NEGATIVE MG/DL
APPEARANCE UR: CLEAR
BACTERIA #/AREA URNS HPF: ABNORMAL /HPF
BILIRUB UR QL STRIP: NEGATIVE
COLOR UR AUTO: YELLOW
GLUCOSE UR STRIP-MCNC: NEGATIVE MG/DL
HGB UR QL STRIP: NEGATIVE
KETONES UR STRIP-MCNC: NEGATIVE MG/DL
LEUKOCYTE ESTERASE UR QL STRIP: ABNORMAL
NITRATE UR QL: NEGATIVE
PH UR STRIP: 5.5 [PH] (ref 5–9)
RBC URINE: 1 /HPF
SP GR UR STRIP: 1.01 (ref 1–1.03)
SQUAMOUS EPITHELIAL: 1 /HPF
UROBILINOGEN UR STRIP-MCNC: NORMAL MG/DL
WBC CLUMPS #/AREA URNS HPF: PRESENT /HPF
WBC URINE: 44 /HPF

## 2023-02-09 PROCEDURE — 99214 OFFICE O/P EST MOD 30 MIN: CPT | Performed by: FAMILY MEDICINE

## 2023-02-09 PROCEDURE — 87086 URINE CULTURE/COLONY COUNT: CPT | Mod: ZL | Performed by: FAMILY MEDICINE

## 2023-02-09 PROCEDURE — 81001 URINALYSIS AUTO W/SCOPE: CPT | Mod: ZL | Performed by: FAMILY MEDICINE

## 2023-02-09 RX ORDER — CEPHALEXIN 500 MG/1
500 CAPSULE ORAL 3 TIMES DAILY
Qty: 21 CAPSULE | Refills: 0 | Status: SHIPPED | OUTPATIENT
Start: 2023-02-09 | End: 2023-02-21

## 2023-02-09 RX ORDER — VALACYCLOVIR HYDROCHLORIDE 1 G/1
TABLET, FILM COATED ORAL
COMMUNITY
Start: 2023-02-01 | End: 2023-03-10

## 2023-02-09 ASSESSMENT — ENCOUNTER SYMPTOMS
FEVER: 0
NAUSEA: 0
ABDOMINAL PAIN: 1
COUGH: 1
CHILLS: 1

## 2023-02-09 ASSESSMENT — PAIN SCALES - GENERAL: PAINLEVEL: MODERATE PAIN (5)

## 2023-02-09 NOTE — PROGRESS NOTES
Nursing Notes:   Nu Anne  2/9/2023  1:51 PM  Signed  Chief Complaint   Patient presents with     Urinary Problem     Possible UTI          Medication Reconciliation: complete    Nu Omid        Subjective   Monisha is a 73 year old, presenting for the following health issues:  Urinary Problem (Possible UTI )    Monisha is here today to evaluate for possible urinary tract infection.    History of Present Illness       Back Pain:  She presents for follow up of back pain. Patient's back pain is a recurring problem.  Location of back pain:  Right middle of back  Description of back pain: cramping and sharp  Back pain spreads: nowhere    Since patient first noticed back pain, pain is: always present, but gets better and worse  Does back pain interfere with her job:  Not applicable      She eats 2-3 servings of fruits and vegetables daily.She consumes 0 sweetened beverage(s) daily.She exercises with enough effort to increase her heart rate 20 to 29 minutes per day.    She is taking medications regularly.  UTI  This is a new problem. The current episode started 1 to 4 weeks ago. The problem occurs constantly. The problem has been gradually worsening. Associated symptoms include abdominal pain, chills, coughing (rarely, due to sinuses) and urinary symptoms. Pertinent negatives include no fever or nausea.      Feels hot and cold.  Has had dysuria, frequency and urgency for the past 2 weeks.  No hematuria.  Has lower abdominal pain.  Has had prior laparoscopies.  No vomiting.  Still having normal bowel movements.            Review of Systems   Constitutional: Positive for chills. Negative for fever.   Respiratory: Positive for cough (rarely, due to sinuses).    Gastrointestinal: Positive for abdominal pain. Negative for nausea.            Objective    /60   Pulse 66   Temp 97.7  F (36.5  C) (Tympanic)   Resp 15   Wt 76.2 kg (168 lb)   LMP  (LMP Unknown)   SpO2 97%   BMI 28.84 kg/m    Body mass index is  28.84 kg/m .  Physical Exam  Constitutional:       Appearance: Normal appearance.   HENT:      Head: Normocephalic.   Eyes:      Pupils: Pupils are equal, round, and reactive to light.   Cardiovascular:      Rate and Rhythm: Normal rate and regular rhythm.      Heart sounds: Normal heart sounds. No murmur heard.  Pulmonary:      Effort: Pulmonary effort is normal.      Breath sounds: Normal breath sounds. No wheezing, rhonchi or rales.   Abdominal:      General: Abdomen is flat. Bowel sounds are normal.      Tenderness: There is abdominal tenderness (mild suprapubic tenderness). There is no guarding or rebound.   Neurological:      Mental Status: She is alert.   Psychiatric:         Mood and Affect: Mood normal.         Behavior: Behavior normal.            Results for orders placed or performed in visit on 02/09/23   UA reflex to Microscopic and Culture     Status: Abnormal    Specimen: Urine, Clean Catch   Result Value Ref Range    Color Urine Yellow Colorless, Straw, Light Yellow, Yellow    Appearance Urine Clear Clear    Glucose Urine Negative Negative mg/dL    Bilirubin Urine Negative Negative    Ketones Urine Negative Negative mg/dL    Specific Gravity Urine 1.008 1.000 - 1.030    Blood Urine Negative Negative    pH Urine 5.5 5.0 - 9.0    Protein Albumin Urine Negative Negative mg/dL    Urobilinogen Urine Normal Normal, 2.0 mg/dL    Nitrite Urine Negative Negative    Leukocyte Esterase Urine Moderate (A) Negative    Bacteria Urine Moderate (A) None Seen /HPF    WBC Clumps Urine Present (A) None Seen /HPF    RBC Urine 1 <=2 /HPF    WBC Urine 44 (H) <=5 /HPF    Squamous Epithelials Urine 1 <=1 /HPF    Narrative    Urine Culture ordered based on laboratory criteria   Urine Culture     Status: Abnormal (Preliminary result)    Specimen: Urine, Clean Catch   Result Value Ref Range    Culture >100,000 CFU/mL Gram negative bacilli (A)         Assessment & Plan   Monisha Velez is a 73 year old, presenting for the  following health issues:      ICD-10-CM    1. Urinary pain  R30.9 UA reflex to Microscopic and Culture     Urine Culture     CANCELED: UA reflex to Microscopic and Culture      2. Acute cystitis without hematuria  N30.00 cephALEXin (KEFLEX) 500 MG capsule        1.  UA is suspicious for UTI.  Culture sent.  Treat with Keflex.  Follow-up if symptoms are worsening or not improving.  She will be contacted with results of urine culture when available.  2.  See #1.      Vanessa Walters MD  [facility]

## 2023-02-09 NOTE — NURSING NOTE
Chief Complaint   Patient presents with     Urinary Problem     Possible UTI          Medication Reconciliation: bi Anne

## 2023-02-10 DIAGNOSIS — R73.03 PREDIABETES: ICD-10-CM

## 2023-02-10 DIAGNOSIS — E78.2 MIXED HYPERLIPIDEMIA: ICD-10-CM

## 2023-02-11 LAB — BACTERIA UR CULT: ABNORMAL

## 2023-02-14 ENCOUNTER — TELEPHONE (OUTPATIENT)
Dept: INTERNAL MEDICINE | Facility: OTHER | Age: 73
End: 2023-02-14
Payer: OTHER GOVERNMENT

## 2023-02-14 NOTE — TELEPHONE ENCOUNTER
Called patient to see if she wanted to be sooner, but patient stated that she was out of town today and was seen by another provider 2/9/23 and is feeling better.  Stated that she no longer needs 2/16/23 appointment, but will call if symptoms return.    Chika Desouza LPN on 2/14/2023 at 2:10 PM

## 2023-02-14 NOTE — TELEPHONE ENCOUNTER
[facility] sent Rx request for the following:      Requested Prescriptions   Pending Prescriptions Disp Refills     atorvastatin (LIPITOR) 10 MG tablet [Pharmacy Med Name: ATORVASTATIN 10MG TABLETS] 36 tablet 0     Sig: TAKE 1/2 TABLET BY MOUTH THREE TIMES PER WEEK       Statins Protocol Failed - 2/14/2023  3:56 PM        Failed - LDL on file in past 12 months     Recent Labs   Lab Test 07/14/21  1200   LDL 75        Last Prescription Date:   8/26/22  Last Fill Qty/Refills:         36, R-0    Last Office Visit:              2/9/23   Future Office visit:           None    Jenelle Mallory RN .............. 2/14/2023  3:58 PM

## 2023-02-16 RX ORDER — ATORVASTATIN CALCIUM 10 MG/1
TABLET, FILM COATED ORAL
Qty: 36 TABLET | Refills: 1 | Status: SHIPPED | OUTPATIENT
Start: 2023-02-16 | End: 2023-03-10

## 2023-02-16 NOTE — TELEPHONE ENCOUNTER
LMTCB to schedule appointment.    Pt is due for annual exam.    Loreta Milner on 2/16/2023 at 11:16 AM

## 2023-02-16 NOTE — TELEPHONE ENCOUNTER
Pt scheduled annual exam with [facility] 3/10/23.    Loreta Milner on 2/16/2023 at 1:27 PM

## 2023-02-17 DIAGNOSIS — N30.00 ACUTE CYSTITIS WITHOUT HEMATURIA: ICD-10-CM

## 2023-02-20 NOTE — TELEPHONE ENCOUNTER
Patient is out of here Cephalexin, she has been out since last week.  She is calling to see if the doctor has looked at it yet.        Christine Rich on 2/20/2023 at 2:25 PM

## 2023-02-21 RX ORDER — CEPHALEXIN 500 MG/1
CAPSULE ORAL
Qty: 21 CAPSULE | Refills: 0 | Status: SHIPPED | OUTPATIENT
Start: 2023-02-21 | End: 2023-03-10

## 2023-02-21 NOTE — TELEPHONE ENCOUNTER
02/21/23 1627 Sign Vanessa Walters MD Reorder from Order:[id number]     cephALEXin (KEFLEX) 500 MG capsule 21 capsule 0 2/21/2023  No   Sig: TAKE 1 CAPSULE(500 MG) BY MOUTH THREE TIMES DAILY FOR 7 DAYS   Sent to pharmacy as: Cephalexin 500 MG Oral Capsule (KEFLEX)   Class: E-Prescribe   Order: [id number]   E-Prescribing Status: Receipt confirmed by pharmacy (2/21/2023  4:27 PM CST)     [id number] - [place], [place] - [address]     Called and spoke to Patient after verifying last name and date of birth. Pt informed of prescription. Jenelle Mallory RN .............. 2/21/2023  4:44 PM

## 2023-02-21 NOTE — TELEPHONE ENCOUNTER
"[facility] sent Rx request for the following:      Last Prescription Date:     cephALEXin (KEFLEX) 500 MG capsule 21 capsule 0 2023 --   Sig - Route: Take 1 capsule (500 mg) by mouth 3 times daily for 7 days - Oral   Associated Dx: Acute cystitis without hematuria    Last Office Visit:              23  Future Office visit:             Next 5 appointments (look out 90 days)    Mar 10, 2023  9:40 AM  PHYSICAL with Nafisa Escobar NP  [facility] ([facility] ) [address]  [place] [id number] [phone]        Per chart review, Pt reported feeling better on , cancelled  appointment and stated she would call if her symptoms returned.    Called [facility] and spoke with pharmacist Enoch, after verifying Pt's last name and . He states, \"usually we don't request refills of antibiotics, unless the Pt is requesting it.\"    Per LOV note:  Treat with Keflex.  Follow-up if symptoms are worsening or not improving.     Left message for Patient to call back. Jenelle Mallory RN .............. 2023  2:16 PM  "

## 2023-02-21 NOTE — TELEPHONE ENCOUNTER
"Patient returned call and she verified her last name and . She states this morning, her urinary urgency has returned, and she couldn't get from the bed to the bathroom in time. She states, \"Today has been ok, but I just keeping getting little twinges in my abdominal area.\" She also states, \"usually I have to go through 2 bottles of antibiotics to get rid of anything.\" Pt requesting refill from Dr. Walters, without being seen. If she has to be seen, she is willing to call back tomorrow morning, in case of cancellations. Pt requesting call back at [phone]; ok to leave detailed message.    Jenelle Mallory RN .............. 2023  4:14 PM  "

## 2023-03-10 ENCOUNTER — OFFICE VISIT (OUTPATIENT)
Dept: INTERNAL MEDICINE | Facility: OTHER | Age: 73
End: 2023-03-10
Attending: NURSE PRACTITIONER
Payer: OTHER GOVERNMENT

## 2023-03-10 VITALS
HEART RATE: 66 BPM | SYSTOLIC BLOOD PRESSURE: 128 MMHG | HEIGHT: 65 IN | OXYGEN SATURATION: 97 % | TEMPERATURE: 96.9 F | WEIGHT: 168.2 LBS | DIASTOLIC BLOOD PRESSURE: 72 MMHG | BODY MASS INDEX: 28.02 KG/M2 | RESPIRATION RATE: 16 BRPM

## 2023-03-10 DIAGNOSIS — N95.1 PERIMENOPAUSAL VASOMOTOR SYMPTOMS: ICD-10-CM

## 2023-03-10 DIAGNOSIS — B00.1 COLD SORE: ICD-10-CM

## 2023-03-10 DIAGNOSIS — R09.82 PND (POST-NASAL DRIP): ICD-10-CM

## 2023-03-10 DIAGNOSIS — G89.29 CHRONIC MIDLINE LOW BACK PAIN WITHOUT SCIATICA: ICD-10-CM

## 2023-03-10 DIAGNOSIS — I10 HYPERTENSION, UNSPECIFIED TYPE: ICD-10-CM

## 2023-03-10 DIAGNOSIS — Z13.89 SCREENING FOR NEPHROPATHY: ICD-10-CM

## 2023-03-10 DIAGNOSIS — E78.2 MIXED HYPERLIPIDEMIA: ICD-10-CM

## 2023-03-10 DIAGNOSIS — N39.46 MIXED INCONTINENCE: ICD-10-CM

## 2023-03-10 DIAGNOSIS — Z00.00 PREVENTATIVE HEALTH CARE: Primary | ICD-10-CM

## 2023-03-10 DIAGNOSIS — R73.03 PREDIABETES: ICD-10-CM

## 2023-03-10 DIAGNOSIS — Z13.29 SCREENING FOR THYROID DISORDER: ICD-10-CM

## 2023-03-10 DIAGNOSIS — M54.50 CHRONIC MIDLINE LOW BACK PAIN WITHOUT SCIATICA: ICD-10-CM

## 2023-03-10 DIAGNOSIS — N18.31 STAGE 3A CHRONIC KIDNEY DISEASE (H): ICD-10-CM

## 2023-03-10 DIAGNOSIS — Z00.00 ENCOUNTER FOR MEDICARE ANNUAL WELLNESS EXAM: ICD-10-CM

## 2023-03-10 LAB
ALBUMIN SERPL BCG-MCNC: 3.9 G/DL (ref 3.5–5.2)
ALP SERPL-CCNC: 63 U/L (ref 35–104)
ALT SERPL W P-5'-P-CCNC: 18 U/L (ref 10–35)
ANION GAP SERPL CALCULATED.3IONS-SCNC: 5 MMOL/L (ref 7–15)
AST SERPL W P-5'-P-CCNC: 21 U/L (ref 10–35)
BASOPHILS # BLD AUTO: 0 10E3/UL (ref 0–0.2)
BASOPHILS NFR BLD AUTO: 0 %
BILIRUB SERPL-MCNC: 0.4 MG/DL
BUN SERPL-MCNC: 13.4 MG/DL (ref 8–23)
CALCIUM SERPL-MCNC: 9 MG/DL (ref 8.8–10.2)
CHLORIDE SERPL-SCNC: 108 MMOL/L (ref 98–107)
CHOLEST SERPL-MCNC: 198 MG/DL
CREAT SERPL-MCNC: 1.02 MG/DL (ref 0.51–0.95)
CREAT UR-MCNC: 43.9 MG/DL
DEPRECATED HCO3 PLAS-SCNC: 30 MMOL/L (ref 22–29)
EOSINOPHIL # BLD AUTO: 0.1 10E3/UL (ref 0–0.7)
EOSINOPHIL NFR BLD AUTO: 2 %
ERYTHROCYTE [DISTWIDTH] IN BLOOD BY AUTOMATED COUNT: 12.2 % (ref 10–15)
GFR SERPL CREATININE-BSD FRML MDRD: 57 ML/MIN/1.73M2
GLUCOSE SERPL-MCNC: 98 MG/DL (ref 70–99)
HBA1C MFR BLD: 6.1 % (ref 4–6.2)
HCT VFR BLD AUTO: 40.8 % (ref 35–47)
HDLC SERPL-MCNC: 71 MG/DL
HGB BLD-MCNC: 13.9 G/DL (ref 11.7–15.7)
HOLD SPECIMEN: NORMAL
IMM GRANULOCYTES # BLD: 0 10E3/UL
IMM GRANULOCYTES NFR BLD: 0 %
LDLC SERPL CALC-MCNC: 88 MG/DL
LYMPHOCYTES # BLD AUTO: 1.3 10E3/UL (ref 0.8–5.3)
LYMPHOCYTES NFR BLD AUTO: 27 %
MCH RBC QN AUTO: 31.2 PG (ref 26.5–33)
MCHC RBC AUTO-ENTMCNC: 34.1 G/DL (ref 31.5–36.5)
MCV RBC AUTO: 92 FL (ref 78–100)
MICROALBUMIN UR-MCNC: <12 MG/L
MICROALBUMIN/CREAT UR: NORMAL MG/G{CREAT}
MONOCYTES # BLD AUTO: 0.3 10E3/UL (ref 0–1.3)
MONOCYTES NFR BLD AUTO: 6 %
NEUTROPHILS # BLD AUTO: 3.2 10E3/UL (ref 1.6–8.3)
NEUTROPHILS NFR BLD AUTO: 65 %
NONHDLC SERPL-MCNC: 127 MG/DL
NRBC # BLD AUTO: 0 10E3/UL
NRBC BLD AUTO-RTO: 0 /100
PLATELET # BLD AUTO: 187 10E3/UL (ref 150–450)
POTASSIUM SERPL-SCNC: 4 MMOL/L (ref 3.4–5.3)
PROT SERPL-MCNC: 6.3 G/DL (ref 6.4–8.3)
RBC # BLD AUTO: 4.46 10E6/UL (ref 3.8–5.2)
SODIUM SERPL-SCNC: 143 MMOL/L (ref 136–145)
TRIGL SERPL-MCNC: 196 MG/DL
TSH SERPL DL<=0.005 MIU/L-ACNC: 1.99 UIU/ML (ref 0.3–4.2)
WBC # BLD AUTO: 4.9 10E3/UL (ref 4–11)

## 2023-03-10 PROCEDURE — 36415 COLL VENOUS BLD VENIPUNCTURE: CPT | Mod: ZL | Performed by: NURSE PRACTITIONER

## 2023-03-10 PROCEDURE — 80061 LIPID PANEL: CPT | Mod: ZL | Performed by: NURSE PRACTITIONER

## 2023-03-10 PROCEDURE — 99214 OFFICE O/P EST MOD 30 MIN: CPT | Mod: 25 | Performed by: NURSE PRACTITIONER

## 2023-03-10 PROCEDURE — 90471 IMMUNIZATION ADMIN: CPT | Performed by: NURSE PRACTITIONER

## 2023-03-10 PROCEDURE — 82570 ASSAY OF URINE CREATININE: CPT | Mod: ZL | Performed by: NURSE PRACTITIONER

## 2023-03-10 PROCEDURE — 80053 COMPREHEN METABOLIC PANEL: CPT | Mod: ZL | Performed by: NURSE PRACTITIONER

## 2023-03-10 PROCEDURE — 90662 IIV NO PRSV INCREASED AG IM: CPT | Performed by: NURSE PRACTITIONER

## 2023-03-10 PROCEDURE — 83036 HEMOGLOBIN GLYCOSYLATED A1C: CPT | Mod: ZL | Performed by: NURSE PRACTITIONER

## 2023-03-10 PROCEDURE — 85025 COMPLETE CBC W/AUTO DIFF WBC: CPT | Mod: ZL | Performed by: NURSE PRACTITIONER

## 2023-03-10 PROCEDURE — 84443 ASSAY THYROID STIM HORMONE: CPT | Mod: ZL | Performed by: NURSE PRACTITIONER

## 2023-03-10 PROCEDURE — 99397 PER PM REEVAL EST PAT 65+ YR: CPT | Mod: 25 | Performed by: NURSE PRACTITIONER

## 2023-03-10 RX ORDER — LORATADINE 10 MG/1
10 TABLET ORAL DAILY
COMMUNITY
Start: 2023-03-10 | End: 2023-06-07

## 2023-03-10 RX ORDER — VALACYCLOVIR HYDROCHLORIDE 1 G/1
1000 TABLET, FILM COATED ORAL 3 TIMES DAILY PRN
Qty: 90 TABLET | Refills: 1 | Status: SHIPPED | OUTPATIENT
Start: 2023-03-10

## 2023-03-10 RX ORDER — ESTRADIOL 0.5 MG/1
0.5 TABLET ORAL DAILY
Qty: 90 TABLET | Refills: 4 | Status: SHIPPED | OUTPATIENT
Start: 2023-03-10 | End: 2024-03-12

## 2023-03-10 RX ORDER — MULTIVITAMIN
TABLET ORAL DAILY
COMMUNITY
Start: 2023-03-10 | End: 2024-03-13 | Stop reason: ALTCHOICE

## 2023-03-10 RX ORDER — FLUTICASONE PROPIONATE 50 MCG
1 SPRAY, SUSPENSION (ML) NASAL DAILY
Qty: 16 G | Refills: 4 | Status: SHIPPED | OUTPATIENT
Start: 2023-03-10 | End: 2023-03-11

## 2023-03-10 RX ORDER — ASPIRIN 81 MG/1
81 TABLET ORAL
COMMUNITY
Start: 2023-03-10

## 2023-03-10 RX ORDER — TOLTERODINE 2 MG/1
2 CAPSULE, EXTENDED RELEASE ORAL DAILY
Qty: 90 CAPSULE | Refills: 3 | Status: SHIPPED | OUTPATIENT
Start: 2023-03-10 | End: 2023-10-24

## 2023-03-10 RX ORDER — ATORVASTATIN CALCIUM 10 MG/1
TABLET, FILM COATED ORAL
Qty: 36 TABLET | Refills: 1 | Status: SHIPPED | OUTPATIENT
Start: 2023-03-10 | End: 2023-08-01

## 2023-03-10 ASSESSMENT — PATIENT HEALTH QUESTIONNAIRE - PHQ9
SUM OF ALL RESPONSES TO PHQ QUESTIONS 1-9: 3
10. IF YOU CHECKED OFF ANY PROBLEMS, HOW DIFFICULT HAVE THESE PROBLEMS MADE IT FOR YOU TO DO YOUR WORK, TAKE CARE OF THINGS AT HOME, OR GET ALONG WITH OTHER PEOPLE: NOT DIFFICULT AT ALL
SUM OF ALL RESPONSES TO PHQ QUESTIONS 1-9: 3

## 2023-03-10 ASSESSMENT — ENCOUNTER SYMPTOMS
WEAKNESS: 0
MYALGIAS: 0
CONSTIPATION: 0
SHORTNESS OF BREATH: 0
JOINT SWELLING: 0
NAUSEA: 0
HEARTBURN: 0
HEADACHES: 1
ABDOMINAL PAIN: 0
DIARRHEA: 0
DYSURIA: 0
HEMATURIA: 0
PALPITATIONS: 0
DIZZINESS: 0
BREAST MASS: 0
HEMATOCHEZIA: 0
NERVOUS/ANXIOUS: 1
PARESTHESIAS: 0
ARTHRALGIAS: 1
FEVER: 0
COUGH: 1
SORE THROAT: 0
CHILLS: 1
EYE PAIN: 1
FREQUENCY: 1

## 2023-03-10 ASSESSMENT — ANXIETY QUESTIONNAIRES
GAD7 TOTAL SCORE: 6
7. FEELING AFRAID AS IF SOMETHING AWFUL MIGHT HAPPEN: NOT AT ALL
6. BECOMING EASILY ANNOYED OR IRRITABLE: SEVERAL DAYS
5. BEING SO RESTLESS THAT IT IS HARD TO SIT STILL: SEVERAL DAYS
1. FEELING NERVOUS, ANXIOUS, OR ON EDGE: SEVERAL DAYS
2. NOT BEING ABLE TO STOP OR CONTROL WORRYING: SEVERAL DAYS
4. TROUBLE RELAXING: SEVERAL DAYS
IF YOU CHECKED OFF ANY PROBLEMS ON THIS QUESTIONNAIRE, HOW DIFFICULT HAVE THESE PROBLEMS MADE IT FOR YOU TO DO YOUR WORK, TAKE CARE OF THINGS AT HOME, OR GET ALONG WITH OTHER PEOPLE: NOT DIFFICULT AT ALL
GAD7 TOTAL SCORE: 6
3. WORRYING TOO MUCH ABOUT DIFFERENT THINGS: SEVERAL DAYS
8. IF YOU CHECKED OFF ANY PROBLEMS, HOW DIFFICULT HAVE THESE MADE IT FOR YOU TO DO YOUR WORK, TAKE CARE OF THINGS AT HOME, OR GET ALONG WITH OTHER PEOPLE?: NOT DIFFICULT AT ALL
7. FEELING AFRAID AS IF SOMETHING AWFUL MIGHT HAPPEN: NOT AT ALL
GAD7 TOTAL SCORE: 6

## 2023-03-10 ASSESSMENT — PAIN SCALES - GENERAL: PAINLEVEL: MILD PAIN (3)

## 2023-03-10 ASSESSMENT — ACTIVITIES OF DAILY LIVING (ADL): CURRENT_FUNCTION: NO ASSISTANCE NEEDED

## 2023-03-10 NOTE — PROGRESS NOTES
"SUBJECTIVE:   Monisha is a 73 year old who presents for Preventive Visit.  Patient has been advised of split billing requirements and indicates understanding: Yes  Are you in the first 12 months of your Medicare coverage?  No    Healthy Habits:     In general, how would you rate your overall health?  Good    Frequency of exercise:  2-3 days/week    Duration of exercise:  30-45 minutes    Do you usually eat at least 4 servings of fruit and vegetables a day, include whole grains    & fiber and avoid regularly eating high fat or \"junk\" foods?  Yes    Taking medications regularly:  Yes    Medication side effects:  None    Ability to successfully perform activities of daily living:  No assistance needed    Home Safety:  No safety concerns identified    Hearing Impairment:  No hearing concerns    In the past 6 months, have you been bothered by leaking of urine? Yes    In general, how would you rate your overall mental or emotional health?  Good      PHQ-2 Total Score: 2    Additional concerns today:  Yes    Have you ever done Advance Care Planning? (For example, a Health Directive, POLST, or a discussion with a medical provider or your loved ones about your wishes): Yes, advance care planning is on file.     Fall risk  Fallen 2 or more times in the past year?: No  Any fall with injury in the past year?: No    Cognitive Screening   1) Repeat 3 items (Leader, Season, Table)    2) Clock draw: NORMAL  3) 3 item recall: Recalls 3 objects  Results: 3 items recalled: COGNITIVE IMPAIRMENT LESS LIKELY    Mini-CogTM Copyright [employer]. Licensed by the author for use in [facility]; reprinted with permission (charlotte@.[facility]). All rights reserved.      Do you have sleep apnea, excessive snoring or daytime drowsiness?: yes    Questions if she should get the flu shot or not - recommend this    Possible sinus infection - sinus drainage, deep yellow, thick, sinus pressure, feels like her teeth hurt, symptoms started a couple of " weeks ago. Has tried to take sinus/allergy meds, not working.    Right knee pain - pain worse when she doesn't wear shoes in the house. She is going to [employer] to find slip on supportive shoes she can wear in the house. Discussed if not improving, could consider imaging and further work up at that time.    Reviewed and updated as needed this visit by clinical staff   Tobacco  Allergies  Meds      Soc Hx      Reviewed and updated as needed this visit by Provider   Tobacco  Allergies  Meds  Problems  Med Hx  Surg Hx  Fam Hx  Soc   Hx       Social History     Tobacco Use     Smoking status: Former     Packs/day: 0.50     Types: Cigarettes     Quit date: 1972     Years since quittin.6     Smokeless tobacco: Never   Substance Use Topics     Alcohol use: Yes     Comment: has slowed down a lot since huband's injury; aiming for occassional     Alcohol Use 3/10/2023   Prescreen: >3 drinks/day or >7 drinks/week? No     Review current opioid prescriptions    For a patient with a current opioid prescription:    Reviewed potential Opioid Use Disorder (OUD) risk factors: Yes     Evaluate their pain severity and current treatment plan:     Provide information on non-opioid treatment options:    Refer to a specialist, as appropriate:    Get more information on pain management in the [facility] Pain Management Best Practices Inter-agency Task Force Report    Screen for potential Substance Use Disorders (SUDs)    Reviewed the patient s potential risk factors for SUDs: Yes     Refer to treatment or specialist, as appropriate:     A screening tool isn t required but you may use one:  Find more information in the National [place] on Drug Abuse Screening and Assessment Tools Chart    Dermatology Professional - annual visit     Current providers sharing in care for this patient include:   Patient Care Team:  Raeann Pearson DO as PCP - General (Internal Medicine)  Nafisa Escobar NP as Nurse Practitioner  (Internal Medicine)  Nafisa Escoabr NP as Assigned PCP    The following health maintenance items are reviewed in Epic and correct as of today:  Health Maintenance   Topic Date Due     COVID-19 Vaccine (4 - Booster for Pfizer series) 12/02/2021     MEDICARE ANNUAL WELLNESS VISIT  03/10/2024     BMP  03/10/2024     LIPID  03/10/2024     MICROALBUMIN  03/10/2024     FALL RISK ASSESSMENT  03/10/2024     HEMOGLOBIN  03/10/2024     MAMMO SCREENING  02/03/2025     ADVANCE CARE PLANNING  03/10/2028     COLORECTAL CANCER SCREENING  04/12/2031     DTAP/TDAP/TD IMMUNIZATION (3 - Td or Tdap) 07/14/2031     DEXA  03/04/2034     PHQ-2 (once per calendar year)  Completed     INFLUENZA VACCINE  Completed     Pneumococcal Vaccine: 65+ Years  Completed     URINALYSIS  Completed     ZOSTER IMMUNIZATION  Completed     HEPATITIS C SCREENING  Addressed     IPV IMMUNIZATION  Aged Out     MENINGITIS IMMUNIZATION  Aged Out     BP Readings from Last 3 Encounters:   03/10/23 128/72   02/09/23 118/60   01/05/23 130/76    Wt Readings from Last 3 Encounters:   03/10/23 76.3 kg (168 lb 3.2 oz)   02/09/23 76.2 kg (168 lb)   01/05/23 74.4 kg (164 lb)          Patient Active Problem List   Diagnosis     Anxiety state     Family history of malignant melanoma of skin     Fundic gastritis     Gastroesophageal reflux disease without esophagitis     H/O adenomatous polyp of colon     Chronic insomnia     Reactive airway disease that is not asthma     Restless leg syndrome     Rosacea     Pain of right lower extremity     Pain of left lower extremity     PND (post-nasal drip)     Chronic kidney disease, stage 3 (H)     Itchy, watery, and red eye     Loud snoring     Apnea     Excessive daytime sleepiness     HTN (hypertension)     Nail dystrophy     Onychomycosis     Chronic midline low back pain without sciatica     Mixed incontinence     Seasonal allergic rhinitis, unspecified trigger     Immunization counseling     Class 1 obesity with body mass  index (BMI) of 30.0 to 30.9 in adult, unspecified obesity type, unspecified whether serious comorbidity present     Macular degeneration (senile) of retina - Left     Past Surgical History:   Procedure Laterality Date     COLONOSCOPY  2016    Tubular adenoma, Next in 5 years     COLONOSCOPY  2021    F/U N/A normal     COLONOSCOPY N/A 2021    Procedure: COLONOSCOPY;  Surgeon: Roberto Anderson MD;  Location:  OR     CYSTOSCOPY  2013    [facility]     DENTAL SURGERY      Root canal,  Dr. Olmos.     ENDOSCOPIC SINUS SURGERY       ESOPHAGOSCOPY, GASTROSCOPY, DUODENOSCOPY (EGD), COMBINED  2016     HYSTERECTOMY VAGINAL      Endometriosis     LAPAROSCOPIC CYSTECTOMY OVARIAN (BENIGN)  2011    cystectomy with lysis of adhesions and possible oophrectomy  by Dr. Gay Mcgovern  at [facility]     ROTATOR CUFF REPAIR RT/LT Right 2017    Dr John     SALPINGO-OOPHORECTOMY BILATERAL  10/2007    bilateral ovarian serous cystadenofibromas, benign.       Social History     Tobacco Use     Smoking status: Former     Packs/day: 0.50     Types: Cigarettes     Quit date: 1972     Years since quittin.6     Smokeless tobacco: Never   Substance Use Topics     Alcohol use: Yes     Comment: has slowed down a lot since huband's injury; aiming for occassional     Family History   Problem Relation Age of Onset     Hypertension Father      Melanoma Father      Asthma Mother      Hypertension Mother      Diabetes Mother         insulin dependence     Peripheral Vascular Disease Mother      Diabetes Sister      Hypertension Sister      Diabetes Sister      Hypertension Sister      Brain Hemorrhage Sister         after fall     Other - See Comments Sister         bladder issues and repeat breast biopsies (benign)     GI problems Daughter      LUNG DISEASE Daughter         environmental mold intolerance     Obesity Son      Hypertension Son      Breast Cancer No family  hx of         Cancer-breast         Current Outpatient Medications   Medication Sig Dispense Refill     aspirin 81 MG EC tablet Take 1 tablet (81 mg) by mouth daily with food       atorvastatin (LIPITOR) 10 MG tablet TAKE 1/2 TABLET BY MOUTH THREE TIMES PER WEEK 36 tablet 1     estradiol (ESTRACE) 0.5 MG tablet Take 1 tablet (0.5 mg) by mouth daily 90 tablet 4     fluticasone (FLONASE) 50 MCG/ACT nasal spray Spray 1 spray into both nostrils daily 16 g 4     loratadine (CLARITIN) 10 MG tablet Take 1 tablet (10 mg) by mouth daily       melatonin 5 MG tablet Take 1-2 tablets (5-10 mg) by mouth nightly as needed for sleep       Multiple Vitamin (MULTIVITAMIN) TABS Take by mouth daily       tolterodine ER (DETROL LA) 2 MG 24 hr capsule Take 1 capsule (2 mg) by mouth daily 90 capsule 3     valACYclovir (VALTREX) 1000 mg tablet Take 1 tablet (1,000 mg) by mouth 3 times daily as needed (Cold Sores) 90 tablet 1     Allergies   Allergen Reactions     Penicillins Hives     Sulfa Drugs Hives     Codeine Sulfate Other (See Comments)     nightmares     Morphine Sulfate Other (See Comments)     Cant sleep     Venlafaxine Hives     'bad reaction      Zolpidem Tartrate      Paresthesias; stomach upset       Recent Labs   Lab Test 03/10/23  0953 06/13/22  1541 02/15/22  1029 07/14/21  1200 02/09/21  1058 01/11/21  1006 07/01/20  0848   A1C 6.1  --   --   --   --  5.9 6.0   LDL 88  --   --  75  --   --  127*   HDL 71  --   --  69  --   --  62   TRIG 196*  --   --  97  --   --  148   ALT 18 17  --  19 17  --  15   CR 1.02* 0.98   < > 1.06 1.11 1.04 1.08   GFRESTIMATED 57* 61   < > 53* 48* 52* 50*   GFRESTBLACK  --   --   --   --  58* 63 61   POTASSIUM 4.0 4.0   < > 4.4 4.3 4.4 4.2    < > = values in this interval not displayed.      Review of Systems   Constitutional: Positive for chills. Negative for fever.   HENT: Positive for congestion. Negative for ear pain, hearing loss and sore throat.    Eyes: Positive for pain. Negative for  "visual disturbance.   Respiratory: Positive for cough. Negative for shortness of breath.    Cardiovascular: Negative for chest pain, palpitations and peripheral edema.   Gastrointestinal: Negative for abdominal pain, constipation, diarrhea, heartburn, hematochezia and nausea.   Breasts:  Negative for tenderness, breast mass and discharge.   Genitourinary: Positive for frequency and urgency. Negative for dysuria, genital sores, hematuria, pelvic pain, vaginal bleeding and vaginal discharge.   Musculoskeletal: Positive for arthralgias. Negative for joint swelling and myalgias.   Skin: Negative for rash.   Neurological: Positive for headaches. Negative for dizziness, weakness and paresthesias.   Psychiatric/Behavioral: Positive for mood changes. The patient is nervous/anxious.      OBJECTIVE:   /72 (BP Location: Right arm, Patient Position: Sitting, Cuff Size: Adult Regular)   Pulse 66   Temp 96.9  F (36.1  C) (Temporal)   Resp 16   Ht 1.645 m (5' 4.75\")   Wt 76.3 kg (168 lb 3.2 oz)   LMP  (LMP Unknown)   SpO2 97%   BMI 28.21 kg/m   Estimated body mass index is 28.21 kg/m  as calculated from the following:    Height as of this encounter: 1.645 m (5' 4.75\").    Weight as of this encounter: 76.3 kg (168 lb 3.2 oz).  Physical Exam  GENERAL APPEARANCE: healthy, alert and no distress  EYES: Eyes grossly normal to inspection, PERRL and conjunctivae and sclerae normal  NECK: no adenopathy, no asymmetry, masses, or scars and thyroid normal to palpation  RESP: lungs clear to auscultation - no rales, rhonchi or wheezes  CV: regular rate and rhythm, normal S1 S2, no S3 or S4, no murmur, click or rub, no peripheral edema and peripheral pulses strong  ABDOMEN: soft, nontender and bowel sounds normal  MS: no musculoskeletal defects are noted and gait is age appropriate without ataxia  SKIN: no suspicious lesions or rashes  NEURO: Normal strength and tone, sensory exam grossly normal, mentation intact and speech " normal  PSYCH: mentation appears normal and affect normal/bright    Diagnostic Test Results:  Labs reviewed in Epic  Results for orders placed or performed in visit on 03/10/23 (from the past 24 hour(s))   CBC with Platelets & Differential    Narrative    The following orders were created for panel order CBC with Platelets & Differential.  Procedure                               Abnormality         Status                     ---------                               -----------         ------                     CBC with platelets and d...[[id number]]                      Final result                 Please view results for these tests on the individual orders.   COMPREHENSIVE METABOLIC PANEL   Result Value Ref Range    Sodium 143 136 - 145 mmol/L    Potassium 4.0 3.4 - 5.3 mmol/L    Chloride 108 (H) 98 - 107 mmol/L    Carbon Dioxide (CO2) 30 (H) 22 - 29 mmol/L    Anion Gap 5 (L) 7 - 15 mmol/L    Urea Nitrogen 13.4 8.0 - 23.0 mg/dL    Creatinine 1.02 (H) 0.51 - 0.95 mg/dL    Calcium 9.0 8.8 - 10.2 mg/dL    Glucose 98 70 - 99 mg/dL    Alkaline Phosphatase 63 35 - 104 U/L    AST 21 10 - 35 U/L    ALT 18 10 - 35 U/L    Protein Total 6.3 (L) 6.4 - 8.3 g/dL    Albumin 3.9 3.5 - 5.2 g/dL    Bilirubin Total 0.4 <=1.2 mg/dL    GFR Estimate 57 (L) >60 mL/min/1.73m2   Hemoglobin A1c   Result Value Ref Range    Hemoglobin A1C 6.1 4.0 - 6.2 %   Lipid Profile   Result Value Ref Range    Cholesterol 198 <200 mg/dL    Triglycerides 196 (H) <150 mg/dL    Direct Measure HDL 71 >=50 mg/dL    LDL Cholesterol Calculated 88 <=100 mg/dL    Non HDL Cholesterol 127 <130 mg/dL    Narrative    Cholesterol  Desirable:  <200 mg/dL    Triglycerides  Normal:  Less than 150 mg/dL  Borderline High:  150-199 mg/dL  High:  200-499 mg/dL  Very High:  Greater than or equal to 500 mg/dL    Direct Measure HDL  Female:  Greater than or equal to 50 mg/dL   Male:  Greater than or equal to 40 mg/dL    LDL Cholesterol  Desirable:  <100mg/dL  Above Desirable:   100-129 mg/dL   Borderline High:  130-159 mg/dL   High:  160-189 mg/dL   Very High:  >= 190 mg/dL    Non HDL Cholesterol  Desirable:  130 mg/dL  Above Desirable:  130-159 mg/dL  Borderline High:  160-189 mg/dL  High:  190-219 mg/dL  Very High:  Greater than or equal to 220 mg/dL   CBC with platelets and differential   Result Value Ref Range    WBC Count 4.9 4.0 - 11.0 10e3/uL    RBC Count 4.46 3.80 - 5.20 10e6/uL    Hemoglobin 13.9 11.7 - 15.7 g/dL    Hematocrit 40.8 35.0 - 47.0 %    MCV 92 78 - 100 fL    MCH 31.2 26.5 - 33.0 pg    MCHC 34.1 31.5 - 36.5 g/dL    RDW 12.2 10.0 - 15.0 %    Platelet Count 187 150 - 450 10e3/uL    % Neutrophils 65 %    % Lymphocytes 27 %    % Monocytes 6 %    % Eosinophils 2 %    % Basophils 0 %    % Immature Granulocytes 0 %    NRBCs per 100 WBC 0 <1 /100    Absolute Neutrophils 3.2 1.6 - 8.3 10e3/uL    Absolute Lymphocytes 1.3 0.8 - 5.3 10e3/uL    Absolute Monocytes 0.3 0.0 - 1.3 10e3/uL    Absolute Eosinophils 0.1 0.0 - 0.7 10e3/uL    Absolute Basophils 0.0 0.0 - 0.2 10e3/uL    Absolute Immature Granulocytes 0.0 <=0.4 10e3/uL    Absolute NRBCs 0.0 10e3/uL   Extra Tube    Narrative    The following orders were created for panel order Extra Tube.  Procedure                               Abnormality         Status                     ---------                               -----------         ------                     Extra Serum Separator Tu...[[id number]]                      In process                   Please view results for these tests on the individual orders.       ASSESSMENT / PLAN:   Monisha was seen today for medicare visit.  Lab results discussed with patient during clinic visit.  All chronic meds refilled.    Diagnoses and all orders for this visit:    Preventative health care  -     INFLUENZA VACCINE 65+ (FLUZONE HD), given in clinic today.    Mixed hyperlipidemia  -     Lipid Profile slightly worse than last year, encouraged to work on diet improvement and increasing her  daily exercise to lower these numbers.  Continue atorvastatin 1/2 tablet 3 times a week.  -     atorvastatin (LIPITOR) 10 MG tablet; TAKE 1/2 TABLET BY MOUTH THREE TIMES PER WEEK    Prediabetes  -     Hemoglobin A1c, in goal range.  Rescreen annually.  -     atorvastatin (LIPITOR) 10 MG tablet; TAKE 1/2 TABLET BY MOUTH THREE TIMES PER WEEK    Mixed incontinence  -     tolterodine ER (DETROL LA) 2 MG 24 hr capsule; Take 1 capsule (2 mg) by mouth daily    Hypertension, unspecified type  -     COMPREHENSIVE METABOLIC PANEL, needs to drink more fluids.  Blood pressure at goal in clinic today.    Chronic midline low back pain without sciatica    Stage 3a chronic kidney disease (H)  -     COMPREHENSIVE METABOLIC PANEL, needs to drink more fluids.    Encounter for Medicare annual wellness exam  -     CBC with Platelets & Differential, unremarkable, in goal range.  Rescreen annually    Screening for nephropathy  -     Albumin Random Urine Quantitative with Creat Ratio, unremarkable.  Rescreen annually    Screening for thyroid disorder  -     TSH with free T4 reflex    Perimenopausal vasomotor symptoms  -     estradiol (ESTRACE) 0.5 MG tablet; Take 1 tablet (0.5 mg) by mouth daily    PND (post-nasal drip)  -     fluticasone (FLONASE) 50 MCG/ACT nasal spray; Spray 1 spray into both nostrils daily    Cold sore  -     valACYclovir (VALTREX) 1000 mg tablet; Take 1 tablet (1,000 mg) by mouth 3 times daily as needed (Cold Sores)    Other orders  -     Extra Tube; Future  -     Extra Tube    Patient has been advised of split billing requirements and indicates understanding: Yes    COUNSELING:  Reviewed preventive health counseling, as reflected in patient instructions  Special attention given to:       Regular exercise       Healthy diet/nutrition       Vision screening       Hearing screening       Dental care       Bladder control       Fall risk prevention       Immunizations    Requesting flu shot today       Osteoporosis  "prevention/bone health       The 10-year ASCVD risk score (Michelle EDDY, et al., 2019) is: 14.4%    Values used to calculate the score:      Age: 73 years      Sex: Female      Is Non- : No      Diabetic: No      Tobacco smoker: No      Systolic Blood Pressure: 128 mmHg      Is BP treated: No      HDL Cholesterol: 71 mg/dL      Total Cholesterol: 198 mg/dL    BMI:   Estimated body mass index is 28.21 kg/m  as calculated from the following:    Height as of this encounter: 1.645 m (5' 4.75\").    Weight as of this encounter: 76.3 kg (168 lb 3.2 oz).     She reports that she quit smoking about 50 years ago. Her smoking use included cigarettes. She smoked an average of .5 packs per day. She has never used smokeless tobacco.    Appropriate preventive services were discussed with this patient, including applicable screening as appropriate for cardiovascular disease, diabetes, osteopenia/osteoporosis, and glaucoma.  As appropriate for age/gender, discussed screening for colorectal cancer, prostate cancer, breast cancer, and cervical cancer. Checklist reviewing preventive services available has been given to the patient.    Reviewed patients plan of care and provided an AVS. The Basic Care Plan (routine screening as documented in Health Maintenance) for Monisha meets the Care Plan requirement. This Care Plan has been established and reviewed with the Patient.    Nafisa Escobar NP  [facility]    Identified Health Risks:  Answers for HPI/ROS submitted by the patient on 3/10/2023  If you checked off any problems, how difficult have these problems made it for you to do your work, take care of things at home, or get along with other people?: Not difficult at all  PHQ9 TOTAL SCORE: 3  ELENA 7 TOTAL SCORE: 6      "

## 2023-03-10 NOTE — Clinical Note
Please abstract the following data from this visit with this patient into the appropriate field in Epic:  Tests that can be patient reported without a hard copy:  Dermatology professionals, February 2022 - get notes.  Other Tests found in the patient's chart through Chart Review/Care Everywhere:  Note to Abstraction: If this section is blank, no results were found via Chart Review/Care Everywhere.

## 2023-03-10 NOTE — PROGRESS NOTES
"  {PROVIDER CHARTING PREFERENCE:044533}    Subjective   Monisha is a 73 year old , presenting for the following health issues:  Medicare Visit (Annual well visit)      HPI     {SUPERLIST (Optional):232289}  {additonal problems for provider to add (Optional):797775}    Review of Systems   {ROS COMP (Optional):170491}      Objective    /72 (BP Location: Right arm, Patient Position: Sitting, Cuff Size: Adult Regular)   Pulse 66   Temp 96.9  F (36.1  C) (Temporal)   Resp 16   Ht 1.645 m (5' 4.75\")   Wt 76.3 kg (168 lb 3.2 oz)   LMP  (LMP Unknown)   SpO2 97%   BMI 28.21 kg/m    Body mass index is 28.21 kg/m .  Physical Exam   {Exam List (Optional):624552}    {Diagnostic Test Results (Optional):447844}    {AMBULATORY ATTESTATION (Optional):151649}            Answers for HPI/ROS submitted by the patient on 3/10/2023  If you checked off any problems, how difficult have these problems made it for you to do your work, take care of things at home, or get along with other people?: Not difficult at all  PHQ9 TOTAL SCORE: 3  ELENA 7 TOTAL SCORE: 6      "

## 2023-03-10 NOTE — NURSING NOTE
"Chief Complaint   Patient presents with     Medicare Visit     Annual well visit   Patient presents for annual well visit; states that she has questions about PT and states that she has had a bad week with anxiety due to recent [place] trip.    Initial /72 (BP Location: Right arm, Patient Position: Sitting, Cuff Size: Adult Regular)   Pulse 66   Temp 96.9  F (36.1  C) (Temporal)   Resp 16   Ht 1.645 m (5' 4.75\")   Wt 76.3 kg (168 lb 3.2 oz)   LMP  (LMP Unknown)   SpO2 97%   BMI 28.21 kg/m   Estimated body mass index is 28.21 kg/m  as calculated from the following:    Height as of this encounter: 1.645 m (5' 4.75\").    Weight as of this encounter: 76.3 kg (168 lb 3.2 oz).     Medication Reconciliation: complete      FOOD SECURITY SCREENING QUESTIONS:    The next two questions are to help us understand your food security.  If you are feeling you need any assistance in this area, we have resources available to support you today.    Hunger Vital Signs:  Within the past 12 months we worried whether our food would run out before we got money to buy more. Never  Within the past 12 months the food we bought just didn't last and we didn't have money to get more. Never      Advance care plan reviewed      Chika Desouza LPN on 3/10/2023 at 10:04 AM      "

## 2023-03-11 DIAGNOSIS — R09.82 PND (POST-NASAL DRIP): ICD-10-CM

## 2023-03-11 RX ORDER — FLUTICASONE PROPIONATE 50 MCG
SPRAY, SUSPENSION (ML) NASAL
Qty: 48 G | Refills: 4 | Status: SHIPPED | OUTPATIENT
Start: 2023-03-11 | End: 2023-03-11

## 2023-03-11 RX ORDER — FLUTICASONE PROPIONATE 50 MCG
1 SPRAY, SUSPENSION (ML) NASAL DAILY
Qty: 48 G | Refills: 4 | Status: SHIPPED | OUTPATIENT
Start: 2023-03-11 | End: 2023-03-12

## 2023-03-12 DIAGNOSIS — R09.82 PND (POST-NASAL DRIP): ICD-10-CM

## 2023-03-12 RX ORDER — FLUTICASONE PROPIONATE 50 MCG
1 SPRAY, SUSPENSION (ML) NASAL DAILY
Qty: 48 G | Refills: 4 | Status: SHIPPED | OUTPATIENT
Start: 2023-03-12 | End: 2024-03-13

## 2023-03-20 NOTE — PROGRESS NOTES
[facility]    Outpatient Physical Therapy Discharge Note    Patient: Monisha Velez  : 1950    Beginning/End Dates of Reporting Period:  22 to 3/20/2023     Referring Provider: Nafisa Escobar NP    Therapy Diagnosis: Impaired mobility, weakness secondary to chronic LBP and neck pain     Client Self Report:  Pt was last seen in PT on 2023. She has failed to schedule further appointments since that time. Current subjective status unknown. Self report at the time of her last session as follows:    Notes that she has had a little more urgency and her frequency also seems to be affected by the cold. Her lower back is not as bad.    Objective Measurements: Current status unknown due to unplanned discharge. Clinical findings on 23 as follows:  Objective Measure: Postural loading  Details: General listening to the L anterior pelvis with extended listening to the descending and sigmoid colon. Postural loading: Remains difficult to load as pt shifts posture frequently when trying to load at all 3 locations. . Head R/R, shoulders Limited R/L and L/R. Pelvis limited in all planes. Tends to stand with weight more over the R LE.  Objective Measure: myofascial  Details: Local listening to the L side of the bladder with extended listening to the R side of the bladder, L endopelvic fascia, L fascia of toldt and the sigmoid mesocolon. Specific fascial restrictions in the pubovesical ligament, urachus, L and R endopelvic fascia, pelvic diaphragm, L fascia of toldt, sigmoid mesocolon.  Objective Measure: segmental mobility/pelvic screen  Details: not tested         Goals: Progress toward goals unknown due to unplanned discharge  Goal Identifier HEP   Goal Description Pt to display independence and 75% compliance with HEP to assist in self management of her pain, improving ROM for ADLs   Target Date  02/11/23   Date Met      Progress (detail required for progress note): Has been doing some stretching/movement exercises every day.     Goal Identifier Myofascial   Goal Description Pt to display improved myofascial mobility in the  lumbar and pelvic regions to allow for symmetrical postural loading to allow for 50% less pain with standing and sitting tasks at home.   Target Date 02/11/23   Date Met      Progress (detail required for progress note): Notes that her pain level is 4-5/10. Has had to do a lot of sitting and made it through that--was not comfortable. Notes that she still has trouble standing. Notes she has to move often.     Goal Identifier cervical   Goal Description Pt to display improved cervical joint mobility and myofascial mobility to allow her to report 50% improvement in level of cervical pain   Target Date 02/11/23   Date Met      Progress (detail required for progress note): Not reassessed today other than limited rotation bilaterally to 40 degrees, limited extension. Notes neck is painful. Hurts at the base. Feels her motion is more limited.     Goal Identifier strength   Goal Description Pt to display improved LE strength to at least 4+/5 to allow for improved ability to lift things from the floor with safe mechanics.   Target Date 02/11/23   Date Met      Progress (detail required for progress note): Notes lifitng is still hard to do, painful--strength is still an issue. Has not really focused on her strengthening exercises, just working on moving         Plan:  Discharge from therapy.    Discharge:    Reason for Discharge: Patient has failed to schedule further appointments.        Discharge Plan: Patient to continue home program.    Sindy Siddiqi, PT on 3/20/2023 at 8:44 AM

## 2023-03-24 ENCOUNTER — TELEPHONE (OUTPATIENT)
Dept: INTERNAL MEDICINE | Facility: OTHER | Age: 73
End: 2023-03-24
Payer: OTHER GOVERNMENT

## 2023-03-24 NOTE — TELEPHONE ENCOUNTER
Reason for call: Request for results.    Name of test or procedure: thyroid blood test    Date of test or procedure: 03/10/2023    Location of test or procedure: [facility]    Preferred method for responding to this message: Telephone Call    Phone number patient can be reached at: Home number on file [phone] (home)    If we can't reach you directly, may we leave a detailed response at the number you provided?Yes     Arlen Jackson on 3/24/2023 at 12:04 PM

## 2023-03-24 NOTE — TELEPHONE ENCOUNTER
After verifying last name and  patient notifed of the information from Dr. Yoan rodriguez.     Anayeli Alarcon LPN on 3/24/2023 at 12:17 PM

## 2023-04-20 ENCOUNTER — TELEPHONE (OUTPATIENT)
Dept: INTERNAL MEDICINE | Facility: OTHER | Age: 73
End: 2023-04-20
Payer: OTHER GOVERNMENT

## 2023-04-20 DIAGNOSIS — G89.29 CHRONIC MIDLINE LOW BACK PAIN WITHOUT SCIATICA: Primary | ICD-10-CM

## 2023-04-20 DIAGNOSIS — M54.50 CHRONIC MIDLINE LOW BACK PAIN WITHOUT SCIATICA: Primary | ICD-10-CM

## 2023-04-20 NOTE — TELEPHONE ENCOUNTER
"Patient called requesting that Nafisa Hyatttangelachu place an order/ referral for her to be able to return to PT with Sindy Siddiqi for her lower back pain and bladder issues. Stated she had been out of town and her case was \"closed\".    Please advise.    Thank you!    Nohemi Lancaster on 4/20/2023 at 3:26 PM    "

## 2023-06-07 ENCOUNTER — HOSPITAL ENCOUNTER (OUTPATIENT)
Dept: GENERAL RADIOLOGY | Facility: OTHER | Age: 73
Discharge: HOME | End: 2023-06-07
Attending: NURSE PRACTITIONER
Payer: OTHER GOVERNMENT

## 2023-06-07 ENCOUNTER — OFFICE VISIT (OUTPATIENT)
Dept: INTERNAL MEDICINE | Facility: OTHER | Age: 73
End: 2023-06-07
Attending: NURSE PRACTITIONER
Payer: OTHER GOVERNMENT

## 2023-06-07 VITALS
RESPIRATION RATE: 14 BRPM | HEART RATE: 69 BPM | HEIGHT: 65 IN | DIASTOLIC BLOOD PRESSURE: 72 MMHG | BODY MASS INDEX: 27.79 KG/M2 | OXYGEN SATURATION: 97 % | WEIGHT: 166.8 LBS | SYSTOLIC BLOOD PRESSURE: 120 MMHG | TEMPERATURE: 97 F

## 2023-06-07 DIAGNOSIS — M25.562 ARTHRALGIA OF BOTH KNEES: ICD-10-CM

## 2023-06-07 DIAGNOSIS — H57.9 ITCHY, WATERY, AND RED EYE: ICD-10-CM

## 2023-06-07 DIAGNOSIS — G47.19 EXCESSIVE DAYTIME SLEEPINESS: Chronic | ICD-10-CM

## 2023-06-07 DIAGNOSIS — R09.82 PND (POST-NASAL DRIP): Primary | ICD-10-CM

## 2023-06-07 DIAGNOSIS — F41.1 ANXIETY STATE: ICD-10-CM

## 2023-06-07 DIAGNOSIS — M25.561 ARTHRALGIA OF BOTH KNEES: ICD-10-CM

## 2023-06-07 DIAGNOSIS — G25.81 RESTLESS LEG SYNDROME: ICD-10-CM

## 2023-06-07 DIAGNOSIS — M62.58 MUSCLE ATROPHY OF LOWER EXTREMITY: ICD-10-CM

## 2023-06-07 PROCEDURE — 99214 OFFICE O/P EST MOD 30 MIN: CPT | Performed by: NURSE PRACTITIONER

## 2023-06-07 PROCEDURE — 73564 X-RAY EXAM KNEE 4 OR MORE: CPT | Mod: 50

## 2023-06-07 RX ORDER — LORATADINE 10 MG/1
10 TABLET ORAL DAILY
COMMUNITY
Start: 2023-06-07 | End: 2024-03-13 | Stop reason: ALTCHOICE

## 2023-06-07 RX ORDER — HYDROXYZINE HYDROCHLORIDE 25 MG/1
25 TABLET, FILM COATED ORAL AT BEDTIME
Qty: 90 TABLET | Refills: 4 | Status: SHIPPED | OUTPATIENT
Start: 2023-06-07 | End: 2023-09-12 | Stop reason: SINTOL

## 2023-06-07 RX ORDER — CETIRIZINE HYDROCHLORIDE 10 MG/1
10 TABLET ORAL DAILY
Qty: 90 TABLET | Refills: 3 | Status: SHIPPED | OUTPATIENT
Start: 2023-06-07 | End: 2024-03-12

## 2023-06-07 ASSESSMENT — PAIN SCALES - GENERAL: PAINLEVEL: MILD PAIN (3)

## 2023-06-07 NOTE — NURSING NOTE
"Chief Complaint   Patient presents with     Musculoskeletal Problem     Bilateral knee pain       Initial /72 (BP Location: Right arm, Patient Position: Sitting, Cuff Size: Adult Regular)   Pulse 69   Temp 97  F (36.1  C) (Temporal)   Resp 14   Ht 1.645 m (5' 4.75\")   Wt 75.7 kg (166 lb 12.8 oz)   LMP 06/01/1990   SpO2 97%   Breastfeeding No   BMI 27.97 kg/m   Estimated body mass index is 27.97 kg/m  as calculated from the following:    Height as of this encounter: 1.645 m (5' 4.75\").    Weight as of this encounter: 75.7 kg (166 lb 12.8 oz).     Medication Reconciliation: complete      FOOD SECURITY SCREENING QUESTIONS:    The next two questions are to help us understand your food security.  If you are feeling you need any assistance in this area, we have resources available to support you today.    Hunger Vital Signs:  Within the past 12 months we worried whether our food would run out before we got money to buy more. Never  Within the past 12 months the food we bought just didn't last and we didn't have money to get more. Never      Advance care plan reviewed      Chika Desouza LPN on 6/7/2023 at 9:19 AM      "

## 2023-06-07 NOTE — PATIENT INSTRUCTIONS
I recommend the following brands of shoes:  [employer] Balance  [employer]  [employer]    Crocs for wearing inside the house.

## 2023-06-07 NOTE — PROGRESS NOTES
"  Assessment & Plan     PND (post-nasal drip)  Itchy, watery, and red eye  Will help with allergy type symptoms.   - hydrOXYzine (ATARAX) 25 MG tablet  Dispense: 90 tablet; Refill: 4  - cetirizine (ZYRTEC) 10 MG tablet  Dispense: 90 tablet; Refill: 3  - loratadine (CLARITIN) 10 MG tablet    Excessive daytime sleepiness  Use at bedtime to help facilitate good sleep.  - hydrOXYzine (ATARAX) 25 MG tablet  Dispense: 90 tablet; Refill: 4    Anxiety state  Encouraged use of hydroxyzine AS NEEDED.  - hydrOXYzine (ATARAX) 25 MG tablet  Dispense: 90 tablet; Refill: 4    Muscle atrophy of lower extremity  Discussed PHYSICAL THERAPY to work on strengthening. Referral placed.  - Physical Therapy Referral    Arthralgia of both knees  Will obtain imaging today. Encouraged to get new supportive shoes and follow up for cortisone injections if needed.  - XR Knee Standing 2 Vw Bilat and 2 Vw Bilat    Review of the result(s) of each unique test - xray  Ordering of each unique test  Prescription drug management     BMI:   Estimated body mass index is 27.97 kg/m  as calculated from the following:    Height as of this encounter: 1.645 m (5' 4.75\").    Weight as of this encounter: 75.7 kg (166 lb 12.8 oz).     Return if symptoms worsen or fail to improve.    Nafisa Escobar NP  [facility]    Susanne Bearden is a 73 year old, presenting for the following health issues: Musculoskeletal Problem (Bilateral knee pain)      6/7/2023   9:14 AM   Additional Questions   Roomed by Chika FISHER LPN     Musculoskeletal Problem  This is a recurrent problem. The current episode started more than 1 month ago. The problem occurs daily. The problem has been gradually worsening. The symptoms are aggravated by bending and walking. She has tried NSAIDs and rest for the symptoms. The treatment provided mild relief.   History of Present Illness       Reason for visit:  Knees    She eats 2-3 servings of fruits and vegetables daily.She " "consumes 2 sweetened beverage(s) daily.She exercises with enough effort to increase her heart rate 9 or less minutes per day.  She exercises with enough effort to increase her heart rate 4 days per week. She is missing 7 dose(s) of medications per week.     #Bilateral knee pain  Inner aspect of both knees  Right calf is significantly larger and more muscular than left, no pain, redness  She has been walking more and wonders if it is her shoes.  She has never seen an orthopedic provider here at [facility]    Review of Systems   CONSTITUTIONAL: NEGATIVE for fever, chills, change in weight  ENT/MOUTH: NEGATIVE for ear, mouth and throat problems  RESP: NEGATIVE for significant cough or SOB  CV: NEGATIVE for chest pain, palpitations or peripheral edema  MUSCULOSKELETAL: POSITIVE for bilateral knee pain  ROS otherwise negative      Objective    /72 (BP Location: Right arm, Patient Position: Sitting, Cuff Size: Adult Regular)   Pulse 69   Temp 97  F (36.1  C) (Temporal)   Resp 14   Ht 1.645 m (5' 4.75\")   Wt 75.7 kg (166 lb 12.8 oz)   LMP 06/01/1990   SpO2 97%   Breastfeeding No   BMI 27.97 kg/m    Body mass index is 27.97 kg/m .  Physical Exam   GENERAL: healthy, alert and no distress  EYES: Eyes grossly normal to inspection, PERRL and conjunctivae and sclerae normal  RESP: lungs clear to auscultation - no rales, rhonchi or wheezes  CV: regular rates and rhythm, peripheral pulses strong and no peripheral edema  ABDOMEN: soft, nontender and bowel sounds normal  MS: muscle wasting of left calf and normal range of motion  NEURO: Normal strength and tone, mentation intact and speech normal  PSYCH: mentation appears normal, affect normal/bright    Results for orders placed or performed during the hospital encounter of 06/07/23   XR Knee Standing 2 Vw Bilat and 2 Vw Bilat     Status: None    Narrative    Bilateral knees    HISTORY: Knee pain    TECHNIQUE: 4 views of both knees were obtained.    FINDINGS: The articular " spaces are normal in height at both knees.  There is no knee effusion seen in either knee. Distal femur patella  proximal tibia and fibula are intact bilaterally      Impression    IMPRESSION: Normal knees    CHUCK CERON MD         SYSTEM ID:  [id number]

## 2023-06-12 ENCOUNTER — TELEPHONE (OUTPATIENT)
Dept: INTERNAL MEDICINE | Facility: OTHER | Age: 73
End: 2023-06-12
Payer: OTHER GOVERNMENT

## 2023-07-05 ENCOUNTER — THERAPY VISIT (OUTPATIENT)
Dept: PHYSICAL THERAPY | Facility: OTHER | Age: 73
End: 2023-07-05
Attending: NURSE PRACTITIONER
Payer: OTHER GOVERNMENT

## 2023-07-05 DIAGNOSIS — G89.29 CHRONIC MIDLINE LOW BACK PAIN WITHOUT SCIATICA: ICD-10-CM

## 2023-07-05 DIAGNOSIS — M62.58 MUSCLE ATROPHY OF LOWER EXTREMITY: Primary | ICD-10-CM

## 2023-07-05 DIAGNOSIS — M54.50 CHRONIC MIDLINE LOW BACK PAIN WITHOUT SCIATICA: ICD-10-CM

## 2023-07-05 PROCEDURE — 97140 MANUAL THERAPY 1/> REGIONS: CPT | Mod: GP

## 2023-07-05 PROCEDURE — 97112 NEUROMUSCULAR REEDUCATION: CPT | Mod: GP

## 2023-07-05 PROCEDURE — 97161 PT EVAL LOW COMPLEX 20 MIN: CPT | Mod: GP

## 2023-07-05 PROCEDURE — 97110 THERAPEUTIC EXERCISES: CPT | Mod: GP

## 2023-07-05 NOTE — PROGRESS NOTES
PHYSICAL THERAPY EVALUATION  Type of Visit: Evaluation    See electronic medical record for Abuse and Falls Screening details.    Subjective       Pt is referred to PT due to Chronic midline low back pain without sciatica [M54.50, G89.29] and Muscle atrophy of lower extremity [M62.58].  She is known to this therapist from past episodes of care.  She does have difficulty providing a concise history. Notes that she is having issues with knee pain and swelling in the legs. She is worse today on the left but it varies from day to day.  Notes that her legs feel weak. Cannot walk as fast when she walks out to the mailbox and uses a cane/walking stick. Can walk   mile.  Notes that her lower back hurts on both sides. I feel like I am all out of whack. Notes it affects her strength. Bladder control is an issue again--not predictable but is having more incontinence. Frequency of incontinence is variable.   Knees: Aggravating factors: kneeling, bending, getting up from the floor, stairs--careful and uses railing; worse going down. Feels unstable walking. Easing factors: resting and elevating, OTC meds--Tylenol   Lower back: Aggravating factors: lifting. Easing factors: resting, Tylenol.     Presenting condition or subjective complaint: both knees area painful and swollen, lower extremity weakness. Lower back and bladder issues.  Date of onset: 04/26/23    Relevant medical history: Arthritis , bladder control issues. Per her medical record: adnexal mass, gastritis, restless leg syndrome, mixed stress/urge urinary incontinence, over-active bladder, hysterectomy (vaginal), laparoscopic cystectomy with adhesion lysis, bilateral salpingo-oophorectomy.  Dates & types of surgery:      Prior diagnostic imaging/testing results: X-ray     FINDINGS: The articular spaces are normal in height at both knees.  There is no knee effusion seen in either knee. Distal femur patella  proximal tibia and fibula are intact bilaterally  IMPRESSION:  Normal knees     Prior therapy history for the same diagnosis, illness or injury: Yes PT for lower back, bladder issues.    Prior Level of Function   Lives independently with her spouse. Longstanding issues with bladder control and lower back pain which at times limit function. Feels she is worse now than when she was last seen. Balance seems more off. Knee pain is new.     Living Environment  Social support: With a significant other or spouse  Type of home: House   Stairs to enter the home: No       Ramp: No   Stairs inside the home: Yes 13 Is there a railing: Yes   Help at home: None  Equipment owned: Straight Cane     Employment: No      Patient goals for therapy: Be more stable walking and not have the swelling in the legs and knees    Pain assessment: Pain level is 6/10. Pt does not provide a range. Pain is described as dull and shanti with LE cramping. Pain is constant, worse with activity. Location is both knees--worse medial left and lower back.      Objective     Posture/Structural:      Head and Neck Position: forward head   Shoulders/scapulae: rounded and forward   Thoracic spine:   Scoliosis:    Lumbar lordosis: decreased    .General listening:     Standing: left anterior pelvis   Seated: left anterior pelvis  Postural loading R/R L/L R/L L/R   Head   x x   Shoulders x x x x   Pelvis x x x x   Poor stability, balance loss with loading and listening    Local listening: Left side of the bladder deep with extended listening to the right side of the bladder; endopelvic fascia R and L         Gait: unstable with gait with wider NAVEED noted than past visits. Limited pelvic rotation, limited pushoff. Mild instability on level surfaces, with turning corners    Range of Motion:   AROM Repetitive: Comments:   Flexion To reach mid shin  Dysrhythmia:  ____ Yes/No     Extension decreased     Left lateral flexion decreased     Right lateral flexion decreased     Right rotation (seated)      Left rotation (seated)       Standing Pelvic Clock      Lower extremity strength: Grade 5 equals normal    Left  Right  Comments:   L2 Hip flexion 3+ 3+    L3 Knee extension 3+ 4-    L4 Ankle dorsiflexion 4 4    L5 1st MTP extension      S1 Plantarflexion 4- 4-    S2 Knee flexion 4- 4-    Hip Abduction 3+ 3+    Gluteus medius 3+ 3+    Gluteus yasmeen 3 3    Difficulty coordinating strength tests noted. No pain with testing    Standing Tests:   Right  Left Comments:   Standing flexion x     Stork x     Hip Drop      Pelvic Drop        Seated Tests:   Right Left Comments:   Seated flexion   neg   Tissue fullness      EVI inferior/posterior   level   Seated SLR   Neg    Slump sit   neg     Supine Tests:   Right Left Comments:   Pubes   level   ASIS inf     Leg length x   Short       Iliac Crest      Iliac Shear Test      Knees to Chest   neg   SLR 80 85 HS tightness   Laseque   neg   LUBNA   neg   FADIR   Mild hip complaints   Hip flexor tightness noted with modified pipo.    Abdominal strength/neuromuscular control:   Pelvic clocks: poor control with 12<>6    Prone Tests: deferred    Positional Facet Diagnoses:   ERS Right ERS Left Neutral  SB R/Rot Left Neutral  SB L/Rot Right FRS Right FRS Left   T10-11         T11-12         T12-L1         L1-2         L2-3      x   L3-4         L4-5         L5-S1     x    (* ERS = extended/rotated/sidebent; FRS = flexed/rotated/sidebent)    Palpation:  Local listening as above. Tightness in the R pubovesical ligament, R and L endopelvic fascia, R and L vesical fascia, R and L iliopsoas, R and L hip adductors. Tight with SB and rotation of the bladder. Tender over L5S1, L45, L23. Tight in the QL and lumbar paraspinals. Tender over the medial aspect inferior to the joint line of the L knee--pes anserine area but not able to provoke with any resisted tests. Mild swelling in this area. Not tender with remaining knee palpation.    Special tests at the knee unremarkable: flexion with over pressure, valgus/varus  stress testing, PF mobilization. Passive knee flexion to 120 degrees--slightly stiff on the L. Full extension noted.       Assessment & Plan   CLINICAL IMPRESSIONS   Medical Diagnosis: Chronic midline low back pain without sciatica (M54.50, G89.29) and Muscle atrophy of lower extremity (M62.58).    Treatment Diagnosis: impaired mobility due to LBP, LE weakness   Impression/Assessment: Patient is a 73 year old female with lower back, incontinence and bilateral knee complaints.  The following significant findings have been identified: Pain, Decreased ROM/flexibility, Decreased joint mobility, Decreased strength, Impaired balance, Decreased proprioception, Impaired posture and myofascial and joint restrictions, incontinence. These impairments interfere with their ability to perform work tasks, recreational activities, household chores, household mobility and community mobility as compared to previous level of function.     Clinical Decision Making (Complexity):   Clinical Presentation: Stable/Uncomplicated  Clinical Presentation Rationale: based on medical and personal factors listed in PT evaluation  Clinical Decision Making (Complexity): Low complexity    PLAN OF CARE  Treatment Interventions: Modalities if indicated  Interventions: Gait Training, Manual Therapy, Neuromuscular Re-education, Therapeutic Activity, Therapeutic Exercise. Consider aquatic therapy.    Long Term Goals     PT Goal 1  Goal Identifier: HEP  Goal Description: Pt to display independence and compliance with her home exercises 5/7 days per week to assist in improving pain level, balance and strength for safe mobility  Target Date: 10/02/23  PT Goal 2  Goal Identifier: LBP  Goal Description: Pt to display symmetrical lumbar spine to allow her to perform moderate household tasks, yard work with pain no higher than 4/10  Target Date: 10/02/23  PT Goal 3  Goal Identifier: LE strength  Goal Description: Improve LE strength to at leasat grade 4/5  throughout to allow patient to manage stairs and get up and down from the floor with 50% reported greater ease.  Target Date: 10/02/23  PT Goal 4  Goal Identifier: Bladder  Goal Description: Pt to display improved myofascial mobility for more symmetrical loading at the pelvis to allow pt to report improved bladder control with less incontinence 3/5 days per week.  Target Date: 10/02/23  PT Goal 5  Goal Identifier: Balance  Goal Description: Pt to display improved LE strength and balance  to allow her to perform SLS for 5 seconds with minimal substitution to carryover into improved gait safety.      Frequency of Treatment: 1-2 times per week  Duration of Treatment: 3-6 months      Education Assessment:   Learner/Method: Patient  Education Comments: eval findings, POC, exercise handouts. Will need reinforcement    Risks and benefits of evaluation/treatment have been explained.   Patient/Family/caregiver agrees with Plan of Care.     Evaluation Time:     PT Robyn Low Complexity Minutes (29183): 15      Signing Clinician: Sindy Siddiqi PT

## 2023-07-10 ENCOUNTER — THERAPY VISIT (OUTPATIENT)
Dept: PHYSICAL THERAPY | Facility: OTHER | Age: 73
End: 2023-07-10
Attending: NURSE PRACTITIONER
Payer: OTHER GOVERNMENT

## 2023-07-10 DIAGNOSIS — M62.58 MUSCLE ATROPHY OF LOWER EXTREMITY: ICD-10-CM

## 2023-07-10 DIAGNOSIS — M54.50 CHRONIC MIDLINE LOW BACK PAIN WITHOUT SCIATICA: Primary | ICD-10-CM

## 2023-07-10 DIAGNOSIS — G89.29 CHRONIC MIDLINE LOW BACK PAIN WITHOUT SCIATICA: Primary | ICD-10-CM

## 2023-07-10 PROCEDURE — 97110 THERAPEUTIC EXERCISES: CPT | Mod: GP

## 2023-07-10 PROCEDURE — 97140 MANUAL THERAPY 1/> REGIONS: CPT | Mod: GP

## 2023-07-10 PROCEDURE — 97112 NEUROMUSCULAR REEDUCATION: CPT | Mod: GP

## 2023-07-18 ENCOUNTER — THERAPY VISIT (OUTPATIENT)
Dept: PHYSICAL THERAPY | Facility: OTHER | Age: 73
End: 2023-07-18
Attending: NURSE PRACTITIONER
Payer: OTHER GOVERNMENT

## 2023-07-18 DIAGNOSIS — G89.29 CHRONIC MIDLINE LOW BACK PAIN WITHOUT SCIATICA: Primary | ICD-10-CM

## 2023-07-18 DIAGNOSIS — M62.58 MUSCLE ATROPHY OF LOWER EXTREMITY: ICD-10-CM

## 2023-07-18 DIAGNOSIS — M54.50 CHRONIC MIDLINE LOW BACK PAIN WITHOUT SCIATICA: Primary | ICD-10-CM

## 2023-07-18 PROCEDURE — 97112 NEUROMUSCULAR REEDUCATION: CPT | Mod: GP

## 2023-07-18 PROCEDURE — 97140 MANUAL THERAPY 1/> REGIONS: CPT | Mod: GP

## 2023-07-18 PROCEDURE — 97110 THERAPEUTIC EXERCISES: CPT | Mod: GP

## 2023-07-25 ENCOUNTER — THERAPY VISIT (OUTPATIENT)
Dept: PHYSICAL THERAPY | Facility: OTHER | Age: 73
End: 2023-07-25
Attending: NURSE PRACTITIONER
Payer: OTHER GOVERNMENT

## 2023-07-25 DIAGNOSIS — M62.58 MUSCLE ATROPHY OF LOWER EXTREMITY: ICD-10-CM

## 2023-07-25 DIAGNOSIS — G89.29 CHRONIC MIDLINE LOW BACK PAIN WITHOUT SCIATICA: Primary | ICD-10-CM

## 2023-07-25 DIAGNOSIS — M54.50 CHRONIC MIDLINE LOW BACK PAIN WITHOUT SCIATICA: Primary | ICD-10-CM

## 2023-07-25 PROCEDURE — 97112 NEUROMUSCULAR REEDUCATION: CPT | Mod: GP

## 2023-07-25 PROCEDURE — 97140 MANUAL THERAPY 1/> REGIONS: CPT | Mod: GP

## 2023-08-01 DIAGNOSIS — E78.2 MIXED HYPERLIPIDEMIA: ICD-10-CM

## 2023-08-01 DIAGNOSIS — R73.03 PREDIABETES: ICD-10-CM

## 2023-08-01 RX ORDER — ATORVASTATIN CALCIUM 10 MG/1
TABLET, FILM COATED ORAL
Qty: 36 TABLET | Refills: 1 | Status: CANCELLED | OUTPATIENT
Start: 2023-08-01

## 2023-08-02 RX ORDER — ATORVASTATIN CALCIUM 10 MG/1
TABLET, FILM COATED ORAL
Qty: 36 TABLET | Refills: 1 | Status: SHIPPED | OUTPATIENT
Start: 2023-08-02 | End: 2023-11-28

## 2023-08-14 ENCOUNTER — HOSPITAL ENCOUNTER (OUTPATIENT)
Dept: GENERAL RADIOLOGY | Facility: OTHER | Age: 73
Discharge: HOME | End: 2023-08-14
Attending: NURSE PRACTITIONER
Payer: OTHER GOVERNMENT

## 2023-08-14 ENCOUNTER — OFFICE VISIT (OUTPATIENT)
Dept: FAMILY MEDICINE | Facility: OTHER | Age: 73
End: 2023-08-14
Payer: OTHER GOVERNMENT

## 2023-08-14 VITALS
HEIGHT: 63 IN | RESPIRATION RATE: 20 BRPM | TEMPERATURE: 98.3 F | DIASTOLIC BLOOD PRESSURE: 82 MMHG | SYSTOLIC BLOOD PRESSURE: 126 MMHG | WEIGHT: 166 LBS | BODY MASS INDEX: 29.41 KG/M2 | HEART RATE: 77 BPM | OXYGEN SATURATION: 95 %

## 2023-08-14 DIAGNOSIS — R05.1 ACUTE COUGH: Primary | ICD-10-CM

## 2023-08-14 DIAGNOSIS — N30.00 ACUTE CYSTITIS WITHOUT HEMATURIA: ICD-10-CM

## 2023-08-14 DIAGNOSIS — J20.9 ACUTE BRONCHITIS, UNSPECIFIED ORGANISM: ICD-10-CM

## 2023-08-14 DIAGNOSIS — J98.9 REACTIVE AIRWAY DISEASE WITHOUT ASTHMA: Chronic | ICD-10-CM

## 2023-08-14 DIAGNOSIS — R07.89 CHEST TIGHTNESS: ICD-10-CM

## 2023-08-14 DIAGNOSIS — R05.1 ACUTE COUGH: ICD-10-CM

## 2023-08-14 DIAGNOSIS — R68.83 CHILLS (WITHOUT FEVER): ICD-10-CM

## 2023-08-14 DIAGNOSIS — R09.81 NASAL CONGESTION: ICD-10-CM

## 2023-08-14 DIAGNOSIS — R61 NIGHT SWEATS: ICD-10-CM

## 2023-08-14 DIAGNOSIS — R09.82 PND (POST-NASAL DRIP): ICD-10-CM

## 2023-08-14 LAB
ALBUMIN UR-MCNC: 20 MG/DL
ANION GAP SERPL CALCULATED.3IONS-SCNC: 10 MMOL/L (ref 7–15)
APPEARANCE UR: ABNORMAL
BACTERIA #/AREA URNS HPF: ABNORMAL /HPF
BASOPHILS # BLD AUTO: 0 10E3/UL (ref 0–0.2)
BASOPHILS NFR BLD AUTO: 0 %
BILIRUB UR QL STRIP: NEGATIVE
BUN SERPL-MCNC: 9.7 MG/DL (ref 8–23)
CALCIUM SERPL-MCNC: 9.5 MG/DL (ref 8.8–10.2)
CHLORIDE SERPL-SCNC: 102 MMOL/L (ref 98–107)
COLOR UR AUTO: YELLOW
CREAT SERPL-MCNC: 0.97 MG/DL (ref 0.51–0.95)
DEPRECATED HCO3 PLAS-SCNC: 26 MMOL/L (ref 22–29)
EOSINOPHIL # BLD AUTO: 0 10E3/UL (ref 0–0.7)
EOSINOPHIL NFR BLD AUTO: 1 %
ERYTHROCYTE [DISTWIDTH] IN BLOOD BY AUTOMATED COUNT: 12.1 % (ref 10–15)
GFR SERPL CREATININE-BSD FRML MDRD: 61 ML/MIN/1.73M2
GLUCOSE SERPL-MCNC: 111 MG/DL (ref 70–99)
GLUCOSE UR STRIP-MCNC: NEGATIVE MG/DL
HCT VFR BLD AUTO: 45.2 % (ref 35–47)
HGB BLD-MCNC: 15 G/DL (ref 11.7–15.7)
HGB UR QL STRIP: NEGATIVE
HOLD SPECIMEN: NORMAL
HYALINE CASTS: 2 /LPF
IMM GRANULOCYTES # BLD: 0 10E3/UL
IMM GRANULOCYTES NFR BLD: 0 %
KETONES UR STRIP-MCNC: NEGATIVE MG/DL
LEUKOCYTE ESTERASE UR QL STRIP: ABNORMAL
LYMPHOCYTES # BLD AUTO: 1.2 10E3/UL (ref 0.8–5.3)
LYMPHOCYTES NFR BLD AUTO: 19 %
MCH RBC QN AUTO: 30.3 PG (ref 26.5–33)
MCHC RBC AUTO-ENTMCNC: 33.2 G/DL (ref 31.5–36.5)
MCV RBC AUTO: 91 FL (ref 78–100)
MONOCYTES # BLD AUTO: 0.6 10E3/UL (ref 0–1.3)
MONOCYTES NFR BLD AUTO: 9 %
MUCOUS THREADS #/AREA URNS LPF: PRESENT /LPF
NEUTROPHILS # BLD AUTO: 4.6 10E3/UL (ref 1.6–8.3)
NEUTROPHILS NFR BLD AUTO: 71 %
NITRATE UR QL: POSITIVE
NRBC # BLD AUTO: 0 10E3/UL
NRBC BLD AUTO-RTO: 0 /100
PH UR STRIP: 5.5 [PH] (ref 5–9)
PLATELET # BLD AUTO: 169 10E3/UL (ref 150–450)
POTASSIUM SERPL-SCNC: 4.4 MMOL/L (ref 3.4–5.3)
RBC # BLD AUTO: 4.95 10E6/UL (ref 3.8–5.2)
RBC URINE: 5 /HPF
SARS-COV-2 RNA RESP QL NAA+PROBE: NEGATIVE
SODIUM SERPL-SCNC: 138 MMOL/L (ref 136–145)
SP GR UR STRIP: 1.02 (ref 1–1.03)
SQUAMOUS EPITHELIAL: 2 /HPF
UROBILINOGEN UR STRIP-MCNC: NORMAL MG/DL
WBC # BLD AUTO: 6.4 10E3/UL (ref 4–11)
WBC CLUMPS #/AREA URNS HPF: PRESENT /HPF
WBC URINE: >182 /HPF

## 2023-08-14 PROCEDURE — 36415 COLL VENOUS BLD VENIPUNCTURE: CPT | Mod: ZL | Performed by: NURSE PRACTITIONER

## 2023-08-14 PROCEDURE — 80048 BASIC METABOLIC PNL TOTAL CA: CPT | Mod: ZL | Performed by: NURSE PRACTITIONER

## 2023-08-14 PROCEDURE — 87186 SC STD MICRODIL/AGAR DIL: CPT | Mod: ZL | Performed by: NURSE PRACTITIONER

## 2023-08-14 PROCEDURE — C9803 HOPD COVID-19 SPEC COLLECT: HCPCS | Performed by: NURSE PRACTITIONER

## 2023-08-14 PROCEDURE — 85025 COMPLETE CBC W/AUTO DIFF WBC: CPT | Mod: ZL | Performed by: NURSE PRACTITIONER

## 2023-08-14 PROCEDURE — 81001 URINALYSIS AUTO W/SCOPE: CPT | Mod: ZL | Performed by: NURSE PRACTITIONER

## 2023-08-14 PROCEDURE — 99214 OFFICE O/P EST MOD 30 MIN: CPT | Performed by: NURSE PRACTITIONER

## 2023-08-14 PROCEDURE — 71046 X-RAY EXAM CHEST 2 VIEWS: CPT

## 2023-08-14 PROCEDURE — 87635 SARS-COV-2 COVID-19 AMP PRB: CPT | Mod: ZL | Performed by: NURSE PRACTITIONER

## 2023-08-14 RX ORDER — AZITHROMYCIN 250 MG/1
TABLET, FILM COATED ORAL
Qty: 6 TABLET | Refills: 0 | Status: SHIPPED | OUTPATIENT
Start: 2023-08-14 | End: 2023-08-14

## 2023-08-14 RX ORDER — DOXYCYCLINE 100 MG/1
100 CAPSULE ORAL 2 TIMES DAILY
Qty: 14 CAPSULE | Refills: 0 | Status: SHIPPED | OUTPATIENT
Start: 2023-08-14 | End: 2023-08-21

## 2023-08-14 RX ORDER — ALBUTEROL SULFATE 90 UG/1
2 AEROSOL, METERED RESPIRATORY (INHALATION) EVERY 4 HOURS PRN
Qty: 18 G | Refills: 0 | Status: SHIPPED | OUTPATIENT
Start: 2023-08-14 | End: 2023-09-12

## 2023-08-14 ASSESSMENT — ENCOUNTER SYMPTOMS
NEUROLOGICAL NEGATIVE: 1
CHILLS: 1
BACK PAIN: 1
FATIGUE: 1
HEADACHES: 0
EYES NEGATIVE: 1
SORE THROAT: 1
WHEEZING: 0
CARDIOVASCULAR NEGATIVE: 1
FEVER: 0
GASTROINTESTINAL NEGATIVE: 1
COUGH: 1
SHORTNESS OF BREATH: 1

## 2023-08-14 ASSESSMENT — PAIN SCALES - GENERAL: PAINLEVEL: SEVERE PAIN (6)

## 2023-08-14 NOTE — NURSING NOTE
"Pt presents to  for respiratory symptoms since Wednesday. Pt states cough is sometimes productive. Pt has been taking Tessalon PRN, and NyQuil at bedtime.    Chief Complaint   Patient presents with    Respiratory Problems     X6 days       FOOD SECURITY SCREENING QUESTIONS  Hunger Vital Signs:  Within the past 12 months we worried whether our food would run out before we got money to buy more. Never  Within the past 12 months the food we bought just didn't last and we didn't have money to get more. Never  Jenelle Dearmon 8/14/2023 9:54 AM      Initial /82 (BP Location: Left arm, Patient Position: Sitting, Cuff Size: Adult Regular)   Pulse 77   Temp 98.3  F (36.8  C) (Tympanic)   Resp 20   Ht 1.6 m (5' 3\")   Wt 75.3 kg (166 lb)   LMP 06/01/1990   SpO2 95%   BMI 29.41 kg/m   Estimated body mass index is 29.41 kg/m  as calculated from the following:    Height as of this encounter: 1.6 m (5' 3\").    Weight as of this encounter: 75.3 kg (166 lb).  Medication Reconciliation: complete    Jenelle Dearmon  "

## 2023-08-14 NOTE — PROGRESS NOTES
Monisha Velez  1950    ASSESSMENT/PLAN:   1. Acute cough  and  2. Chest tightness  and  3. Nasal congestion  Reviewed with patient that symptoms are likely viral in nature however with her persistent cough, shortness of breath and severity of symptoms I am concerned for acute pneumonia.  Recommend obtaining chest x-ray to rule out acute cardiopulmonary abnormalities.  COVID-19 test returned negative.  - Symptomatic COVID-19 Virus (Coronavirus) by PCR Nose  - XR Chest 2 Views; Future    4. Reactive airway disease that is not asthma  with  5. Acute bronchitis, unspecified organism  Chest x-ray returned negative for pneumonia.  Reviewed treatment plan for URI symptoms with secondary acute bronchitis, patient has history of reactive airway disease as well.  Discussed use of Flonase nasal spray and daily nondrowsy histamine to help with congestion, patient is taking Zyrtec.  Discussed acute bronchitis treatment with antibiotic treatment and albuterol inhaler every 4 hours as needed cough and shortness of breath.  Patient has had reactions to penicillins and Bactrim.  Initially wanted to treat with azithromycin however with patient's current subsequent UTI recommend using doxycycline twice daily for 7 days as antibiotic choice.  If symptoms continue to worsen or persist, recommend returning for further evaluation.    - albuterol (PROAIR HFA/PROVENTIL HFA/VENTOLIN HFA) 108 (90 Base) MCG/ACT inhaler; Inhale 2 puffs into the lungs every 4 hours as needed  Dispense: 18 g; Refill: 0    6. Night sweats  with  7. Chills (without fever)  Reviewed likely secondary to current URI, obtaining chest x-ray to rule out acute pneumonia.  We will rule out UTI with urinalysis.  Discussed use of Tylenol as needed for fever, chills or body aches.    8.  Incontinence  Patient has history of urinary incontinence however this has been more frequently lately.  This has been worse with coughing.  She states she has been more dehydrated.   She is having some mid back pain.  Reviewed with patient that chills and night sweats is likely secondary to current URI however, I recommend taking urinalysis to rule out UTI.  - UA Macroscopic with reflex to Microscopic and Culture  9.  Acute cystitis without hematuria  Urinalysis returned showing nitrates, large amount of leukocyte esterase, greater than 182 WBCs, bacteria, mucus.  Recommend beginning antibiotic treatment for acute cystitis without hematuria.  Patient has had more through allergies to penicillins and sulfa drugs.  With current URI symptoms I recommend proceeding with antibiotic treatment, doxycycline 100 mg twice a day for 7 days.  Reviewed most common, adverse side effects reviewed with patient.  Unsure of how long urinary symptoms have been present.  We will also obtain CBC and BMP due to the severity of UTI, patient being only mildly symptomatic.    Patient agrees with plan of care and verbalizes understating. AVS printed. Patient education provided verbally and written instructions provided as requested. Patient made aware of emergent signs and symptoms to monitor for and when to seek additional care/follow up.     SUBJECTIVE:   CHIEF COMPLAINT/ REASON FOR VISIT  Patient presents with:  Respiratory Problems: X6 days     HISTORY OF PRESENT ILLNESS  Monisha Velez is a pleasant 73 year old female [facility]    I have reviewed the nursing notes.  I have reviewed allergies, medication list, problem list, and past medical history.    REVIEW OF SYSTEMS  Review of Systems   Constitutional:  Positive for chills and fatigue. Negative for fever.   HENT:  Positive for congestion and sore throat. Negative for ear pain.    Eyes: Negative.    Respiratory:  Positive for cough and shortness of breath. Negative for wheezing.    Cardiovascular: Negative.  Negative for chest pain.   Gastrointestinal: Negative.    Genitourinary: Negative.         Urinary incontinence    Musculoskeletal:   "Positive for back pain.   Skin: Negative.  Negative for rash.   Neurological: Negative.  Negative for headaches.   All other systems reviewed and are negative.     VITAL SIGNS  Vitals:    08/14/23 0950   BP: 126/82   BP Location: Left arm   Patient Position: Sitting   Cuff Size: Adult Regular   Pulse: 77   Resp: 20   Temp: 98.3  F (36.8  C)   TempSrc: Tympanic   SpO2: 95%   Weight: 75.3 kg (166 lb)   Height: 1.6 m (5' 3\")      Body mass index is 29.41 kg/m .    OBJECTIVE:   PHYSICAL EXAM  Physical Exam  Vitals reviewed.   Constitutional:       Appearance: Normal appearance. She is not ill-appearing or toxic-appearing.   HENT:      Head: Normocephalic and atraumatic.      Right Ear: Tympanic membrane, ear canal and external ear normal.      Left Ear: Tympanic membrane, ear canal and external ear normal.      Nose: Congestion present. No rhinorrhea.      Mouth/Throat:      Pharynx: No oropharyngeal exudate or posterior oropharyngeal erythema.   Eyes:      Conjunctiva/sclera: Conjunctivae normal.   Cardiovascular:      Rate and Rhythm: Normal rate and regular rhythm.      Pulses: Normal pulses.      Heart sounds: Normal heart sounds. No murmur heard.  Pulmonary:      Effort: Pulmonary effort is normal.      Breath sounds: Normal breath sounds. No wheezing or rhonchi.   Musculoskeletal:      Cervical back: Neck supple. No tenderness.   Lymphadenopathy:      Cervical: No cervical adenopathy.   Skin:     Capillary Refill: Capillary refill takes less than 2 seconds.      Findings: No rash.   Neurological:      General: No focal deficit present.      Mental Status: She is alert and oriented to person, place, and time.   Psychiatric:         Mood and Affect: Mood normal.         Behavior: Behavior normal.         Thought Content: Thought content normal.         Judgment: Judgment normal.        DIAGNOSTICS  Results for orders placed or performed during the hospital encounter of 08/14/23   XR Chest 2 Views     Status: None    " Narrative    PROCEDURE:  XR CHEST 2 VIEWS    HISTORY:  Acute cough; Chest tightness.     COMPARISON:  June 2022    FINDINGS:   The cardiac silhouette is normal in size. The pulmonary vasculature is  normal.  The lungs are clear. No pleural effusion or pneumothorax.      Impression    IMPRESSION:  No acute cardiopulmonary disease.      CHUCK CERON MD         SYSTEM ID:  [id number]   Results for orders placed or performed in visit on 08/14/23   Symptomatic COVID-19 Virus (Coronavirus) by PCR Nose     Status: Normal    Specimen: Nose; Swab   Result Value Ref Range    SARS CoV2 PCR Negative Negative    Narrative    Testing was performed using the Xpert Xpress SARS-CoV-2 Assay on the Cepheid Gene-Xpert Instrument Systems. Additional information about this Emergency Use Authorization (EUA) assay can be found via the Lab Guide. This test should be ordered for the detection of SARS-CoV-2 in individuals who meet SARS-CoV-2 clinical and/or epidemiological criteria as well as from individuals without symptoms or other reasons to suspect COVID-19. Test performance for asymptomatic patients has only been established in anterior nasal swab specimens. This test is for in vitro diagnostic use under the FDA EUA for laboratories certified under CLIA to perform high complexity testing. This test has not been FDA cleared or approved. A negative result does not rule out the presence of PCR inhibitors in the specimen or target RNA concentration below the limit of detection for the assay. The possibility of a false negative should be considered if the patient's recent exposure or clinical presentation suggests COVID-19. This test was validated by [facility]. This laboratory is certified under the Clinical Laboratory Improvement Amendments (CLIA) as qualified to perform high complexity clinical laboratory testing.   UA Macroscopic with reflex to Microscopic and Culture     Status:  Abnormal    Specimen: Urine, Clean Catch   Result Value Ref Range    Color Urine Yellow Colorless, Straw, Light Yellow, Yellow    Appearance Urine Slightly Cloudy (A) Clear    Glucose Urine Negative Negative mg/dL    Bilirubin Urine Negative Negative    Ketones Urine Negative Negative mg/dL    Specific Gravity Urine 1.022 1.000 - 1.030    Blood Urine Negative Negative    pH Urine 5.5 5.0 - 9.0    Protein Albumin Urine 20 (A) Negative mg/dL    Urobilinogen Urine Normal Normal, 2.0 mg/dL    Nitrite Urine Positive (A) Negative    Leukocyte Esterase Urine Large (A) Negative    Bacteria Urine Few (A) None Seen /HPF    WBC Clumps Urine Present (A) None Seen /HPF    Mucus Urine Present (A) None Seen /LPF    RBC Urine 5 (H) <=2 /HPF    WBC Urine >182 (H) <=5 /HPF    Squamous Epithelials Urine 2 (H) <=1 /HPF    Hyaline Casts Urine 2 <=2 /LPF    Narrative    Urine Culture ordered based on laboratory criteria        Katheryn Irvin NP  [facility]

## 2023-08-16 LAB — BACTERIA UR CULT: ABNORMAL

## 2023-08-21 ENCOUNTER — TELEPHONE (OUTPATIENT)
Dept: INTERNAL MEDICINE | Facility: OTHER | Age: 73
End: 2023-08-21
Payer: OTHER GOVERNMENT

## 2023-08-21 DIAGNOSIS — N30.00 ACUTE CYSTITIS WITHOUT HEMATURIA: ICD-10-CM

## 2023-08-21 DIAGNOSIS — J20.9 ACUTE BRONCHITIS, UNSPECIFIED ORGANISM: ICD-10-CM

## 2023-08-21 RX ORDER — DOXYCYCLINE 100 MG/1
100 CAPSULE ORAL 2 TIMES DAILY
Qty: 6 CAPSULE | Refills: 0 | Status: SHIPPED | OUTPATIENT
Start: 2023-08-21 | End: 2023-08-24

## 2023-08-21 NOTE — TELEPHONE ENCOUNTER
Patient states she already tested negative for COVID last Monday. She agrees to be seen in person if after completing antibiotics her symptoms persist; also agrees to call before then if symptoms suddenly worsen or new symptoms develop. Helen Lopez RN on 8/21/2023 at 1:06 PM

## 2023-08-21 NOTE — TELEPHONE ENCOUNTER
The [facility] was [facility] in [facility] and got a prescription for doxycycline.  She states she is done with the medication and is not over this illness and still does not feel well.  She wanted to get another prescription for the doxycycline.  Please advise.

## 2023-08-21 NOTE — TELEPHONE ENCOUNTER
Call center staff states Pt called and a new prescription was never sent to pharmacy. Please call with status.    Jenelle Mallory RN .............. 8/21/2023  3:51 PM

## 2023-08-21 NOTE — TELEPHONE ENCOUNTER
"S-(situation): patient states she continues to cough    B-(background): patient seen in [facility] on 8/14/23 for UTI and bronchitis and treated with 7 day treatment of Doxycycline, last dose yesterday.    A-(assessment): states is coughing a lot having urinary incontinence from it.  States is having clear nasal drainage.  States takes Nyquil, zyrtec and antibiotic to get rest.  States is using inhaler which is helping.  Breathing is better.  States feels chilled but denies fever or night sweats.  Patient states\" she thinks she needs to be on antibiotic just a little longer\"  Patient states she is feeling a little better since [facility] visit.    R-(recommendations): informed patient will route message to Dr. Pearson for review and consideration.    Kendy Stroud RN on 8/21/2023 at 9:27 AM    "

## 2023-08-21 NOTE — TELEPHONE ENCOUNTER
I will send in 3 additional days of antibiotics.  She should test herself for COVID to be sure that isn't it.  If she has continued issues beyond this I would recommend being seen again

## 2023-08-21 NOTE — TELEPHONE ENCOUNTER
I would recommend testing again for COVID. I have had several patients test negative first and than 5-7 days later test positive.

## 2023-08-21 NOTE — TELEPHONE ENCOUNTER
Patient will re-test and will let us know if it is positive. Advised of below as well. Helen Lopez RN on 8/21/2023 at 4:08 PM      doxycycline hyclate (VIBRAMYCIN) 100 MG capsule 6 capsule 0 8/21/2023 8/24/2023 No   Sig - Route: Take 1 capsule (100 mg) by mouth 2 times daily for 3 days - Oral   Sent to pharmacy as: Doxycycline Hyclate 100 MG Oral Capsule (VIBRAMYCIN)   Class: E-Prescribe   Order: [id number]   E-Prescribing Status: Receipt confirmed by pharmacy (8/21/2023  4:04 PM CDT)

## 2023-09-02 ENCOUNTER — HOSPITAL ENCOUNTER (OUTPATIENT)
Dept: GENERAL RADIOLOGY | Facility: OTHER | Age: 73
Discharge: HOME | End: 2023-09-02
Attending: NURSE PRACTITIONER
Payer: OTHER GOVERNMENT

## 2023-09-02 ENCOUNTER — OFFICE VISIT (OUTPATIENT)
Dept: FAMILY MEDICINE | Facility: OTHER | Age: 73
End: 2023-09-02
Payer: OTHER GOVERNMENT

## 2023-09-02 VITALS
BODY MASS INDEX: 28.51 KG/M2 | HEART RATE: 60 BPM | OXYGEN SATURATION: 97 % | HEIGHT: 64 IN | TEMPERATURE: 97.7 F | RESPIRATION RATE: 20 BRPM | DIASTOLIC BLOOD PRESSURE: 80 MMHG | SYSTOLIC BLOOD PRESSURE: 122 MMHG | WEIGHT: 167 LBS

## 2023-09-02 DIAGNOSIS — J18.9 PNEUMONIA OF BOTH LUNGS DUE TO INFECTIOUS ORGANISM, UNSPECIFIED PART OF LUNG: ICD-10-CM

## 2023-09-02 DIAGNOSIS — R05.3 PERSISTENT COUGH FOR 3 WEEKS OR LONGER: ICD-10-CM

## 2023-09-02 DIAGNOSIS — R30.0 DYSURIA: Primary | ICD-10-CM

## 2023-09-02 PROCEDURE — 81003 URINALYSIS AUTO W/O SCOPE: CPT | Mod: ZL | Performed by: NURSE PRACTITIONER

## 2023-09-02 PROCEDURE — 71046 X-RAY EXAM CHEST 2 VIEWS: CPT | Mod: TC

## 2023-09-02 PROCEDURE — 87086 URINE CULTURE/COLONY COUNT: CPT | Mod: ZL | Performed by: NURSE PRACTITIONER

## 2023-09-02 PROCEDURE — 99214 OFFICE O/P EST MOD 30 MIN: CPT | Performed by: NURSE PRACTITIONER

## 2023-09-02 RX ORDER — CEFDINIR 300 MG/1
300 CAPSULE ORAL 2 TIMES DAILY
Qty: 14 CAPSULE | Refills: 0 | Status: SHIPPED | OUTPATIENT
Start: 2023-09-02 | End: 2023-09-09

## 2023-09-02 RX ORDER — GUAIFENESIN 200 MG/10ML
200 LIQUID ORAL EVERY 4 HOURS PRN
Qty: 236 ML | Refills: 0 | Status: SHIPPED | OUTPATIENT
Start: 2023-09-02 | End: 2024-03-13

## 2023-09-02 RX ORDER — AZITHROMYCIN 250 MG/1
TABLET, FILM COATED ORAL
Qty: 6 TABLET | Refills: 0 | Status: SHIPPED | OUTPATIENT
Start: 2023-09-02 | End: 2023-09-07

## 2023-09-02 ASSESSMENT — ENCOUNTER SYMPTOMS
GASTROINTESTINAL NEGATIVE: 1
COUGH: 1
FEVER: 0
FATIGUE: 1
SHORTNESS OF BREATH: 1
WHEEZING: 1
WEAKNESS: 1
APPETITE CHANGE: 1

## 2023-09-02 ASSESSMENT — PAIN SCALES - GENERAL: PAINLEVEL: MODERATE PAIN (4)

## 2023-09-02 NOTE — PROGRESS NOTES
Monisha Velez  1950    ASSESSMENT/PLAN:   1. Dysuria  - Urine Culture  - UA reflex to Microscopic    Patient was diagnosed with urinary tract infection 2 weeks ago on 8/14/2023 completed a full 10-day course of doxycycline.  Urinalysis today returned within normal limits, no signs of recurrent UTI.  Results discussed with patient.  Reviewed that after urinary tract infection with current URI/pneumonia her bladder is under more stress.  Recommend staying well-hydrated fluids, voiding on schedule every 2 hours and allowing more time for recovery.  If her urinary incontinence continues to persist after resolution of pneumonia infection I recommend further follow-up with PCP.    2. Persistent cough for 3 weeks or longer  - XR Chest 2 Views; Future    3. Pneumonia of both lungs due to infectious organism, unspecified part of lung  - cefdinir (OMNICEF) 300 MG capsule; Take 1 capsule (300 mg) by mouth 2 times daily for 7 days  Dispense: 14 capsule; Refill: 0  - azithromycin (ZITHROMAX) 250 MG tablet; Take 2 tablets (500 mg) by mouth daily for 1 day, THEN 1 tablet (250 mg) daily for 4 days.  Dispense: 6 tablet; Refill: 0  - guaiFENesin (ROBITUSSIN) 20 mg/mL liquid; Take 10 mLs (200 mg) by mouth every 4 hours as needed for cough  Dispense: 236 mL; Refill: 0    On exam patient does have decreased breath sounds throughout bilateral lower lobes, no audible wheezing or rhonchi.  Oxygen 97%.  She is afebrile.  Patient is persistently coughing throughout office visit anytime she speaks or takes a deep breath.  I would recommend further evaluation with chest x-ray which returned showing mild bibasilar atelectasis.  Venkat reviewed with patient.  I would recommend antibiotic treatment with cefdinir twice daily for 7 days and azithromycin for 5 days.  We will also send in guaifenesin to take as needed for cough.  Patient is agreeable to this plan.  If her symptoms are not improving with this treatment, she is unable to  "tolerate oral intake, develops a fever or has worsening shortness of breath or wheezing I recommend she go to the emergency department for further evaluation.    Patient agrees with plan of care and verbalizes understating. AVS printed. Patient education provided verbally and written instructions provided as requested. Patient made aware of emergent signs and symptoms to monitor for and when to seek additional care/follow up.     SUBJECTIVE:   CHIEF COMPLAINT/ REASON FOR VISIT  Patient presents with:  Dysuria     HISTORY OF PRESENT ILLNESS  Monisha Velez is a pleasant 73 year old female [facility] with concerns of persistent cough, wheezing and shortness of breath.      Patient was seen and evaluated on 8/14/2023.  She completed 10-day course of doxycycline for treatment of acute cystitis and acute bronchitis.  She is also using albuterol inhaler as prescribed.  She also notes that she is incontinent of urine more frequently than normal, and did have incontinence last night while she was sleeping.  She is having some lower bladder discomfort.  She like to be sure her urinary tract infection is cleared.  She denies any fever, chills or body aches.    I have reviewed the nursing notes.  I have reviewed allergies, medication list, problem list, and past medical history.    REVIEW OF SYSTEMS  Review of Systems   Constitutional:  Positive for appetite change and fatigue. Negative for fever.   Respiratory:  Positive for cough, shortness of breath and wheezing.    Cardiovascular:  Negative for chest pain.   Gastrointestinal: Negative.    Genitourinary:         Urinary incontinence   Neurological:  Positive for weakness.        VITAL SIGNS  Vitals:    09/02/23 1101   BP: 122/80   BP Location: Right arm   Patient Position: Sitting   Cuff Size: Adult Large   Pulse: 60   Resp: 20   Temp: 97.7  F (36.5  C)   TempSrc: Tympanic   SpO2: 97%   Weight: 75.8 kg (167 lb)   Height: 1.613 m (5' 3.5\")      Body mass " index is 29.12 kg/m .    OBJECTIVE:   PHYSICAL EXAM  Physical Exam  Vitals reviewed.   Constitutional:       Appearance: Normal appearance. She is not ill-appearing or toxic-appearing.   HENT:      Head: Normocephalic and atraumatic.      Nose: Nose normal.   Eyes:      Conjunctiva/sclera: Conjunctivae normal.   Cardiovascular:      Rate and Rhythm: Normal rate and regular rhythm.      Pulses: Normal pulses.      Heart sounds: Normal heart sounds. No murmur heard.  Pulmonary:      Effort: Pulmonary effort is normal.      Breath sounds: Normal breath sounds.      Comments: Decreased breath sounds in bilateral lower lobes, no wheezing or rhonchi.  Musculoskeletal:      Cervical back: Neck supple. No tenderness.   Lymphadenopathy:      Cervical: No cervical adenopathy.   Skin:     Capillary Refill: Capillary refill takes less than 2 seconds.      Findings: No rash.   Neurological:      General: No focal deficit present.      Mental Status: She is alert and oriented to person, place, and time.   Psychiatric:         Mood and Affect: Mood normal.         Behavior: Behavior normal.         Thought Content: Thought content normal.         Judgment: Judgment normal.          DIAGNOSTICS  Results for orders placed or performed during the hospital encounter of 09/02/23   XR Chest 2 Views     Status: None    Narrative    PROCEDURE INFORMATION:   Exam: XR Chest   Exam date and time: 9/2/2023 11:49 AM   Age: 73 years old   Clinical indication: Chronic cough; Additional info: Persistent cough for 3   weeks or longer     TECHNIQUE:   Imaging protocol: Radiologic exam of the chest.   Views: 2 views.     COMPARISON:   CR XR CHEST 2 VIEWS 8/14/2023 10:50 AM     FINDINGS:   Lungs: No focal consolidation or pulmonary edema. Mild bibasilar subsegmental   atelectasis.   Pleural spaces: Unremarkable. No pleural effusion. No pneumothorax.   Heart/Mediastinum: Unremarkable. No cardiomegaly.   Vasculature: Atherosclerotic calcifications are  present in the aorta.   Diaphragm: Mild elevation of the left hemidiaphragm.   Bones/joints: Degenerative changes are present in the spine.       Impression    IMPRESSION:   Mild bibasilar subsegmental atelectasis.    THIS DOCUMENT HAS BEEN ELECTRONICALLY SIGNED BY MANJU FREGOSO MD   Results for orders placed or performed in visit on 09/02/23   UA reflex to Microscopic     Status: Normal   Result Value Ref Range    Color Urine Yellow Colorless, Straw, Light Yellow, Yellow    Appearance Urine Clear Clear    Glucose Urine Negative Negative mg/dL    Bilirubin Urine Negative Negative    Ketones Urine Negative Negative mg/dL    Specific Gravity Urine 1.004 1.000 - 1.030    Blood Urine Negative Negative    pH Urine 6.0 5.0 - 9.0    Protein Albumin Urine Negative Negative mg/dL    Urobilinogen Urine Normal Normal, 2.0 mg/dL    Nitrite Urine Negative Negative    Leukocyte Esterase Urine Negative Negative    Narrative    Microscopic not indicated        Katheryn Irvin NP  [facility]

## 2023-09-02 NOTE — NURSING NOTE
Pt here for continued urinary urgency and frequency.  Having abd discomfort also.  Shelia Kendrick CMA (AAMA)......................9/2/2023  10:58 AM       Medication Reconciliation: complete    Shelia Kendrick CMA  9/2/2023 10:59 AM      FOOD SECURITY SCREENING QUESTIONS:    The next two questions are to help us understand your food security.  If you are feeling you need any assistance in this area, we have resources available to support you today.    Hunger Vital Signs:  Within the past 12 months we worried whether our food would run out before we got money to buy more. Never  Within the past 12 months the food we bought just didn't last and we didn't have money to get more. Never  Shelia Kendrick CMA,LPN on 9/2/2023 at 10:59 AM

## 2023-09-04 LAB — BACTERIA UR CULT: NO GROWTH

## 2023-09-07 ENCOUNTER — TELEPHONE (OUTPATIENT)
Dept: INTERNAL MEDICINE | Facility: OTHER | Age: 73
End: 2023-09-07
Payer: OTHER GOVERNMENT

## 2023-09-07 NOTE — TELEPHONE ENCOUNTER
The patient called back and stated she is out of medicine today for one of the medicines (azithromycin).  She still has 3 left of the Cefdinir.  She has an appointment with Dr Pearson on 9/12/2023.  She is still coughing. Please advise.

## 2023-09-07 NOTE — TELEPHONE ENCOUNTER
Reason for call: Patient wanting a work in appointment.    Is the appointment for a Hospital Follow up?  No      (If yes - Unable to find an appointment with any provider during the time frame needed. Nurse/Provider - Can this patient be worked into a schedule with PCP or team member?)    Patient is having the following symptoms:  pneumonia  for 3 week    The patient is requesting an appointment with  [facility]    Was an appointment offered for this call? No    If Yes, what is the date of the appointment?  NA     Preferred method for responding to this message: Telephone Call    Phone number patient can be reached at? Home number on file [phone] (home)    If we can't reach you directly, may we leave a detailed response at the number you provided? No    Can this message wait until your PCP/provider returns if unavailable today?  NA    [facility]23 and given a prescription for double pneumonia. Patient is out of medication now. Should she be seen or can [facility] refill prescription?      Chary Awan on 9/7/2023 at 8:27 AM

## 2023-09-07 NOTE — TELEPHONE ENCOUNTER
"Advised patient that Azithromycin continues to work up to two weeks after the last dose and she should follow-up as scheduled with Dr. Pearson on 9/12/23. Also reminded her of the below advise that if symptoms were to worsen or new symptoms develop before then that she is to present to ER. Patient agrees with plan. Nothing further needed at this time. Helen Lopez RN on 9/7/2023 at 1:20 PM      Per  note SHADY Irvin NP, \"I would recommend antibiotic treatment with cefdinir twice daily for 7 days and azithromycin for 5 days. We will also send in guaifenesin to take as needed for cough. Patient is agreeable to this plan. If her symptoms are not improving with this treatment, she is unable to tolerate oral intake, develops a fever or has worsening shortness of breath or wheezing I recommend she go to the emergency department for further evaluation.\"  "

## 2023-09-12 ENCOUNTER — OFFICE VISIT (OUTPATIENT)
Dept: INTERNAL MEDICINE | Facility: OTHER | Age: 73
End: 2023-09-12
Attending: INTERNAL MEDICINE
Payer: OTHER GOVERNMENT

## 2023-09-12 VITALS
SYSTOLIC BLOOD PRESSURE: 144 MMHG | WEIGHT: 171.4 LBS | BODY MASS INDEX: 29.26 KG/M2 | HEART RATE: 62 BPM | RESPIRATION RATE: 16 BRPM | DIASTOLIC BLOOD PRESSURE: 80 MMHG | OXYGEN SATURATION: 97 % | TEMPERATURE: 97.4 F | HEIGHT: 64 IN

## 2023-09-12 DIAGNOSIS — N39.46 MIXED INCONTINENCE: ICD-10-CM

## 2023-09-12 DIAGNOSIS — J98.9 REACTIVE AIRWAY DISEASE WITHOUT ASTHMA: Chronic | ICD-10-CM

## 2023-09-12 DIAGNOSIS — J20.9 ACUTE BRONCHITIS, UNSPECIFIED ORGANISM: ICD-10-CM

## 2023-09-12 DIAGNOSIS — R05.9 COUGH, UNSPECIFIED TYPE: Primary | ICD-10-CM

## 2023-09-12 DIAGNOSIS — N18.31 STAGE 3A CHRONIC KIDNEY DISEASE (H): Chronic | ICD-10-CM

## 2023-09-12 DIAGNOSIS — I10 HYPERTENSION, UNSPECIFIED TYPE: Chronic | ICD-10-CM

## 2023-09-12 PROCEDURE — 99214 OFFICE O/P EST MOD 30 MIN: CPT | Performed by: INTERNAL MEDICINE

## 2023-09-12 RX ORDER — MIRABEGRON 25 MG/1
25 TABLET, FILM COATED, EXTENDED RELEASE ORAL DAILY
Qty: 90 TABLET | Refills: 1 | Status: SHIPPED | OUTPATIENT
Start: 2023-09-12 | End: 2024-03-13

## 2023-09-12 RX ORDER — BENZONATATE 100 MG/1
100 CAPSULE ORAL 3 TIMES DAILY PRN
Qty: 60 CAPSULE | Refills: 3 | Status: SHIPPED | OUTPATIENT
Start: 2023-09-12 | End: 2024-03-13

## 2023-09-12 RX ORDER — ALBUTEROL SULFATE 90 UG/1
2 AEROSOL, METERED RESPIRATORY (INHALATION) EVERY 4 HOURS PRN
Qty: 18 G | Refills: 2 | Status: SHIPPED | OUTPATIENT
Start: 2023-09-12 | End: 2023-12-02

## 2023-09-12 ASSESSMENT — PAIN SCALES - GENERAL: PAINLEVEL: SEVERE PAIN (6)

## 2023-09-12 NOTE — PROGRESS NOTES
"  Assessment & Plan     Cough, unspecified type  -Given that she has just finished her antibiotics we will continue to monitor for improvement.  She is provided some Tessalon Perles.  If she has recurrent or worsening symptoms she is to let us know we will need to consider more advanced imaging and further evaluation.  - benzonatate (TESSALON) 100 MG capsule; Take 1 capsule (100 mg) by mouth 3 times daily as needed for cough    Stage 3a chronic kidney disease (H)  Continue to follow periodically    Hypertension, unspecified type  Likely related to acute illness however continue to monitor and encouraged to check at home.    Mixed incontinence  Given that she has failed multiple medications in the past including oxybutynin and tolterodine we will try Myrbetriq.  - mirabegron (MYRBETRIQ) 25 MG 24 hr tablet; Take 1 tablet (25 mg) by mouth daily To replace Tolterodine    Reactive airway disease that is not asthma  Provided an inhaler to use as needed  - albuterol (PROAIR HFA/PROVENTIL HFA/VENTOLIN HFA) 108 (90 Base) MCG/ACT inhaler; Inhale 2 puffs into the lungs every 4 hours as needed for cough, wheezing or shortness of breath    Acute bronchitis, unspecified organism  See above  - albuterol (PROAIR HFA/PROVENTIL HFA/VENTOLIN HFA) 108 (90 Base) MCG/ACT inhaler; Inhale 2 puffs into the lungs every 4 hours as needed for cough, wheezing or shortness of breath     BMI:   Estimated body mass index is 29.89 kg/m  as calculated from the following:    Height as of this encounter: 1.613 m (5' 3.5\").    Weight as of this encounter: 77.7 kg (171 lb 6.4 oz).   Weight management plan: Discussed healthy diet and exercise guidelines    Return in about 6 months (around 3/12/2024) for Annual Review with renewal of all medications.    Raeann Pearson, [facility]    Subjective   Monisha is a 73 year old, presenting for the following health issues:  Recheck Medication and Bladder Problems        9/12/2023    10:49 AM " "  Additional Questions   Roomed by barbi damon CNA       History of Present Illness       Reason for visit:  Medication check, pneumonia follow, bladder problem    She eats 2-3 servings of fruits and vegetables daily.She consumes 0 sweetened beverage(s) daily.She exercises with enough effort to increase her heart rate 9 or less minutes per day.  She exercises with enough effort to increase her heart rate 3 or less days per week.   She is taking medications regularly.       Medication Followup of refill of medications  Taking Medication as prescribed: yes  Side Effects:  None  Medication Helping Symptoms:  yes    She has had sore ribs from coughing.  She recently had bilateral pneumonia and treated with Cefdinir and Azithromycin and has improved some.  She is very fatigued but has been trying to walk.  She continues to cough but not really SOB.      She been having ongoing urinary symptoms. She is taking Tolteradine three times weekly and does have some side effects when she takes more.      She was prescribed oxybutynin 15mg daily but dry mouth was a dose limiting side effect. Unfortunately since starting the Tolterodine she has had daily headaches.    Review of Systems   No recent fevers, no chest pain but some MSK wall pain.       Objective    BP (!) 144/80 (BP Location: Right arm, Patient Position: Sitting, Cuff Size: Adult Regular)   Pulse 62   Temp 97.4  F (36.3  C) (Temporal)   Resp 16   Ht 1.613 m (5' 3.5\")   Wt 77.7 kg (171 lb 6.4 oz)   LMP 06/01/1990   SpO2 97%   BMI 29.89 kg/m    Body mass index is 29.89 kg/m .  Physical Exam   GEN: Vitals reviewed. Healthy appearing. Patient is in no acute distress. Cooperative with exam.  HEENT: Normocephalic atraumatic.  Eyes grossly normal to inspection.  No discharge or erythema, or obvious scleral/conjunctival abnormalities.   NECK: Supple; no thyromegaly or masses noted.  No cervical or supraclavicular lymphadenopathy.  CV: Heart regular in rate and rhythm with " no murmur.    LUNGS: No audible wheeze, cough, or visible cyanosis.  No visible retractions or increased work of breathing.  Lungs clear to auscultation bilaterally.    ABD:  nondistended  SKIN: Warm and dry to touch.  Visible skin clear. No significant rash, abnormal pigmentation or lesions.  EXT: No clubbing or cyanosis. Trace peripheral edema.

## 2023-09-12 NOTE — NURSING NOTE
"Chief Complaint   Patient presents with    Recheck Medication    Bladder Problems       Initial BP (!) 147/76 (BP Location: Right arm, Patient Position: Sitting, Cuff Size: Adult Regular)   Pulse 62   Temp 97.4  F (36.3  C) (Temporal)   Resp 16   Ht 1.613 m (5' 3.5\")   Wt 77.7 kg (171 lb 6.4 oz)   LMP 06/01/1990   SpO2 97%   BMI 29.89 kg/m   Estimated body mass index is 29.89 kg/m  as calculated from the following:    Height as of this encounter: 1.613 m (5' 3.5\").    Weight as of this encounter: 77.7 kg (171 lb 6.4 oz).    FOOD SECURITY SCREENING QUESTIONS:    The next two questions are to help us understand your food security.  If you are feeling you need any assistance in this area, we have resources available to support you today.    Hunger Vital Signs:  Within the past 12 months we worried whether our food would run out before we got money to buy more. Never  Within the past 12 months the food we bought just didn't last and we didn't have money to get more. Never    Santa White CNA    Advance Care Directive reviewed  "

## 2023-09-12 NOTE — PATIENT INSTRUCTIONS
Pay attention to your urinary symptoms on the days you take Tolterodine vs the days you don't.  See if you get a benefit.  Call PT and get set up with [facility] again.    Continue Tolterodine until the new med, Mybetriq is available from the pharmacy    If your cough does not gradually improve over the next 2-3 weeks please be seen to consider more advanced imaging of the lungs. Continue to use the inhaler every 4 hours as needed

## 2023-09-19 NOTE — PROGRESS NOTES
9/19/2023   DISCHARGE  Pt was last seen in PT on 7-25-23. She then cancelled her next appoinment due to illness. She has failed to schedule further PT since that time. Refer to note below for subjective report at the time of her last session.     Current objective status and progress toward goals unknown due to unplanned discharge.     Reason for Discharge: Patient has failed to schedule further appointments.    Discharge Plan: Patient to continue home program.    Referring Provider:  Nafisa Escobar       07/25/23 0500   Appointment Info   Signing clinician's name / credentials Sindy Siddiqi, PT   Visits Used 4   Medical Diagnosis Chronic midline low back pain without sciatica (M54.50, G89.29) and Muscle atrophy of lower extremity (M62.58).   PT Tx Diagnosis impaired mobility due to LBP, LE weakness   Quick Adds Certification   Progress Note/Certification   Start of Care Date 07/05/23   Onset of illness/injury or Date of Surgery 04/26/23   Therapy Frequency 1-2 times per week   Predicted Duration 3-6 months   Certification date from 07/05/23   Certification date to 10/02/23   GOALS   PT Goals 2;3;4;5   PT Goal 1   Goal Identifier HEP   Goal Description Pt to display independence and compliance with her home exercises 5/7 days per week to assist in improving pain level, balance and strength for safe mobility   Target Date 10/02/23   PT Goal 2   Goal Identifier LBP   Goal Description Pt to display symmetrical lumbar spine to allow her to perform moderate household tasks, yard work with pain no higher than 4/10   Target Date 10/02/23   PT Goal 3   Goal Identifier LE strength   Goal Description Improve LE strength to at leasat grade 4/5 throughout to allow patient to manage stairs and get up and down from the floor with 50% reported greater ease.   Target Date 10/02/23   PT Goal 4   Goal Identifier Bladder   Goal Description Pt to display improved myofascial mobility for more symmetrical loading at the pelvis to  allow pt to report improved bladder control with less incontinence 3/5 days per week.   Target Date 10/02/23   PT Goal 5   Goal Identifier Balance   Goal Description Pt to display improved LE strength and balance  to allow her to perform SLS for 5 seconds with minimal substitution to carryover into improved gait safety.   Subjective Report   Subjective Report Notes that she is moving better. Is taking zyrtec at night and it helps her sleep.  Sees Dr. Pearson in early August. Notes that her bladder  is doing pretty good. Went out on the boat and did ok--no issue with incontinence.   Knees are better, less swollen. Lower back is sore. Worked outside yesterday, running the wood splitter but did ok with that. Is still using her cane when she walks out to get the mail.   Objective Measures   Objective Measures Objective Measure 1;Objective Measure 2;Objective Measure 3;Objective Measure 4   Objective Measure 1   Objective Measure postural loading   Details Overall posture general mobility improved with less stiffness. General listening to the left anterior pelvis. Postural loading: improved stability and balance noted with loading. Head R/R and L/R. Shoulders R/R and L/R. Pelvis R/R, L/L and L/R.   Objective Measure 2   Objective Measure myofascial   Details Local listening to the L endopelvic fascia.  Fascial tightness is improved. Restrictions noted in the L endopelvic fascia, L pubovesical ligament, L suprapubic pelvic floor, L hip adductors. Decreased R SB and rotation of the bladder with signficant tightness and tenderness in lui L vesical fascia   Objective Measure 3   Objective Measure segmental mobility   Details ERS L L45   Objective Measure 4   Objective Measure LE   Treatment Interventions (PT)   Interventions Therapeutic Procedure/Exercise;Neuromuscular Re-education;Gait Training;Manual Therapy   Therapeutic Procedure/Exercise   Therapeutic Procedures: strength, endurance, ROM, flexibillity minutes (50331) 2    Ther Proc 1 ther ex   Ther Proc 1 - Details Review of standing hip abduction and standing hip extension which pt has done in the past--weill add these back into her program. Will use UE support with both.   Skilled Intervention To promote improved ROM/flexibility, strength, pain management.   Patient Response/Progress No charge   Neuromuscular Re-education   Neuromuscular re-ed of mvmt, balance, coord, kinesthetic sense, posture, proprioception minutes (21151) 13   Neuromuscular Re-education Neuro Re-ed 2   Neuro Re-ed 1 POF, induction   Neuro Re-ed 1 - Details POF bladder. Induction R to L bladder.   Neuro Re-ed 2 MET   Neuro Re-ed 2 - Details ERS L L45   Skilled Intervention To promote improved posture, pain control, joint and myofascial mobility.   Patient Response/Progress improved  lumbar symmetry   Manual Therapy   Manual Therapy: Mobilization, MFR, MLD, friction massage minutes (82674) 30   Manual Therapy 1 MFR/OSFM (organ specifc fascial mobilization)   Manual Therapy 1 - Details L and R endopelvic fascia, L vesical fascia, L pubovesical ligament, suprapubic pelvic floor, hip adductors  L, L psoas. Direct stretch to bladder R and L SB and rotation.   Skilled Intervention To promote improved myofascial and joint mobility, pain control. ROM/flexibility.   Patient Response/Progress very tender with work to the L side of the bladder but improving--more localized restrictions.   Education   Learner/Method Patient   Education Comments eval findings, POC, exercise handouts. Will need reinforcement   Plan   Home program glut sets, quad sets, seated hip flexion/march, romberg--eyes open at counter. Hip abduction hooklying with red theraband, LTR, sit to stand elevated height.. Standing hip abduction with UE support, standing hip extension with UE support   Plan for next session Continue assessment, manual therapy, N-M re-ed, therapeutic exercise, HEP   Total Session Time   Timed Code Treatment Minutes 45   Total  Treatment Time (sum of timed and untimed services) 45     Sindy Siddiqi, PT on 9/19/2023 at 7:40 AM

## 2023-10-16 ENCOUNTER — PATIENT OUTREACH (OUTPATIENT)
Dept: GASTROENTEROLOGY | Facility: CLINIC | Age: 73
End: 2023-10-16
Payer: OTHER GOVERNMENT

## 2023-10-17 DIAGNOSIS — N39.46 MIXED INCONTINENCE: ICD-10-CM

## 2023-10-24 RX ORDER — TOLTERODINE 2 MG/1
2 CAPSULE, EXTENDED RELEASE ORAL DAILY
Qty: 60 CAPSULE | Refills: 0 | Status: SHIPPED | OUTPATIENT
Start: 2023-10-24 | End: 2023-11-19

## 2023-10-24 NOTE — TELEPHONE ENCOUNTER
[facility] in #[id number] in [facility] [place] sent Rx request for the following:      Requested Prescriptions   Pending Prescriptions Disp Refills    tolterodine ER (DETROL LA) 2 MG 24 hr capsule [Pharmacy Med Name: TOLTERODINE TART ER 2 MG CAP] 90 capsule 3     Sig: TAKE 1 CAPSULE BY MOUTH EVERY DAY       Last Prescription Date:   3/10/23  Last Fill Qty/Refills:         90, R-3    Last Office Visit:              9/12/23   Future Office visit:           3/13/24    Prescription approved per [facility] Refill Protocol.     ERMA JIMENEZ RN on 10/24/2023 at 11:25 AM

## 2023-11-19 DIAGNOSIS — N39.46 MIXED INCONTINENCE: ICD-10-CM

## 2023-11-19 RX ORDER — TOLTERODINE 2 MG/1
2 CAPSULE, EXTENDED RELEASE ORAL DAILY
Qty: 90 CAPSULE | Refills: 1 | Status: SHIPPED | OUTPATIENT
Start: 2023-11-19 | End: 2024-03-13

## 2023-11-28 DIAGNOSIS — R73.03 PREDIABETES: ICD-10-CM

## 2023-11-28 DIAGNOSIS — E78.2 MIXED HYPERLIPIDEMIA: ICD-10-CM

## 2023-11-28 RX ORDER — ATORVASTATIN CALCIUM 10 MG/1
TABLET, FILM COATED ORAL
Qty: 36 TABLET | Refills: 1 | Status: SHIPPED | OUTPATIENT
Start: 2023-11-28 | End: 2024-03-12

## 2023-11-28 NOTE — TELEPHONE ENCOUNTER
[facility] sent Rx request for the following:      Requested Prescriptions   Pending Prescriptions Disp Refills    atorvastatin (LIPITOR) 10 MG tablet [Pharmacy Med Name: ATORVASTATIN 10MG TABLETS] 36 tablet 1     Sig: TAKE 1/2 TABLET BY MOUTH THREE TIMES PER WEEK        Last Prescription Date:   8/2/23  Last Fill Qty/Refills:         36, R-1    Last Office Visit:              9/12/23   Future Office visit:           3/13/24    Prescription approved per [facility] Refill Protocol.    ERMA JIMENEZ RN on 11/28/2023 at 11:53 AM

## 2023-11-29 ENCOUNTER — OFFICE VISIT (OUTPATIENT)
Dept: INTERNAL MEDICINE | Facility: OTHER | Age: 73
End: 2023-11-29
Attending: INTERNAL MEDICINE
Payer: OTHER GOVERNMENT

## 2023-11-29 ENCOUNTER — HOSPITAL ENCOUNTER (OUTPATIENT)
Dept: GENERAL RADIOLOGY | Facility: OTHER | Age: 73
Discharge: HOME | End: 2023-11-29
Attending: INTERNAL MEDICINE
Payer: OTHER GOVERNMENT

## 2023-11-29 VITALS
HEIGHT: 64 IN | TEMPERATURE: 97.5 F | WEIGHT: 167.8 LBS | BODY MASS INDEX: 28.65 KG/M2 | HEART RATE: 83 BPM | OXYGEN SATURATION: 96 % | DIASTOLIC BLOOD PRESSURE: 78 MMHG | RESPIRATION RATE: 16 BRPM | SYSTOLIC BLOOD PRESSURE: 132 MMHG

## 2023-11-29 DIAGNOSIS — L30.0 NUMMULAR ECZEMA: ICD-10-CM
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
## 2023-11-29 DIAGNOSIS — S89.91XA KNEE INJURY, RIGHT, INITIAL ENCOUNTER: Primary | ICD-10-CM

## 2023-11-29 DIAGNOSIS — R07.81 RIB PAIN ON RIGHT SIDE: ICD-10-CM

## 2023-11-29 DIAGNOSIS — S89.91XA KNEE INJURY, RIGHT, INITIAL ENCOUNTER: ICD-10-CM

## 2023-11-29 DIAGNOSIS — R09.81 SINUS CONGESTION: ICD-10-CM

## 2023-11-29 DIAGNOSIS — H93.8X1 CLOGGED EAR, RIGHT: ICD-10-CM

## 2023-11-29 DIAGNOSIS — F51.04 CHRONIC INSOMNIA: ICD-10-CM

## 2023-11-29 LAB — SARS-COV-2 RNA RESP QL NAA+PROBE: NEGATIVE

## 2023-11-29 PROCEDURE — 71101 X-RAY EXAM UNILAT RIBS/CHEST: CPT | Mod: RT

## 2023-11-29 PROCEDURE — 87635 SARS-COV-2 COVID-19 AMP PRB: CPT | Mod: ZL | Performed by: INTERNAL MEDICINE

## 2023-11-29 PROCEDURE — 73562 X-RAY EXAM OF KNEE 3: CPT | Mod: RT

## 2023-11-29 PROCEDURE — 99214 OFFICE O/P EST MOD 30 MIN: CPT | Performed by: INTERNAL MEDICINE

## 2023-11-29 PROCEDURE — C9803 HOPD COVID-19 SPEC COLLECT: HCPCS | Performed by: INTERNAL MEDICINE

## 2023-11-29 RX ORDER — TRAZODONE HYDROCHLORIDE 50 MG/1
25 TABLET, FILM COATED ORAL
Qty: 45 TABLET | Refills: 1 | Status: SHIPPED | OUTPATIENT
Start: 2023-11-29 | End: 2024-03-13 | Stop reason: ALTCHOICE

## 2023-11-29 RX ORDER — TRIAMCINOLONE ACETONIDE 5 MG/G
CREAM TOPICAL 2 TIMES DAILY
Qty: 15 G | Refills: 1 | Status: SHIPPED | OUTPATIENT
Start: 2023-11-29

## 2023-11-29 ASSESSMENT — PAIN SCALES - GENERAL: PAINLEVEL: SEVERE PAIN (6)

## 2023-11-29 NOTE — PROGRESS NOTES
Assessment & Plan     Knee injury, right, initial encounter  X-ray obtained of the knee shows no obvious acute fracture.  If she has ongoing issues would recommend further evaluation with sports medicine and possible MRI.  - XR Knee Right 3 Views; Future    Nummular eczema  Skin lesion is consistent with nummular eczema.  Recommend topical steroid.  - triamcinolone (ARISTOCORT HP) 0.5 % external cream; Apply topically 2 times daily To lesion on thigh    Sinus congestion  -Given new onset sinus congestion after recent pneumonia recommend COVID testing.  Completed today and negative.  If she has continued symptoms would recommend repeat testing in 2 to 3 days.  - Symptomatic COVID-19 Virus (Coronavirus) by PCR Nose    Clogged ear, right  Likely related to sinus congestion.  No cerumen impaction noted.    Chronic insomnia  Trial low-dose trazodone again for sleep.  She is to monitor for any side effects.  - traZODone (DESYREL) 50 MG tablet; Take 0.5 tablets (25 mg) by mouth nightly as needed for sleep    Rib pain on right side  -X-ray of the ribs today shows no acute abnormality.  Likely bruised area or occult fracture.  Recommend deep breathing, pain control and monitor for any recurrent symptoms of pneumonia.  - XR Ribs & Chest Rt 3vw; Future    Fall, initial encounter  See above  - XR Knee Right 3 Views; Future  - XR Ribs & Chest Rt 3vw; Future    Return in about 4 months (around 3/29/2024) for as scheduled.    Raeann Pearson, [facility]    [facility]   Monisha is a 73 year old, presenting for the following health issues:  Fall (Right ear pain from fall)        11/29/2023     3:36 PM   Additional Questions   Roomed by Akua EDWARDS       History of Present Illness       Reason for visit:  Knee leg discomfort right ear  Symptom onset:  3-4 weeks ago    She eats 2-3 servings of fruits and vegetables daily.She exercises with enough effort to increase her heart rate 10 to 19 minutes per day.    She  "is taking medications regularly.     She slipped in the shower about 3 weeks ago after she had pneumonia.  She has a painful area on the right ribs and is not aware if it was there before or after the fall.  She also hit her right knee and is having some pain with bending the knee and this affects her sleep.      She feels like her right ear is clogged with balance issues after having pneumonia.  She also started having some sinus congestion last night again.    She has a history of chronic insomnia.  She has been taking 10 mg of melatonin nightly however does not feel that this is helping.    She has a skin lesion on her right leg she would like looked at today.        Review of Systems   Denies any fevers, chills, chest pain, shortness of breath      Objective    /78 (BP Location: Right arm, Patient Position: Sitting, Cuff Size: Adult Regular)   Pulse 83   Temp 97.5  F (36.4  C) (Temporal)   Resp 16   Ht 1.613 m (5' 3.5\")   Wt 76.1 kg (167 lb 12.8 oz)   LMP 06/01/1990   SpO2 96%   BMI 29.26 kg/m    Body mass index is 29.26 kg/m .  Physical Exam   GEN: Vitals reviewed. Healthy appearing. Patient is in no acute distress. Cooperative with exam.  HEENT: Normocephalic atraumatic.  Eyes grossly normal to inspection.  No discharge or erythema, or obvious scleral/conjunctival abnormalities.  External auditory canal clear bilaterally.  NECK: Supple; no thyromegaly or masses noted.  No cervical or supraclavicular lymphadenopathy.  CV: Heart regular in rate and rhythm with no murmur.    LUNGS: No audible wheeze, cough, or visible cyanosis.  No visible retractions or increased work of breathing.  Lungs clear to auscultation bilaterally.    ABD:  Obese, nondistended  SKIN: Warm and dry to touch.  Visible skin clear. No significant rash, abnormal pigmentation or lesions.  EXT: No clubbing or cyanosis.  No peripheral edema.  Slight swelling noted around the right knee with pain on palpation of the medial knee.  " Right thigh with skin lesion, erythematous with ringed appearance, measuring approximately 1.5 cm.  MSK: Tenderness to palpation on the right rib cage.

## 2023-11-29 NOTE — NURSING NOTE
"Chief Complaint   Patient presents with    Fall     Right ear pain from fall     Patient presents to the clinic today for right ear pain from a fall in her shower  Initial LMP 06/01/1990  Estimated body mass index is 29.89 kg/m  as calculated from the following:    Height as of 9/12/23: 1.613 m (5' 3.5\").    Weight as of 9/12/23: 77.7 kg (171 lb 6.4 oz).  Meds Reconciled: complete      Akua Fox LPN,LPN on 11/29/2023 at 3:37 PM  Ext. [phone]        Akua Fox LPN  "

## 2023-12-02 ENCOUNTER — VIRTUAL VISIT (OUTPATIENT)
Dept: URGENT CARE | Facility: CLINIC | Age: 73
End: 2023-12-02
Payer: OTHER GOVERNMENT

## 2023-12-02 DIAGNOSIS — U07.1 CLINICAL DIAGNOSIS OF COVID-19: Primary | ICD-10-CM

## 2023-12-02 DIAGNOSIS — J98.9 REACTIVE AIRWAY DISEASE WITHOUT ASTHMA: Chronic | ICD-10-CM

## 2023-12-02 PROCEDURE — 99213 OFFICE O/P EST LOW 20 MIN: CPT | Mod: VID

## 2023-12-02 RX ORDER — ALBUTEROL SULFATE 90 UG/1
2 AEROSOL, METERED RESPIRATORY (INHALATION) EVERY 4 HOURS PRN
Qty: 18 G | Refills: 2 | Status: SHIPPED | OUTPATIENT
Start: 2023-12-02 | End: 2024-03-13

## 2023-12-02 NOTE — PATIENT INSTRUCTIONS
For informational purposes only. Not to replace the advice of your health care provider. Copyright   2022 [facility]. All rights reserved. Clinically reviewed by Mary Carmen Armendariz, PharmD, BCACP. [employer] [id number] - REV 06/23.  COVID-19 Outpatient Treatments  Your care team can help you find the best treatments for COVID-19. Talk to a health care provider or refer to the FDA medicine fact sheets below.  Paxlovid (nirmatrelvir and ritonavir): https://www.paxlovid.Qbix/resources  Molnupiravir (Lagevrio): https://www.fda.gov/media/016849/download  Important: We can only prescribe Paxlovid or Molnupiravir when it can be started within 5 days of first having symptoms.  Paxlovid (nimatrelvir and ritonavir)  How it works  Two medicines (nirmatrelvir and ritonavir) are taken together. They stop the virus from growing. Less amount of virus is easier for your body to fight.  Benefits  Lowers risk of a hospital stay or death from COVID-19.  How to take  Medicine comes in a daily container with both medicine tablets. Take by mouth twice daily (once in the morning, once at night) for 5 days.  The number of tablets to take varies by patient.  Don't chew or break capsules. Swallow whole.  When to take  Take it as soon as possible and within 5 days of your first symptoms.  Who can take it  Patients must be 12 years or older weigh at least 88 pounds. Paxlovid is the preferred treatment for pregnant patients.  Possible side effects  Can cause altered sense of taste, diarrhea (loose, watery stools), high blood pressure, muscle aches.  Medicine conflicts  Some medicines may conflict with Paxlovid and may cause serious side effects.  Tell your care team about all the medicines you take, including prescription and over-the-counter medicines, vitamins, and herbal supplements.  Your care team will review your medicines to make sure that you can safely take Paxlovid.  Cautions  Paxlovid is not advised for patients with severe  kidney or liver disease. If you have kidney or liver problems, the dose may need to be changed.  If you're pregnant or breastfeeding, talk to your care team about your options.  If you take hormonal birth control (such as the Pill), then you or your partner should also use a non-hormonal form of birth control (such as a condom). Keep doing this for 1 menstrual cycle after your last dose of Paxlovid.  Molnupiravir (lagevrio)  How it works  Stops the virus from growing. Less amount of virus is easier for your body to fight.  Benefits  Lowers risk of a hospital stay or death from COVID-19.  How to take  Take 4 capsules by mouth every 12 hours (4 in the morning and 4 at night) for 5 days. Don't chew or break capsules. Swallow whole.  When to take  Take as soon as possible and within 5 days of your first symptoms.  Who can take it  Patients must be 18 years or older.   Possible side effects  Diarrhea (loose, watery stools), nausea (feeling sick to your stomach), dizziness, headaches.  Medicine conflicts  Right now, there are no known conflicts with other drugs. But tell your care team about all medicines you take.  Cautions  This medicine is not advised for patients who are pregnant.  If you are someone who could become pregnant, use trusted birth control until 4 days after your last dose of molnupiravir.  If your partner could become pregnant, you should use trusted birth control until 3 months after your last dose of molnupiravir.

## 2023-12-02 NOTE — PROGRESS NOTES
"Monisha is a 73 year old who is being evaluated via a billable video visit.      How would you like to obtain your AVS? MyChart  If the video visit is dropped, the invitation should be resent by: Text to cell phone: [phone]  Will anyone else be joining your video visit? No          Assessment & Plan     Reactive airway disease that is not asthma    Refilled inhaler.    - albuterol (PROAIR HFA/PROVENTIL HFA/VENTOLIN HFA) 108 (90 Base) MCG/ACT inhaler; Inhale 2 puffs into the lungs every 4 hours as needed for cough, wheezing or shortness of breath      Clinical diagnosis of COVID-19    Treat with Paxlovid.    - nirmatrelvir and ritonavir (PAXLOVID) 300 mg/100 mg therapy pack; Take 3 tablets by mouth 2 times daily for 5 days (Take 2 Nirmatrelvir tablets and 1 Ritonavir tablet twice daily for 5 days)             COVID-19 positive patient.  Encounter for consideration of medication intervention. Patient does qualify for a prescription. Full discussion with patient including medication options, risks and benefits. Potential drug interactions reviewed with patient.     Treatment Planned  Paxlovid  [facility]  Temporary change to home medications: Hold atorvastatin    Estimated body mass index is 29.26 kg/m  as calculated from the following:    Height as of 11/29/23: 1.613 m (5' 3.5\").    Weight as of 11/29/23: 76.1 kg (167 lb 12.8 oz).  GFR Estimate   Date Value Ref Range Status   08/14/2023 61 >60 mL/min/1.73m2 Final   02/09/2021 48 (L) >60 mL/min/[1.73_m2] Final     Lab Results   Component Value Date    [id number] Negative 11/29/2023       No follow-ups on file.    Virtual Urgent Care  [facility] VIRTUAL URGENT CARE    Subjective   Monisha is a 73 year old, presenting for the following health issues:  No chief complaint on file.      HPI       COVID-19 Symptom Review  How many days ago did these symptoms start? 11/30    Are any of the following symptoms significant for you?  New or worsening difficulty breathing? " No  Worsening cough? Yes, I am coughing up mucus.  Fever or chills? Yes, I felt feverish or had chills.  Headache: YES  Sore throat: YES  Chest pain: YES- tightness  Diarrhea: No  Body aches? YES    What treatments has patient tried? Nonsteroidals and Cough syrup   Does patient live in a nursing home, group home, or shelter? No  Does patient have a way to get food/medications during quarantined? Yes, I have a friend or family member who can help me.            Review of Systems   Constitutional, HEENT, cardiovascular, pulmonary, gi and gu systems are negative, except as otherwise noted.      Objective           Vitals:  No vitals were obtained today due to virtual visit.    Physical Exam   GENERAL: Healthy, alert and no distress  EYES: Eyes grossly normal to inspection.  No discharge or erythema, or obvious scleral/conjunctival abnormalities.  HENT: Normal cephalic/atraumatic.  External ears, nose and mouth without ulcers or lesions.  No nasal drainage visible.  NECK: No asymmetry, visible masses or scars  RESP: No audible wheeze, cough, or visible cyanosis.  No visible retractions or increased work of breathing.    SKIN: Visible skin clear. No significant rash, abnormal pigmentation or lesions.  NEURO: Cranial nerves grossly intact.  Mentation and speech appropriate for age.  PSYCH: Mentation appears normal, affect normal/bright, judgement and insight intact, normal speech and appearance well-groomed.            Video-Visit Details    Type of service:  Video Visit     Originating Location (pt. Location): Home    Distant Location (provider location):  Off-site  Platform used for Video Visit: [employer]

## 2023-12-04 ENCOUNTER — TELEPHONE (OUTPATIENT)
Dept: INTERNAL MEDICINE | Facility: OTHER | Age: 73
End: 2023-12-04
Payer: OTHER GOVERNMENT

## 2023-12-04 NOTE — TELEPHONE ENCOUNTER
Called and spoke with patient .  Patient states she tested positive for COVID on Thursday 11/30.  States is on Paxlovid.  Informed patient that you can test positive for up to 3 months.  Reviewed positive COVID letter with patient and sent copy to her my chart.  Patient also referred to CDC website.    Patient asked that a copy of the letter be sent to her  also as he was positive last week.  Kendy Stroud RN on 12/4/2023 at 11:37 AM

## 2023-12-04 NOTE — LETTER
December 4, 2023      Monisha Velez  [address] [place] [address]          COVID-19 Virus PCR to [facility] - Result (no units)   Date Value   04/08/2021     Test received-See reflex to [facility] test SARS CoV2 (COVID-19) Virus RT-PCR     SARS-CoV-2 PCR Result (no units)   Date Value   04/08/2021 NEGATIVE     SARS CoV2 PCR (no units)   Date Value   11/29/2023 Negative          This letter provides a written record that you were tested for COVID-19. Your result was positive. This means you have COVID-19 (coronavirus).     Is there medicine to treat COVID-19?   Yes, there are two kinds of treatment: Antiviral (virus-killing) medicine and antibody treatment (called monoclonal antibodies). These have been proven safe and effective. They may make you feel better faster, keep you out of the hospital, and prevent death.   It s very important to take them early in your illness before you get worse.     Who should take this medicine?   These treatments are for people who are not in the hospital, but they re at risk of getting very sick from COVID. This includes people who:        Are over age 65        Are members of the [employer] community (black, indigenous and people of color)        Are overweight (body mass index is over 25)        Are inactive (don t exercise)        Are pregnant        Smoke or vape (now or in the past)        Have a disability        Have any of the following health problems: Diabetes, high blood pressure, cancer, heart problems, liver disease, lung disease, kidney disease, sickle cell disease, cystic fibrosis (CF), dementia and other neurological (brain) diseases, HIV, thalassemia or tuberculosis        Have ever had a stroke, organ transplant or blood cell transplant        Have a mental health problem or substance abuse disorder (drugs, alcohol)        Have a weak immune system (are immuno-compromised)     If your risk is high, you may be eligible for treatment.    To schedule an  appointment to discuss COVID-19 treatment, you can:    ? Call your family clinic. Or, dial [phone] ([phone]) and press [phone].    ? Go to [email or web address] (click  Schedule your appointment ).    ? Request an appointment on [employer] (select  COVID-19 Treatment ).   How can I take care of myself?   1. Get lots of rest. Drink extra fluids (unless a doctor has told you not to).   2. Take acetaminophen (Tylenol) for fever or pain. If you have liver or kidney problems, ask your family doctor if it s okay to take acetaminophen (Tylenol).     Take either:        650 mg (two 325 mg pills) every 4 to 6 hours (up to 10 pills per day), or         1,000 mg (two 500 mg pills) every 6 hours as needed (up to 6 pills per day).        Note: Don t take more than 3,000 mg of acetaminophen (Tylenol) in one day. Acetaminophen is found in many medicines (both prescribed and over-the-counter medicines). Read all labels to be sure you don t take too much.     For children        Check the acetaminophen (Tylenol) bottle for the right dose (based on their age or weight.)        Don t give children more than 1,625 mg of acetaminophen in one day.   3. If you have other health problems (like cancer, heart failure, an organ transplant or severe kidney disease): Call your specialty clinic if you don t feel better in the next 2 days.     4. Know when to call 911: Emergency warning signs include:        Trouble breathing or shortness of breath        Pain or pressure in the chest that doesn t go away        Feeling confused like you haven t felt before, or not being able to wake up        Bluish-colored lips or face     How can I protect others?   If you DO have symptoms:         Stay home and away from others (self-isolate):    ? For at least 5 days after your symptoms started, AND UNTIL    ? You ve had no fever for 24 hours--with no medicine that reduces fever, AND    ? Your other symptoms (such as a cough) are better.     Wear a mask or face covering for 10 full days anytime you re around other people.     If you DON T have symptoms:    Stay at home and away from others for at least 5 days after your positive test.    Wear a mask or face covering for 10 full days anytime you re around other people.     If you were severely ill from COVID-19 or have a weak immune system, stay at home and away from others for at least 10 days.     During self-isolation:        Stay home until it s safe to be around others.        At home, stay away from other people and pets. Or, wear a well-fitting mask when you need to be around others.        Monitor your symptoms. If you have any emergency warning signs (including trouble breathing), seek emergency medical care right away.        Stay in a separate room from other household members, if possible.        Use a separate bathroom, if possible.        Improve ventilation at home, if possible.        Don t share personal household items, like cups, towels, and utensils.     If you plan to visit a clinic or hospital, please check their visitor guidelines before you arrive--healthcare sites may have different rules. If you were tested because you re going to have surgery or another procedure, contact your care team for next steps.       You should NOT go back to work until you meet the guidelines above for ending your home isolation. You don t need to be re-tested for COVID-19 before going back to work. Studies show that you won t spread the virus if it s been at least 10 days since your symptoms started (or 20 days if you have a weak immune system).   Employers, schools and daycares: This document serves as formal notice of medical guidelines before your employee or student can return to work or school. They must meet the above guidelines before going back in person.   Where can I get more information?     [employer] [place]: [email or web address]    What to Do If You re Sick:  www.cdc.gov/coronavirus/2019-ncov/if-you-are-sick/steps-when-sick.html    Ending Home Isolation:   www.cdc.gov/coronavirus/2019-ncov/your-health/quarantine-isolation.html    [facility] clinical trials (COVID-19 research studies): [email or web address]

## 2023-12-04 NOTE — TELEPHONE ENCOUNTER
Pt called and tested positive for Covid last Friday. Her  tested positive last Thursday.   Patient is wondering when they can be retested again. She is requesting a phone call to her home phone number: [phone]    Fernanda Lee on 12/4/2023 at 10:09 AM

## 2024-02-27 ENCOUNTER — HOSPITAL ENCOUNTER (OUTPATIENT)
Dept: MAMMOGRAPHY | Facility: OTHER | Age: 74
Discharge: HOME | End: 2024-02-27
Attending: INTERNAL MEDICINE | Admitting: INTERNAL MEDICINE
Payer: OTHER GOVERNMENT

## 2024-02-27 DIAGNOSIS — Z12.31 VISIT FOR SCREENING MAMMOGRAM: ICD-10-CM

## 2024-02-27 PROCEDURE — 77063 BREAST TOMOSYNTHESIS BI: CPT

## 2024-03-12 ENCOUNTER — TELEPHONE (OUTPATIENT)
Dept: INTERNAL MEDICINE | Facility: OTHER | Age: 74
End: 2024-03-12
Payer: OTHER GOVERNMENT

## 2024-03-12 RX ORDER — CLOBETASOL PROPIONATE 0.5 MG/G
CREAM TOPICAL
COMMUNITY
Start: 2024-01-15 | End: 2024-09-18

## 2024-03-12 NOTE — TELEPHONE ENCOUNTER
Called patient to offer RN MCW visit 30 mins prior to her scheduled time of 9:05. She is able to come in early.    ERMA JIMENEZ RN on 3/12/2024 at 9:27 AM

## 2024-03-13 ENCOUNTER — OFFICE VISIT (OUTPATIENT)
Dept: INTERNAL MEDICINE | Facility: OTHER | Age: 74
End: 2024-03-13
Attending: INTERNAL MEDICINE
Payer: OTHER GOVERNMENT

## 2024-03-13 VITALS
OXYGEN SATURATION: 96 % | HEART RATE: 66 BPM | TEMPERATURE: 97.7 F | SYSTOLIC BLOOD PRESSURE: 138 MMHG | RESPIRATION RATE: 16 BRPM | WEIGHT: 167 LBS | BODY MASS INDEX: 30.73 KG/M2 | DIASTOLIC BLOOD PRESSURE: 82 MMHG | HEIGHT: 62 IN

## 2024-03-13 DIAGNOSIS — B00.1 COLD SORE: ICD-10-CM

## 2024-03-13 DIAGNOSIS — J98.9 REACTIVE AIRWAY DISEASE WITHOUT ASTHMA: Chronic | ICD-10-CM

## 2024-03-13 DIAGNOSIS — N18.31 STAGE 3A CHRONIC KIDNEY DISEASE (H): Chronic | ICD-10-CM

## 2024-03-13 DIAGNOSIS — F51.04 CHRONIC INSOMNIA: ICD-10-CM

## 2024-03-13 DIAGNOSIS — Z00.00 ENCOUNTER FOR MEDICARE ANNUAL WELLNESS EXAM: Primary | ICD-10-CM

## 2024-03-13 DIAGNOSIS — G25.81 RESTLESS LEGS SYNDROME (RLS): ICD-10-CM

## 2024-03-13 DIAGNOSIS — R73.03 PREDIABETES: ICD-10-CM

## 2024-03-13 DIAGNOSIS — N95.1 PERIMENOPAUSAL VASOMOTOR SYMPTOMS: ICD-10-CM

## 2024-03-13 DIAGNOSIS — R09.82 PND (POST-NASAL DRIP): ICD-10-CM

## 2024-03-13 DIAGNOSIS — E78.2 MIXED HYPERLIPIDEMIA: ICD-10-CM

## 2024-03-13 DIAGNOSIS — N39.46 MIXED INCONTINENCE: ICD-10-CM

## 2024-03-13 LAB
ALBUMIN SERPL BCG-MCNC: 4.4 G/DL (ref 3.5–5.2)
ALP SERPL-CCNC: 69 U/L (ref 40–150)
ALT SERPL W P-5'-P-CCNC: 21 U/L (ref 0–50)
ANION GAP SERPL CALCULATED.3IONS-SCNC: 9 MMOL/L (ref 7–15)
AST SERPL W P-5'-P-CCNC: 23 U/L (ref 0–45)
BILIRUB SERPL-MCNC: 0.4 MG/DL
BUN SERPL-MCNC: 17.8 MG/DL (ref 8–23)
CALCIUM SERPL-MCNC: 9.7 MG/DL (ref 8.8–10.2)
CHLORIDE SERPL-SCNC: 106 MMOL/L (ref 98–107)
CHOLEST SERPL-MCNC: 234 MG/DL
CREAT SERPL-MCNC: 0.99 MG/DL (ref 0.51–0.95)
CREAT UR-MCNC: 83.3 MG/DL
DEPRECATED HCO3 PLAS-SCNC: 26 MMOL/L (ref 22–29)
EGFRCR SERPLBLD CKD-EPI 2021: 59 ML/MIN/1.73M2
ERYTHROCYTE [DISTWIDTH] IN BLOOD BY AUTOMATED COUNT: 12.7 % (ref 10–15)
FASTING STATUS PATIENT QL REPORTED: ABNORMAL
GLUCOSE SERPL-MCNC: 102 MG/DL (ref 70–99)
HBA1C MFR BLD: 5.8 % (ref 4–6.2)
HCT VFR BLD AUTO: 43.7 % (ref 35–47)
HDLC SERPL-MCNC: 77 MG/DL
HGB BLD-MCNC: 14.5 G/DL (ref 11.7–15.7)
LDLC SERPL CALC-MCNC: 132 MG/DL
MAGNESIUM SERPL-MCNC: 1.9 MG/DL (ref 1.7–2.3)
MCH RBC QN AUTO: 30.4 PG (ref 26.5–33)
MCHC RBC AUTO-ENTMCNC: 33.2 G/DL (ref 31.5–36.5)
MCV RBC AUTO: 92 FL (ref 78–100)
MICROALBUMIN UR-MCNC: <12 MG/L
MICROALBUMIN/CREAT UR: NORMAL MG/G{CREAT}
NONHDLC SERPL-MCNC: 157 MG/DL
PLATELET # BLD AUTO: 196 10E3/UL (ref 150–450)
POTASSIUM SERPL-SCNC: 4.4 MMOL/L (ref 3.4–5.3)
PROT SERPL-MCNC: 6.8 G/DL (ref 6.4–8.3)
RBC # BLD AUTO: 4.77 10E6/UL (ref 3.8–5.2)
SODIUM SERPL-SCNC: 141 MMOL/L (ref 135–145)
TRIGL SERPL-MCNC: 123 MG/DL
WBC # BLD AUTO: 5.1 10E3/UL (ref 4–11)

## 2024-03-13 PROCEDURE — 82040 ASSAY OF SERUM ALBUMIN: CPT | Mod: ZL | Performed by: INTERNAL MEDICINE

## 2024-03-13 PROCEDURE — 90471 IMMUNIZATION ADMIN: CPT | Performed by: INTERNAL MEDICINE

## 2024-03-13 PROCEDURE — 82043 UR ALBUMIN QUANTITATIVE: CPT | Mod: ZL | Performed by: INTERNAL MEDICINE

## 2024-03-13 PROCEDURE — 99214 OFFICE O/P EST MOD 30 MIN: CPT | Mod: 25 | Performed by: INTERNAL MEDICINE

## 2024-03-13 PROCEDURE — 99397 PER PM REEVAL EST PAT 65+ YR: CPT | Mod: 25 | Performed by: INTERNAL MEDICINE

## 2024-03-13 PROCEDURE — 36415 COLL VENOUS BLD VENIPUNCTURE: CPT | Mod: ZL | Performed by: INTERNAL MEDICINE

## 2024-03-13 PROCEDURE — 83036 HEMOGLOBIN GLYCOSYLATED A1C: CPT | Mod: ZL | Performed by: INTERNAL MEDICINE

## 2024-03-13 PROCEDURE — 85014 HEMATOCRIT: CPT | Mod: ZL | Performed by: INTERNAL MEDICINE

## 2024-03-13 PROCEDURE — 80061 LIPID PANEL: CPT | Mod: ZL | Performed by: INTERNAL MEDICINE

## 2024-03-13 PROCEDURE — 83735 ASSAY OF MAGNESIUM: CPT | Mod: ZL | Performed by: INTERNAL MEDICINE

## 2024-03-13 PROCEDURE — 90662 IIV NO PRSV INCREASED AG IM: CPT | Performed by: INTERNAL MEDICINE

## 2024-03-13 RX ORDER — FLUTICASONE PROPIONATE 50 MCG
1 SPRAY, SUSPENSION (ML) NASAL DAILY
Qty: 48 G | Refills: 4 | Status: SHIPPED | OUTPATIENT
Start: 2024-03-13

## 2024-03-13 RX ORDER — TRAZODONE HYDROCHLORIDE 50 MG/1
25 TABLET, FILM COATED ORAL
Qty: 90 TABLET | Refills: 1 | Status: CANCELLED | OUTPATIENT
Start: 2024-03-13

## 2024-03-13 RX ORDER — TOLTERODINE 2 MG/1
2 CAPSULE, EXTENDED RELEASE ORAL DAILY
Qty: 90 CAPSULE | Refills: 4 | Status: CANCELLED | OUTPATIENT
Start: 2024-03-13

## 2024-03-13 RX ORDER — VALACYCLOVIR HYDROCHLORIDE 1 G/1
1000 TABLET, FILM COATED ORAL 3 TIMES DAILY PRN
Qty: 90 TABLET | Refills: 1 | Status: CANCELLED | OUTPATIENT
Start: 2024-03-13

## 2024-03-13 RX ORDER — ESTRADIOL 0.5 MG/1
0.5 TABLET ORAL DAILY
Qty: 90 TABLET | Refills: 4 | Status: SHIPPED | OUTPATIENT
Start: 2024-03-13 | End: 2024-09-18

## 2024-03-13 RX ORDER — ALBUTEROL SULFATE 90 UG/1
2 AEROSOL, METERED RESPIRATORY (INHALATION) EVERY 4 HOURS PRN
Qty: 18 G | Refills: 2 | Status: SHIPPED | OUTPATIENT
Start: 2024-03-13

## 2024-03-13 RX ORDER — TOLTERODINE 2 MG/1
2 CAPSULE, EXTENDED RELEASE ORAL
Qty: 90 CAPSULE | Refills: 4 | Status: SHIPPED | OUTPATIENT
Start: 2024-03-13 | End: 2024-04-24

## 2024-03-13 RX ORDER — MIRABEGRON 25 MG/1
25 TABLET, FILM COATED, EXTENDED RELEASE ORAL DAILY
Qty: 90 TABLET | Refills: 4 | Status: CANCELLED | OUTPATIENT
Start: 2024-03-13

## 2024-03-13 RX ORDER — CETIRIZINE HYDROCHLORIDE 10 MG/1
10 TABLET ORAL DAILY
Qty: 90 TABLET | Refills: 4 | Status: SHIPPED | OUTPATIENT
Start: 2024-03-13

## 2024-03-13 RX ORDER — ATORVASTATIN CALCIUM 10 MG/1
TABLET, FILM COATED ORAL
Qty: 90 TABLET | Refills: 4 | Status: SHIPPED | OUTPATIENT
Start: 2024-03-13 | End: 2024-04-10

## 2024-03-13 RX ORDER — VIT A/VIT C/VIT E/ZINC/COPPER 4296-226
2 CAPSULE ORAL DAILY
COMMUNITY
Start: 2024-03-13

## 2024-03-13 RX ORDER — ROPINIROLE 0.25 MG/1
.25-.5 TABLET, FILM COATED ORAL
Qty: 90 TABLET | Refills: 0 | Status: SHIPPED | OUTPATIENT
Start: 2024-03-13

## 2024-03-13 SDOH — HEALTH STABILITY: PHYSICAL HEALTH: ON AVERAGE, HOW MANY DAYS PER WEEK DO YOU ENGAGE IN MODERATE TO STRENUOUS EXERCISE (LIKE A BRISK WALK)?: 2 DAYS

## 2024-03-13 ASSESSMENT — PAIN SCALES - GENERAL: PAINLEVEL: NO PAIN (0)

## 2024-03-13 ASSESSMENT — SOCIAL DETERMINANTS OF HEALTH (SDOH): HOW OFTEN DO YOU GET TOGETHER WITH FRIENDS OR RELATIVES?: THREE TIMES A WEEK

## 2024-03-13 NOTE — PROGRESS NOTES
Preventive Care Visit  [facility]  Raeann Pearson DO, Internal Medicine  Mar 13, 2024    Assessment & Plan     Encounter for Medicare annual wellness exam    Mixed hyperlipidemia  Cholesterol remains elevated.  It is recommended at this time that she try to increase her statin gradually.  She is to let us know if she is able to do this and we will send in a new prescription.  - atorvastatin (LIPITOR) 10 MG tablet  Dispense: 90 tablet; Refill: 4  - Lipid Profile    Prediabetes  Recheck of her A1c today shows improvement from prior.  Continue to monitor annually.  - atorvastatin (LIPITOR) 10 MG tablet  Dispense: 90 tablet; Refill: 4  - Hemoglobin A1c    PND (post-nasal drip)  - Controlled.  Continue current regimen.    - cetirizine (ZYRTEC) 10 MG tablet  Dispense: 90 tablet; Refill: 4  - fluticasone (FLONASE) 50 MCG/ACT nasal spray  Dispense: 48 g; Refill: 4    Perimenopausal vasomotor symptoms  - Controlled.  Continue current regimen.    - estradiol (ESTRACE) 0.5 MG tablet  Dispense: 90 tablet; Refill: 4    Mixed incontinence  At this time she can continue on tolterodine.  She is to call if at any point alternative treatments are needed.  - tolterodine ER (DETROL LA) 2 MG 24 hr capsule  Dispense: 90 capsule; Refill: 4    Chronic insomnia  Okay to continue at this time with Zyrtec and melatonin.  Will address restless legs as below.    Cold sore  Occasion updated with refills for the year    Stage 3a chronic kidney disease (H)  -Labs overall are stable from prior.  Continue to monitor  - Magnesium  - CBC with platelets  - Comprehensive metabolic panel  - Albumin Random Urine Quantitative with Creat Ratio    Reactive airway disease that is not asthma  - Controlled.  Continue current regimen.    - albuterol (PROAIR HFA/PROVENTIL HFA/VENTOLIN HFA) 108 (90 Base) MCG/ACT inhaler  Dispense: 18 g; Refill: 2    Restless legs syndrome (RLS)  At this time given her ongoing issues with restless legs we  "will plan to start low-dose ropinirole.  She is to call with any side effects.  She is to follow-up in approximately 1 month to see how she is doing on this and adjust the dose as indicated.  - rOPINIRole (REQUIP) 0.25 MG tablet  Dispense: 90 tablet; Refill: 0      BMI  Estimated body mass index is 30.3 kg/m  as calculated from the following:    Height as of this encounter: 1.581 m (5' 2.25\").    Weight as of this encounter: 75.8 kg (167 lb).   Weight management plan: Discussed healthy diet and exercise guidelines      Return in about 53 weeks (around 3/19/2025) for Annual Wellness Visit.    Susanne Bearden is a 74 year old, presenting for the following:  medicare wellness        3/13/2024     8:35 AM   Additional Questions   Roomed by CARMENZA Bateman   Accompanied by BELEM        Health Care Directive  Patient has a Health Care Directive on file  Discussed advance care planning with patient.    HPI  Patient reports that she has continued to have some issues with sinus drainage.  She has been using some eyedrops along with Zyrtec.    She has not been using her mirabegron.  She has continued on tolterodine.  She also has been wearing some \"Crystal patches\" which have helped with her bladder although she still does have some leaking.    She is scheduled to see the dentist next week.  She is up-to-date on eye exam which was last done in September.  She does need refills of her chronic medications including Valtrex which she uses as needed for cold sores, PreserVision which she takes on a regular basis, estradiol which she continues to take daily and atorvastatin which she is currently taking 1/2 tablet 3 times per week.  She also uses an inhaler as needed.  She would like to have these on file.    She has been having some sleep issues.  She has been taking Zyrtec +10 mg of melatonin.  She has had issues both falling asleep and with waking in the middle the night.  She previously tried trazodone which caused her side effects. "  When asked what is keeping her up primarily at night she said it is likely her restless leg syndrome.  She is not currently on any medications for this.    Due for labs today including recheck of her kidney function.  She has been prediabetic in the past.        3/13/2024   General Health   How would you rate your overall physical health? Good   Feel stress (tense, anxious, or unable to sleep) To some extent   (!) STRESS CONCERN      3/13/2024   Nutrition   Diet: Regular (no restrictions)         3/13/2024   Exercise   Days per week of moderate/strenous exercise 2 days   (!) EXERCISE CONCERN      3/13/2024   Social Factors   Frequency of gathering with friends or relatives Three times a week         2023   Fall Risk   Fallen 2 or more times in the past year? Yes          3/10/2023   Activities of Daily Living- Home Safety   Needs help with the following daily activites NO assistance is needed   Safety concerns in the home None of the above         3/13/2024   Dental   Dentist two times every year? Yes         3/10/2023   Hearing Screening   Hearing concerns? No concerns         Today's PHQ-2 Score:       3/13/2024     8:58 AM   PHQ-2 (  [employer])   Q1: Little interest or pleasure in doing things 0   Q2: Feeling down, depressed or hopeless 0   PHQ-2 Score 0   Q1: Little interest or pleasure in doing things Not at all   Q2: Feeling down, depressed or hopeless Not at all   PHQ-2 Score 0         3/10/2023   Substance Use   Alcohol more than 3/day or more than 7/wk No     Social History     Tobacco Use    Smoking status: Former     Packs/day: .5     Types: Cigarettes     Start date: 1965     Quit date: 1972     Years since quittin.6    Smokeless tobacco: Never   Vaping Use    Vaping Use: Never used   Substance Use Topics    Alcohol use: Yes     Comment: has slowed down a lot since huband's injury; aiming for occassional    Drug use: No           2024   LAST FHS-7 RESULTS   1st degree relative  breast or ovarian cancer No   Any relative bilateral breast cancer No   Any male have breast cancer No   Any ONE woman have BOTH breast AND ovarian cancer No   Any woman with breast cancer before 50yrs No   2 or more relatives with breast AND/OR ovarian cancer No   2 or more relatives with breast AND/OR bowel cancer No       ASCVD Risk   The 10-year ASCVD risk score (Michelle EDDY, et al., 2019) is: 18.4%    Values used to calculate the score:      Age: 74 years      Sex: Female      Is Non- : No      Diabetic: No      Tobacco smoker: No      Systolic Blood Pressure: 138 mmHg      Is BP treated: No      HDL Cholesterol: 71 mg/dL      Total Cholesterol: 198 mg/dL      Reviewed and updated as needed this visit by Provider   Tobacco  Allergies  Meds  Problems  Med Hx  Surg Hx  Fam Hx  Soc   Hx Sexual Activity          Current Outpatient Medications   Medication Sig Dispense Refill    albuterol (PROAIR HFA/PROVENTIL HFA/VENTOLIN HFA) 108 (90 Base) MCG/ACT inhaler Inhale 2 puffs into the lungs every 4 hours as needed for cough, wheezing or shortness of breath 18 g 2    aspirin 81 MG EC tablet Take 1 tablet (81 mg) by mouth daily with food      atorvastatin (LIPITOR) 10 MG tablet TAKE 1/2 TABLET BY MOUTH THREE TIMES PER WEEK 90 tablet 4    cetirizine (ZYRTEC) 10 MG tablet Take 1 tablet (10 mg) by mouth daily 90 tablet 4    clobetasol propionate (TEMOVATE) 0.05 % external cream APPLY TWICE DAILY TO GRANULOMA ANNULAIRE SPOTS ON TRUNK/EXTREMITIES. STOP WHEN CLEAR. RESUME AS NEEDED FOR FLARES      estradiol (ESTRACE) 0.5 MG tablet Take 1 tablet (0.5 mg) by mouth daily 90 tablet 4    fluticasone (FLONASE) 50 MCG/ACT nasal spray Spray 1 spray into both nostrils daily 48 g 4    melatonin 5 MG tablet Take 1-2 tablets (5-10 mg) by mouth nightly as needed for sleep      Multiple Vitamins-Minerals (PRESERVISION AREDS) CAPS Take 2 capsules by mouth daily      tolterodine ER (DETROL LA) 2 MG 24 hr  capsule Take 1 capsule (2 mg) by mouth three times a week 90 capsule 4    triamcinolone (ARISTOCORT HP) 0.5 % external cream Apply topically 2 times daily To lesion on thigh 15 g 1    valACYclovir (VALTREX) 1000 mg tablet Take 1 tablet (1,000 mg) by mouth 3 times daily as needed (Cold Sores) 90 tablet 1     Current providers sharing in care for this patient include:  Patient Care Team:  Raeann Pearson DO as PCP - General (Internal Medicine)  Nafisa Escobar NP as Nurse Practitioner (Internal Medicine)  Raeann Pearson DO as Assigned PCP    The following health maintenance items are reviewed in Epic and correct as of today:  Health Maintenance   Topic Date Due    RSV VACCINE (Pregnancy & 60+) (1 - 1-dose 60+ series) Never done    INFLUENZA VACCINE (1) 09/01/2023    COVID-19 Vaccine (4 - 2023-24 season) 09/01/2023    LIPID  03/10/2024    MICROALBUMIN  03/10/2024    BMP  08/14/2024    HEMOGLOBIN  08/14/2024    FALL RISK ASSESSMENT  11/29/2024    MAMMO SCREENING  02/27/2025    MEDICARE ANNUAL WELLNESS VISIT  03/13/2025    GLUCOSE  08/14/2026    ADVANCE CARE PLANNING  03/10/2028    DTAP/TDAP/TD IMMUNIZATION (3 - Td or Tdap) 07/14/2031    DEXA  03/04/2034    PHQ-2 (once per calendar year)  Completed    Pneumococcal Vaccine: 65+ Years  Completed    URINALYSIS  Completed    ZOSTER IMMUNIZATION  Completed    HEPATITIS C SCREENING  Addressed    IPV IMMUNIZATION  Aged Out    HPV IMMUNIZATION  Aged Out    MENINGITIS IMMUNIZATION  Aged Out    RSV MONOCLONAL ANTIBODY  Aged Out    COLORECTAL CANCER SCREENING  Discontinued       Review of Systems   ROS:  CONSTITUTIONAL: Negative for fever, chills, night sweats, significant change in weight  INTEGUMENTARY/SKIN/LYMPH: Negative for worrisome rashes, moles or lesions; swollen lymph nodes  EYES: Negative for significant vision changes or irritation  ENT: Negative for additional ear, mouth and throat problems  RESP: Negative for significant cough or SOB  CV: Negative for chest pain,  "palpitations or increased peripheral edema  GI: Negative for abdominal pain, or change in bowel habits or blood in the stools/black stools  : Negative for unusual urinary or vaginal symptoms. No vaginal bleeding.  MUSCULOSKELETAL: Negative for new or changing joint pain or significant swelling  NEURO: Negative for new headaches, weakness or paraesthesias  PSYCHIATRIC: Negative for changes in mood or affect; significant anxiety or depression       Objective    Exam  /82 (BP Location: Right arm, Patient Position: Sitting, Cuff Size: Adult Large)   Pulse 66   Temp 97.7  F (36.5  C) (Temporal)   Resp 16   Ht 1.581 m (5' 2.25\")   Wt 75.8 kg (167 lb)   LMP 06/01/1990   SpO2 96%   Breastfeeding No   BMI 30.30 kg/m     Estimated body mass index is 30.3 kg/m  as calculated from the following:    Height as of this encounter: 1.581 m (5' 2.25\").    Weight as of this encounter: 75.8 kg (167 lb).    Physical Exam  GEN: Vitals reviewed. Healthy appearing. Patient is in no acute distress. Cooperative with exam.  HEENT: Normocephalic atraumatic.  Eyes grossly normal to inspection.  No discharge or erythema, or obvious scleral/conjunctival abnormalities. Oropharynx with no erythema or exudates. Dentition adequate.  EACs clear bilaterally, TM gray with normal landmarks.  NECK: Supple; no thyromegaly or masses noted.  No cervical or supraclavicular lymphadenopathy.  CV: Heart regular in rate and rhythm with no murmur.    LUNGS: No audible wheeze, cough, or visible cyanosis.  No visible retractions or increased work of breathing.  Lungs clear to auscultation bilaterally.    ABD:  Obese, nondistended  SKIN: Warm and dry to touch.  Visible skin clear. No significant rash, abnormal pigmentation or lesions.  EXT: No clubbing or cyanosis.  No peripheral edema.  NEURO: Alert and oriented to person, place, and time.  Cranial nerves II-XII grossly intact with no focal or lateralizing deficits.  Muscle tone normal.  Gait " normal. No tremor.   MSK: ROM of upper and lower ext symmetric and full.  PSYCH: Mood is good.  Mentation appears normal, affect normal/bright, judgement and insight intact, normal speech and appearance well-groomed.           3/13/2024   Mini Cog   Clock Draw Score 2 Normal   3 Item Recall 3 objects recalled    3 objects recalled   Mini Cog Total Score 5       Signed Electronically by: Raeann Pearson, DO

## 2024-03-13 NOTE — NURSING NOTE
"No chief complaint on file.      Initial /82 (BP Location: Right arm, Patient Position: Sitting, Cuff Size: Adult Large)   Pulse 66   Temp 97.7  F (36.5  C) (Temporal)   Resp 16   Ht 1.581 m (5' 2.25\")   Wt 75.8 kg (167 lb)   LMP 06/01/1990   SpO2 96%   Breastfeeding No   BMI 30.30 kg/m   Estimated body mass index is 30.3 kg/m  as calculated from the following:    Height as of this encounter: 1.581 m (5' 2.25\").    Weight as of this encounter: 75.8 kg (167 lb).  Medication Review: complete    The next two questions are to help us understand your food security.  If you are feeling you need any assistance in this area, we have resources available to support you today.          11/29/2023   SDOH- Food Insecurity   Within the past 12 months, did you worry that your food would run out before you got money to buy more? N   Within the past 12 months, did the food you bought just not last and you didn t have money to get more? N         Health Care Directive:  Patient has a Health Care Directive on file      ERMA JIMENEZ RN      "

## 2024-03-13 NOTE — PATIENT INSTRUCTIONS
Preventive Care Advice   This is general advice given by our system to help you stay healthy. However, your care team may have specific advice just for you. Please talk to your care team about your preventive care needs.  Nutrition  Eat 5 or more servings of fruits and vegetables each day.  Try wheat bread, brown rice and whole grain pasta (instead of white bread, rice, and pasta).  Get enough calcium and vitamin D. Check the label on foods and aim for 100% of the RDA (recommended daily allowance).  Lifestyle  Exercise at least 150 minutes each week   (30 minutes a day, 5 days a week).  Do muscle strengthening activities 2 days a week. These help control your weight and prevent disease.  No smoking.  Wear sunscreen to prevent skin cancer.  Have a dental exam and cleaning every 6 months.  Yearly exams  See your health care team every year to talk about:  Any changes in your health.  Any medicines your care team has prescribed.  Preventive care, family planning, and ways to prevent chronic diseases.  Shots (vaccines)   HPV shots (up to age 26), if you've never had them before.  Hepatitis B shots (up to age 59), if you've never had them before.  COVID-19 shot: Get this shot when it's due.  Flu shot: Get a flu shot every year.  Tetanus shot: Get a tetanus shot every 10 years.  Pneumococcal, hepatitis A, and RSV shots: Ask your care team if you need these based on your risk.  Shingles shot (for age 50 and up).  General health tests  Diabetes screening:  Starting at age 35, Get screened for diabetes at least every 3 years.  If you are younger than age 35, ask your care team if you should be screened for diabetes.  Cholesterol test: At age 39, start having a cholesterol test every 5 years, or more often if advised.  Bone density scan (DEXA): At age 50, ask your care team if you should have this scan for osteoporosis (brittle bones).  Hepatitis C: Get tested at least once in your life.  STIs (sexually transmitted  infections)  Before age 24: Ask your care team if you should be screened for STIs.  After age 24: Get screened for STIs if you're at risk. You are at risk for STIs (including HIV) if:  You are sexually active with more than one person.  You don't use condoms every time.  You or a partner was diagnosed with a sexually transmitted infection.  If you are at risk for HIV, ask about PrEP medicine to prevent HIV.  Get tested for HIV at least once in your life, whether you are at risk for HIV or not.  Cancer screening tests  Cervical cancer screening: If you have a cervix, begin getting regular cervical cancer screening tests at age 21. Most people who have regular screenings with normal results can stop after age 65. Talk about this with your provider.  Breast cancer scan (mammogram): If you've ever had breasts, begin having regular mammograms starting at age 40. This is a scan to check for breast cancer.  Colon cancer screening: It is important to start screening for colon cancer at age 45.  Have a colonoscopy test every 10 years (or more often if you're at risk) Or, ask your provider about stool tests like a FIT test every year or Cologuard test every 3 years.  To learn more about your testing options, visit: https://www.[employer]/400044.pdf.  For help making a decision, visit: https://bit.ly/bu58234.  Prostate cancer screening test: If you have a prostate and are age 55 to 69, ask your provider if you would benefit from a yearly prostate cancer screening test.  Lung cancer screening: If you are a current or former smoker age 50 to 80, ask your care team if ongoing lung cancer screenings are right for you.  For informational purposes only. Not to replace the advice of your health care provider. Copyright   2023 [employer]. All rights reserved. Clinically reviewed by the [facility] Transitions Program. [employer] [id number] - REV 01/24.

## 2024-03-14 RX ORDER — ROPINIROLE 0.25 MG/1
TABLET, FILM COATED ORAL
Qty: 180 TABLET | OUTPATIENT
Start: 2024-03-14

## 2024-03-14 NOTE — TELEPHONE ENCOUNTER
[facility] requesting:    rOPINIRole (REQUIP) 0.25 MG tablet 90 tablet 0 3/13/2024 -- No   Sig - Route: Take 1-2 tablets (0.25-0.5 mg) by mouth nightly as needed (restless legs) - Oral   Sent to pharmacy as: rOPINIRole HCl 0.25 MG Oral Tablet (REQUIP)   Class: E-Prescribe   Order: [id number]   E-Prescribing Status: Receipt confirmed by pharmacy (3/13/2024 10:19 AM CDT)       Edis Vasquez RN on 3/14/2024 at 12:00 PM

## 2024-04-02 DIAGNOSIS — N95.1 PERIMENOPAUSAL VASOMOTOR SYMPTOMS: ICD-10-CM

## 2024-04-05 RX ORDER — ESTRADIOL 0.5 MG/1
0.5 TABLET ORAL DAILY
Qty: 90 TABLET | Refills: 4 | OUTPATIENT
Start: 2024-04-05

## 2024-04-05 NOTE — TELEPHONE ENCOUNTER
[facility] sent Rx request for the following:      Redundant refill request refused: Too soon:  estradiol (ESTRACE) 0.5 MG tablet 90 tablet 4 3/13/2024 -- No   Sig - Route: Take 1 tablet (0.5 mg) by mouth daily - Oral   Sent to pharmacy as: Estradiol 0.5 MG Oral Tablet (ESTRACE)   Class: E-Prescribe   Notes to Pharmacy: Renewal only - patient will call for refill   Order: [id number]   E-Prescribing Status: Receipt confirmed by pharmacy (3/13/2024 10:09 AM CDT)     Printout Tracking    External Result Report     Pharmacy    [id number] - [place], [place] - [address]     Jenelle Mallory RN .............. 4/5/2024  3:05 PM

## 2024-04-10 ENCOUNTER — TELEPHONE (OUTPATIENT)
Dept: INTERNAL MEDICINE | Facility: OTHER | Age: 74
End: 2024-04-10
Payer: OTHER GOVERNMENT

## 2024-04-10 DIAGNOSIS — E78.2 MIXED HYPERLIPIDEMIA: ICD-10-CM

## 2024-04-10 DIAGNOSIS — R73.03 PREDIABETES: ICD-10-CM

## 2024-04-10 NOTE — TELEPHONE ENCOUNTER
Left message for patient to return call. Provider is asking what Mg of atorvastatin she is on for sure and what pharmacy so she can send her over a Prescription.  Akua Fox LPN on 4/10/2024 at 3:57 PM  EXT. 0200

## 2024-04-10 NOTE — TELEPHONE ENCOUNTER
Lili-patient is looking for a new prescription for Atorvastin dose was increased at last visit   [facility] is the pharmacy     Please call and advise    Thank You    Jessy Rmairez on 4/10/2024 at 9:30 AM

## 2024-04-11 NOTE — TELEPHONE ENCOUNTER
After proper verification, patient stated that she is taking atorvastatin one half tab 4 times weekly. Rx updated and teed up for provider.  Akua Fox LPN on 4/11/2024 at 9:28 AM  EXT. 2150

## 2024-04-11 NOTE — TELEPHONE ENCOUNTER
Patient would like a call back regarding her medication. Returning missed call from Akua Awan on 4/11/2024 at 8:38 AM

## 2024-04-15 ENCOUNTER — OFFICE VISIT (OUTPATIENT)
Dept: INTERNAL MEDICINE | Facility: OTHER | Age: 74
End: 2024-04-15
Attending: INTERNAL MEDICINE
Payer: OTHER GOVERNMENT

## 2024-04-15 VITALS
HEART RATE: 65 BPM | RESPIRATION RATE: 16 BRPM | BODY MASS INDEX: 31.94 KG/M2 | DIASTOLIC BLOOD PRESSURE: 74 MMHG | OXYGEN SATURATION: 97 % | HEIGHT: 62 IN | SYSTOLIC BLOOD PRESSURE: 126 MMHG | TEMPERATURE: 97.9 F | WEIGHT: 173.6 LBS

## 2024-04-15 DIAGNOSIS — G47.9 SLEEP DISTURBANCE: ICD-10-CM

## 2024-04-15 DIAGNOSIS — F41.9 ANXIETY: ICD-10-CM

## 2024-04-15 DIAGNOSIS — G25.81 RESTLESS LEGS SYNDROME: Primary | ICD-10-CM

## 2024-04-15 DIAGNOSIS — Z23 HIGH PRIORITY FOR 2019-NCOV VACCINE: ICD-10-CM

## 2024-04-15 PROCEDURE — 91320 SARSCV2 VAC 30MCG TRS-SUC IM: CPT

## 2024-04-15 PROCEDURE — 90480 ADMN SARSCOV2 VAC 1/ONLY CMP: CPT

## 2024-04-15 PROCEDURE — 99213 OFFICE O/P EST LOW 20 MIN: CPT | Mod: 25

## 2024-04-15 RX ORDER — GABAPENTIN 100 MG/1
100 CAPSULE ORAL DAILY
Qty: 90 CAPSULE | Refills: 4 | Status: SHIPPED | OUTPATIENT
Start: 2024-04-15

## 2024-04-15 RX ORDER — ATORVASTATIN CALCIUM 10 MG/1
TABLET, FILM COATED ORAL
Qty: 90 TABLET | Refills: 4 | Status: SHIPPED | OUTPATIENT
Start: 2024-04-15

## 2024-04-15 ASSESSMENT — ANXIETY QUESTIONNAIRES
GAD7 TOTAL SCORE: 11
6. BECOMING EASILY ANNOYED OR IRRITABLE: NEARLY EVERY DAY
2. NOT BEING ABLE TO STOP OR CONTROL WORRYING: MORE THAN HALF THE DAYS
7. FEELING AFRAID AS IF SOMETHING AWFUL MIGHT HAPPEN: NOT AT ALL
IF YOU CHECKED OFF ANY PROBLEMS ON THIS QUESTIONNAIRE, HOW DIFFICULT HAVE THESE PROBLEMS MADE IT FOR YOU TO DO YOUR WORK, TAKE CARE OF THINGS AT HOME, OR GET ALONG WITH OTHER PEOPLE: NOT DIFFICULT AT ALL
3. WORRYING TOO MUCH ABOUT DIFFERENT THINGS: MORE THAN HALF THE DAYS
5. BEING SO RESTLESS THAT IT IS HARD TO SIT STILL: SEVERAL DAYS
GAD7 TOTAL SCORE: 11
1. FEELING NERVOUS, ANXIOUS, OR ON EDGE: SEVERAL DAYS
4. TROUBLE RELAXING: MORE THAN HALF THE DAYS
7. FEELING AFRAID AS IF SOMETHING AWFUL MIGHT HAPPEN: NOT AT ALL
8. IF YOU CHECKED OFF ANY PROBLEMS, HOW DIFFICULT HAVE THESE MADE IT FOR YOU TO DO YOUR WORK, TAKE CARE OF THINGS AT HOME, OR GET ALONG WITH OTHER PEOPLE?: NOT DIFFICULT AT ALL

## 2024-04-15 ASSESSMENT — PATIENT HEALTH QUESTIONNAIRE - PHQ9
10. IF YOU CHECKED OFF ANY PROBLEMS, HOW DIFFICULT HAVE THESE PROBLEMS MADE IT FOR YOU TO DO YOUR WORK, TAKE CARE OF THINGS AT HOME, OR GET ALONG WITH OTHER PEOPLE: NOT DIFFICULT AT ALL
SUM OF ALL RESPONSES TO PHQ QUESTIONS 1-9: 7
SUM OF ALL RESPONSES TO PHQ QUESTIONS 1-9: 7

## 2024-04-15 ASSESSMENT — PAIN SCALES - GENERAL: PAINLEVEL: MODERATE PAIN (4)

## 2024-04-15 NOTE — PATIENT INSTRUCTIONS
We will try gabapentin at night to help with your restless legs.    We will follow-up in 4 weeks to reassess.     We can discuss anxiety at that time as well if you would like.    Please reach out sooner if need be.

## 2024-04-15 NOTE — PROGRESS NOTES
Subjective   Monisha is a 74 year old, presenting for the following health issues:  Follow Up (Sleep and restless leg syndrome)      4/15/2024     9:19 AM   Additional Questions   Roomed by GRACE Sepulveda   Accompanied by Self   (G25.81) Restless legs syndrome  (primary encounter diagnosis)  Comment: Patient presents to the clinic today for follow-up of restless leg syndrome.  She was recently seen by her PCP and was put on Requip low-dose.  She trialed 1 night with this and did not like how she felt.  She states that it made her very foggy.  She continues to struggle with sleep due to the restless legs.  She wishes to trial a new medication for this.  Plan: gabapentin (NEURONTIN) 100 MG capsule        I will put her on gabapentin off label use for restless leg syndrome (per UpToDate).  She is to take 100 mg at bedtime.  She will follow-up with me in 4 weeks to reassess.  Could increase the dose at this time.  Education given on gabapentin via after visit summary.    (F41.9) Anxiety  Comment: Patient states that she has been struggling lately with anxiety.  She has multiple stressors at home including health of , son garrett, Worship relations, and health of family members.  We discussed initiation of medication for anxiety.  She wishes to work on restless leg issues first.  Plan: Follow-up in 4 weeks.  Would consider starting SSRI or SNRI.    (G47.9) Sleep disturbance  Comment: Patient struggles with sleep onset as well as maintaining sleep.  She feels that she wakes up in the middle the night for several reasons including restless leg, anxiety, and urination.  She is currently taking melatonin 10 mg Gummies nightly as well as Zyrtec.  We discussed possible referral for sleep apnea.  Patient states that she does snore.  She is waking up and not feeling rested.  Plan: Continue current treatment regimen.  Consider referral for sleep eval.  Possible sleep apnea    (Z23) High priority for 2019-nCoV  "vaccine  Comment: Patient due for COVID-19 vaccine.  Plan: Given today at clinic.    History of Present Illness       Reason for visit:  Legs    She eats 2-3 servings of fruits and vegetables daily.She consumes 2 sweetened beverage(s) daily.She exercises with enough effort to increase her heart rate 10 to 19 minutes per day.  She exercises with enough effort to increase her heart rate 4 days per week.   She is taking medications regularly.     Took requip one night and felt very foggy on it. Did not take it again.   Continues to struggle with restless leg syndrome.  Many recent stressors in life causing increased anxiety.      Objective    /74   Pulse 65   Temp 97.9  F (36.6  C) (Temporal)   Resp 16   Ht 1.581 m (5' 2.25\")   Wt 78.7 kg (173 lb 9.6 oz)   LMP 06/01/1990   SpO2 97%   BMI 31.50 kg/m    Body mass index is 31.5 kg/m .  Physical Exam  Constitutional:       General: She is not in acute distress.     Appearance: Normal appearance.   Cardiovascular:      Rate and Rhythm: Normal rate.   Pulmonary:      Effort: Pulmonary effort is normal.   Musculoskeletal:         General: Normal range of motion.   Skin:     General: Skin is warm and dry.      Capillary Refill: Capillary refill takes less than 2 seconds.   Neurological:      Mental Status: She is alert and oriented to person, place, and time.        Signed Electronically by: JERRELL Blank CNP    "

## 2024-04-15 NOTE — NURSING NOTE
"Chief Complaint   Patient presents with    Follow Up     Sleep and restless leg syndrome       Initial /74   Pulse 65   Temp 97.9  F (36.6  C) (Temporal)   Resp 16   Ht 1.581 m (5' 2.25\")   Wt 78.7 kg (173 lb 9.6 oz)   LMP 06/01/1990   SpO2 97%   BMI 31.50 kg/m   Estimated body mass index is 31.5 kg/m  as calculated from the following:    Height as of this encounter: 1.581 m (5' 2.25\").    Weight as of this encounter: 78.7 kg (173 lb 9.6 oz).  Medication Reconciliation: complete      Coco Gipson LPN on 4/15/2024 at 9:29 AM     "

## 2024-04-24 ENCOUNTER — OFFICE VISIT (OUTPATIENT)
Dept: INTERNAL MEDICINE | Facility: OTHER | Age: 74
End: 2024-04-24
Attending: INTERNAL MEDICINE
Payer: OTHER GOVERNMENT

## 2024-04-24 ENCOUNTER — HOSPITAL ENCOUNTER (OUTPATIENT)
Dept: GENERAL RADIOLOGY | Facility: OTHER | Age: 74
Discharge: HOME | End: 2024-04-24
Payer: OTHER GOVERNMENT

## 2024-04-24 VITALS
WEIGHT: 167 LBS | BODY MASS INDEX: 30.73 KG/M2 | HEIGHT: 62 IN | SYSTOLIC BLOOD PRESSURE: 129 MMHG | TEMPERATURE: 96.9 F | OXYGEN SATURATION: 98 % | HEART RATE: 67 BPM | RESPIRATION RATE: 17 BRPM | DIASTOLIC BLOOD PRESSURE: 82 MMHG

## 2024-04-24 DIAGNOSIS — M25.562 ACUTE PAIN OF LEFT KNEE: Primary | ICD-10-CM

## 2024-04-24 DIAGNOSIS — G25.81 RESTLESS LEG SYNDROME: ICD-10-CM

## 2024-04-24 LAB
CRP SERPL-MCNC: <3 MG/L
URATE SERPL-MCNC: 5.2 MG/DL (ref 2.4–5.7)

## 2024-04-24 PROCEDURE — 84550 ASSAY OF BLOOD/URIC ACID: CPT | Mod: ZL

## 2024-04-24 PROCEDURE — 99213 OFFICE O/P EST LOW 20 MIN: CPT

## 2024-04-24 PROCEDURE — 36415 COLL VENOUS BLD VENIPUNCTURE: CPT | Mod: ZL

## 2024-04-24 PROCEDURE — 86140 C-REACTIVE PROTEIN: CPT | Mod: ZL

## 2024-04-24 PROCEDURE — 73562 X-RAY EXAM OF KNEE 3: CPT | Mod: LT

## 2024-04-24 ASSESSMENT — PATIENT HEALTH QUESTIONNAIRE - PHQ9
10. IF YOU CHECKED OFF ANY PROBLEMS, HOW DIFFICULT HAVE THESE PROBLEMS MADE IT FOR YOU TO DO YOUR WORK, TAKE CARE OF THINGS AT HOME, OR GET ALONG WITH OTHER PEOPLE: NOT DIFFICULT AT ALL
SUM OF ALL RESPONSES TO PHQ QUESTIONS 1-9: 6
SUM OF ALL RESPONSES TO PHQ QUESTIONS 1-9: 6

## 2024-04-24 ASSESSMENT — PAIN SCALES - GENERAL: PAINLEVEL: MILD PAIN (3)

## 2024-04-24 NOTE — PROGRESS NOTES
"  Subjective   Monisha is a 74 year old, presenting for the following health issues:  Pain (Left knee)      4/24/2024     9:48 AM   Additional Questions   Roomed by GRACE Sepulveda   Accompanied by Self   (M25.562) Acute pain of left knee  (primary encounter diagnosis)  Comment: Very pleasant 74-year-old patient presents to the clinic today with 3 days of left knee pain.  She did not have any trauma to this knee and does not remember twisting or injuring the knee.  Knee is slightly edematous.  Pain is located below the kneecap extending medially.  No loss of range of motion.  Good strength.  No crepitus.  X-ray, uric acid level, and CRP all normal.  Plan: XR Knee Left 3 Views, Uric acid, CRP         inflammation        Patient encouraged to utilize ice, rest, elevation, Tylenol/ibuprofen, and gentle stretching.  She is to reach out to me if the pain does not subside and I would consider physical therapy and possibly a steroid knee injection.    (G25.81) Restless leg syndrome  Comment: I saw the patient on 4/15 and started her gabapentin 100 mg after failed treatment with Requip.  Patient states that the gabapentin is working very well for her currently.   Plan: Continue current treatment plan with gabapentin 100 mg at at bedtime.    Musculoskeletal Problem  This is a new problem. The current episode started in the past 7 days. The problem occurs constantly. The problem has been gradually improving. The symptoms are aggravated by walking and twisting. She has tried NSAIDs (Biofreeze) for the symptoms. The treatment provided mild relief.      Pain in left knee that started 3 days ago. No trauma. Does not remember injuring.  Pain is constant but worse with certain activities. No loss of ROM.   Has taken Ibuprofen for this. Has not iced.         Objective    /82   Pulse 67   Temp 96.9  F (36.1  C) (Tympanic)   Resp 17   Ht 1.581 m (5' 2.25\")   Wt 75.8 kg (167 lb)   LMP 06/01/1990   SpO2 98%   BMI 30.30 kg/m  "   Body mass index is 30.3 kg/m .  Physical Exam  Constitutional:       General: She is not in acute distress.     Appearance: Normal appearance. She is not ill-appearing.   Cardiovascular:      Rate and Rhythm: Normal rate.      Pulses: Normal pulses.   Musculoskeletal:         General: Swelling (Slight swelling to left knee) present. Normal range of motion.   Skin:     General: Skin is warm and dry.      Capillary Refill: Capillary refill takes less than 2 seconds.      Findings: Erythema (Left knee) present. No bruising, lesion or rash.   Neurological:      Mental Status: She is alert and oriented to person, place, and time.        Results for orders placed or performed in visit on 04/24/24   XR Knee Left 3 Views     Status: None    Narrative    PROCEDURE:  XR KNEE LEFT 3 VIEWS    HISTORY: acute left knee pain (medial); Acute pain of left knee    COMPARISON:  None.    TECHNIQUE:  3 views of the left knee were obtained.    FINDINGS:  No fracture or dislocation is identified. The joint spaces  are preserved.        Impression    IMPRESSION: Normal left knee      CHUCK CERON MD         SYSTEM ID:  [id number]   Uric acid     Status: Normal   Result Value Ref Range    Uric Acid 5.2 2.4 - 5.7 mg/dL   CRP inflammation     Status: Normal   Result Value Ref Range    CRP Inflammation <3.00 <5.00 mg/L     Signed Electronically by: JERRELL Blank CNP

## 2024-04-24 NOTE — NURSING NOTE
"Chief Complaint   Patient presents with    Pain     Left knee       Initial /82   Pulse 67   Temp 96.9  F (36.1  C) (Tympanic)   Resp 17   Ht 1.581 m (5' 2.25\")   Wt 75.8 kg (167 lb)   LMP 06/01/1990   SpO2 98%   BMI 30.30 kg/m   Estimated body mass index is 30.3 kg/m  as calculated from the following:    Height as of this encounter: 1.581 m (5' 2.25\").    Weight as of this encounter: 75.8 kg (167 lb).  Medication Reconciliation: complete    Coco Gipson LPN on 4/24/2024 at 9:54 AM     "

## 2024-04-24 NOTE — PATIENT INSTRUCTIONS
X-ray today and lab today. I will mychart you with results and plan.    Continue with rest, ice, Tylenol and/or Ibuprofen, and elevation.     We could consider a joint injection and/or physical therapy.    If pain continues I would want to do an MRI.

## 2024-06-28 ENCOUNTER — OFFICE VISIT (OUTPATIENT)
Dept: FAMILY MEDICINE | Facility: OTHER | Age: 74
End: 2024-06-28
Attending: FAMILY MEDICINE
Payer: OTHER GOVERNMENT

## 2024-06-28 VITALS
HEIGHT: 62 IN | OXYGEN SATURATION: 97 % | DIASTOLIC BLOOD PRESSURE: 90 MMHG | BODY MASS INDEX: 32.02 KG/M2 | WEIGHT: 174 LBS | SYSTOLIC BLOOD PRESSURE: 148 MMHG | HEART RATE: 66 BPM | RESPIRATION RATE: 18 BRPM | TEMPERATURE: 96.9 F

## 2024-06-28 DIAGNOSIS — R39.9 UTI SYMPTOMS: Primary | ICD-10-CM

## 2024-06-28 LAB
ALBUMIN UR-MCNC: NEGATIVE MG/DL
APPEARANCE UR: ABNORMAL
BACTERIA #/AREA URNS HPF: ABNORMAL /HPF
BILIRUB UR QL STRIP: NEGATIVE
COLOR UR AUTO: YELLOW
GLUCOSE UR STRIP-MCNC: NEGATIVE MG/DL
HGB UR QL STRIP: NEGATIVE
KETONES UR STRIP-MCNC: NEGATIVE MG/DL
LEUKOCYTE ESTERASE UR QL STRIP: ABNORMAL
NITRATE UR QL: POSITIVE
PH UR STRIP: 6.5 [PH] (ref 5–9)
RBC URINE: 2 /HPF
SP GR UR STRIP: 1 (ref 1–1.03)
UROBILINOGEN UR STRIP-MCNC: NORMAL MG/DL
WBC URINE: 155 /HPF

## 2024-06-28 PROCEDURE — 87186 SC STD MICRODIL/AGAR DIL: CPT | Mod: ZL | Performed by: FAMILY MEDICINE

## 2024-06-28 PROCEDURE — 99214 OFFICE O/P EST MOD 30 MIN: CPT | Performed by: FAMILY MEDICINE

## 2024-06-28 PROCEDURE — 81001 URINALYSIS AUTO W/SCOPE: CPT | Mod: ZL | Performed by: FAMILY MEDICINE

## 2024-06-28 PROCEDURE — 87086 URINE CULTURE/COLONY COUNT: CPT | Mod: ZL | Performed by: FAMILY MEDICINE

## 2024-06-28 RX ORDER — NITROFURANTOIN 25; 75 MG/1; MG/1
100 CAPSULE ORAL 2 TIMES DAILY
Qty: 20 CAPSULE | Refills: 0 | Status: SHIPPED | OUTPATIENT
Start: 2024-06-28 | End: 2024-07-08

## 2024-06-28 ASSESSMENT — ENCOUNTER SYMPTOMS
NAUSEA: 0
FREQUENCY: 0
VOMITING: 0
SWEATS: 0
HEMATURIA: 0
FLANK PAIN: 1

## 2024-06-28 ASSESSMENT — PAIN SCALES - GENERAL: PAINLEVEL: MODERATE PAIN (4)

## 2024-06-28 NOTE — NURSING NOTE
Patient is here for concerns of possible bladder infection. States symptoms started a couple weeks, having bladder pain, low back pain, and itching.     Patient's last menstrual period was 06/01/1990 (approximate).  Medication Reconciliation: complete    Fatoumata Ramirez LPN 6/28/2024 10:15 AM       Advance care directive on file? yes  Advance care directive provided to patient? na       Fatoumata Ramirez LPN

## 2024-06-28 NOTE — PROGRESS NOTES
"  Assessment & Plan     UTI symptoms  Reviewed urinalysis.  Consistent with simple cystitis.  Reviewed previous cultures.  Recommend Macrobid twice daily for 10 days.  Increase current.  Use and fluid intake.  May use over-the-counter miconazole for yeast infection.  - UA with Microscopic reflex to Culture  - Urine Culture  - nitroFURantoin macrocrystal-monohydrate (MACROBID) 100 MG capsule; Take 1 capsule (100 mg) by mouth 2 times daily for 10 days    Follow-up if she develops fever, back pain, nausea vomiting.    No follow-ups on file.    Subjective   Monisha is a 74 year old, presenting for the following health issues:  Urinary Problem      73 yo female presents with lower pelvic pressure, vulvar itching, slight dysuria  Denies hematuria, fever, chills  Mild low back pain      8/2023 Ucx Ecoli    History of Present Illness       Reason for visit:  Bladder urine  Symptom onset:  3-4 weeks ago  Symptoms include:  Bladder  Symptom intensity:  Severe  Symptom progression:  Worsening  Had these symptoms before:  Yes  Has tried/received treatment for these symptoms:  Yes  Previous treatment was successful:  Yes  Prior treatment description:  Meds    She eats 2-3 servings of fruits and vegetables daily.She consumes 3 sweetened beverage(s) daily.She exercises with enough effort to increase her heart rate 9 or less minutes per day.  She exercises with enough effort to increase her heart rate 3 or less days per week. She is missing 7 dose(s) of medications per week.  She is not taking prescribed medications regularly due to other.                 Review of Systems  Constitutional, HEENT, cardiovascular, pulmonary, gi and gu systems are negative, except as otherwise noted.      Objective    BP (!) 156/90   Pulse 66   Temp 96.9  F (36.1  C) (Temporal)   Resp 18   Ht 1.581 m (5' 2.25\")   Wt 78.9 kg (174 lb)   LMP 06/01/1990 (Approximate)   SpO2 97%   Breastfeeding No   BMI 31.57 kg/m    Body mass index is 31.57 " kg/m .  Physical Exam   GENERAL: alert and no distress  ABDOMEN: Suprapubic tenderness to palpation.  No CVA tenderness.  Polyp positive bowel sounds present.  No rebound or rigidity    Results for orders placed or performed in visit on 06/28/24   UA with Microscopic reflex to Culture     Status: Abnormal    Specimen: Urine, Midstream   Result Value Ref Range    Color Urine Yellow Colorless, Straw, Light Yellow, Yellow    Appearance Urine Slightly Cloudy (A) Clear    Glucose Urine Negative Negative mg/dL    Bilirubin Urine Negative Negative    Ketones Urine Negative Negative mg/dL    Specific Gravity Urine 1.005 1.000 - 1.030    Blood Urine Negative Negative    pH Urine 6.5 5.0 - 9.0    Protein Albumin Urine Negative Negative mg/dL    Urobilinogen Urine Normal Normal, 2.0 mg/dL    Nitrite Urine Positive (A) Negative    Leukocyte Esterase Urine Large (A) Negative    Bacteria Urine Many (A) None Seen /HPF    RBC Urine 2 <=2 /HPF    WBC Urine 155 (H) <=5 /HPF    Narrative    Urine Culture ordered based on laboratory criteria           Signed Electronically by: Radha Gu MD    Answers submitted by the patient for this visit:  Painful Urination Questionnaire (Submitted on 6/28/2024)  Chronicity: recurrent  Onset: 1 to 4 weeks ago  Frequency: every urination  Progression since onset: gradually worsening  Pain quality: aching, burning  Pain - numeric: 4/10  Fever: no fever  Fever duration: less than 1 day  Sexually active?: Yes  History of pyelonephritis?: Yes  hematuria: No  frequency: No  nausea: No  flank pain: Yes  possible pregnancy: No  urgency: Yes  sweats: No  vomiting: No

## 2024-06-30 LAB — BACTERIA UR CULT: ABNORMAL

## 2024-07-26 ENCOUNTER — NURSE TRIAGE (OUTPATIENT)
Dept: INTERNAL MEDICINE | Facility: OTHER | Age: 74
End: 2024-07-26
Payer: OTHER GOVERNMENT

## 2024-07-26 ENCOUNTER — LAB (OUTPATIENT)
Dept: LAB | Facility: OTHER | Age: 74
End: 2024-07-26
Payer: OTHER GOVERNMENT

## 2024-07-26 DIAGNOSIS — N30.00 ACUTE CYSTITIS WITHOUT HEMATURIA: ICD-10-CM

## 2024-07-26 DIAGNOSIS — N30.00 ACUTE CYSTITIS WITHOUT HEMATURIA: Primary | ICD-10-CM

## 2024-07-26 LAB
ALBUMIN UR-MCNC: NEGATIVE MG/DL
APPEARANCE UR: CLEAR
BACTERIA #/AREA URNS HPF: ABNORMAL /HPF
BILIRUB UR QL STRIP: NEGATIVE
COLOR UR AUTO: YELLOW
GLUCOSE UR STRIP-MCNC: NEGATIVE MG/DL
HGB UR QL STRIP: NEGATIVE
KETONES UR STRIP-MCNC: NEGATIVE MG/DL
LEUKOCYTE ESTERASE UR QL STRIP: NEGATIVE
NITRATE UR QL: NEGATIVE
PH UR STRIP: 6.5 [PH] (ref 5–9)
RBC URINE: 0 /HPF
SP GR UR STRIP: 1.01 (ref 1–1.03)
UROBILINOGEN UR STRIP-MCNC: NORMAL MG/DL
WBC URINE: 1 /HPF

## 2024-07-26 PROCEDURE — 81001 URINALYSIS AUTO W/SCOPE: CPT | Mod: ZL

## 2024-07-26 NOTE — TELEPHONE ENCOUNTER
"Patient states she received a Fiz message this morning that said she should repeat the UA. She does have returning symptoms x2-3 days and would like the lab order placed. Order pending. Helen Lopez RN on 7/26/2024 at 12:43 PM      Reason for Disposition   Side (flank) or lower back pain present    Additional Information   Negative: Shock suspected (e.g., cold/pale/clammy skin, too weak to stand, low BP, rapid pulse)   Negative: Sounds like a life-threatening emergency to the triager   Negative: Followed a female genital area injury (e.g., labia, vagina, vulva)   Negative: Followed a male genital area injury (penis, scrotum)   Negative: Vaginal discharge   Negative: Pus (white, yellow) or bloody discharge from end of penis   Negative: Pain or burning with passing urine (urination) and pregnant   Negative: Pain or burning with passing urine (urination) and female   Negative: Pain or burning with passing urine (urination) and male   Negative: Pain or itching in the vulvar area   Negative: Pain in scrotum is main symptom   Negative: Blood in the urine is main symptom   Negative: Symptoms arising from use of a urinary catheter (e.g., Coude, Florez)   Negative: Unable to urinate (or only a few drops) > 4 hours and bladder feels very full (e.g., palpable bladder or strong urge to urinate)   Negative: Decreased urination and drinking very little and dehydration suspected (e.g., dark urine, no urine > 12 hours, very dry mouth, very lightheaded)   Negative: Patient sounds very sick or weak to the triager   Negative: Fever > 100.4 F  (38.0 C)    Answer Assessment - Initial Assessment Questions  1. SYMPTOM: \"What's the main symptom you're concerned about?\" (e.g., frequency, incontinence)      Stress incontinence, uncomfortable/sleepless nights, symptoms improved on antibiotics but now shifted back to where she was with lower back pain     2. ONSET: \"When did the  symptoms  start?\"      Came back especially the last couple " "of days     3. PAIN: \"Is there any pain?\" If Yes, ask: \"How bad is it?\" (Scale: 1-10; mild, moderate, severe)      3/4     4. CAUSE: \"What do you think is causing the symptoms?\"      Returning UTI     5. OTHER SYMPTOMS: \"Do you have any other symptoms?\" (e.g., blood in urine, fever, flank pain, pain with urination)      Uses a salve for itching/irritation which was present at the visit with Dr. Gu     6. PREGNANCY: \"Is there any chance you are pregnant?\" \"When was your last menstrual period?\"      no    Protocols used: Urinary Symptoms-A-[place]    "

## 2024-07-26 NOTE — TELEPHONE ENCOUNTER
Patient called stating that she had a UTI at the end of June. She has a note that says she should do a repeat UA. She still has symptoms and is wondering if someone could order that for her. Please advise.    Loreta Milner on 7/26/2024 at 9:55 AM

## 2024-07-26 NOTE — TELEPHONE ENCOUNTER
Order placed by TESSA Mackey NP. Patient transferred to Scheduling. Helen Lopez RN on 7/26/2024 at 1:26 PM

## 2024-08-05 ENCOUNTER — OFFICE VISIT (OUTPATIENT)
Dept: FAMILY MEDICINE | Facility: OTHER | Age: 74
End: 2024-08-05
Attending: PHYSICIAN ASSISTANT
Payer: OTHER GOVERNMENT

## 2024-08-05 VITALS
HEART RATE: 72 BPM | RESPIRATION RATE: 20 BRPM | TEMPERATURE: 96.9 F | DIASTOLIC BLOOD PRESSURE: 70 MMHG | WEIGHT: 170 LBS | BODY MASS INDEX: 30.84 KG/M2 | SYSTOLIC BLOOD PRESSURE: 128 MMHG

## 2024-08-05 DIAGNOSIS — N39.46 MIXED INCONTINENCE: Primary | Chronic | ICD-10-CM

## 2024-08-05 DIAGNOSIS — N95.1 PERIMENOPAUSAL VASOMOTOR SYMPTOMS: ICD-10-CM

## 2024-08-05 DIAGNOSIS — R30.0 DYSURIA: ICD-10-CM

## 2024-08-05 LAB
ALBUMIN UR-MCNC: NEGATIVE MG/DL
APPEARANCE UR: CLEAR
BILIRUB UR QL STRIP: NEGATIVE
COLOR UR AUTO: YELLOW
GLUCOSE UR STRIP-MCNC: NEGATIVE MG/DL
HGB UR QL STRIP: NEGATIVE
KETONES UR STRIP-MCNC: NEGATIVE MG/DL
LEUKOCYTE ESTERASE UR QL STRIP: NEGATIVE
NITRATE UR QL: NEGATIVE
PH UR STRIP: 6.5 [PH] (ref 5–9)
SP GR UR STRIP: 1.01 (ref 1–1.03)
UROBILINOGEN UR STRIP-MCNC: NORMAL MG/DL

## 2024-08-05 PROCEDURE — 81003 URINALYSIS AUTO W/O SCOPE: CPT | Mod: ZL | Performed by: PHYSICIAN ASSISTANT

## 2024-08-05 PROCEDURE — 99214 OFFICE O/P EST MOD 30 MIN: CPT | Performed by: PHYSICIAN ASSISTANT

## 2024-08-05 PROCEDURE — 87086 URINE CULTURE/COLONY COUNT: CPT | Mod: ZL | Performed by: PHYSICIAN ASSISTANT

## 2024-08-05 RX ORDER — NITROFURANTOIN 25; 75 MG/1; MG/1
100 CAPSULE ORAL 2 TIMES DAILY
Qty: 14 CAPSULE | Refills: 0 | Status: SHIPPED | OUTPATIENT
Start: 2024-08-05 | End: 2024-08-12

## 2024-08-05 ASSESSMENT — PAIN SCALES - GENERAL: PAINLEVEL: MILD PAIN (3)

## 2024-08-05 NOTE — NURSING NOTE
"Patient presents to the clinic for urinary symptoms over the past 1-2 weeks.    FOOD SECURITY SCREENING QUESTIONS:    The next two questions are to help us understand your food security.  If you are feeling you need any assistance in this area, we have resources available to support you today.    Hunger Vital Signs:  Within the past 12 months we worried whether our food would run out before we got money to buy more. Never  Within the past 12 months the food we bought just didn't last and we didn't have money to get more. Never    Advance Care Directive on file? yes  Advance Care Directive provided to patient? Declined.      Chief Complaint   Patient presents with    Urinary Problem       Initial /70 (BP Location: Right arm, Patient Position: Sitting, Cuff Size: Adult Regular)   Pulse 72   Temp 96.9  F (36.1  C) (Tympanic)   Resp 20   Wt 77.1 kg (170 lb)   LMP 06/01/1990 (Approximate)   BMI 30.84 kg/m   Estimated body mass index is 30.84 kg/m  as calculated from the following:    Height as of 6/28/24: 1.581 m (5' 2.25\").    Weight as of this encounter: 77.1 kg (170 lb).  Medication Reconciliation: complete        Anayeli Hammer LPN     "

## 2024-08-05 NOTE — PATIENT INSTRUCTIONS
tolterodine ER (DETROL LA) 2 MG 24 hr capsule (medication for overactive bladder) - start taking this again every day for 4-6 weeks, then we can look at cutting this back if needed     Referral to Dr. Gmoez - [facility] will call [facility] to schedule    Antibiotics - Macrobid (Nitrofurantoin) 1 capsule (100 mg) by mouth with food twice a day for 7 days. Urine culture is in process, this will take 1-3 days to come back - this will show us what type of bacteria is in your urine and if we need to make any changes to your antibiotic.   - Limit: greasy, acidic, spicy and caffeinated foods  - Increase water intake

## 2024-08-05 NOTE — PROGRESS NOTES
Assessment & Plan       ICD-10-CM    1. Mixed incontinence  N39.46       2. Dysuria  R30.0 UA Macroscopic with reflex to Microscopic and Culture     Urine Culture Aerobic Bacterial     Adult Urology  Referral     nitroFURantoin macrocrystal-monohydrate (MACROBID) 100 MG capsule      3. Perimenopausal vasomotor symptoms  N95.1         Mixed incontinence - extensively reviewed chart today.  I was able to find that Monisha has been on Oxybutynin, Tolterodine and Myrbetriq in the past.  She also followed with urology in the remote past, recommend close urologic follow-up due to progression of symptoms.  Discussed Kegel's exercises and other ways to modify incontinence.  I recommend that she restart tolterodine 2 mg daily and we can decrease/back off on dosing as she tolerates. Monisha believes she has an active prescription for Tolterodine at home, she will reach out if a refill is needed (fills at [facility] due to cost).   Oxybutynin: Appears she was previously on oxybutynin somewhere between 2015 and 2018.  She has been on dosage range between 5 and 15 mg daily.  Tolterodine: Started 2018, started on 4 mg daily, appears in 2020 this was decreased to 2 mg daily and continued at this dose until 2021.  In July 2021 she was taking 2 mg by mouth every other day, then increase back to daily in 2022.  Appears she was changed to taking 1 capsule (2 mg) by mouth 3 times a week March 2023.  Myrbetriq: Started in September 2023 on 25 mg dose, appears this was started to replace tolterodine and discontinued on 3/13/2024 and changed back to tolterodine (I do not see a reason of why this was changed).  Dysuria, updated urinalysis today, this appears essentially normal, however, she has had UTI/bladder infections in the past with a completely normal urinalysis.  We opted to add on a urine culture and treat symptomatically with a 7-day course of Macrobid/nitrofurantoin.  She will take 1 capsule (100 mg) by mouth twice daily with  food for 7 days.  A urine culture is in process, this will return in the next 1 to 3 days.  If urine culture results mixed urogenital libby or no growth, would recommend to stop oral antibiotics.  If there is growth of specific urinary bacteria I will let her know if we need to make any adjustments/changes to her treatment plan.  Recommend to limit bladder irritants such as acidic, greasy, spicy foods, caffeine, etc.  Recommend to avoid when urge arises.  Menopausal vasomotor symptoms -discussed that vaginal atrophy may also be playing a role in current symptomatology, she is able to utilize topical triamcinolone and clobetasol creams as needed for vaginal itching/burning as well as Estrace 0.5 mg daily.    See Patient Instructions    No follow-ups on file.    Subjective   Monisha is a 74 year old, presenting for the following health issues:  Urinary Problem        8/5/2024     2:37 PM   Additional Questions   Roomed by van zamora lpn     History of Present Illness       Reason for visit:  Urine track discomfort    She eats 2-3 servings of fruits and vegetables daily.She exercises with enough effort to increase her heart rate 10 to 19 minutes per day.  She exercises with enough effort to increase her heart rate 5 days per week.   She is taking medications regularly.     Monisha presents to the clinic with concerns of UTI.  She reached out to the clinic on 7/26/2024 with concerns of stress incontinence, uncomfortable sleepless nights.  She did have a UTI on 6/28/2024 and was treated with Macrobid twice daily x 10 days.  She states her symptoms improved when she was on antibiotics when she redeveloped lower back pain.    She completed a UA on 7/26/2024 that showed 1 white blood cells, 0 red blood cells, few bacteria, negative leukocyte esterase and nitrites.    Today, she states she continues to have urinary urgency, frequency and leakage.  Also does endorse some low back pain and lower suprapubic pressure.  She states that  primary bladder liquid just with coughing and sneezing.  She has been on medications for overactive bladder/incontinence in the past as well as completed a course of physical therapy.  She is unsure of the names of the medications that she has been on in the past.  She declines any blood/hematuria.  Declines fevers or chills.      Review of Systems  Constitutional, HEENT, cardiovascular, pulmonary, GI, , musculoskeletal, neuro, skin, endocrine and psych systems are negative, except as otherwise noted.      Objective    /70 (BP Location: Right arm, Patient Position: Sitting, Cuff Size: Adult Regular)   Pulse 72   Temp 96.9  F (36.1  C) (Tympanic)   Resp 20   Wt 77.1 kg (170 lb)   LMP 06/01/1990 (Approximate)   BMI 30.84 kg/m    Body mass index is 30.84 kg/m .  Physical Exam   GENERAL: alert and no distress  EYES: Eyes grossly normal to inspection  RESP: lungs clear to auscultation - no rales, rhonchi or wheezes  CV: regular rate and rhythm, normal S1 S2, no S3 or S4, no murmur, click or rub, no peripheral edema  ABDOMEN: soft, nontender, no hepatosplenomegaly, no masses and bowel sounds normal  SKIN: no suspicious lesions or rashes  BACK: no CVA tenderness, no paralumbar tenderness  PSYCH: mentation appears normal, affect normal/bright    Results for orders placed or performed in visit on 08/05/24   UA Macroscopic with reflex to Microscopic and Culture     Status: Normal    Specimen: Urine, Midstream   Result Value Ref Range    Color Urine Yellow Colorless, Straw, Light Yellow, Yellow    Appearance Urine Clear Clear    Glucose Urine Negative Negative mg/dL    Bilirubin Urine Negative Negative    Ketones Urine Negative Negative mg/dL    Specific Gravity Urine 1.006 1.000 - 1.030    Blood Urine Negative Negative    pH Urine 6.5 5.0 - 9.0    Protein Albumin Urine Negative Negative mg/dL    Urobilinogen Urine Normal Normal, 2.0 mg/dL    Nitrite Urine Negative Negative    Leukocyte Esterase Urine Negative  Negative    Narrative    Microscopic not indicated       Signed Electronically by: Chelsea Miller PA-C

## 2024-08-07 LAB — BACTERIA UR CULT: NO GROWTH

## 2024-08-21 ENCOUNTER — TELEPHONE (OUTPATIENT)
Dept: OBGYN | Facility: OTHER | Age: 74
End: 2024-08-21

## 2024-08-21 ENCOUNTER — OFFICE VISIT (OUTPATIENT)
Dept: OBGYN | Facility: OTHER | Age: 74
End: 2024-08-21
Attending: PHYSICIAN ASSISTANT
Payer: OTHER GOVERNMENT

## 2024-08-21 VITALS
WEIGHT: 169 LBS | DIASTOLIC BLOOD PRESSURE: 72 MMHG | SYSTOLIC BLOOD PRESSURE: 122 MMHG | HEART RATE: 69 BPM | BODY MASS INDEX: 30.66 KG/M2

## 2024-08-21 DIAGNOSIS — Z78.0 POSTMENOPAUSAL ESTROGEN DEFICIENCY: ICD-10-CM

## 2024-08-21 DIAGNOSIS — R30.0 DYSURIA: ICD-10-CM

## 2024-08-21 DIAGNOSIS — R33.9 URINARY RETENTION WITH INCOMPLETE BLADDER EMPTYING: ICD-10-CM

## 2024-08-21 DIAGNOSIS — N95.1 VASOMOTOR SYMPTOMS DUE TO MENOPAUSE: ICD-10-CM

## 2024-08-21 DIAGNOSIS — N39.46 MIXED STRESS AND URGE URINARY INCONTINENCE: Primary | ICD-10-CM

## 2024-08-21 DIAGNOSIS — N89.8 VAGINAL DISCHARGE: ICD-10-CM

## 2024-08-21 DIAGNOSIS — N89.8 VAGINAL ITCHING: ICD-10-CM

## 2024-08-21 DIAGNOSIS — N39.0 FREQUENT UTI: ICD-10-CM

## 2024-08-21 LAB
ALBUMIN UR-MCNC: NEGATIVE MG/DL
APPEARANCE UR: CLEAR
BACTERIA #/AREA URNS HPF: ABNORMAL /HPF
BACTERIAL VAGINOSIS VAG-IMP: NEGATIVE
BILIRUB UR QL STRIP: NEGATIVE
CANDIDA DNA VAG QL NAA+PROBE: NOT DETECTED
CANDIDA GLABRATA / CANDIDA KRUSEI DNA: NOT DETECTED
COLOR UR AUTO: YELLOW
GLUCOSE UR STRIP-MCNC: NEGATIVE MG/DL
HGB UR QL STRIP: NEGATIVE
KETONES UR STRIP-MCNC: NEGATIVE MG/DL
LEUKOCYTE ESTERASE UR QL STRIP: NEGATIVE
NITRATE UR QL: NEGATIVE
PH UR STRIP: 5.5 [PH] (ref 5–9)
RBC URINE: <1 /HPF
SP GR UR STRIP: 1 (ref 1–1.03)
T VAGINALIS DNA VAG QL NAA+PROBE: NOT DETECTED
UROBILINOGEN UR STRIP-MCNC: NORMAL MG/DL
WBC URINE: 1 /HPF

## 2024-08-21 PROCEDURE — 87086 URINE CULTURE/COLONY COUNT: CPT | Mod: ZL | Performed by: NURSE PRACTITIONER

## 2024-08-21 PROCEDURE — 99215 OFFICE O/P EST HI 40 MIN: CPT | Performed by: NURSE PRACTITIONER

## 2024-08-21 PROCEDURE — 51798 US URINE CAPACITY MEASURE: CPT | Performed by: NURSE PRACTITIONER

## 2024-08-21 PROCEDURE — 0352U MULTIPLEX VAGINAL PANEL BY PCR: CPT | Mod: ZL | Performed by: NURSE PRACTITIONER

## 2024-08-21 PROCEDURE — 81001 URINALYSIS AUTO W/SCOPE: CPT | Mod: ZL | Performed by: NURSE PRACTITIONER

## 2024-08-21 RX ORDER — TOLTERODINE TARTRATE 2 MG/1
2 TABLET, EXTENDED RELEASE ORAL 2 TIMES DAILY
COMMUNITY
End: 2024-08-21

## 2024-08-21 RX ORDER — ESTRADIOL 0.1 MG/G
CREAM VAGINAL
Qty: 42.5 G | Refills: 0 | Status: SHIPPED | OUTPATIENT
Start: 2024-08-21 | End: 2024-09-18

## 2024-08-21 RX ORDER — CLOBETASOL PROPIONATE 0.5 MG/G
OINTMENT TOPICAL 2 TIMES DAILY
Qty: 45 G | Refills: 1 | Status: SHIPPED | OUTPATIENT
Start: 2024-08-21 | End: 2024-09-18

## 2024-08-21 ASSESSMENT — PAIN SCALES - GENERAL: PAINLEVEL: MILD PAIN (3)

## 2024-08-21 NOTE — NURSING NOTE
"Chief Complaint   Patient presents with    Incontinence     Patient is here for incontinence consult. Patient has seen Dr. Ha in the past, Patient is taking detrol LA. Patient had a hyst many years ago. Patient is having incontinence with cough, laugh sneezing, urgency, and frequent trips to the rest room.   Initial /72   Pulse 69   Wt 76.7 kg (169 lb)   LMP 06/01/1990 (Approximate)   BMI 30.66 kg/m   Estimated body mass index is 30.66 kg/m  as calculated from the following:    Height as of 6/28/24: 1.581 m (5' 2.25\").    Weight as of this encounter: 76.7 kg (169 lb).  Medication Reconciliation: complete        Rosangela Green LPN    "

## 2024-08-21 NOTE — TELEPHONE ENCOUNTER
Pt states that she was suppose to get 2 medications.  Pt states that the only one that Dain received was the Clobetasol.  Please call.    Fabian Figueroa on 8/21/2024 at 1:05 PM

## 2024-08-21 NOTE — PATIENT INSTRUCTIONS
Some of the vaginal changes I noted are consistent with lichen sclerosis.  Clobetasol ointment twice daily.  You apply this to your external vaginal labia each morning and each evening and I will see you back for follow-up in 1 month  Concerns for postmenopausal vaginal changes.  I do think we should switch you from oral estradiol to vaginal estrogen.  This will help with vaginal itching, vaginal dryness and prevent frequent UTI.  We will discuss this at her follow-up next month  3.  Oral estrogen is not recommended due to increased risk of heart attack and stroke  4. Cranberry tabs 150 mg and D-mannose 1350 mg- Use both twice a day      Recurrent UTI    Patient understands that other problems can mimic a UTI including interstitial cystitis, caffeine excess, constipation, gynecologic issues and bowel issues and urologic malignancies such as bladder cancer.     Discussion on prevention of UTIs including drinking plenty of fluids (2L/day), maintaining a good bowel regiment with Miralax or Metamucil.  Emphasized the important of probiotics preferably fermented types.  Cranberry tablets and d-mannose supplements emphasized as well as timed voiding.  Finally discussed the use of estrogen in post-menopausal elderly women to help improve vaginal tissue health provided it is safe to use.     Drink plenty of fluids, > 2 liters/day  Avoid bladder irritants: Caffeine (coffee, tea, pop, energy drinks), sugary drinks, chocolate, some spicy foods  Maintain good bowel regimen with MiraLAX or Metamucil.  Long term plan of preventing constipation, not just taking once constipated. Miralax 1 capful in tall glass of water 2 to 3 times/week.  Avoid or slow down if diarrhea happens. Goal of soft formed, non-strained BM daily or every other day with complete emptying.   Good hygiene encouraged with voiding after sexual intercourse and in some cases showering before and after intercourse.    Cranberry tabs 150 mg and D-mannose 1350 mg-  "Use both twice a day  Importance of probiotics:  Yogurts that contain good bacteria.  Kefir 1% milkfat (plain)  If perimenopausal or postmenopausal then consideration of estrogen cream vaginally and periurethrally twice a week if there is no history of blood clots, tobacco use or breast cancer.     ------------------------------------------------------------------------------------    Urge Incontinence   Extensive discussion with patient about her current urinary symptoms and leakage.  Explained the concept of \"overactive bladder\" and typical symptoms such as urgency, frequency, urge incontinence and nocturia.  Discussed the difference between primary stress leakage and urge leakage.    Etiologies discussed including childbirth and birth trauma, atrophic changes, vaginal prolapse, caffeine use, constipation, c and previous pelvic surgery such as RAYNE, A&P repair and sling placement.    -potential steps of treatment discussed.  The first step includes conservative measures such as  timed voiding, avoidance of constipation and avoidance of dietary triggers (elimination diet).   -Avoid bladder irritants: Caffeine (coffee, tea, pop, energy drinks), sugary drinks, chocolate, some spicy foods  -Maintain good bowel regimen with MiraLAX or Metamucil.  Long term plan of preventing constipation, not just taking once constipated. Miralax 1 capful in tall glass of water 2 to 3 times/week.  Avoid or slow down if diarrhea happens. Goal of soft formed, non-strained BM daily or every other day with complete emptying.   The second step includes consideration of premarin cream if a candidate.    If perimenopausal or postmenopausal then consideration of estrogen cream vaginally and periurethrally twice a week   The third step includes medications such as anticholinergics and/or mirabegron.  The final step includes Botox intradetrusor injections, Interstim and/or PTNS therapy.      "

## 2024-08-21 NOTE — PROGRESS NOTES
Chief Complaint: Incontinence    HPI:  Monisha is a 74 year old  female who presents for for evaluation for incontinence.  Patient has history of mixed urinary incontinence.    Patient has a longstanding history of incontinence with coughing, sneezing and with urgency.  Patient will leak urine with movement and changing positions.  She does feel like she empties her bladder when she urinates.  She urinates at least every 2 hours.    Patient has followed with urology in the past.  Patient has previously been on oxybutynin (), tolterodine ([id number]- taking 2mg 3x/week most recently) and Myrbetriq (2023-3/2024), then changed back to tolterodine recently.  She was restarted on tolterodine 2 mg daily on 2024.  She reports she has not noticed much of a difference.  She does notice intermittent dizziness, dry mouth, constipation.    Urine culture obtained on 2024 returned negative for infection    Patient is status post hysterectomy  secondary to endometriosis.  Bilateral salpingectomy and oophorectomy in .  She was prescribed Celexa and Estrace to help with hot flashes menopausal vasomotor symptoms and has continued on this for many years.  She continues to take oral estradiol 0.5 mg daily for management of hot flashes.    Patient reports vaginal itching and vaginal dryness.  She is no longer sexually active but reports it was painful when she was. She denies any known prolapse.    She states she does not struggle with constipation.  She usually has a complete bowel movement every day or every other day.  She does not feel like she is straining.    Patient does drink 2 cups of coffee a day.  Avoids tea and soda.  She will have a beer occasionally.  She usually drinks propel water or flavored water.    Patient's last menstrual period was 1990 (approximate).    OB History  OB History    Para Term  AB Living   2 2 2 0 0 2   SAB IAB Ectopic Multiple Live Births   0 0 0 0 2       # Outcome Date GA Lbr Pasha/2nd Weight Sex Type Anes PTL Lv   2 Term 04/03/81   3.402 kg (7 lb 8 oz) M Vag-Spont   SOFY   1 Term 07/23/74   3.402 kg (7 lb 8 oz) F Vag-Spont   SOFY     /72   Pulse 69   Wt 76.7 kg (169 lb)   LMP 06/01/1990 (Approximate)   BMI 30.66 kg/m      REVIEW OF SYSTEMS  As Per HPI, Otherwise negative.     Physical Exam:  Constitutional: healthy, alert, and no distress  Abdomen: Soft, nontender, no CVA tenderness  Pelvic: External female genitalia show fusion of labia minora majora, scarring over anterior vulva, normal vaginal erythema or irritation.  Vaginal discharge- small white/thin discharge. No cervix or uterus.  Grade 1/2 cystocele.  No rectocele.  Urethral mobility present, leaking of urine with stress.  Chaperone was present.     PVR-152 mL    Lab:   Results for orders placed or performed in visit on 08/21/24   UA with Microscopic reflex to Culture     Status: Abnormal    Specimen: Urine, Midstream   Result Value Ref Range    Color Urine Yellow Colorless, Straw, Light Yellow, Yellow    Appearance Urine Clear Clear    Glucose Urine Negative Negative mg/dL    Bilirubin Urine Negative Negative    Ketones Urine Negative Negative mg/dL    Specific Gravity Urine 1.004 1.000 - 1.030    Blood Urine Negative Negative    pH Urine 5.5 5.0 - 9.0    Protein Albumin Urine Negative Negative mg/dL    Urobilinogen Urine Normal Normal, 2.0 mg/dL    Nitrite Urine Negative Negative    Leukocyte Esterase Urine Negative Negative    Bacteria Urine Few (A) None Seen /HPF    RBC Urine <1 <=2 /HPF    WBC Urine 1 <=5 /HPF    Narrative    Urine Culture not indicated   Multiplex Vaginal Panel by PCR     Status: Normal    Specimen: Vagina; Swab   Result Value Ref Range    Bacterial Vaginosis Organism DNA Negative Negative    Candida Group DNA Not Detected Not Detected    Candida glabrata / Raina krusei DNA Not Detected Not Detected    Trichomonas vaginalis DNA Not Detected Not Detected    Narrative    The  Xpert  Xpress MVP test, performed on the [employer], is an automated, qualitative in vitro diagnostic test for the detection of DNA targets from anaerobic bacteria associated with bacterial vaginosis, Candida species associated with vulvovaginal candidiasis, and Trichomonas vaginalis. The assay uses clinician-collected and self-collected vaginal swabs from patients who are symptomatic for vaginitis/ vaginosis. The Xpert  Xpress MVP test utilizes real-time polymerase chain reaction (PCR) for the amplification of specific DNA targets and utilizes fluorogenic target-specific hybridization probes to detect and differentiate DNA. It is intended to aid in the diagnosis of vaginal infections in women with a clinical presentation consistent with bacterial vaginosis, vulvovaginal candidiasis, or trichomoniasis.   The assay targets three anaerobic microorgansims that are associated with bacterial vaginosis (BV). Other organisms that are not detected by the Xpert  Xpress MVP test have also been reported to be associated with BV. The BV organism and Candida species targets of the Xpert  Xpress MVP test can be commensal in women; positive results must be considered in conjunction with other clinical and patient information to determine the disease status.       ASSESSMENT/PLAN :  1. Mixed stress and urge urinary incontinence  - UA with Microscopic reflex to Culture  - MEASURE POST-VOID RESIDUAL URINE/BLADDER CAPACITY, US NON-IMAGING  2. Urinary retention with incomplete bladder emptying  3. Frequent UTI  4. Dysuria  - Adult Urology  Referral    Patient presents to discuss urinary incontinence.  Patient's urological/gynecological history is positive for mixed stress and urge urinary incontinence, urinary retention with incomplete bladder emptying, postmenopausal estrogen deficiency, frequent UTI and vaginal itching, concern for lichen sclerosus.  Patient is returning urine, postvoid residual 152 mL.   On exam she does have pelvic for weakness with grade 1-2 cystocele.  We briefly discussed pessary for intervention at this office visit however we will revisit this idea in the future if necessary.    Reviewed clinical findings concerning for lichen sclerosis and recommendation of clobetasol treatment twice daily for 1 month.  We will also add in periurethral vaginal estrogen cream nightly for the next 2 weeks then decrease frequency to twice weekly for maintenance    -Recommend discontinuing tolterodine management of incontinence due to ineffectiveness and side effects including dry mouth, constipation, dizziness and memory concerns.  Patient is agreeable.    5. Vaginal discharge  - Multiplex Vaginal Panel by PCR  Vaginitis panel negative for infection    6. Vaginal itching  - clobetasol (TEMOVATE) 0.05 % external ointment; Apply topically 2 times daily.  Dispense: 45 g; Refill: 1  Patient describes having intermittent vaginal itching, sometimes this improves and other times she will have more of an intense flare.  This has been ongoing for many years.  On exam she does have evidence of vulvar inflammation and scarring, no visible lesions or adhesions.  Expressed findings concerning for lichen sclerosis.  I would recommend trial of clobetasol limit externally on bilateral labia twice daily for 1 month with close follow-up.    7. Postmenopausal estrogen deficiency  - estradiol (ESTRACE) 0.1 MG/GM vaginal cream; Place 1 g vaginally at bedtime for 14 days, THEN 1 g twice a week. Apply a nickel size amount to finger and applied periurethrally and spread the rest vaginally each night at bedtime for the next 2 weeks then decrease frequency to twice weekly at bedtime..  Dispense: 42.5 g; Refill: 0     Recommend starting vaginal estrogen daily at bedtime for the next 2 weeks then decrease frequency to twice daily for maintenance.  Reviewed with patient this should help with vaginal irritation, itching and is proven to  reduce frequent UTI.  Patient is agreeable to try.    8.  Postmenopausal vasomotor symptoms  Patient has been on oral estradiol since her hysterectomy.  Her biggest concern is hot flashes.  She is currently taking 0.5 mg once daily.  Reviewed with patient that I would not recommend she stay on oral estrogen due to increased risks of DVT/PE and stroke.  Vasomotor symptoms should be improving by this age.  I recommend slowly tapering her off oral estradiol.  She feels comfortable starting with 0.25 mg once daily and we will follow-up on this in 1 month    Follow-up: We did discuss a lot of information and recommendations during this office visit.  I will see patient back for 1 month follow-up to see for pelvic, follow-up on possible lichen sclerosis, follow-up on vaginal estrogen, follow-up on slow taper for oral estradiol due to cardiovascular risk.  Patient does have vaginal prolapse and urinary retention, we could discuss pessary for intervention    All questions were answered.  Red flags symptoms were discussed and patient was advised when they should return for reevaluation or for prompt emergency evaluation.   Patient was given verbal and written instructions on plan of care. Instructions were printed or are available on [employer] on electronic AVS.     DEVON Marmolejo., R.N., APRN [facility]    OBN

## 2024-08-22 NOTE — TELEPHONE ENCOUNTER
Provider called patient, no further action needed for nursing.  Chelsea Poon RN on 8/22/2024 at 1:13 PM

## 2024-08-24 LAB — BACTERIA UR CULT: NO GROWTH

## 2024-09-18 ENCOUNTER — OFFICE VISIT (OUTPATIENT)
Dept: OBGYN | Facility: OTHER | Age: 74
End: 2024-09-18
Attending: NURSE PRACTITIONER
Payer: OTHER GOVERNMENT

## 2024-09-18 VITALS
DIASTOLIC BLOOD PRESSURE: 90 MMHG | HEART RATE: 68 BPM | SYSTOLIC BLOOD PRESSURE: 142 MMHG | WEIGHT: 167.8 LBS | BODY MASS INDEX: 30.45 KG/M2

## 2024-09-18 DIAGNOSIS — L90.0 LICHEN SCLEROSUS: ICD-10-CM

## 2024-09-18 DIAGNOSIS — Z78.0 POSTMENOPAUSAL ESTROGEN DEFICIENCY: ICD-10-CM

## 2024-09-18 DIAGNOSIS — R33.9 URINARY RETENTION WITH INCOMPLETE BLADDER EMPTYING: Primary | ICD-10-CM

## 2024-09-18 DIAGNOSIS — N95.1 VASOMOTOR SYMPTOMS DUE TO MENOPAUSE: ICD-10-CM

## 2024-09-18 DIAGNOSIS — N39.0 FREQUENT UTI: ICD-10-CM

## 2024-09-18 DIAGNOSIS — N39.46 MIXED STRESS AND URGE URINARY INCONTINENCE: ICD-10-CM

## 2024-09-18 PROCEDURE — 99214 OFFICE O/P EST MOD 30 MIN: CPT | Performed by: NURSE PRACTITIONER

## 2024-09-18 RX ORDER — CLOBETASOL PROPIONATE 0.5 MG/G
CREAM TOPICAL AT BEDTIME
Qty: 45 G | Refills: 2 | Status: SHIPPED | OUTPATIENT
Start: 2024-09-18

## 2024-09-18 RX ORDER — ESTRADIOL 0.5 MG/1
0.5 TABLET ORAL
Qty: 90 TABLET | Refills: 4 | Status: SHIPPED | OUTPATIENT
Start: 2024-09-19 | End: 2024-09-18

## 2024-09-18 RX ORDER — ESTRADIOL 0.1 MG/G
CREAM VAGINAL
Qty: 42.5 G | Refills: 3 | Status: SHIPPED | OUTPATIENT
Start: 2024-09-19 | End: 2024-10-03

## 2024-09-18 NOTE — PROGRESS NOTES
Monisha Velez  1950    SUBJECTIVE  CHIEF COMPLAINT/ REASON FOR VISIT  Patient presents with:  RECHECK     HISTORY OF PRESENT ILLNESS  Monisha Velez is a pleasant 74 year old female presents to clinic today for follow-up on mixed urinary incontinence.    Patient was started on periurethral and vaginal estrogen for prevention of UTI and improvement of incontinence.  States she has been using this and tolerating this well.  Patient does have urinary retention, postvoid residual after last visit was 152 mL.  She has grade 1/2 cystocele vaginal prolapse-    Patient was started on treatment for suspected lichen sclerosis, treated with clobetasol ointment twice daily.  She states this has been working well, improving her vaginal irritation and itching.    She has been on oral estrogen since her hysterectomy for management of hot flashes.  At her last visit we discussed vascular risks risks of remaining on oral estrogen.  Patient agreed to taper off of oral estrogen and is completely tapered off.  She is doing well and has not noticed any hot flashes return.    I have reviewed the nursing notes.  I have reviewed allergies, medication list, problem list, and past medical history.    OBHx  OB History    Para Term  AB Living   2 2 2 0 0 2   SAB IAB Ectopic Multiple Live Births   0 0 0 0 2      # Outcome Date GA Lbr Pasha/2nd Weight Sex Type Anes PTL Lv   2 Term 80   3.402 kg (7 lb 8 oz) M Vag-Spont   SOFY   1 Term 73   3.402 kg (7 lb 8 oz) F Vag-Spont   SOFY       ROS: 10-Point ROS negative except as noted in HPI    Physical Exam  BP (!) 142/90   Pulse 68   Wt 76.1 kg (167 lb 12.8 oz)   LMP 1990 (Approximate)   BMI 30.45 kg/m    Body mass index is 30.45 kg/m .  Gen: Well-appearing, well developed  Resp: nonlabored, normal effort, no audible wheezing  GI: soft, nontender, no flank pain  MS: moving without difficulty  Psych: appropriate mood and affect    Pelvic:  External female genitalia  show fusion of labia minora majora, scarring over anterior vulva, No erythema or edema. Normal hair distribution. Vagina is without lesions.  No vaginal discharge. No cervix or uterus. No adnexal tenderness or masses. Grade 1/2 cystocele. No rectocele.  Chaperone present      ASSESSMENT/PLAN  1. Urinary retention with incomplete bladder emptying  with  2. Mixed stress and urge urinary incontinence  Patient states she has noticed an improvement over the past month.  She has not been having such sudden bursts of incontinence.  Her bladder is still leaky but has not seemed as bothersome lately.    -Longstanding history of mixed stress and urinary incontinence, urinary retention with incomplete bladder emptying.  Postvoid residual from last visit was 152 mL.  She has grade 1-2 cystocele. History of hysterectomy, secondary to endometriosis.  -Discussed conservative interventions for cystocele to improve urinary retention with pessary.    Patient is still sexually active with her  and would like to avoid pessary.  She is not interested in any surgical intervention at this time.    -At her last visit we discontinued tolterodine due to adverse side effects that she was experiencing including dry mouth, constipation, dizziness and worsening memory.  She has not noticed much increase incontinence since discontinuing tolterodine.  -Stable bowel regimen, no constipation.  -If incontinence continues to persist or worsen could consider more invasive interventions with Dr. Gomez including bladder Botox, PTNS or InterStim implant.    3. Lichen sclerosus  - clobetasol propionate (TEMOVATE) 0.05 % external cream; Apply topically at bedtime. Apply to external vaginal area/labia daily at bedtime for next 30 days, then decrease frequency to twice a week.  This is something he will continue to stay on for management of lichen sclerosis  Dispense: 45 g; Refill: 2  Vaginal itching and inflammation resolved.  May decrease clobetasol  to daily at bedtime for the next month and then will reduce frequency to twice weekly.  This will be easier to maintain.  Will see patient for follow-up in 1 month to ensure symptoms are still well-controlled.    4. Postmenopausal estrogen deficiency  - estradiol (ESTRACE) 0.1 MG/GM vaginal cream; Apply a nickel size amount to finger and applied periurethrally and insert the rest vaginally twice weekly at bedtime, throw applicator away.  Dispense: 42.5 g; Refill: 3  Has been tolerating vaginal estrogen twice weekly.  She does need refills.  Recommend throwing applicator away and applying this periurethrally and vaginally twice weekly.    5. Vasomotor symptoms due to menopause  Stable.  Patient has tapered herself oral estradiol to increase cardiovascular risk and has not had return of hot flashes.    6. Frequent UTI  Asymptomatic.  Urine culture negative on 8/21/2024 and 8/5/2024.  Urine culture from 6/28/2024 was positive for E. Coli.  Will recheck urine at next month's office visit.  She knows to contact us should she have any increase incontinence or symptoms for urinary tract infection.    Explanation of diagnosis, treatment options and risk and benefits of medications reviewed with patient/caregiver. Patient/caregiver agrees with plan of care. Red flags symptoms were discussed and patient/caregiver was advised when they should return for reevaluation or for prompt emergency evaluation. Discharge Instructions were printed or are available on Mychart on electronic AVS.     Follow up: 1 month- lichen sclerosis (clobetosol), estrogen    Katheryn Irvin NP  [facility]

## 2024-09-18 NOTE — PATIENT INSTRUCTIONS
Lichen sclerosis-apply betazole steroid ointment to external labia/vaginal area daily at bedtime.  Please do this daily at bedtime for the next month and then decrease frequency to twice a week at bedtime.  This is something that you will continue on long-term to prevent vaginal itching, scarring, irritation.      Vaginal estrogen-apply nickel sized amount to your index finger and apply this to your urethra then insert the rest of the cream vaginally.  Please do this twice a week at bedtime.  This is something that you will continue long time to help with estrogen deficiency vaginal irritation, hot flashes and discomfort with intercourse.      I will see you for follow-up in 1 month, or sooner if you are having concerns

## 2024-09-18 NOTE — NURSING NOTE
Pt presents to clinic today for follow up lichen and incontinence that has improved  since last office visit.      Medication Reconciliation: complete  Serenity Singer LPN

## 2024-09-27 ENCOUNTER — TELEPHONE (OUTPATIENT)
Dept: OBGYN | Facility: OTHER | Age: 74
End: 2024-09-27
Payer: OTHER GOVERNMENT

## 2024-09-27 NOTE — TELEPHONE ENCOUNTER
Left message with patient. Bel Day out of clinic. Will route for review.  Hailey Ventura RN on 9/27/2024 at 10:24 AM

## 2024-09-27 NOTE — TELEPHONE ENCOUNTER
Estradiol vaginal cream Rx needs to be changed somewhat so her insurance covers. She would need either a higher dose or a different cream. Please call.    Loreta Milner on 9/27/2024 at 9:39 AM

## 2024-09-30 NOTE — TELEPHONE ENCOUNTER
Patient is calling in regards to Estradiol vaginal cream. She is checking to see if FRAN had received the message. Writer stated to patient that FRAN will return to clinic tomorrow.  Fernanda Lee on 9/30/2024 at 11:19 AM

## 2024-09-30 NOTE — TELEPHONE ENCOUNTER
Called and spoke to pharmacy who stated that it was too soon to fill the medication and it could not be filled until 11/06/2024. Patient picked up medication on 08/22/2024. Pharmacy stated that they had sent a PA request for the medication. Called and spoke to Patient after verifying last name and date of birth. Patient informed of the above information. Patient verbalized understanding and had no questions at this time. Patient asked to be called with update on the PA status. Message sent to PA department regarding the medication.     Imelda Ng RN on 9/30/2024 at 11:46 AM

## 2024-09-30 NOTE — TELEPHONE ENCOUNTER
"Contacted DAGs department regarding this patient medication request.  Per the PA department \"I tried submitting it, but it came back stating, \"Additional Information Required. Please advise the dispensing pharmacy to contact the Pharmacy Help Line at [phone] for assistance.\" Pharmacy contacted regarding information above and they said the would contact the Pharmacy Help Line. Pharmacy stated that they may need the provider to send in other request to override the system. Called and left detailed message for the patient.      Imelda Ng RN on 9/30/2024 at 1:29 PM    "

## 2024-10-01 NOTE — TELEPHONE ENCOUNTER
Call placed to pharmacy who was able to process the medication through patients insurance with a $30 copay. Called and spoke to Patient after verifying last name and date of birth. Patient informed that medication went through insurance and that it would be ready at the pharmacy. Patient verbalized understanding and had no questions at this time.       Imelda Ng RN on 10/1/2024 at 2:43 PM

## 2024-10-04 ENCOUNTER — OFFICE VISIT (OUTPATIENT)
Dept: OBGYN | Facility: OTHER | Age: 74
End: 2024-10-04
Attending: NURSE PRACTITIONER
Payer: OTHER GOVERNMENT

## 2024-10-04 ENCOUNTER — HOSPITAL ENCOUNTER (OUTPATIENT)
Dept: GENERAL RADIOLOGY | Facility: OTHER | Age: 74
Discharge: HOME | End: 2024-10-04
Attending: NURSE PRACTITIONER
Payer: OTHER GOVERNMENT

## 2024-10-04 VITALS
BODY MASS INDEX: 31.03 KG/M2 | HEART RATE: 64 BPM | SYSTOLIC BLOOD PRESSURE: 130 MMHG | WEIGHT: 171 LBS | DIASTOLIC BLOOD PRESSURE: 80 MMHG

## 2024-10-04 DIAGNOSIS — K59.00 CONSTIPATION, UNSPECIFIED CONSTIPATION TYPE: ICD-10-CM

## 2024-10-04 DIAGNOSIS — R10.84 ABDOMINAL PAIN, GENERALIZED: ICD-10-CM

## 2024-10-04 DIAGNOSIS — R30.9 PAINFUL URINATION: Primary | ICD-10-CM

## 2024-10-04 DIAGNOSIS — N30.00 ACUTE CYSTITIS WITHOUT HEMATURIA: ICD-10-CM

## 2024-10-04 LAB
ALBUMIN SERPL BCG-MCNC: 4.4 G/DL (ref 3.5–5.2)
ALBUMIN UR-MCNC: NEGATIVE MG/DL
ALP SERPL-CCNC: 76 U/L (ref 40–150)
ALT SERPL W P-5'-P-CCNC: 23 U/L (ref 0–50)
ANION GAP SERPL CALCULATED.3IONS-SCNC: 8 MMOL/L (ref 7–15)
APPEARANCE UR: CLEAR
AST SERPL W P-5'-P-CCNC: 26 U/L (ref 0–45)
BACTERIA #/AREA URNS HPF: ABNORMAL /HPF
BASOPHILS # BLD AUTO: 0 10E3/UL (ref 0–0.2)
BASOPHILS NFR BLD AUTO: 0 %
BILIRUB SERPL-MCNC: 0.3 MG/DL
BILIRUB UR QL STRIP: NEGATIVE
BUN SERPL-MCNC: 16.9 MG/DL (ref 8–23)
CALCIUM SERPL-MCNC: 9.5 MG/DL (ref 8.8–10.4)
CHLORIDE SERPL-SCNC: 103 MMOL/L (ref 98–107)
COLOR UR AUTO: YELLOW
CREAT SERPL-MCNC: 1.06 MG/DL (ref 0.51–0.95)
EGFRCR SERPLBLD CKD-EPI 2021: 55 ML/MIN/1.73M2
EOSINOPHIL # BLD AUTO: 0.1 10E3/UL (ref 0–0.7)
EOSINOPHIL NFR BLD AUTO: 1 %
ERYTHROCYTE [DISTWIDTH] IN BLOOD BY AUTOMATED COUNT: 12.2 % (ref 10–15)
GLUCOSE SERPL-MCNC: 109 MG/DL (ref 70–99)
GLUCOSE UR STRIP-MCNC: NEGATIVE MG/DL
HCO3 SERPL-SCNC: 29 MMOL/L (ref 22–29)
HCT VFR BLD AUTO: 43.4 % (ref 35–47)
HGB BLD-MCNC: 14.7 G/DL (ref 11.7–15.7)
HGB UR QL STRIP: NEGATIVE
IMM GRANULOCYTES # BLD: 0 10E3/UL
IMM GRANULOCYTES NFR BLD: 0 %
KETONES UR STRIP-MCNC: NEGATIVE MG/DL
LEUKOCYTE ESTERASE UR QL STRIP: ABNORMAL
LYMPHOCYTES # BLD AUTO: 1.6 10E3/UL (ref 0.8–5.3)
LYMPHOCYTES NFR BLD AUTO: 18 %
MCH RBC QN AUTO: 31.1 PG (ref 26.5–33)
MCHC RBC AUTO-ENTMCNC: 33.9 G/DL (ref 31.5–36.5)
MCV RBC AUTO: 92 FL (ref 78–100)
MONOCYTES # BLD AUTO: 0.4 10E3/UL (ref 0–1.3)
MONOCYTES NFR BLD AUTO: 5 %
NEUTROPHILS # BLD AUTO: 6.8 10E3/UL (ref 1.6–8.3)
NEUTROPHILS NFR BLD AUTO: 76 %
NITRATE UR QL: NEGATIVE
NRBC # BLD AUTO: 0 10E3/UL
NRBC BLD AUTO-RTO: 0 /100
PH UR STRIP: 6.5 [PH] (ref 5–9)
PLATELET # BLD AUTO: 195 10E3/UL (ref 150–450)
POTASSIUM SERPL-SCNC: 4.6 MMOL/L (ref 3.4–5.3)
PROT SERPL-MCNC: 7.1 G/DL (ref 6.4–8.3)
RBC # BLD AUTO: 4.72 10E6/UL (ref 3.8–5.2)
RBC URINE: 1 /HPF
SODIUM SERPL-SCNC: 140 MMOL/L (ref 135–145)
SP GR UR STRIP: 1 (ref 1–1.03)
SQUAMOUS EPITHELIAL: 1 /HPF
UROBILINOGEN UR STRIP-MCNC: NORMAL MG/DL
WBC # BLD AUTO: 9 10E3/UL (ref 4–11)
WBC URINE: 44 /HPF

## 2024-10-04 PROCEDURE — 81001 URINALYSIS AUTO W/SCOPE: CPT | Mod: ZL | Performed by: NURSE PRACTITIONER

## 2024-10-04 PROCEDURE — 74018 RADEX ABDOMEN 1 VIEW: CPT

## 2024-10-04 PROCEDURE — 99214 OFFICE O/P EST MOD 30 MIN: CPT | Performed by: NURSE PRACTITIONER

## 2024-10-04 PROCEDURE — 80053 COMPREHEN METABOLIC PANEL: CPT | Mod: ZL | Performed by: NURSE PRACTITIONER

## 2024-10-04 PROCEDURE — 85004 AUTOMATED DIFF WBC COUNT: CPT | Mod: ZL | Performed by: NURSE PRACTITIONER

## 2024-10-04 PROCEDURE — 36415 COLL VENOUS BLD VENIPUNCTURE: CPT | Mod: ZL | Performed by: NURSE PRACTITIONER

## 2024-10-04 PROCEDURE — 87086 URINE CULTURE/COLONY COUNT: CPT | Mod: ZL | Performed by: NURSE PRACTITIONER

## 2024-10-04 RX ORDER — CEPHALEXIN 500 MG/1
500 CAPSULE ORAL 2 TIMES DAILY
Qty: 14 CAPSULE | Refills: 0 | Status: SHIPPED | OUTPATIENT
Start: 2024-10-04 | End: 2024-10-11

## 2024-10-04 ASSESSMENT — PAIN SCALES - GENERAL: PAINLEVEL: MODERATE PAIN (4)

## 2024-10-04 NOTE — NURSING NOTE
"Chief Complaint   Patient presents with    Urinary Problem     Patient is seen in clinic today complaining of dysuria and urinary frequency.  Karly Liu LPN on 10/4/2024 at 1:17 PM   Ext. 1161    Initial /80   Pulse 64   Wt 77.6 kg (171 lb)   LMP 06/01/1990 (Approximate)   Breastfeeding No   BMI 31.03 kg/m   Estimated body mass index is 31.03 kg/m  as calculated from the following:    Height as of 6/28/24: 1.581 m (5' 2.25\").    Weight as of this encounter: 77.6 kg (171 lb).  Medication Reconciliation: complete        Karly Liu LPN  "

## 2024-10-04 NOTE — PROGRESS NOTES
Monisha Velez  1950    SUBJECTIVE  CHIEF COMPLAINT/ REASON FOR VISIT  Patient presents with:  Urinary Problem     HISTORY OF PRESENT ILLNESS  Monisha Velez is a pleasant 74 year old female presents to clinic today with concerns for dysuria.  Patient states yesterday morning she developed dysuria and this has progressively worsened.  She feels like she is urinating every 10 minutes.  She is at increased urgency as well.  She feels suprapubic pressure over her bladder however does have generalized abdominal pain.  No nausea.  She was constipated for a few days and went 2 days without a bowel movement until yesterday when she had 3-4 bowel movements with use of over-the-counter stool softener.  Denies any fevers but has felt sweaty and chilled.    He is still using clobetasol steroid cream daily at bedtime for management of lichen sclerosis.  She was able to  her vaginal estrogen cream this week and has used it once.    I have reviewed the nursing notes.  I have reviewed allergies, medication list, problem list, and past medical history.    OBHx  OB History    Para Term  AB Living   2 2 2 0 0 2   SAB IAB Ectopic Multiple Live Births   0 0 0 0 2      # Outcome Date GA Lbr Pasha/2nd Weight Sex Type Anes PTL Lv   2 Term 80   3.402 kg (7 lb 8 oz) M Vag-Spont   SOFY   1 Term 73   3.402 kg (7 lb 8 oz) F Vag-Spont   SOFY     ROS: 10-Point ROS negative except as noted in HPI    Physical Exam  /80   Pulse 64   Wt 77.6 kg (171 lb)   LMP 1990 (Approximate)   Breastfeeding No   BMI 31.03 kg/m    Body mass index is 31.03 kg/m .  Gen: Well-appearing, well developed  Resp: nonlabored, normal effort, no audible wheezing  GI: soft, tenderness in all 4 quadrants, no suprapubic tenderness to palpation, no flank pain  MS: moving without difficulty  Psych: appropriate mood and affect    DIAGNOSTICS  Results for orders placed or performed during the hospital encounter of 10/04/24   XR  Abdomen 1 View     Status: None    Narrative    PROCEDURE: XR ABDOMEN 1 VIEW 10/4/2024 1:54 PM    HISTORY: abdominal pain, constipation; Abdominal pain, generalized;  Constipation, unspecified constipation type    COMPARISONS: None.    TECHNIQUE: Flat and upright views of the abdomen    FINDINGS: The intestinal gas pattern is normal. There is no  extraluminal gas or pathologic intra-abdominal calcifications.         Impression    IMPRESSION: Normal abdominal gas pattern    CHUCK CERON MD         SYSTEM ID:  [id number]   Results for orders placed or performed in visit on 10/04/24   UA with Microscopic reflex to Culture     Status: Abnormal    Specimen: Urine, Midstream   Result Value Ref Range    Color Urine Yellow Colorless, Straw, Light Yellow, Yellow    Appearance Urine Clear Clear    Glucose Urine Negative Negative mg/dL    Bilirubin Urine Negative Negative    Ketones Urine Negative Negative mg/dL    Specific Gravity Urine 1.004 1.000 - 1.030    Blood Urine Negative Negative    pH Urine 6.5 5.0 - 9.0    Protein Albumin Urine Negative Negative mg/dL    Urobilinogen Urine Normal Normal, 2.0 mg/dL    Nitrite Urine Negative Negative    Leukocyte Esterase Urine Large (A) Negative    Bacteria Urine Few (A) None Seen /HPF    RBC Urine 1 <=2 /HPF    WBC Urine 44 (H) <=5 /HPF    Squamous Epithelials Urine 1 <=1 /HPF    Narrative    Urine Culture ordered based on laboratory criteria   Comprehensive Metabolic Panel     Status: Abnormal   Result Value Ref Range    Sodium 140 135 - 145 mmol/L    Potassium 4.6 3.4 - 5.3 mmol/L    Carbon Dioxide (CO2) 29 22 - 29 mmol/L    Anion Gap 8 7 - 15 mmol/L    Urea Nitrogen 16.9 8.0 - 23.0 mg/dL    Creatinine 1.06 (H) 0.51 - 0.95 mg/dL    GFR Estimate 55 (L) >60 mL/min/1.73m2    Calcium 9.5 8.8 - 10.4 mg/dL    Chloride 103 98 - 107 mmol/L    Glucose 109 (H) 70 - 99 mg/dL    Alkaline Phosphatase 76 40 - 150 U/L    AST 26 0 - 45 U/L    ALT 23 0 - 50 U/L    Protein Total 7.1 6.4 - 8.3  g/dL    Albumin 4.4 3.5 - 5.2 g/dL    Bilirubin Total 0.3 <=1.2 mg/dL   CBC with platelets and differential     Status: None   Result Value Ref Range    WBC Count 9.0 4.0 - 11.0 10e3/uL    RBC Count 4.72 3.80 - 5.20 10e6/uL    Hemoglobin 14.7 11.7 - 15.7 g/dL    Hematocrit 43.4 35.0 - 47.0 %    MCV 92 78 - 100 fL    MCH 31.1 26.5 - 33.0 pg    MCHC 33.9 31.5 - 36.5 g/dL    RDW 12.2 10.0 - 15.0 %    Platelet Count 195 150 - 450 10e3/uL    % Neutrophils 76 %    % Lymphocytes 18 %    % Monocytes 5 %    % Eosinophils 1 %    % Basophils 0 %    % Immature Granulocytes 0 %    NRBCs per 100 WBC 0 <1 /100    Absolute Neutrophils 6.8 1.6 - 8.3 10e3/uL    Absolute Lymphocytes 1.6 0.8 - 5.3 10e3/uL    Absolute Monocytes 0.4 0.0 - 1.3 10e3/uL    Absolute Eosinophils 0.1 0.0 - 0.7 10e3/uL    Absolute Basophils 0.0 0.0 - 0.2 10e3/uL    Absolute Immature Granulocytes 0.0 <=0.4 10e3/uL    Absolute NRBCs 0.0 10e3/uL   CBC and Differential     Status: None    Narrative    The following orders were created for panel order CBC and Differential.  Procedure                               Abnormality         Status                     ---------                               -----------         ------                     CBC with platelets and d...[[id number]]                      Final result                 Please view results for these tests on the individual orders.        ASSESSMENT/PLAN  1. Painful urination  - UA with Microscopic reflex to Culture; Future  - UA with Microscopic reflex to Culture  - Urine Culture    Urinalysis returned with large amount of leukocyte esterase, negative for nitrates or hematuria.  Recommend proceeding with urine culture.  Will start patient on antibiotic treatment while urine culture processes.  Strict ER precautions are given to patient should her symptoms worsen or persist or not improved with antibiotic.  Recommend returning for repeat urinalysis in 2 weeks.  Recommend continuing periurethral and  vaginal estrogen cream twice weekly for prevention of UTI.  Patient has longstanding history of urinary incontinence which has worsened with current urinary tract infection.  Will continue working on conservative treatment option plans to help with overactive bladder.  I will see patient back for follow-up in 2 weeks.    2. Abdominal pain, generalized  - CBC and Differential; Future  - Comprehensive Metabolic Panel; Future  - XR Abdomen 1 View; Future    Patient has acute generalized abdominal pain since yesterday when her urinary symptoms started.  She is also been struggling with constipation.  We review other differentials including but not limited to acute cystitis, constipation, obstruction, diverticulitis, cholecystitis, renal stone, acute gastroenteritis.  Recommend proceeding with lab work including CBC, CMP and plain film abdominal x-ray.  Lab work returned with stable renal function, normal liver enzymes, no leukocytosis.    3. Constipation, unspecified constipation type  - XR Abdomen 1 View; Future  Patient has longstanding history of chronic constipation.  Most recently she had an episode of constipation for a couple of days resolved with over-the-counter stool softeners.  On exam her abdomen is soft however has generalized tenderness in all 4 quadrants.  No nausea, still tolerating oral intake.  Recommend abdominal x-ray to evaluate severity of constipation.    4.  Lichen sclerosus  Patient has been doing well with topical clobetasol steroid ointment.  Emphasized that this is a continuous treatment and I would recommend decreasing frequency to twice weekly at bedtime.       Explanation of diagnosis, treatment options and risk and benefits of medications reviewed with patient/caregiver. Patient/caregiver agrees with plan of care. Red flags symptoms were discussed and patient/caregiver was advised when they should return for reevaluation or for prompt emergency evaluation. Discharge Instructions were  printed or are available on Mychart on electronic AVS.     Katheryn Irvin NP  [facility]

## 2024-10-06 LAB — BACTERIA UR CULT: NO GROWTH

## 2024-10-08 ENCOUNTER — TELEPHONE (OUTPATIENT)
Dept: OBGYN | Facility: OTHER | Age: 74
End: 2024-10-08
Payer: OTHER GOVERNMENT

## 2024-10-08 DIAGNOSIS — R33.9 URINARY RETENTION WITH INCOMPLETE BLADDER EMPTYING: Primary | ICD-10-CM

## 2024-10-16 ENCOUNTER — OFFICE VISIT (OUTPATIENT)
Dept: OBGYN | Facility: OTHER | Age: 74
End: 2024-10-16
Attending: NURSE PRACTITIONER
Payer: OTHER GOVERNMENT

## 2024-10-16 VITALS
BODY MASS INDEX: 30.48 KG/M2 | WEIGHT: 168 LBS | SYSTOLIC BLOOD PRESSURE: 130 MMHG | DIASTOLIC BLOOD PRESSURE: 74 MMHG | HEART RATE: 63 BPM

## 2024-10-16 DIAGNOSIS — R33.9 URINARY RETENTION WITH INCOMPLETE BLADDER EMPTYING: ICD-10-CM

## 2024-10-16 DIAGNOSIS — Z23 NEED FOR VACCINATION: ICD-10-CM

## 2024-10-16 DIAGNOSIS — K59.00 CONSTIPATION, UNSPECIFIED CONSTIPATION TYPE: ICD-10-CM

## 2024-10-16 DIAGNOSIS — N39.46 MIXED STRESS AND URGE URINARY INCONTINENCE: ICD-10-CM

## 2024-10-16 DIAGNOSIS — N39.0 FREQUENT UTI: ICD-10-CM

## 2024-10-16 DIAGNOSIS — R30.0 DYSURIA: Primary | ICD-10-CM

## 2024-10-16 LAB
ALBUMIN UR-MCNC: NEGATIVE MG/DL
APPEARANCE UR: CLEAR
BACTERIA #/AREA URNS HPF: ABNORMAL /HPF
BILIRUB UR QL STRIP: NEGATIVE
COLOR UR AUTO: ABNORMAL
GLUCOSE UR STRIP-MCNC: NEGATIVE MG/DL
HGB UR QL STRIP: NEGATIVE
KETONES UR STRIP-MCNC: NEGATIVE MG/DL
LEUKOCYTE ESTERASE UR QL STRIP: NEGATIVE
MUCOUS THREADS #/AREA URNS LPF: PRESENT /LPF
NITRATE UR QL: NEGATIVE
PH UR STRIP: 5.5 [PH] (ref 5–9)
RBC URINE: <1 /HPF
SP GR UR STRIP: 1.01 (ref 1–1.03)
UROBILINOGEN UR STRIP-MCNC: NORMAL MG/DL
WBC URINE: 1 /HPF

## 2024-10-16 PROCEDURE — 90472 IMMUNIZATION ADMIN EACH ADD: CPT | Performed by: NURSE PRACTITIONER

## 2024-10-16 PROCEDURE — 87086 URINE CULTURE/COLONY COUNT: CPT | Mod: ZL | Performed by: NURSE PRACTITIONER

## 2024-10-16 PROCEDURE — 90471 IMMUNIZATION ADMIN: CPT | Performed by: NURSE PRACTITIONER

## 2024-10-16 PROCEDURE — 99214 OFFICE O/P EST MOD 30 MIN: CPT | Mod: 25 | Performed by: NURSE PRACTITIONER

## 2024-10-16 PROCEDURE — 90678 RSV VACC PREF BIVALENT IM: CPT | Performed by: NURSE PRACTITIONER

## 2024-10-16 PROCEDURE — 81001 URINALYSIS AUTO W/SCOPE: CPT | Mod: ZL | Performed by: NURSE PRACTITIONER

## 2024-10-16 PROCEDURE — 90662 IIV NO PRSV INCREASED AG IM: CPT | Performed by: NURSE PRACTITIONER

## 2024-10-16 RX ORDER — CRANBERRY FRUIT EXTRACT 250 MG
250 CAPSULE ORAL DAILY
Qty: 90 CAPSULE | Refills: 3 | Status: SHIPPED | OUTPATIENT
Start: 2024-10-16

## 2024-10-16 RX ORDER — POLYETHYLENE GLYCOL 3350 17 G/17G
1 POWDER, FOR SOLUTION ORAL DAILY
Qty: 578 G | Refills: 2 | Status: SHIPPED | OUTPATIENT
Start: 2024-10-16

## 2024-10-16 NOTE — PATIENT INSTRUCTIONS
Trying to eliminate caffeine, sugary drinks and alcohol. Try to illuminate coffee, juices, pop and alcohol as this is more aggravating to your bladder and will make you leak more urine.  You to still enjoy life and have these occasionally, but the more you can eliminate and to drink plain water the better.  You could even try switching to decaf coffee instead of full-strength coffee.    Empty your bladder every 2 hours to help reduce incontinence    Improving constipation will help with your incontinence.  I have sent a prescription for MiraLAX.  Try putting a capful in 8 to 10 ounces of water daily.  If you develop diarrhea or your pulse are too loose, switch to every other day.  This is something I would like you to find a schedule with that you stay on weekly.  Maybe you continue using this daily or maybe you switch to 3 times a week to prevent constipation and keep your stools soft, formed and daily.    Continue clobetasol steroid ointment on external labia twice a week at bedtime-for treatment of lichen sclerosis    Periurethral and vaginal estrogen cream twice a week at bedtime to help prevent urinary tract infection    Referral is placed for pelvic floor therapy, they will call you to schedule.  Let them know you were there in 2022 and made a refresher on exercises.

## 2024-10-16 NOTE — NURSING NOTE
"No chief complaint on file.  Patient is here for a 1 month follow up for lichen, follow up urine sample. Patient is feeling clammy. Patient would also like to discuss a pessary fitting.      Initial /74   Pulse 63   Wt 76.2 kg (168 lb)   LMP 06/01/1990 (Approximate)   BMI 30.48 kg/m   Estimated body mass index is 30.48 kg/m  as calculated from the following:    Height as of 6/28/24: 1.581 m (5' 2.25\").    Weight as of this encounter: 76.2 kg (168 lb).  Medication Reconciliation: complete      Post void residual 103 ml    Rosangela Green LPN    "

## 2024-10-16 NOTE — PROGRESS NOTES
Chief Complaint: follow up incontinence and dysuria    HPI:  Monisha is a 74 year old  female who presents for urinary symptoms.  I last saw patient on 10/4/2024 for dysuria and lower abdominal pain.  Urine culture was negative.  Patient had abnormal WBC count of 9.0  Patient states she is still having lower abdominal pain intermittently, worse in right lower quadrant.  She has been constipated for the past couple of days.  She has chronic history of constipation.  She took fiber supplements and did use a suppository last night.  She is not taking daily stool softeners.    Patient is still using clobetasol ointment twice a week for treatment of lichen sclerosis and periurethral/vaginal estrogen cream twice a week at bedtime for prevention of UTI and improvement of incontinence.  She states this has been going well.  Denies any vaginal itching or labial irritation.      OB History  OB History    Para Term  AB Living   2 2 2 0 0 2   SAB IAB Ectopic Multiple Live Births   0 0 0 0 2      # Outcome Date GA Lbr Pasha/2nd Weight Sex Type Anes PTL Lv   2 Term 80   3.402 kg (7 lb 8 oz) M Vag-Spont   SOFY   1 Term 73   3.402 kg (7 lb 8 oz) F Vag-Spont   SOFY       /74   Pulse 63   Wt 76.2 kg (168 lb)   LMP 1990 (Approximate)   BMI 30.48 kg/m      REVIEW OF SYSTEMS  As Per HPI, Otherwise negative.     Physical Exam:  Constitutional: healthy, alert, and no distress  Pelvic: Normal appearing external female genitalia. Normal hair distribution. Vagina is without lesions. No discharge. No Cervix or uterus.  No adnexal tenderness or masses. Grade 1 midline cystocele. Urethral mobility present. Chaperone was present.     PVR: 103mL, previous visit 153mL    ASSESSMENT/PLAN :  1. Dysuria  - UA with Microscopic reflex to Culture  Normal urinalysis today.    2. Frequent UTI  - Physical Therapy  Referral; Future  - Cranberry 250 MG CAPS; Take 250 mg by mouth daily.  Dispense: 90  capsule; Refill: 3  - D-Mannose 500 MG CAPS; Take 1,000 mg by mouth daily.  Dispense: 90 capsule; Refill: 3    Emphasized the importance of eliminating bladder irritants and managing constipation.  Discussed timed voiding.  We reviewed that her urinary retention and incomplete bladder emptying is putting her at risk for recurrent UTI and worsening her incontinence.  She is still sexually active and does not want to use a pessary at this time.  She would like to avoid surgery.  -She feels comfortable starting cranberry and d-mannose supplements.  -Will continue with twice weekly periurethral/vaginal estrogen cream  -Will start pelvic floor rehab again, she completed pelvic PT in 2022.    3. Urinary retention with incomplete bladder emptying  - Physical Therapy  Referral; Future    PVR today 103mL, previous visit 153mL.  We discussed trial of pessary however patient is still sexually active with  and would like to avoid this which I agree with.  recommend starting with pelvic floor rehab she agrees with.    4. Mixed stress and urge urinary incontinence  - Physical Therapy  Referral; Future    Has not noticed incontinence worsened since stopping Detrol.  She will continue periurethral/vaginal estrogen twice weekly at bedtime.  Will resume pelvic floor rehab.  Will have follow-up with Dr. Gomez our urologist February 2025.    5. Constipation, unspecified constipation type  - polyethylene glycol (MIRALAX) 17 GM/Dose powder; Take 17 g (1 Capful) by mouth daily. Decrease to every other day if Diarrhea  Dispense: 578 g; Refill: 2    Patient continues to struggle with chronic constipation.  Prescription for MiraLAX sent to pharmacy.  She may also trial over-the-counter docusate sodium.  She is increasing her fiber and try and increase water intake.    6. Need for vaccination  Would like influenza and RSV vaccination today.  Risks and benefits and expectations with this immunization are reviewed.   This is administered by nursing staff.    Follow up: 2 months, or sooner if needed.  Will to see Dr. Gomez February 2025    DEVON Marmolejo., R.N., APRN [facility]

## 2024-10-18 LAB — BACTERIA UR CULT: NO GROWTH

## 2024-10-21 ENCOUNTER — TELEPHONE (OUTPATIENT)
Dept: FAMILY MEDICINE | Facility: OTHER | Age: 74
End: 2024-10-21

## 2024-10-21 ENCOUNTER — OFFICE VISIT (OUTPATIENT)
Dept: FAMILY MEDICINE | Facility: OTHER | Age: 74
End: 2024-10-21
Attending: NURSE PRACTITIONER
Payer: OTHER GOVERNMENT

## 2024-10-21 VITALS
DIASTOLIC BLOOD PRESSURE: 88 MMHG | SYSTOLIC BLOOD PRESSURE: 138 MMHG | RESPIRATION RATE: 18 BRPM | HEIGHT: 62 IN | HEART RATE: 74 BPM | WEIGHT: 166 LBS | TEMPERATURE: 97.5 F | BODY MASS INDEX: 30.55 KG/M2 | OXYGEN SATURATION: 97 %

## 2024-10-21 DIAGNOSIS — J06.9 VIRAL URI: Primary | ICD-10-CM

## 2024-10-21 DIAGNOSIS — R05.1 ACUTE COUGH: ICD-10-CM

## 2024-10-21 DIAGNOSIS — R09.81 NASAL CONGESTION: ICD-10-CM

## 2024-10-21 LAB
FLUAV RNA SPEC QL NAA+PROBE: NEGATIVE
FLUBV RNA RESP QL NAA+PROBE: NEGATIVE
RSV RNA SPEC NAA+PROBE: NEGATIVE
SARS-COV-2 RNA RESP QL NAA+PROBE: NEGATIVE

## 2024-10-21 PROCEDURE — 99213 OFFICE O/P EST LOW 20 MIN: CPT | Performed by: NURSE PRACTITIONER

## 2024-10-21 PROCEDURE — 87637 SARSCOV2&INF A&B&RSV AMP PRB: CPT | Mod: ZL | Performed by: NURSE PRACTITIONER

## 2024-10-21 ASSESSMENT — ENCOUNTER SYMPTOMS: COUGH: 1

## 2024-10-21 ASSESSMENT — PAIN SCALES - GENERAL: PAINLEVEL: SEVERE PAIN (7)

## 2024-10-21 NOTE — NURSING NOTE
Patient is here for cold symptoms, states cough,congestion, sinus issues, sore throat, cold sweats, chills, and very tired. States  had cold last week.     Patient's last menstrual period was 06/01/1990 (approximate).  Medication Reconciliation: complete    Fatoumata Ramirez LPN 10/21/2024 9:31 AM       Advance care directive on file? yes  Advance care directive provided to patient? na       Fatoumata Ramirez LPN

## 2024-10-21 NOTE — TELEPHONE ENCOUNTER
DMO -patient states the DMO was going to call in a script for her from her visit today 10.21.24    Please call and advise    Thank You    Jessy Ramirez on 10/21/2024 at 11:01 AM      Can leave a detailed message

## 2024-10-21 NOTE — PROGRESS NOTES
"  Assessment & Plan   Problem List Items Addressed This Visit    None  Visit Diagnoses       Viral URI    -  Primary    Acute cough        Relevant Orders    Symptomatic Influenza A/B, RSV, & SARS-CoV2 PCR (COVID-19) Nose (Completed)    Nasal congestion        Relevant Orders    Symptomatic Influenza A/B, RSV, & SARS-CoV2 PCR (COVID-19) Nose (Completed)             Viral swab negative for influenza, COVID or RSV.  Other viral illness suspected at this time.  No signs of bacterial infection.  Antibiotics would not be warranted.  Recommend continued symptomatic management and close follow-up.  Patient notified via MyChart of above.     No follow-ups on file.      Susanne Bearden is a 74 year old, presenting for the following health issues:  Cough (Congestion )        10/21/2024     9:27 AM   Additional Questions   Roomed by Fatoumata gamble     History of Present Illness       Reason for visit:  Cough and congestion  Symptom onset:  3-7 days ago  Symptoms include:  Cough, congestion , sore throat, sinus issues, cold sweats, hot flashes  Symptom intensity:  Moderate  Symptom progression:  Worsening  What makes it better:  No   She presents to clinic today with upper respiratory symptoms.  She reports her  has similar symptoms, hers started approximately 4 days ago.  She has cough, congestion, sore throat, hot sweats, feeling cold.  Denies any significant fevers.  She has not tested for COVID at home.   was never tested for COVID as his symptoms were greater than 5 days when he was seen in clinic.  She does have an albuterol inhaler that she uses for allergies, no asthma history.  She has been using sinus medication and in the past without significant improvement of symptoms.          Objective    /88   Pulse 74   Temp 97.5  F (36.4  C) (Temporal)   Resp 18   Ht 1.581 m (5' 2.25\")   Wt 75.3 kg (166 lb)   LMP 06/01/1990 (Approximate)   SpO2 97%   Breastfeeding No   BMI 30.12 kg/m    Body mass " index is 30.12 kg/m .  Physical Exam   GENERAL: ill but nontoxic  EYES: Eyes grossly normal to inspection, PERRL and conjunctivae and sclerae normal  HENT: ear canals and TM's normal, nose and mouth without ulcers or lesions, audible congestion  NECK: no adenopathy, no asymmetry, masses, or scars  RESP: lungs clear to auscultation - no rales, rhonchi or wheezes, O2 sats 97% on RA, no respiratory distress  CV: regular rate and rhythm, normal S1 S2  NEURO: Normal strength and tone, mentation intact and speech normal  PSYCH: mentation appears normal, affect normal/bright    Results for orders placed or performed in visit on 10/21/24   Symptomatic Influenza A/B, RSV, & SARS-CoV2 PCR (COVID-19) Nose     Status: Normal    Specimen: Nose; Swab   Result Value Ref Range    Influenza A PCR Negative Negative    Influenza B PCR Negative Negative    RSV PCR Negative Negative    SARS CoV2 PCR Negative Negative    Narrative    Testing was performed using the Xpert Xpress CoV2/Flu/RSV Assay on the [employer] GeneXpert Instrument. This test should be ordered for the detection of SARS-CoV2, influenza, and RSV viruses in individuals with signs and symptoms of respiratory tract infection. This test is for in vitro diagnostic use under the [place] FDA for laboratories certified under CLIA to perform high or moderate complexity testing. This test has been [place] FDA cleared. A negative result does not rule out the presence of PCR inhibitors in the specimen or target RNA in concentration below the limit of detection for the assay. If only one viral target is positive but coinfection with multiple targets is suspected, the sample should be re-tested with another FDA cleared, approved, or authorized test, if coninfection would change clinical management. This test was validated by the [facility]. These laboratories are certified under the Clinical Laboratory Improvement Amendments of 1988 (CLIA-88) as qualified to perfom high  complexity laboratory testing.             Signed Electronically by: JERRELL Faustin CNP

## 2024-10-21 NOTE — TELEPHONE ENCOUNTER
Spoke with patient and let her know that her results show negative. Let her know that Luanne Singer sent this to her in a "[employer]" message. Encouraged her to treat it symptomatically. Increase fluids, Tylenol, OTC cold and sinus medications and to follow up with us if her symptoms don't start improving in a few days.  Patient verbalized understanding.  Tanya Josue RN on 10/21/2024 at 11:41 AM

## 2024-10-29 ENCOUNTER — TELEPHONE (OUTPATIENT)
Dept: FAMILY MEDICINE | Facility: OTHER | Age: 74
End: 2024-10-29
Payer: OTHER GOVERNMENT

## 2024-10-29 DIAGNOSIS — J01.90 ACUTE SINUSITIS WITH SYMPTOMS > 10 DAYS: Primary | ICD-10-CM

## 2024-10-29 RX ORDER — AZITHROMYCIN 250 MG/1
TABLET, FILM COATED ORAL
Qty: 6 TABLET | Refills: 0 | Status: SHIPPED | OUTPATIENT
Start: 2024-10-29 | End: 2024-11-03

## 2024-10-29 NOTE — TELEPHONE ENCOUNTER
Patient was notified medication was sent to pharmacy and she should follow up if she is not improving.    Katheryn Churchill LPN on 10/29/2024 at 10:18 AM

## 2024-10-29 NOTE — TELEPHONE ENCOUNTER
Patient was seen by DMO on 10/21/24. Her symptoms are worsening and have moved into her sinuses so she is wondering if a antibiotic can be sent to [facility]'s or if she will need to schedule another office visit. Please advise.     Linnea Gregg on 10/29/2024 at 8:25 AM

## 2024-10-29 NOTE — TELEPHONE ENCOUNTER
Patient was seen in clinic on 10/21/24. Would like her to follow up in clinic?    Katheryn Churchill LPN on 10/29/2024 at 9:32 AM

## 2024-10-29 NOTE — TELEPHONE ENCOUNTER
Azithromycin sent to pharmacy. Follow up if not improving. JERRELL Faustin CNP on 10/29/2024 at 9:58 AM

## 2024-11-07 ENCOUNTER — TELEPHONE (OUTPATIENT)
Dept: OBGYN | Facility: OTHER | Age: 74
End: 2024-11-07
Payer: OTHER GOVERNMENT

## 2024-11-07 NOTE — TELEPHONE ENCOUNTER
Called and spoke to Patient after verifying last name and date of birth. Patient stated that she has been having daily hot flashes since she had stopped the estradiol. She is using the clobetasol daily. Patient is wondering if she can go back on the estradiol or a different medication? Patient informed that message would be sent to Katheryn Irvin NP for review.    Imelda Ng RN on 11/7/2024 at 2:32 PM

## 2024-11-07 NOTE — TELEPHONE ENCOUNTER
Patient has questions about her medications. She is having horrible hot flashes. Please advise    Tatum Miles on 11/7/2024 at 12:03 PM

## 2024-11-12 NOTE — TELEPHONE ENCOUNTER
Please call patient to see how she is doing with her hot flashes.  Hot flashes can be related to numerous things including what you are eating throughout the day, caffeine intake, warm beverage and her gabapentin medication also can cause hot flashes. We typically do not see hot flashes due to menopause and estrogen deficiency at her age.  Has she been sick with any other symptoms including urinary or vaginal symptoms? Or other respiratory symptoms?    Estrogen for management of postmenopausal hot flashes is not recommended at her age due to increased risk of heart attack and stroke.  I do not recommend her resuming her oral estrogen.

## 2024-11-13 NOTE — TELEPHONE ENCOUNTER
"Called and informed patient of information below. Patient states she was sick for 3 1/2 weeks with sinus congestion, sore throat, cough and states this \"set her bladder off.\" States they tested for Covid, influenza and RSV and it was negative.  States she was taking Mucinex and Sudafed. States Singer prescribed her azithromycin (10/29/24-11/3/24). States she is still tired from being sick. Patient wants to know if she should cut back her gabapentin. Writer informed patient that will route this message to Katheryn Irvin to review and advise.     Gerardo Huynh RN on 11/13/2024 at 2:15 PM     "

## 2024-11-15 NOTE — TELEPHONE ENCOUNTER
After verifying pt last name and date of birth, pt was given below recommendations and gave patient a follow up visit for Tuesday with [facility]. Pt accepted. Appointment made    Elicia Rosas RN on 11/15/2024 at 8:54 AM

## 2024-11-15 NOTE — TELEPHONE ENCOUNTER
Patient could have been having more hot flashes with recent illness and antibiotics use as well.  Would like her to monitor her hot flashes.  See if she notices any patterns if it is when she is drinking hot beverages or after she eats meals.  I do not manage her gabapentin and do not think she needs to alter this because of her hot flashes.  She is on a low-dose and can discuss this with her primary provider if she does notice any pattern with hot flashes after she takes her gabapentin dose.    If she is still having concerns or persistent symptoms of any urinary or vaginal symptoms I am happy to see her.

## 2024-11-18 ENCOUNTER — THERAPY VISIT (OUTPATIENT)
Dept: PHYSICAL THERAPY | Facility: OTHER | Age: 74
End: 2024-11-18
Attending: NURSE PRACTITIONER
Payer: OTHER GOVERNMENT

## 2024-11-18 DIAGNOSIS — N39.0 FREQUENT UTI: ICD-10-CM

## 2024-11-18 DIAGNOSIS — R33.9 URINARY RETENTION WITH INCOMPLETE BLADDER EMPTYING: ICD-10-CM

## 2024-11-18 DIAGNOSIS — N39.46 MIXED STRESS AND URGE URINARY INCONTINENCE: ICD-10-CM

## 2024-11-18 PROCEDURE — 97530 THERAPEUTIC ACTIVITIES: CPT | Mod: GP

## 2024-11-18 PROCEDURE — 97161 PT EVAL LOW COMPLEX 20 MIN: CPT | Mod: GP

## 2024-11-18 NOTE — PROGRESS NOTES
PHYSICAL THERAPY EVALUATION  Type of Visit: Evaluation        Fall Risk Screen:  Fall screen completed by: PT  Have you fallen 2 or more times in the past year?: No  Have you fallen and had an injury in the past year?: Yes  Is patient a fall risk?: Department fall risk interventions implemented    Subjective         Presenting condition or subjective complaint: bladder problems  Date of onset:  (patient reports symptoms have been ongoing for years)    Relevant medical history: Asthma; Bladder or bowel problems     Prior therapy history for the same diagnosis, illness or injury: (Patient-Rptd) Yes      Prior Level of Function  Transfers: Independent  Ambulation: Independent  ADL: Independent    Living Environment  Social support: (Patient-Rptd) With a significant other or spouse   Type of home: (Patient-Rptd) House; 1 level   Stairs to enter the home: (Patient-Rptd) Yes   Is there a railing: (Patient-Rptd) No  Ramp: (Patient-Rptd) No   Stairs inside the home: (Patient-Rptd) Yes (Patient-Rptd) 13 Is there a railing: (Patient-Rptd) Yes  Employment: (Patient-Rptd) No  retired    Pain assessment: Pain present     Objective      PELVIC EVALUATION  ADDITIONAL HISTORY:  Bladder History:  Feels bladder filling: Yes (sometimes)  Triggers for feeling of inability to wait to go to the bathroom: Yes when she's cold; key in the door  How long can you wait to urinate: needs to go immediately  Gets up at night to urinate: Yes 2-3, sometimes more  Can stop the flow of urine when urinating: Sometimes  Volume of urine usually released: Small   Other issues: Trouble emptying bladder completely; Dribbling after urinating; Bladder infections  Number of bladder infections in last 12 months: 4  Fluid intake per day:   1 1/2 cups of coffee/day occasional wine or beer  Medications taken for bladder:     none  Activities causing urine leak: Hurrying to the bathroom due to a strong urge to urinate (pee); Cough; Other activities lifting  Amount  of urine typically leaked: varies, sometimes full bladder loss, sometimes a small amount  Pads used to help with leaking: Yes I use this many pads per day: only uses occasionally      Bowel History:  Frequency of bowel movement: every day - every other day  Consistency of stool: Soft-formed    Ignores the urge to defecate: No  Other bowel issues: Straining to have bowel movement  Length of time spent trying to have a bowel movement: within a few minutes    Sexual Function History:  Sexually active: Yes  Lubrication used: No No  State of menopause: Post-menopause (I am done with menopause)  Hormone medications: Yes recently stopped oral estrogen, continues to use topically    Number of previous pregnancies: 2  Number of deliveries: 2  If you have delivered before, did you have any of these issues during delivery: Vaginal delivery; Tearing  Do you get regular exercise: No  Have you tried pelvic floor strengthening exercises for 4 weeks: Yes    Discussed reason for referral regarding pelvic health needs and external/internal pelvic floor muscle examination with patient/guardian.  Opportunity provided to ask questions and verbal consent for assessment and intervention was given.    PAIN: Pain Level at Rest: 1/10  Pain Level with Use: 10/10  Pain Quality: Aching and Cramping  Pain Frequency: constant  Pain is Exacerbated By: walking, lifting, carrying, rest, bending, daily self-cares  Pain is Relieved By: otc medications and changing positions  POSTURE: WFL  LUMBAR SCREEN: AROM WFL  HIP SCREEN:  Strength: WFL     PELVIC EXAM  External Visual Inspection:  At rest: Normal  With voluntary pelvic floor contraction: Absent, with verbal cueing able to perform contraction  Relaxation of PFM: Partial/delayed relaxation  With intra-abdominal pressure: Cough: Perineal descent  Bearing down as defecation: Perineal elevation    Integumentary:   Introitus: Tight, Scar tissue/episiotomy   With coughing significant bulging  visible    External Digital Palpation per Perineum: patient declined external and internal exam at this time  Internal Digital Palpation:    Assessment & Plan   CLINICAL IMPRESSIONS  Medical Diagnosis: Urinary retention with incomplete bladder emptying; Frequent UTI; Mixed stress and urge urinary incontinence    Treatment Diagnosis: pelvic floor dysfunction   Impression/Assessment: Patient is a 74 year old female with pelvic floor complaints.  The following significant findings have been identified: Pain, Decreased ROM/flexibility, Decreased strength, Decreased proprioception, Impaired muscle performance, Decreased activity tolerance, and Instability. These impairments interfere with their ability to perform self care tasks, recreational activities, household chores, household mobility, and community mobility as compared to previous level of function.     Clinical Decision Making (Complexity):  Clinical Presentation: Stable/Uncomplicated  Clinical Presentation Rationale: based on medical and personal factors listed in PT evaluation  Clinical Decision Making (Complexity): Low complexity    PLAN OF CARE  Treatment Interventions:  Modalities: Biofeedback, Cryotherapy, E-stim, Hot Pack, Ultrasound, Vasoneumatic Device  Interventions: Manual Therapy, Neuromuscular Re-education, Therapeutic Activity, Therapeutic Exercise    Long Term Goals     PT Goal 1  Goal Identifier: pelvic floor contraction  Goal Description: patient will demonstrate ability to volitionally contract pelvic floor as evidenced by motion of perineal body, without compensation from glutes in order to reduce incontinence.  Rationale: to maximize safety and independence with performance of ADLs and functional tasks;to maximize safety and independence with self cares;to maximize safety and independence within the home;to maximize safety and independence within the community  Target Date: 01/01/25  PT Goal 2  Goal Identifier: pelvic floor relaxation  Goal  Description: patient will demonstrate ability to volitionally relax pelvic floor as evidenced by motion of perineal body, in order to promote complete emptying of bladder and bowel.  Rationale: to maximize safety and independence with performance of ADLs and functional tasks;to maximize safety and independence within the home;to maximize safety and independence with self cares  Target Date: 01/15/25  PT Goal 3  Goal Identifier: incontinence  Goal Description: patient will report 40% reduction of incontinence with daily activities  Rationale: to maximize safety and independence with performance of ADLs and functional tasks;to maximize safety and independence within the home;to maximize safety and independence within the community;to maximize safety and independence with self cares  Target Date: 02/12/25      Frequency of Treatment: 1-2x/week  Duration of Treatment: 12 weeks    Education Assessment:   Learner/Method: Patient;Listening  Risks and benefits of evaluation/treatment have been explained.   Patient/Family/caregiver agrees with Plan of Care.     Evaluation Time:     PT Eval, Low Complexity Minutes (20515): 20     Signing Clinician: Jennifer Jenkins DPT

## 2024-11-19 ENCOUNTER — OFFICE VISIT (OUTPATIENT)
Dept: OBGYN | Facility: OTHER | Age: 74
End: 2024-11-19
Attending: NURSE PRACTITIONER
Payer: OTHER GOVERNMENT

## 2024-11-19 ENCOUNTER — HOSPITAL ENCOUNTER (OUTPATIENT)
Dept: GENERAL RADIOLOGY | Facility: OTHER | Age: 74
Discharge: HOME | End: 2024-11-19
Attending: NURSE PRACTITIONER
Payer: OTHER GOVERNMENT

## 2024-11-19 VITALS
RESPIRATION RATE: 16 BRPM | BODY MASS INDEX: 30.45 KG/M2 | DIASTOLIC BLOOD PRESSURE: 72 MMHG | WEIGHT: 167.8 LBS | SYSTOLIC BLOOD PRESSURE: 128 MMHG | TEMPERATURE: 97.4 F | OXYGEN SATURATION: 96 % | HEART RATE: 65 BPM

## 2024-11-19 DIAGNOSIS — R05.8 COUGH PRESENT FOR GREATER THAN 3 WEEKS: ICD-10-CM

## 2024-11-19 DIAGNOSIS — R23.2 HOT FLASHES: Primary | ICD-10-CM

## 2024-11-19 DIAGNOSIS — R68.83 CHILLS: ICD-10-CM

## 2024-11-19 DIAGNOSIS — J06.9 RECENT URI: ICD-10-CM

## 2024-11-19 LAB
ALBUMIN UR-MCNC: NEGATIVE MG/DL
APPEARANCE UR: CLEAR
BACTERIA #/AREA URNS HPF: ABNORMAL /HPF
BASOPHILS # BLD AUTO: 0 10E3/UL (ref 0–0.2)
BASOPHILS NFR BLD AUTO: 0 %
BILIRUB UR QL STRIP: NEGATIVE
COLOR UR AUTO: ABNORMAL
EOSINOPHIL # BLD AUTO: 0.1 10E3/UL (ref 0–0.7)
EOSINOPHIL NFR BLD AUTO: 2 %
ERYTHROCYTE [DISTWIDTH] IN BLOOD BY AUTOMATED COUNT: 12.7 % (ref 10–15)
GLUCOSE UR STRIP-MCNC: NEGATIVE MG/DL
HCT VFR BLD AUTO: 42.9 % (ref 35–47)
HGB BLD-MCNC: 14.5 G/DL (ref 11.7–15.7)
HGB UR QL STRIP: NEGATIVE
HYALINE CASTS: 1 /LPF
IMM GRANULOCYTES # BLD: 0 10E3/UL
IMM GRANULOCYTES NFR BLD: 0 %
KETONES UR STRIP-MCNC: NEGATIVE MG/DL
LEUKOCYTE ESTERASE UR QL STRIP: NEGATIVE
LYMPHOCYTES # BLD AUTO: 1.8 10E3/UL (ref 0.8–5.3)
LYMPHOCYTES NFR BLD AUTO: 34 %
MCH RBC QN AUTO: 31.4 PG (ref 26.5–33)
MCHC RBC AUTO-ENTMCNC: 33.8 G/DL (ref 31.5–36.5)
MCV RBC AUTO: 93 FL (ref 78–100)
MONOCYTES # BLD AUTO: 0.4 10E3/UL (ref 0–1.3)
MONOCYTES NFR BLD AUTO: 7 %
MUCOUS THREADS #/AREA URNS LPF: PRESENT /LPF
NEUTROPHILS # BLD AUTO: 3 10E3/UL (ref 1.6–8.3)
NEUTROPHILS NFR BLD AUTO: 56 %
NITRATE UR QL: NEGATIVE
NRBC # BLD AUTO: 0 10E3/UL
NRBC BLD AUTO-RTO: 0 /100
PH UR STRIP: 5.5 [PH] (ref 5–9)
PLATELET # BLD AUTO: 219 10E3/UL (ref 150–450)
RBC # BLD AUTO: 4.62 10E6/UL (ref 3.8–5.2)
RBC URINE: 1 /HPF
SP GR UR STRIP: 1.02 (ref 1–1.03)
UROBILINOGEN UR STRIP-MCNC: NORMAL MG/DL
WBC # BLD AUTO: 5.3 10E3/UL (ref 4–11)
WBC URINE: 1 /HPF

## 2024-11-19 PROCEDURE — 71046 X-RAY EXAM CHEST 2 VIEWS: CPT

## 2024-11-19 PROCEDURE — 85004 AUTOMATED DIFF WBC COUNT: CPT | Mod: ZL | Performed by: NURSE PRACTITIONER

## 2024-11-19 PROCEDURE — 85018 HEMOGLOBIN: CPT | Mod: ZL | Performed by: NURSE PRACTITIONER

## 2024-11-19 PROCEDURE — 36415 COLL VENOUS BLD VENIPUNCTURE: CPT | Mod: ZL | Performed by: NURSE PRACTITIONER

## 2024-11-19 PROCEDURE — 81001 URINALYSIS AUTO W/SCOPE: CPT | Mod: ZL | Performed by: NURSE PRACTITIONER

## 2024-11-19 RX ORDER — BENZONATATE 100 MG/1
100 CAPSULE ORAL 3 TIMES DAILY PRN
Qty: 42 CAPSULE | Refills: 1 | Status: SHIPPED | OUTPATIENT
Start: 2024-11-19

## 2024-11-19 ASSESSMENT — PAIN SCALES - GENERAL: PAINLEVEL_OUTOF10: NO PAIN (0)

## 2024-11-19 NOTE — PROGRESS NOTES
CHIEF COMPLAINT: Hot flashes    HPI  Monisha is a 74 year old , who presents to clinic today to discuss hot flashes.  I have seen patient in the past for frequent UTI and incontinence.  She is concerned her hot flashes may be secondary to postmenopausal hormonal changes.  We tapered her off  oral estrogen 2024 due to increased cardiovascular risk, patient did not have any rebound of symptoms and felt fine after tapering off estrogen.    Patient has been sick with upper respiratory symptoms for about 5 weeks.  She states she is still having a dry cough, it was productive but not any more.  She is chilled throughout the day and having hot flashes intermittently throughout the day.  She is not checked her temperature.  She has not been sleeping well.  She is trying to take Mucinex to help with her cough.  She was given Z-Juan on 10/29/2024 with some improvement but not completely.  Does not negative for COVID-19, influenza AMB and RSV on 10/21/2024.    3 weeks ago she also had a cracked crown and needed this repaired on her right upper molar.  She denies any dental pain but does have some sensitivity.  She will continue following with her dentist.    Urinary wise patient denies any dysuria, worsening incontinence or change in odor.  Denies any vaginal irritation.  She started pelvic floor therapy yesterday and states this went very well.    Patient's last menstrual period was 1990 (approximate).    I have reviewed the nursing notes.  I have reviewed allergies, medication list, problem list, and past medical history.    ROS  10-Point ROS negative except as noted in HPI    PHYSICAL EXAM  /72 (BP Location: Right arm, Patient Position: Sitting, Cuff Size: Adult Regular)   Pulse 65   Temp 97.4  F (36.3  C) (Tympanic)   Resp 16   Wt 76.1 kg (167 lb 12.8 oz)   LMP 1990 (Approximate)   SpO2 96%   BMI 30.45 kg/m    Body mass index is 30.45 kg/m .  Gen: Well-appearing, well developed, non  toxic  HEENT: No cervical adenopathy, throat erythematous, no exudate  Cardiac: Heart rate rhythm regular, no murmur  Resp: nonlabored, normal effort, lungs clear to auscultation, no wheezing or rhonchi no audible wheezing or cough  GI: soft, nontender, no flank pain  MS: moving without difficulty  Psych: appropriate mood and affect    DIAGNOSTICS  Results for orders placed or performed during the hospital encounter of 11/19/24   XR Chest 2 Views     Status: None    Narrative    Procedure:XR CHEST 2 VIEWS    Clinical history:Female, 74 years, uri SYMPTOMS for 5 weeks,  worsening- concern for pneumonia; Cough present for greater than 3  weeks    Technique: Two views are submitted.    Comparison: 11/29/2023    Findings: The cardiac silhouette is within normal limits.. The  pulmonary vasculature is within normal limits.    The lungs are clear. Bony structures are unremarkable.      Impression    Impression:   No acute abnormality. No evidence of acute or active cardiopulmonary  disease.    JOHN POWELL MD         SYSTEM ID:  [id number]   Results for orders placed or performed in visit on 11/19/24   UA with Microscopic reflex to Culture     Status: Abnormal    Specimen: Urine, Midstream   Result Value Ref Range    Color Urine Light Yellow Colorless, Straw, Light Yellow, Yellow    Appearance Urine Clear Clear    Glucose Urine Negative Negative mg/dL    Bilirubin Urine Negative Negative    Ketones Urine Negative Negative mg/dL    Specific Gravity Urine 1.022 1.000 - 1.030    Blood Urine Negative Negative    pH Urine 5.5 5.0 - 9.0    Protein Albumin Urine Negative Negative mg/dL    Urobilinogen Urine Normal Normal, 2.0 mg/dL    Nitrite Urine Negative Negative    Leukocyte Esterase Urine Negative Negative    Bacteria Urine Few (A) None Seen /HPF    Mucus Urine Present (A) None Seen /LPF    RBC Urine 1 <=2 /HPF    WBC Urine 1 <=5 /HPF    Hyaline Casts Urine 1 <=2 /LPF    Narrative    Urine Culture not indicated   CBC with  platelets and differential     Status: None   Result Value Ref Range    WBC Count 5.3 4.0 - 11.0 10e3/uL    RBC Count 4.62 3.80 - 5.20 10e6/uL    Hemoglobin 14.5 11.7 - 15.7 g/dL    Hematocrit 42.9 35.0 - 47.0 %    MCV 93 78 - 100 fL    MCH 31.4 26.5 - 33.0 pg    MCHC 33.8 31.5 - 36.5 g/dL    RDW 12.7 10.0 - 15.0 %    Platelet Count 219 150 - 450 10e3/uL    % Neutrophils 56 %    % Lymphocytes 34 %    % Monocytes 7 %    % Eosinophils 2 %    % Basophils 0 %    % Immature Granulocytes 0 %    NRBCs per 100 WBC 0 <1 /100    Absolute Neutrophils 3.0 1.6 - 8.3 10e3/uL    Absolute Lymphocytes 1.8 0.8 - 5.3 10e3/uL    Absolute Monocytes 0.4 0.0 - 1.3 10e3/uL    Absolute Eosinophils 0.1 0.0 - 0.7 10e3/uL    Absolute Basophils 0.0 0.0 - 0.2 10e3/uL    Absolute Immature Granulocytes 0.0 <=0.4 10e3/uL    Absolute NRBCs 0.0 10e3/uL   CBC and Differential     Status: None    Narrative    The following orders were created for panel order CBC and Differential.  Procedure                               Abnormality         Status                     ---------                               -----------         ------                     CBC with platelets and d...[[id number]]                      Final result                 Please view results for these tests on the individual orders.        ASSESSMENT/PLAN  Monisha Velez is a 74 year old  here for hot flashes  1. Hot flashes (Primary)  - UA with Microscopic reflex to Culture; Future  - UA with Microscopic reflex to Culture  2. Chills  Patient has been having intermittent hot flashes, chills and sweating over the past week.  I reviewed with patient that her hot flashes and chills may be multifactorial.  I am concerned with her recent dental procedure as well as her URI symptoms that have lasted 5 weeks.  We discussed that her increasing hot flashes and chills and sweating throughout the day may be secondary to infection.  She continues to have upper respiratory symptoms, see plan  below.    Reviewed with patient that it is not recommended to continue estrogen therapy at her age due to increased cardiovascular risk.  Patient did well tapering off of estrogen and did not have any return of hot flashes once she was tapered off.  We review certain medications and beverages that can increase hot flashes.  Patient understands reasoning for not resuming estrogen therapy.  She agrees with further workup for hot flashes and ongoing URI symptoms.    3. Recent URI  with  4. Cough present for greater than 3 weeks  - XR Chest 2 Views; Future  - CBC and Differential; Future  - benzonatate (TESSALON) 100 MG capsule; Take 1 capsule (100 mg) by mouth 3 times daily as needed for cough.  Dispense: 42 capsule; Refill: 1     -Lungs are clear to auscultation, no wheezing or rhonchi.  Vital signs are stable, she is afebrile.  Audible persistent dry cough in clinic.  Recommend chest x-ray to exclude acute abnormalities.  Chest x-ray returned negative for acute cardiopulmonary abnormality/pneumonia.  CBC returned with normal WBC count of 5.3, reassuring.  -Discussed with patient that postviral cough can continue for 4 to 6 weeks after original infection.  Would not recommend antibiotic.  Recommend staying hydrated with fluids, getting adequate rest, Tylenol as needed for discomfort and I will send in prescription for Tessalon Perles 3 times a day as needed for cough.  She could also use Flonase nasal spray to help with any congestion/postnasal drip twice daily.  Recommend using albuterol inhaler 2 puffs every 4 hours as needed for cough and shortness of breath    5.  Cracked tooth/crown  Stable, no dental pain, minimal sensitivities.  No evidence of abscess.  Afebrile.  Recommend continuing to follow with her dentist.    Explanation of diagnosis, treatment options and risk and benefits of medications reviewed with patient/caregiver. Patient/caregiver agrees with plan of care. Red flags symptoms, Strict ER precautions  and when to return for follow up were discussed and patient/caregiver.    Katheryn Irvin NP  [facility]  OBGYN

## 2024-11-20 ENCOUNTER — THERAPY VISIT (OUTPATIENT)
Dept: PHYSICAL THERAPY | Facility: OTHER | Age: 74
End: 2024-11-20
Attending: NURSE PRACTITIONER
Payer: OTHER GOVERNMENT

## 2024-11-20 DIAGNOSIS — R33.9 URINARY RETENTION WITH INCOMPLETE BLADDER EMPTYING: Primary | ICD-10-CM

## 2024-11-20 DIAGNOSIS — N39.0 FREQUENT UTI: ICD-10-CM

## 2024-11-20 DIAGNOSIS — N39.46 MIXED STRESS AND URGE URINARY INCONTINENCE: ICD-10-CM

## 2024-11-20 PROCEDURE — 97112 NEUROMUSCULAR REEDUCATION: CPT | Mod: GP

## 2024-11-20 PROCEDURE — 97530 THERAPEUTIC ACTIVITIES: CPT | Mod: GP

## 2024-11-20 NOTE — PATIENT INSTRUCTIONS
---Congestion-Flonase nasal spray, twice daily as needed.  Zyrtec once daily as needed for congestion, avoid Sudafed    ---Cough-hot tea, honey, throat lozenges.  Will send prescription for Tessalon Perles to take 3 times a day as needed for cough    -albuterol inhaler 2 puffs every 4 hours as needed for cough and shortness of breath    If symptoms or not improving over the next week please return for reevaluation.

## 2024-11-27 ENCOUNTER — THERAPY VISIT (OUTPATIENT)
Dept: PHYSICAL THERAPY | Facility: OTHER | Age: 74
End: 2024-11-27
Attending: NURSE PRACTITIONER
Payer: OTHER GOVERNMENT

## 2024-11-27 DIAGNOSIS — N39.46 MIXED STRESS AND URGE URINARY INCONTINENCE: ICD-10-CM

## 2024-11-27 DIAGNOSIS — R33.9 URINARY RETENTION WITH INCOMPLETE BLADDER EMPTYING: Primary | ICD-10-CM

## 2024-11-27 DIAGNOSIS — N39.0 FREQUENT UTI: ICD-10-CM

## 2024-11-27 PROCEDURE — 97110 THERAPEUTIC EXERCISES: CPT | Mod: GP

## 2024-11-27 PROCEDURE — 97530 THERAPEUTIC ACTIVITIES: CPT | Mod: GP

## 2024-12-02 ENCOUNTER — THERAPY VISIT (OUTPATIENT)
Dept: PHYSICAL THERAPY | Facility: OTHER | Age: 74
End: 2024-12-02
Attending: NURSE PRACTITIONER
Payer: OTHER GOVERNMENT

## 2024-12-02 DIAGNOSIS — N39.46 MIXED STRESS AND URGE URINARY INCONTINENCE: ICD-10-CM

## 2024-12-02 DIAGNOSIS — R33.9 URINARY RETENTION WITH INCOMPLETE BLADDER EMPTYING: Primary | ICD-10-CM

## 2024-12-02 DIAGNOSIS — N39.0 FREQUENT UTI: ICD-10-CM

## 2024-12-02 PROCEDURE — 97112 NEUROMUSCULAR REEDUCATION: CPT | Mod: GP

## 2024-12-02 PROCEDURE — 97110 THERAPEUTIC EXERCISES: CPT | Mod: GP

## 2024-12-02 PROCEDURE — 97530 THERAPEUTIC ACTIVITIES: CPT | Mod: GP

## 2024-12-17 ENCOUNTER — HOSPITAL ENCOUNTER (OUTPATIENT)
Dept: ULTRASOUND IMAGING | Facility: OTHER | Age: 74
Discharge: HOME | End: 2024-12-17
Attending: NURSE PRACTITIONER
Payer: OTHER GOVERNMENT

## 2024-12-17 ENCOUNTER — OFFICE VISIT (OUTPATIENT)
Dept: FAMILY MEDICINE | Facility: OTHER | Age: 74
End: 2024-12-17
Attending: NURSE PRACTITIONER
Payer: OTHER GOVERNMENT

## 2024-12-17 ENCOUNTER — HOSPITAL ENCOUNTER (OUTPATIENT)
Dept: GENERAL RADIOLOGY | Facility: OTHER | Age: 74
Discharge: HOME | End: 2024-12-17
Attending: NURSE PRACTITIONER
Payer: OTHER GOVERNMENT

## 2024-12-17 VITALS
SYSTOLIC BLOOD PRESSURE: 126 MMHG | RESPIRATION RATE: 16 BRPM | BODY MASS INDEX: 30.34 KG/M2 | HEART RATE: 70 BPM | OXYGEN SATURATION: 96 % | DIASTOLIC BLOOD PRESSURE: 70 MMHG | WEIGHT: 167.2 LBS | TEMPERATURE: 97.3 F

## 2024-12-17 DIAGNOSIS — M79.604 PAIN OF RIGHT LOWER EXTREMITY: Chronic | ICD-10-CM

## 2024-12-17 DIAGNOSIS — M25.561 ACUTE PAIN OF RIGHT KNEE: ICD-10-CM

## 2024-12-17 DIAGNOSIS — M79.604 PAIN OF RIGHT LOWER EXTREMITY: Primary | Chronic | ICD-10-CM

## 2024-12-17 DIAGNOSIS — R25.2 LEG CRAMP: ICD-10-CM

## 2024-12-17 PROBLEM — J44.9 COPD (CHRONIC OBSTRUCTIVE PULMONARY DISEASE) (H): Status: ACTIVE | Noted: 2024-12-17

## 2024-12-17 LAB
ANION GAP SERPL CALCULATED.3IONS-SCNC: 6 MMOL/L (ref 7–15)
BUN SERPL-MCNC: 17.7 MG/DL (ref 8–23)
CALCIUM SERPL-MCNC: 9.8 MG/DL (ref 8.8–10.4)
CHLORIDE SERPL-SCNC: 108 MMOL/L (ref 98–107)
CREAT SERPL-MCNC: 0.98 MG/DL (ref 0.51–0.95)
EGFRCR SERPLBLD CKD-EPI 2021: 60 ML/MIN/1.73M2
GLUCOSE SERPL-MCNC: 102 MG/DL (ref 70–99)
HCO3 SERPL-SCNC: 28 MMOL/L (ref 22–29)
MAGNESIUM SERPL-MCNC: 2.1 MG/DL (ref 1.7–2.3)
POTASSIUM SERPL-SCNC: 4.5 MMOL/L (ref 3.4–5.3)
SODIUM SERPL-SCNC: 142 MMOL/L (ref 135–145)

## 2024-12-17 PROCEDURE — 82565 ASSAY OF CREATININE: CPT | Mod: ZL | Performed by: NURSE PRACTITIONER

## 2024-12-17 PROCEDURE — 80048 BASIC METABOLIC PNL TOTAL CA: CPT | Mod: ZL | Performed by: NURSE PRACTITIONER

## 2024-12-17 PROCEDURE — 83735 ASSAY OF MAGNESIUM: CPT | Mod: ZL | Performed by: NURSE PRACTITIONER

## 2024-12-17 PROCEDURE — 36415 COLL VENOUS BLD VENIPUNCTURE: CPT | Mod: ZL | Performed by: NURSE PRACTITIONER

## 2024-12-17 PROCEDURE — 93971 EXTREMITY STUDY: CPT | Mod: RT

## 2024-12-17 PROCEDURE — 73562 X-RAY EXAM OF KNEE 3: CPT | Mod: RT

## 2024-12-17 ASSESSMENT — PAIN SCALES - GENERAL: PAINLEVEL_OUTOF10: MODERATE PAIN (5)

## 2024-12-17 NOTE — PROGRESS NOTES
{PROVIDER CHARTING PREFERENCE:534941}    Subjective   Monisha is a 74 year old, presenting for the following health issues:  Pain (Right leg pain started a week ago.)        12/17/2024    10:32 AM   Additional Questions   Roomed by GRACE Forbes   Accompanied by Self         12/17/2024    10:32 AM   Patient Reported Additional Medications   Patient reports taking the following new medications N/A     Musculoskeletal Problem       2 weeks ago, in the night she was awoken from sleep with a horrible malini horse sensation. Ever since then, this right leg has bothered her.     No falls, trauma, or injury has preceded this.     Her right knee is painful to fully extend.     She gets physical therapy at [facility] for her bladder.     Does not do a lot of kneeling. No recent knee injury.     No history of blood clot. No chest pain or shortness of breath.   The pain is worsening. Worse with movement/activity.     Has taken tylenol and used heat for the pain with minimal improvement. Takes 1000 mg tylenol bid.     Review of Systems  Constitutional, neuro, ENT, endocrine, pulmonary, cardiac, gastrointestinal, genitourinary, musculoskeletal, integument and psychiatric systems are negative, except as otherwise noted.      Objective    /70   Pulse 70   Temp 97.3  F (36.3  C) (Axillary)   Resp 16   Wt 75.8 kg (167 lb 3.2 oz)   LMP 06/01/1990 (Approximate)   SpO2 96%   BMI 30.34 kg/m    Body mass index is 30.34 kg/m .  Physical Exam   {Exam List (Optional):698447}    {Diagnostic Test Results (Optional):975180}        Signed Electronically by: Gay Talbot NP  {Email feedback regarding this note to [email or web address]   :[id number]}

## 2024-12-17 NOTE — NURSING NOTE
"Chief Complaint   Patient presents with    Pain     Right leg pain started a week ago.       Initial /70   Pulse 70   Temp 97.3  F (36.3  C) (Axillary)   Resp 16   Wt 75.8 kg (167 lb 3.2 oz)   LMP 06/01/1990 (Approximate)   SpO2 96%   BMI 30.34 kg/m   Estimated body mass index is 30.34 kg/m  as calculated from the following:    Height as of 10/21/24: 1.581 m (5' 2.25\").    Weight as of this encounter: 75.8 kg (167 lb 3.2 oz).  Medication Reconciliation: complete          "

## 2024-12-17 NOTE — PATIENT INSTRUCTIONS
Knee xray today does not show a joint effusion or a baker's cyst behind the knee. There are some mild degenerative changes. An ultrasound was also done to look for a blood clot (Deep vein thrombosis) which could cause calf pain, swelling, redness, tenderness. I will call you with results from this and recommendations.   As long as there is no blood clot, then I will place orders for physical therapy to work on your knee / lower extremity for more of a musculoskeletal pain.   We also tanesha blood work to look for electrolyte imbalances which could cause leg cramps. I will call with these results.     In the meantime, recommend activity as tolerated, stretching, warm packs to the area that is affected and/or alternate with ice if that is more helpful for your symptoms.

## 2024-12-19 DIAGNOSIS — R33.9 URINARY RETENTION WITH INCOMPLETE BLADDER EMPTYING: Primary | ICD-10-CM

## 2024-12-31 ENCOUNTER — OFFICE VISIT (OUTPATIENT)
Dept: OBGYN | Facility: OTHER | Age: 74
End: 2024-12-31
Attending: NURSE PRACTITIONER
Payer: OTHER GOVERNMENT

## 2024-12-31 ENCOUNTER — TELEPHONE (OUTPATIENT)
Dept: OBGYN | Facility: OTHER | Age: 74
End: 2024-12-31

## 2024-12-31 VITALS
SYSTOLIC BLOOD PRESSURE: 128 MMHG | HEART RATE: 66 BPM | WEIGHT: 169 LBS | BODY MASS INDEX: 30.66 KG/M2 | DIASTOLIC BLOOD PRESSURE: 76 MMHG

## 2024-12-31 DIAGNOSIS — N39.46 MIXED STRESS AND URGE URINARY INCONTINENCE: Primary | ICD-10-CM

## 2024-12-31 DIAGNOSIS — Z78.0 POSTMENOPAUSAL ESTROGEN DEFICIENCY: ICD-10-CM

## 2024-12-31 DIAGNOSIS — N95.1 VASOMOTOR SYMPTOMS DUE TO MENOPAUSE: ICD-10-CM

## 2024-12-31 DIAGNOSIS — R61 NIGHT SWEATS: ICD-10-CM

## 2024-12-31 DIAGNOSIS — H57.02 PUPIL ASYMMETRY: ICD-10-CM

## 2024-12-31 DIAGNOSIS — R33.9 URINARY RETENTION WITH INCOMPLETE BLADDER EMPTYING: ICD-10-CM

## 2024-12-31 RX ORDER — ESTRADIOL 0.1 MG/G
2 CREAM VAGINAL DAILY
Qty: 60 G | Refills: 2 | Status: SHIPPED | OUTPATIENT
Start: 2024-12-31 | End: 2025-01-30

## 2024-12-31 ASSESSMENT — PAIN SCALES - GENERAL: PAINLEVEL_OUTOF10: NO PAIN (0)

## 2024-12-31 NOTE — PROGRESS NOTES
Follow up    CHIEF COMPLAINT: 2-month follow-up frequent UTI, incontinence and lichen sclerosus.    HPI:    Monisha Velez is a 74 year old , here for follow up for follow-up for frequent UTI, incontinence and lichen sclerosus.  I last saw patient 2024 with concerns for hot flashes.  At that time she was battling upper respiratory symptoms for over a month.  States her respiratory symptoms have resolved.  Denies any lingering concerns.  She still is concerned about her hot flashes.  She states she is waking up at night sweating and would like to resume her estradiol pill for postmenopausal hot flashes.    She is using vaginal estrogen cream twice weekly at bedtime for prevention of UTI and improvement of incontinence.  She reports she just took a trip out of town and she did not need to stop to urinate and did not have any incontinence.  She is not needing to use a panty liner.  She has been following with PT and using her breathing exercises to help with her incontinence.  Denies any dysuria, bladder spasms or lower abdominal pelvic pain.  No flank pain.    Patient uses clobetasol ointment twice weekly at bedtime for management of lichen sclerosis states this has been going well.  Denies any vaginal irritation or concerns.     Patient's last menstrual period was 1990 (approximate).    ROS: 10-Point ROS negative except as noted in HPI    Physical Exam  /76   Pulse 66   Wt 76.7 kg (169 lb)   LMP 1990 (Approximate)   Breastfeeding No   BMI 30.66 kg/m    Body mass index is 30.66 kg/m .  Gen: Well-appearing, well developed  Eye: Left pupil larger than right pupil.  Both reactive to light.  Symmetrical, ocular movement intact.  No conjunctiva injection, erythema or discharge.  Resp: nonlabored, normal effort, no audible wheezing  GI: soft, nontender, no flank pain  MS: moving with some difficulty, pain in right knee.  Otherwise steady gait  Psych: appropriate mood and  affect  Neurological: Speaking clear coherent sentences.  No facial drooping.  Facial symmetry noted.      ASSESSMENT/PLAN  Monisha Velez is a 74 year old  female here for 2-month follow-up for urinary incontinence, recurrent UTI and lichen sclerosus.    1. Mixed stress and urge urinary incontinence (Primary)  Has longstanding history of mixed urinary incontinence and recurrent UTI.  I first saw patient 2024 for original urology consult, see that note for full details.    Patient reports improvement of urinary incontinence and does feel like she is emptying her bladder completely.  Reports 50% improvement of her symptoms.  -Importance of limiting bladder irritants and managing constipation reviewed.  She has been following with PT and has had improvement with their recommendations.  She will continue following with PT.     She denies any concerning vaginal or urinary symptoms today.    -She is still taking cranberry and d-mannose supplements.  She has done well with twice weekly periurethral vaginal estrogen cream however she is concerned that her hot flashes are due to vasomotor postmenopausal symptoms.  Will trial a 30-day increase of estrogen cream to daily at bedtime to see if this improves her hot flashes.  See plan below.    --Encourage her to keep follow-up with urology on 2025    2. Urinary retention with incomplete bladder emptying  Continues to have urinary retention.  Is using periurethral and vaginal estrogen cream.  Focusing on bladder training and working with PT.  Does not want to use pessary for intervention as she is still sexually active with her .  Is not interested in surgical intervention at this time.    --Encourage her to keep follow-up with urology on 2025    3. Postmenopausal estrogen deficiency  Continue using periurethral/vaginal estrogen cream to help with prevention of recurrent UTI and urinary incontinence.  Also with vaginal irritation and dryness with  incontinence.    4. Night sweats  Patient continues to have concerning symptoms with daily night sweats and would like to resume her estradiol.  We have discussed this multiple office visits and other causes that can contribute to night sweats, including some of her medications.  We reviewed that resuming oral estrogen is not safe due to increased risk of DVT/PE and cardiovascular risk on oral estrogen for women her age, I would not feel comfortable prescribing this.  Patient agrees and understands recommendations to avoid oral estrogen.  -- Will complete lab work today for initial evaluation of other causes for persistent night sweats/hot flashes including CBC, CMP, thyroid function, HIV and TB QuantiFERON.  -- Patient will have follow-up with PCP in 1 week.  She can also provide any other recommendations or other causes for her hot flashes at night sweats other than vasomotor postmenopausal cause.      5. Vasomotor symptoms due to menopause  - estradiol (ESTRACE) 0.1 MG/GM vaginal cream; Place 2 g vaginally daily.  Dispense: 60 g; Refill: 2  Reviewed with patient that her symptoms including hot flashes usually have resolved by this point.  I reemphasized I do not feel safe prescribing oral estrogen.  Discussed that some women do find mild vasomotor symptoms from vaginal estrogen.  Discussed trialing a 30-day trial of daily vaginal Contigen to see if this helps improve her night sweats.  She does not find any benefit in daily vaginal estrogen cream I recommend reducing frequency to twice weekly at bedtime.    6. Pupil asymmetry  Brought to patient's attention that left pupil was more dilated than right pupil.  She states she put the wrong eye solution in her left eye this morning causing significant irritation.  She has been needing to flush out her eye with water all morning.  She denies any eye pain.  Denies any vision changes.  She is wearing her glasses.  I did show her the asymmetry in the mirror.  Remainder  of neurological exam was intact.  Encouraged her to have her  and her monitor this and if her pupils do not resume to equal sizes bilaterally that I recommend she go to the ER for further evaluation.  She does have follow-up with her PCP on 1/8/2025 and I encouraged her to keep this appointment as well.    Follow up with Urology on 1/6/2025 and with PCP on 1/8/2025-will wait for plan for follow-up with OB/GYN based on Dr. Gomez's recommendations.    45 minutes spent on the date of the encounter doing chart review, history and physical exam, counseling and education, documentation and further activities per the note     Katheryn Irvin NP  [facility]    OB/GYN

## 2024-12-31 NOTE — TELEPHONE ENCOUNTER
Writer left message for patient to return call and ask for a nurse in [address].      Provider ordered labs for patient to set up a Lab only appointment. Please notify patient when call is returned.     Karly Liu LPN on 12/31/2024 at 11:50 AM   Ext. [phone]\

## 2024-12-31 NOTE — TELEPHONE ENCOUNTER
After proper verification writer spoke with patient who verbalized understanding. Has lab appointment set up.  Hailey Ventura RN on 12/31/2024 at 1:26 PM

## 2024-12-31 NOTE — NURSING NOTE
"Chief Complaint   Patient presents with    Follow Up     Lichen     Patient is seen in OB/GYN today for a follow up. Patient is also complaining of worsening vasomotor symptoms since the discontinuing of her estrogen Rx.   Karly Liu LPN on 12/31/2024 at 9:39 AM   Ext. [phone]     Initial /76   Pulse 66   Wt 76.7 kg (169 lb)   LMP 06/01/1990 (Approximate)   Breastfeeding No   BMI 30.66 kg/m   Estimated body mass index is 30.66 kg/m  as calculated from the following:    Height as of 10/21/24: 1.581 m (5' 2.25\").    Weight as of this encounter: 76.7 kg (169 lb).  Medication Reconciliation: complete    Karly Liu LPN  "

## 2025-01-02 ENCOUNTER — LAB (OUTPATIENT)
Dept: LAB | Facility: OTHER | Age: 75
End: 2025-01-02
Attending: INTERNAL MEDICINE
Payer: OTHER GOVERNMENT

## 2025-01-02 DIAGNOSIS — R61 NIGHT SWEATS: ICD-10-CM

## 2025-01-02 DIAGNOSIS — N95.1 VASOMOTOR SYMPTOMS DUE TO MENOPAUSE: ICD-10-CM

## 2025-01-02 LAB
ALBUMIN SERPL BCG-MCNC: 4.3 G/DL (ref 3.5–5.2)
ALP SERPL-CCNC: 70 U/L (ref 40–150)
ALT SERPL W P-5'-P-CCNC: 23 U/L (ref 0–50)
ANION GAP SERPL CALCULATED.3IONS-SCNC: 11 MMOL/L (ref 7–15)
AST SERPL W P-5'-P-CCNC: 22 U/L (ref 0–45)
BASOPHILS # BLD AUTO: 0 10E3/UL (ref 0–0.2)
BASOPHILS NFR BLD AUTO: 0 %
BILIRUB SERPL-MCNC: 0.4 MG/DL
BUN SERPL-MCNC: 17.8 MG/DL (ref 8–23)
CALCIUM SERPL-MCNC: 9.6 MG/DL (ref 8.8–10.4)
CHLORIDE SERPL-SCNC: 105 MMOL/L (ref 98–107)
CREAT SERPL-MCNC: 1.15 MG/DL (ref 0.51–0.95)
EGFRCR SERPLBLD CKD-EPI 2021: 49 ML/MIN/1.73M2
EOSINOPHIL # BLD AUTO: 0.1 10E3/UL (ref 0–0.7)
EOSINOPHIL NFR BLD AUTO: 2 %
ERYTHROCYTE [DISTWIDTH] IN BLOOD BY AUTOMATED COUNT: 12.7 % (ref 10–15)
GLUCOSE SERPL-MCNC: 99 MG/DL (ref 70–99)
HCO3 SERPL-SCNC: 25 MMOL/L (ref 22–29)
HCT VFR BLD AUTO: 43.4 % (ref 35–47)
HGB BLD-MCNC: 14.4 G/DL (ref 11.7–15.7)
HIV 1+2 AB+HIV1 P24 AG SERPL QL IA: NONREACTIVE
IMM GRANULOCYTES # BLD: 0 10E3/UL
IMM GRANULOCYTES NFR BLD: 0 %
LYMPHOCYTES # BLD AUTO: 1.7 10E3/UL (ref 0.8–5.3)
LYMPHOCYTES NFR BLD AUTO: 26 %
MCH RBC QN AUTO: 31.4 PG (ref 26.5–33)
MCHC RBC AUTO-ENTMCNC: 33.2 G/DL (ref 31.5–36.5)
MCV RBC AUTO: 95 FL (ref 78–100)
MONOCYTES # BLD AUTO: 0.5 10E3/UL (ref 0–1.3)
MONOCYTES NFR BLD AUTO: 7 %
NEUTROPHILS # BLD AUTO: 4.5 10E3/UL (ref 1.6–8.3)
NEUTROPHILS NFR BLD AUTO: 66 %
NRBC # BLD AUTO: 0 10E3/UL
NRBC BLD AUTO-RTO: 0 /100
PLATELET # BLD AUTO: 188 10E3/UL (ref 150–450)
POTASSIUM SERPL-SCNC: 4.6 MMOL/L (ref 3.4–5.3)
PROT SERPL-MCNC: 6.7 G/DL (ref 6.4–8.3)
RBC # BLD AUTO: 4.58 10E6/UL (ref 3.8–5.2)
SODIUM SERPL-SCNC: 141 MMOL/L (ref 135–145)
TSH SERPL DL<=0.005 MIU/L-ACNC: 2.05 UIU/ML (ref 0.3–4.2)
WBC # BLD AUTO: 6.8 10E3/UL (ref 4–11)

## 2025-01-02 PROCEDURE — 85004 AUTOMATED DIFF WBC COUNT: CPT | Mod: ZL

## 2025-01-02 PROCEDURE — 84436 ASSAY OF TOTAL THYROXINE: CPT | Mod: ZL

## 2025-01-02 PROCEDURE — 36415 COLL VENOUS BLD VENIPUNCTURE: CPT | Mod: ZL

## 2025-01-02 PROCEDURE — 84481 FREE ASSAY (FT-3): CPT | Mod: ZL

## 2025-01-02 PROCEDURE — 86481 TB AG RESPONSE T-CELL SUSP: CPT | Mod: ZL

## 2025-01-02 PROCEDURE — 82310 ASSAY OF CALCIUM: CPT | Mod: ZL

## 2025-01-02 PROCEDURE — 87389 HIV-1 AG W/HIV-1&-2 AB AG IA: CPT | Mod: ZL

## 2025-01-02 PROCEDURE — 85014 HEMATOCRIT: CPT | Mod: ZL

## 2025-01-02 PROCEDURE — 84443 ASSAY THYROID STIM HORMONE: CPT | Mod: ZL

## 2025-01-02 RX ORDER — ESTRADIOL 0.1 MG/G
2 CREAM VAGINAL DAILY
Qty: 60 G | Refills: 2 | OUTPATIENT
Start: 2025-01-02

## 2025-01-02 NOTE — TELEPHONE ENCOUNTER
Refill request refused, with note to pharmacy.  Should have refills on file.      Requested Prescriptions   Pending Prescriptions Disp Refills    estradiol (ESTRACE) 0.1 MG/GM vaginal cream 60 g 2     Sig: Place 2 g vaginally daily.     Last Prescription Date:   12/31/2024  Last Fill Qty/Refills:         60 g, R-2    Imelda Ng RN on 1/2/2025 at 1:18 PM

## 2025-01-03 LAB
QUANTIFERON MITOGEN: 10 IU/ML
QUANTIFERON NIL TUBE: 0.08 IU/ML
QUANTIFERON TB1 TUBE: 0.14 IU/ML
QUANTIFERON TB2 TUBE: 0.1
T3FREE SERPL-MCNC: 3 PG/ML (ref 2–4.4)
T4 SERPL-MCNC: 7.9 UG/DL (ref 4.5–11.7)

## 2025-01-04 LAB
GAMMA INTERFERON BACKGROUND BLD IA-ACNC: 0.08 IU/ML
M TB IFN-G BLD-IMP: NEGATIVE
M TB IFN-G CD4+ BCKGRND COR BLD-ACNC: 9.92 IU/ML
MITOGEN IGNF BCKGRD COR BLD-ACNC: 0.02 IU/ML
MITOGEN IGNF BCKGRD COR BLD-ACNC: 0.06 IU/ML

## 2025-01-06 ENCOUNTER — LAB (OUTPATIENT)
Dept: LAB | Facility: OTHER | Age: 75
End: 2025-01-06
Payer: MEDICARE

## 2025-01-06 ENCOUNTER — OFFICE VISIT (OUTPATIENT)
Dept: UROLOGY | Facility: OTHER | Age: 75
End: 2025-01-06
Attending: NURSE PRACTITIONER
Payer: MEDICARE

## 2025-01-06 VITALS
WEIGHT: 169 LBS | OXYGEN SATURATION: 97 % | DIASTOLIC BLOOD PRESSURE: 80 MMHG | SYSTOLIC BLOOD PRESSURE: 128 MMHG | HEART RATE: 67 BPM | HEIGHT: 62 IN | RESPIRATION RATE: 20 BRPM | BODY MASS INDEX: 31.1 KG/M2

## 2025-01-06 DIAGNOSIS — N32.81 OVERACTIVE BLADDER: Primary | ICD-10-CM

## 2025-01-06 DIAGNOSIS — R33.9 URINARY RETENTION WITH INCOMPLETE BLADDER EMPTYING: ICD-10-CM

## 2025-01-06 DIAGNOSIS — N18.31 STAGE 3A CHRONIC KIDNEY DISEASE (H): ICD-10-CM

## 2025-01-06 DIAGNOSIS — N39.3 FEMALE STRESS INCONTINENCE: ICD-10-CM

## 2025-01-06 LAB
ALBUMIN UR-MCNC: NEGATIVE MG/DL
APPEARANCE UR: CLEAR
BILIRUB UR QL STRIP: NEGATIVE
COLOR UR AUTO: NORMAL
GLUCOSE UR STRIP-MCNC: NEGATIVE MG/DL
HGB UR QL STRIP: NEGATIVE
KETONES UR STRIP-MCNC: NEGATIVE MG/DL
LEUKOCYTE ESTERASE UR QL STRIP: NEGATIVE
NITRATE UR QL: NEGATIVE
PH UR STRIP: 5.5 [PH] (ref 5–9)
SP GR UR STRIP: 1.02 (ref 1–1.03)
UROBILINOGEN UR STRIP-MCNC: NORMAL MG/DL

## 2025-01-06 PROCEDURE — 51798 US URINE CAPACITY MEASURE: CPT

## 2025-01-06 PROCEDURE — G0463 HOSPITAL OUTPT CLINIC VISIT: HCPCS | Mod: 25

## 2025-01-06 PROCEDURE — 81003 URINALYSIS AUTO W/O SCOPE: CPT | Mod: ZL

## 2025-01-06 RX ORDER — ESTRADIOL 0.1 MG/G
2 CREAM VAGINAL SEE ADMIN INSTRUCTIONS
Qty: 45 G | Refills: 4 | Status: CANCELLED | OUTPATIENT
Start: 2025-01-06

## 2025-01-06 RX ORDER — MIRABEGRON 25 MG/1
25 TABLET, FILM COATED, EXTENDED RELEASE ORAL DAILY
Qty: 30 TABLET | Refills: 11 | Status: SHIPPED | OUTPATIENT
Start: 2025-01-06

## 2025-01-06 NOTE — NURSING NOTE
"Chief Complaint   Patient presents with    Urinary Retention    Incontinence     Consult        Initial /80   Pulse 67   Resp 20   Ht 1.581 m (5' 2.25\")   Wt 76.7 kg (169 lb)   LMP 06/01/1990 (Approximate)   SpO2 97%   BMI 30.66 kg/m   Estimated body mass index is 30.66 kg/m  as calculated from the following:    Height as of this encounter: 1.581 m (5' 2.25\").    Weight as of this encounter: 76.7 kg (169 lb).  Medication Reconciliation: complete        post void residual - 35 ml    Kerline Thakur LPN    "

## 2025-01-06 NOTE — PATIENT INSTRUCTIONS
Drink plenty of fluids, > 2 liters/day  Avoid constipation.  Consider Miralax Over the counter, metamucil or Citrucel with increased fiber in your diet  Void after sexual activity if still sexually active   Shower using a shower wand to decrease the bacterial counts in the perineal area.   Cranberry tabs twice a day:  Must contain 36 mg of PAC or proanthocyanidins  Probiotics:  Yogurts that contain good bacteria.  Kefir 1% milkfat (plain), Cultured Nahed, Bertrand or Parvez or Align from your local pharmacy.   If perimenopausal or postmenopausal we will consider estrogen cream vaginally and periurethrally twice a week if there is no history of blood clots, tobacco use or breast cancer.   Continue with pelvic floor physical therapy as able.  Start mirebegron 25 mg daily, take at the same time every day. Check blood pressure at least weekly, and contact our office if increasing at .

## 2025-01-06 NOTE — PROGRESS NOTES
Chief Complaint: Urinary Retention and Incontinence (Consult )  .    HPI: Ms. Monisha Velez is a 74 year old year old female with a history of COPD, GERD, hypertension, chronic kidney disease stage III, mixed incontinence, and urinary retention who presents today January 6, 2025 for evaluation of urinary retention and incontinence.    Patient was previously seen by Katheryn ko [facility]'s health APRN who initiated vaginal estrogen, cranberry capsules, d-mannose, and clobetasol (for lichens).  Patient reports recent increase in hot flashes since stopping oral estrogen. She plans to discuss this with PCP later this week. She is currently doing vaginal estrogen two times daily. She does note that since starting estrogen, and pelvic floor physical therapy she has significant improvement in retention and is leaking slightly less. Continues to have both urge and stress leakage. Is voiding every 1-2 hours during the day, and time during the night.  If she waits too long between voiding she does experience some leakage.    Patient has a history of vaginal hysterectomy in 1990, no bladder sling.  Urinary symptoms started just 2 years ago and do not correlate with timing of surgery.    Past Medical History:   Diagnosis Date    Adnexal mass 08/22/2011    Contact dermatitis 12/16/2009    Diplopia     Episode of mild diplopia secondary to sinusitis, ophthalmology consult 07/2008.    H/O gastritis     History of adenomatous polyp of colon     Postmenopausal atrophic vaginitis 07/29/2011    Restless leg syndrome     Retention of urine 08/22/2011    Rosacea     Tinea corporis 11/19/2010    Urge incontinence of urine        Past Surgical History:   Procedure Laterality Date    COLONOSCOPY  03/18/2016    Tubular adenoma, Next in 5 years    COLONOSCOPY  04/12/2021    F/U N/A normal    COLONOSCOPY N/A 4/12/2021    Procedure: COLONOSCOPY;  Surgeon: Roberto Anderson MD;  Location:  OR    CYSTOSCOPY  01/2013    [facility]    DENTAL SURGERY   2005    Root canal,  Dr. Olmos.    ENDOSCOPIC SINUS SURGERY  1993    ESOPHAGOSCOPY, GASTROSCOPY, DUODENOSCOPY (EGD), COMBINED  03/18/2016    HYSTERECTOMY VAGINAL  1990    Endometriosis    LAPAROSCOPIC CYSTECTOMY OVARIAN (BENIGN)  09/27/2011    cystectomy with lysis of adhesions and possible oophrectomy  by Dr. Gay Mcgovern  at [facility]    ROTATOR CUFF REPAIR RT/LT Right 12/28/2017    Dr John    SALPINGO-OOPHORECTOMY BILATERAL  10/2007    bilateral ovarian serous cystadenofibromas, benign.       FAMILY HISTORY: Denies a family history of bladder or cancer.    SOCIAL HISTORY:    reports that she quit smoking about 52 years ago. Her smoking use included cigarettes. She started smoking about 60 years ago. She has a 3.8 pack-year smoking history. She has never used smokeless tobacco.    Current Outpatient Medications   Medication Sig Dispense Refill    albuterol (PROAIR HFA/PROVENTIL HFA/VENTOLIN HFA) 108 (90 Base) MCG/ACT inhaler Inhale 2 puffs into the lungs every 4 hours as needed for cough, wheezing or shortness of breath 18 g 2    aspirin 81 MG EC tablet Take 1 tablet (81 mg) by mouth daily with food      atorvastatin (LIPITOR) 10 MG tablet TAKE 1/2 TABLET BY MOUTH FOUR TIMES PER WEEK 90 tablet 4    benzonatate (TESSALON) 100 MG capsule Take 1 capsule (100 mg) by mouth 3 times daily as needed for cough. 42 capsule 1    cetirizine (ZYRTEC) 10 MG tablet Take 1 tablet (10 mg) by mouth daily 90 tablet 4    clobetasol propionate (TEMOVATE) 0.05 % external cream Apply topically at bedtime. Apply to external vaginal area/labia daily at bedtime for next 30 days, then decrease frequency to twice a week.  This is something he will continue to stay on for management of lichen sclerosis 45 g 2    Cranberry 250 MG CAPS Take 250 mg by mouth daily. 90 capsule 3    D-Mannose 500 MG CAPS Take 1,000 mg by mouth daily. 90 capsule 3    estradiol (ESTRACE) 0.1 MG/GM vaginal cream Place 2 g vaginally daily.  "60 g 2    fluticasone (FLONASE) 50 MCG/ACT nasal spray Spray 1 spray into both nostrils daily 48 g 4    gabapentin (NEURONTIN) 100 MG capsule Take 1 capsule (100 mg) by mouth daily 90 capsule 4    melatonin 5 MG tablet Take 10 mg by mouth nightly as needed for sleep      Multiple Vitamins-Minerals (PRESERVISION AREDS) CAPS Take 2 capsules by mouth daily      triamcinolone (ARISTOCORT HP) 0.5 % external cream Apply topically 2 times daily To lesion on thigh 15 g 1    valACYclovir (VALTREX) 1000 mg tablet Take 1 tablet (1,000 mg) by mouth 3 times daily as needed (Cold Sores) 90 tablet 1    polyethylene glycol (MIRALAX) 17 GM/Dose powder Take 17 g (1 Capful) by mouth daily. Decrease to every other day if Diarrhea 578 g 2    rOPINIRole (REQUIP) 0.25 MG tablet Take 1-2 tablets (0.25-0.5 mg) by mouth nightly as needed (restless legs) (Patient not taking: Reported on 1/6/2025) 90 tablet 0       ALLERGIES: Penicillins, Sulfa antibiotics, Codeine sulfate, Hydroxyzine, Morphine sulfate, Venlafaxine, and Zolpidem tartrate     GENERAL PHYSICAL EXAM:   Vitals: /80   Pulse 67   Resp 20   Ht 1.581 m (5' 2.25\")   Wt 76.7 kg (169 lb)   LMP 06/01/1990 (Approximate)   SpO2 97%   BMI 30.66 kg/m    Body mass index is 30.66 kg/m .    GENERAL: Well groomed, well developed, well nourished female in NAD.  HEENT: Normal  CV:  Warm extremities   RESPIRATORY: Normal respiratory effort.    GI:  Soft, NT, ND,   MS: Moving all four  NEURO: Alert and oriented x 3.  PSYCH: Normal mood and affect, pleasant and agreeable during interview and exam.    :  Negative for CVA tenderness. Normal appearing external female genitalia. Normal hair distribution. Vagina is without lesions. Mild atrophic changes.  No cervix appreciated.  Grade 1 cystocele present.  Mild decrease in pelvic tone, that is symmetrical.  Sensation intact.  Negative cough stress test.        PVR: Residual urine by ultrasound was 35 ml.           RADIOLOGY: The following " tests were reviewed: None.    LABS: The last test results for Ms. Monisha Velez were reviewed.   Results for orders placed or performed in visit on 01/06/25 (from the past 24 hours)   Urinalysis Macroscopic   Result Value Ref Range    Color Urine Light Yellow Colorless, Straw, Light Yellow, Yellow    Appearance Urine Clear Clear    Glucose Urine Negative Negative mg/dL    Bilirubin Urine Negative Negative    Ketones Urine Negative Negative mg/dL    Specific Gravity Urine 1.021 1.000 - 1.030    Blood Urine Negative Negative    pH Urine 5.5 5.0 - 9.0    Protein Albumin Urine Negative Negative mg/dL    Urobilinogen Urine Normal Normal, 2.0 mg/dL    Nitrite Urine Negative Negative    Leukocyte Esterase Urine Negative Negative       BMP -   Recent Labs   Lab Test 01/02/25  1251 12/17/24  1250 10/04/24  1341 03/13/24  1030 02/09/21  1058 01/11/21  1006    142 140 141   < > 140   POTASSIUM 4.6 4.5 4.6 4.4   < > 4.4   CHLORIDE 105 108* 103 106   < > 106   CO2 25 28 29 26   < > 29   BUN 17.8 17.7 16.9 17.8   < > 14   CR 1.15* 0.98* 1.06* 0.99*   < > 1.04   GLC 99 102* 109* 102*   < > 111*   DENISSE 9.6 9.8 9.5 9.7   < > 9.2   MAG  --  2.1  --  1.9  --   --    PHOS  --   --   --   --   --  3.4    < > = values in this interval not displayed.       CBC -   Recent Labs   Lab Test 01/02/25  1251 11/19/24  1400 10/04/24  1341   WBC 6.8 5.3 9.0   HGB 14.4 14.5 14.7    219 195       ASSESSMENT:   Overactive bladder  Recurrent UTI    PLAN:   1. Urinary retention with incomplete bladder emptying  Urinary retention is significantly improved today with only 35 mL on scan.  Patient reports she feels that she is emptying her bladder more readily.  Would encourage patient to continue with limiting alcohol and caffeine.  Patient does have a grade 1 cystocele and has previously declined pessary from women's health NP.  Recommend immediate follow-up for evaluation should she be unable to void or feel suprapubic discomfort.  - Adult  "Urology  Referral  - MEASUREMENT, POST-VOIDING RESIDUAL URINE &/OR BLADDER CAPACITY, US, NON-IMAGING    2. Stage 3a chronic kidney disease (H)  CMP done on 1/2/2025, mild decrease in GFR and increase in creatinine.  Recommended increase in fluid intake and avoid NSAIDs.  - MEASUREMENT, POST-VOIDING RESIDUAL URINE &/OR BLADDER CAPACITY, US, NON-IMAGING    3. Overactive bladder (Primary)  Patient endorses urinary urge leakage when she is unable to get to the bathroom in time, as well as frequent every 1-2 hour trips to the bathroom to void.  Extensive discussion with patient about her current urinary symptoms and leakage.  Explained the concept of \"overactive bladder\" and typical symptoms such as urgency, frequency, urge incontinence and nocturia.  Discussed the difference between primary stress leakage and urge leakage.    Etiologies discussed including childbirth and birth trauma, atrophic changes, vaginal prolapse, excessive caffeine use, smoking, constipation, certain medications, conditions such as Diabetes, Parkinson's disease and other neurologic conditions such as MS, and previous pelvic surgery such as RAYNE, A&P repair and sling placement.    Patient was previously advised of timed voiding, is in pelvic floor physical therapy and using vaginal estrogen cream. Explained that other options include medications such as anticholinergics and/or  Botox intradetrusor injections, Interstim and/or PTNS therapy.     Recommendations made for behavioral therapy including limitation of caffeine use, alcohol use, avoidance of constipation, avoidance of spicy or acidic foods, better control of diabetes and blood sugars and avoidance of certain medications used to treat fluid overload(diuretics) and Diabetes (Invokana,etc).     Risks and benefits of each discussed including common ones such as infection, bleeding, dry mouth, constipation, dry eyes, retention of urine and failure.  Given history of urinary retention I " would be hesitant to start antimuscarinic agents. Hypertension has been well controlled and patient endorses having a way to monitor blood pressure.     - mirabegron (MYRBETRIQ) 25 MG 24 hr tablet; Take 1 tablet (25 mg) by mouth daily.  Dispense: 30 tablet; Refill: 11  - Monitor blood pressure at least weekly.  -Follow up in 4-6 weeks, or sooner if symptoms worsen or do not improve.  - Patient is currently on twice daily vaginal estrogen for hot flashes. If this is changed to oral dosing, would suggest for twice weekly dosing of vaginal estrogen to keep urinary symptoms and UTI to a minimum.     4. Female stress incontinence  I would recommend continuation of pelvic floor physical therapy as this has been helpful for patient and she does have mild decrease in pelvic tone and a grade 1 cystocele.  Advised her to discuss long-term therapy plan with physical therapist.      45 minutes spent on the date of this encounter doing chart review, history and exam, documentation and further activities as noted above.        JERRELL Gilman [facility]

## 2025-01-08 ENCOUNTER — OFFICE VISIT (OUTPATIENT)
Dept: INTERNAL MEDICINE | Facility: OTHER | Age: 75
End: 2025-01-08
Attending: INTERNAL MEDICINE
Payer: MEDICARE

## 2025-01-08 VITALS
OXYGEN SATURATION: 97 % | DIASTOLIC BLOOD PRESSURE: 84 MMHG | WEIGHT: 168 LBS | HEART RATE: 64 BPM | RESPIRATION RATE: 16 BRPM | BODY MASS INDEX: 30.48 KG/M2 | SYSTOLIC BLOOD PRESSURE: 128 MMHG

## 2025-01-08 DIAGNOSIS — N39.0 RECURRENT UTI: ICD-10-CM

## 2025-01-08 DIAGNOSIS — F51.04 CHRONIC INSOMNIA: ICD-10-CM

## 2025-01-08 DIAGNOSIS — N18.31 STAGE 3A CHRONIC KIDNEY DISEASE (H): ICD-10-CM

## 2025-01-08 DIAGNOSIS — M25.562 CHRONIC PAIN OF LEFT KNEE: ICD-10-CM

## 2025-01-08 DIAGNOSIS — G89.29 CHRONIC PAIN OF LEFT KNEE: ICD-10-CM

## 2025-01-08 DIAGNOSIS — M25.562 CHRONIC PAIN OF LEFT KNEE: Primary | ICD-10-CM

## 2025-01-08 DIAGNOSIS — F51.04 CHRONIC INSOMNIA: Primary | ICD-10-CM

## 2025-01-08 DIAGNOSIS — G89.29 CHRONIC PAIN OF LEFT KNEE: Primary | ICD-10-CM

## 2025-01-08 PROCEDURE — 99214 OFFICE O/P EST MOD 30 MIN: CPT | Performed by: INTERNAL MEDICINE

## 2025-01-08 PROCEDURE — G2211 COMPLEX E/M VISIT ADD ON: HCPCS | Performed by: INTERNAL MEDICINE

## 2025-01-08 PROCEDURE — G0463 HOSPITAL OUTPT CLINIC VISIT: HCPCS

## 2025-01-08 ASSESSMENT — PAIN SCALES - GENERAL: PAINLEVEL_OUTOF10: SEVERE PAIN (7)

## 2025-01-08 NOTE — PROGRESS NOTES
{PROVIDER CHARTING PREFERENCE:315813}    Subjective   Monisha is a 74 year old, presenting for the following health issues:  Knee Pain (Right)  {(!) Visit Details have not yet been documented.  Please enter Visit Details and then use this list to pull in documentation. (Optional):670540}  Knee Pain    History of Present Illness       Reason for visit:  Knee Pain  Symptom onset:  More than a month  Symptoms include:  Pain, Swelling, Redness  Symptom intensity:  Moderate  Symptom progression:  Staying the same  Had these symptoms before:  No   She is taking medications regularly.       {MA/LPN/RN Pre-Provider Visit Orders- hCG/UA/Strep (Optional):129221}  {SUPERLIST (Optional):460564}  {additonal problems for provider to add (Optional):007013}    {ROS Picklists (Optional):676664}      Objective    /84   Pulse 64   Resp 16   Wt 76.2 kg (168 lb)   LMP 06/01/1990 (Approximate)   SpO2 97%   BMI 30.48 kg/m    Body mass index is 30.48 kg/m .  Physical Exam   {Exam List (Optional):322170}    {Diagnostic Test Results (Optional):622392}        Signed Electronically by: Raeann Pearson DO  {Email feedback regarding this note to [email or web address]   :488833}   longitudinal plan of care for the diagnosis(es)/condition(s) as documented were addressed during this visit. Due to the added complexity in care, I will continue to support Monisha in the subsequent management and with ongoing continuity of care.    Signed Electronically by: Raeann Pearson DO

## 2025-01-08 NOTE — PATIENT INSTRUCTIONS
For sleep: try daily exercise, Tomás Chi, daily relaxing music in the evening    Hold your melatonin for 1 week and then restart. Take every night, 1-2 hours before sleep    - maintain a routine, go to bed and wake up at the same time every day  - make sure that your bedroom is comfortable, keep your bedroom quiet, dark,comfortable and cool  - include a warm beverage and/or hot bath 1-2 hours before bedtime  - avoid naps  - expose yourself to bright light during the day  - do not watch the clock  - avoid caffeine and nicotine for at least 4-6 hours prior to bed and consider cutting out all caffeine (coffee, chocolate, tea) if able  - avoid alcohol before bedtime   - exercise regularly but avoid exercising right before bed  - avoid looking at illuminated screens (phones, computers, TV) in the 1-2 hours before bedtime.  - If you haven t been able to get to sleep after about 20 minutes or more, get up and do something calming or boring until you feel sleepy, then return to bed and try again. Avoid doing anything that is too stimulating or interesting, as this will wake you up even more.  - use bed only for sleep and sex, do not use it as a place to watch TV, eat, read, work on your laptop, pay bills, and other things

## 2025-01-08 NOTE — NURSING NOTE
"Chief Complaint   Patient presents with    Knee Pain     Right       Initial /84   Pulse 64   Resp 16   Wt 76.2 kg (168 lb)   LMP 06/01/1990 (Approximate)   SpO2 97%   BMI 30.48 kg/m   Estimated body mass index is 30.48 kg/m  as calculated from the following:    Height as of 1/6/25: 1.581 m (5' 2.25\").    Weight as of this encounter: 76.2 kg (168 lb).  Medication Review: complete    The next two questions are to help us understand your food security.  If you are feeling you need any assistance in this area, we have resources available to support you today.          4/15/2024   SDOH- Food Insecurity   Within the past 12 months, did you worry that your food would run out before you got money to buy more? N   Within the past 12 months, did the food you bought just not last and you didn t have money to get more? N         Health Care Directive:  Patient has a Health Care Directive on file      Francesca Gutierrez LPN      "

## 2025-01-09 ENCOUNTER — MEDICAL CORRESPONDENCE (OUTPATIENT)
Dept: PHYSICAL THERAPY | Facility: OTHER | Age: 75
End: 2025-01-09
Payer: MEDICARE

## 2025-01-17 PROBLEM — M25.561 ACUTE PAIN OF RIGHT KNEE: Status: ACTIVE | Noted: 2025-01-17

## 2025-01-17 PROBLEM — R25.2 LEG CRAMP: Status: ACTIVE | Noted: 2025-01-17

## 2025-01-28 ENCOUNTER — TELEPHONE (OUTPATIENT)
Dept: INTERNAL MEDICINE | Facility: OTHER | Age: 75
End: 2025-01-28
Payer: MEDICARE

## 2025-01-28 NOTE — TELEPHONE ENCOUNTER
Attempted to call the pt.  There was no answer.  A message was left requesting that she call the clinic to schedule an appointment to have her symptoms further evaluated.  Francesca Gutierrez LPN on 1/28/2025 at 3:46 PM

## 2025-01-28 NOTE — TELEPHONE ENCOUNTER
[facility]-patient would like to discuss possibly having gout and wants to know if recent blood work done with Katheryn Irvin in December would show that       Please call and advise  Thank You    Jessy Ramirez on 1/28/2025 at 3:33 PM

## 2025-01-29 ENCOUNTER — OFFICE VISIT (OUTPATIENT)
Dept: FAMILY MEDICINE | Facility: OTHER | Age: 75
End: 2025-01-29
Attending: PHYSICIAN ASSISTANT
Payer: MEDICARE

## 2025-01-29 VITALS
OXYGEN SATURATION: 99 % | HEART RATE: 67 BPM | TEMPERATURE: 97.8 F | BODY MASS INDEX: 30.66 KG/M2 | DIASTOLIC BLOOD PRESSURE: 84 MMHG | WEIGHT: 169 LBS | SYSTOLIC BLOOD PRESSURE: 132 MMHG

## 2025-01-29 DIAGNOSIS — M79.604 PAIN OF RIGHT LOWER EXTREMITY: Primary | ICD-10-CM

## 2025-01-29 DIAGNOSIS — M25.561 ACUTE PAIN OF RIGHT KNEE: ICD-10-CM

## 2025-01-29 LAB
ANION GAP SERPL CALCULATED.3IONS-SCNC: 10 MMOL/L (ref 7–15)
BASOPHILS # BLD AUTO: 0 10E3/UL (ref 0–0.2)
BASOPHILS NFR BLD AUTO: 1 %
BUN SERPL-MCNC: 19.9 MG/DL (ref 8–23)
CALCIUM SERPL-MCNC: 9.8 MG/DL (ref 8.8–10.4)
CHLORIDE SERPL-SCNC: 105 MMOL/L (ref 98–107)
CREAT SERPL-MCNC: 1.12 MG/DL (ref 0.51–0.95)
CRP SERPL-MCNC: <3 MG/L
EGFRCR SERPLBLD CKD-EPI 2021: 51 ML/MIN/1.73M2
EOSINOPHIL # BLD AUTO: 0.1 10E3/UL (ref 0–0.7)
EOSINOPHIL NFR BLD AUTO: 2 %
ERYTHROCYTE [DISTWIDTH] IN BLOOD BY AUTOMATED COUNT: 12.3 % (ref 10–15)
ERYTHROCYTE [SEDIMENTATION RATE] IN BLOOD BY WESTERGREN METHOD: 3 MM/HR (ref 0–30)
GLUCOSE SERPL-MCNC: 113 MG/DL (ref 70–99)
HCO3 SERPL-SCNC: 26 MMOL/L (ref 22–29)
HCT VFR BLD AUTO: 43.1 % (ref 35–47)
HGB BLD-MCNC: 14.7 G/DL (ref 11.7–15.7)
IMM GRANULOCYTES # BLD: 0 10E3/UL
IMM GRANULOCYTES NFR BLD: 0 %
LYMPHOCYTES # BLD AUTO: 1.6 10E3/UL (ref 0.8–5.3)
LYMPHOCYTES NFR BLD AUTO: 28 %
MCH RBC QN AUTO: 31.1 PG (ref 26.5–33)
MCHC RBC AUTO-ENTMCNC: 34.1 G/DL (ref 31.5–36.5)
MCV RBC AUTO: 91 FL (ref 78–100)
MONOCYTES # BLD AUTO: 0.4 10E3/UL (ref 0–1.3)
MONOCYTES NFR BLD AUTO: 7 %
NEUTROPHILS # BLD AUTO: 3.4 10E3/UL (ref 1.6–8.3)
NEUTROPHILS NFR BLD AUTO: 62 %
NRBC # BLD AUTO: 0 10E3/UL
NRBC BLD AUTO-RTO: 0 /100
PLATELET # BLD AUTO: 210 10E3/UL (ref 150–450)
POTASSIUM SERPL-SCNC: 4.5 MMOL/L (ref 3.4–5.3)
RBC # BLD AUTO: 4.72 10E6/UL (ref 3.8–5.2)
SODIUM SERPL-SCNC: 141 MMOL/L (ref 135–145)
URATE SERPL-MCNC: 5.4 MG/DL (ref 2.4–5.7)
WBC # BLD AUTO: 5.5 10E3/UL (ref 4–11)

## 2025-01-29 PROCEDURE — 86038 ANTINUCLEAR ANTIBODIES: CPT | Mod: ZL | Performed by: PHYSICIAN ASSISTANT

## 2025-01-29 PROCEDURE — 86431 RHEUMATOID FACTOR QUANT: CPT | Mod: ZL | Performed by: PHYSICIAN ASSISTANT

## 2025-01-29 PROCEDURE — 80048 BASIC METABOLIC PNL TOTAL CA: CPT | Mod: ZL | Performed by: PHYSICIAN ASSISTANT

## 2025-01-29 PROCEDURE — 36415 COLL VENOUS BLD VENIPUNCTURE: CPT | Mod: ZL | Performed by: PHYSICIAN ASSISTANT

## 2025-01-29 PROCEDURE — 86140 C-REACTIVE PROTEIN: CPT | Mod: ZL | Performed by: PHYSICIAN ASSISTANT

## 2025-01-29 PROCEDURE — 85025 COMPLETE CBC W/AUTO DIFF WBC: CPT | Mod: ZL | Performed by: PHYSICIAN ASSISTANT

## 2025-01-29 PROCEDURE — G0463 HOSPITAL OUTPT CLINIC VISIT: HCPCS

## 2025-01-29 PROCEDURE — 85652 RBC SED RATE AUTOMATED: CPT | Mod: ZL | Performed by: PHYSICIAN ASSISTANT

## 2025-01-29 PROCEDURE — 84550 ASSAY OF BLOOD/URIC ACID: CPT | Mod: ZL | Performed by: PHYSICIAN ASSISTANT

## 2025-01-29 RX ORDER — POLYETHYLENE GLYCOL 3350 17 G/17G
1 POWDER, FOR SOLUTION ORAL
COMMUNITY
Start: 2024-10-16

## 2025-01-29 ASSESSMENT — PAIN SCALES - GENERAL: PAINLEVEL_OUTOF10: SEVERE PAIN (8)

## 2025-01-29 NOTE — NURSING NOTE
"Chief Complaint   Patient presents with    Musculoskeletal Problem     Patient here for right knee pain. Did have ultrasound. She would like to be tested for Gout and have an MRI.  Initial /84   Pulse 67   Temp 97.8  F (36.6  C) (Tympanic)   Wt 76.7 kg (169 lb)   LMP 06/01/1990 (Approximate)   SpO2 99%   BMI 30.66 kg/m   Estimated body mass index is 30.66 kg/m  as calculated from the following:    Height as of 1/6/25: 1.581 m (5' 2.25\").    Weight as of this encounter: 76.7 kg (169 lb).  Medication Review: complete    The next two questions are to help us understand your food security.  If you are feeling you need any assistance in this area, we have resources available to support you today.          4/15/2024   SDOH- Food Insecurity   Within the past 12 months, did you worry that your food would run out before you got money to buy more? N   Within the past 12 months, did the food you bought just not last and you didn t have money to get more? N         Health Care Directive:  Patient has a Health Care Directive on file      Mirela Fernando MA      "

## 2025-01-29 NOTE — PROGRESS NOTES
Assessment & Plan   Problem List Items Addressed This Visit          Nervous and Auditory    Pain of right lower extremity - Primary (Chronic)    Relevant Orders    Uric acid    MR Knee Right w/o Contrast    CBC with Platelets & Differential (Completed)    Basic metabolic panel    Anti Nuclear Lena IgG by IFA with Reflex    Rheumatoid factor    CRP inflammation    Erythrocyte sedimentation rate auto (Completed)    Acute pain of right knee    Relevant Orders    Uric acid    MR Knee Right w/o Contrast    CBC with Platelets & Differential (Completed)    Basic metabolic panel    Anti Nuclear Lena IgG by IFA with Reflex    Rheumatoid factor    CRP inflammation    Erythrocyte sedimentation rate auto (Completed)      Right knee pain and pain in the right lower extremity: Discussed symptoms and concerns at length.  Completed thorough lab workup including CBC, BMP, ESR, CRP, ALBERT, and rheumatoid factor.  Also completed uric acid to rule out acute, concerns.  Order right knee MRI due to the persistent/worsening right knee pain and instability.  Continue physical therapy.    Encouraged to take tylenol for relief up to 4 times per day.  Encouraged rest and elevation.  Encouraged to use ice or heat 15 minutes at a time several times per day to decrease pain. Return to clinic in 1-2 weeks as necessary for persistent pain. Return to clinic with any change or worsening of symptoms.      See Patient Instructions    Return if symptoms worsen or fail to improve.      Susanne Bearden is a 75 year old, presenting for the following health issues:  Musculoskeletal Problem        1/29/2025     9:02 AM   Additional Questions   Roomed by Mirela CHOI CMA     History of Present Illness       Reason for visit:  Leg knee discomfort    She eats 2-3 servings of fruits and vegetables daily.She consumes 2 sweetened beverage(s) daily.She exercises with enough effort to increase her heart rate 10 to 19 minutes per day.  She exercises with enough effort  to increase her heart rate 3 or less days per week.   She is taking medications regularly.     Patient is having right knee pain that has been persistent over the last few months.  Has been to multiple providers including orthopedics.  Having no relief of symptoms.  Swelling of the right knee.  Feels like there is fluid in the knee.  Pain shoots down to the right toes.  Feels like there is pain on the right medial distal knee along with the posterior knee and calf.  They have ruled out blood and Baker's cyst this with the right knee x-ray and ultrasound of the right lower extremity.  Right knee x-ray showed degenerative changes.  Having constant pain.  Foot turns more outwards.  Hard to step on her foot.  Hard time in the shower.  Taking Tylenol 2 tablets every 4-6 hours.  This contains the pain.  Comes addenda little.  Walking and steps are very hard.  Better with elevation, warm heat, and CDS creams.  Feels like the knee is more red at night.  Warm at times.  Father has history of gout.    Review of Systems  Constitutional, HEENT, cardiovascular, pulmonary, gi and gu systems are negative, except as otherwise noted.      Objective    /84   Pulse 67   Temp 97.8  F (36.6  C) (Tympanic)   Wt 76.7 kg (169 lb)   LMP 06/01/1990 (Approximate)   SpO2 99%   BMI 30.66 kg/m    Body mass index is 30.66 kg/m .  Physical Exam  Vitals and nursing note reviewed.   Constitutional:       Appearance: Normal appearance.   HENT:      Head: Normocephalic and atraumatic.   Musculoskeletal:         General: No swelling or signs of injury. Normal range of motion.      Cervical back: Normal range of motion.      Comments: Mild to moderate pain to palpation over the left medial distal knee along with right posterior knee and calf.  Pain in the right knee with extreme flexion extension along with external rotation.  Minimal warmth appreciated right knee.  No bruising or swelling appreciated.   Skin:     General: Skin is warm and  dry.   Neurological:      General: No focal deficit present.      Mental Status: She is alert and oriented to person, place, and time.      Motor: No weakness.      Coordination: Coordination normal.      Gait: Gait normal.      Deep Tendon Reflexes: Reflexes normal.   Psychiatric:         Mood and Affect: Mood normal.         Behavior: Behavior normal.          Results for orders placed or performed in visit on 01/29/25   Erythrocyte sedimentation rate auto     Status: Normal   Result Value Ref Range    Erythrocyte Sedimentation Rate 3 0 - 30 mm/hr   CBC with platelets and differential     Status: None   Result Value Ref Range    WBC Count 5.5 4.0 - 11.0 10e3/uL    RBC Count 4.72 3.80 - 5.20 10e6/uL    Hemoglobin 14.7 11.7 - 15.7 g/dL    Hematocrit 43.1 35.0 - 47.0 %    MCV 91 78 - 100 fL    MCH 31.1 26.5 - 33.0 pg    MCHC 34.1 31.5 - 36.5 g/dL    RDW 12.3 10.0 - 15.0 %    Platelet Count 210 150 - 450 10e3/uL    % Neutrophils 62 %    % Lymphocytes 28 %    % Monocytes 7 %    % Eosinophils 2 %    % Basophils 1 %    % Immature Granulocytes 0 %    NRBCs per 100 WBC 0 <1 /100    Absolute Neutrophils 3.4 1.6 - 8.3 10e3/uL    Absolute Lymphocytes 1.6 0.8 - 5.3 10e3/uL    Absolute Monocytes 0.4 0.0 - 1.3 10e3/uL    Absolute Eosinophils 0.1 0.0 - 0.7 10e3/uL    Absolute Basophils 0.0 0.0 - 0.2 10e3/uL    Absolute Immature Granulocytes 0.0 <=0.4 10e3/uL    Absolute NRBCs 0.0 10e3/uL   CBC with Platelets & Differential     Status: None    Narrative    The following orders were created for panel order CBC with Platelets & Differential.  Procedure                               Abnormality         Status                     ---------                               -----------         ------                     CBC with platelets and d...[[id number]]                      Final result                 Please view results for these tests on the individual orders.           Signed Electronically by: Pricilla Clifford PA-C

## 2025-01-30 LAB
ANA SER QL IF: NEGATIVE
RHEUMATOID FACT SERPL-ACNC: <10 IU/ML

## 2025-02-04 ENCOUNTER — HOSPITAL ENCOUNTER (OUTPATIENT)
Dept: MRI IMAGING | Facility: OTHER | Age: 75
Discharge: HOME | End: 2025-02-04
Attending: PHYSICIAN ASSISTANT
Payer: MEDICARE

## 2025-02-04 DIAGNOSIS — M25.561 ACUTE PAIN OF RIGHT KNEE: ICD-10-CM

## 2025-02-04 DIAGNOSIS — M79.604 PAIN OF RIGHT LOWER EXTREMITY: ICD-10-CM

## 2025-02-04 PROCEDURE — 73721 MRI JNT OF LWR EXTRE W/O DYE: CPT | Mod: RT

## 2025-02-10 ENCOUNTER — THERAPY VISIT (OUTPATIENT)
Dept: PHYSICAL THERAPY | Facility: OTHER | Age: 75
End: 2025-02-10
Attending: NURSE PRACTITIONER
Payer: MEDICARE

## 2025-02-10 DIAGNOSIS — M79.604 PAIN OF RIGHT LOWER EXTREMITY: Primary | Chronic | ICD-10-CM

## 2025-02-10 DIAGNOSIS — M25.561 ACUTE PAIN OF RIGHT KNEE: ICD-10-CM

## 2025-02-10 DIAGNOSIS — R25.2 LEG CRAMP: ICD-10-CM

## 2025-02-11 ENCOUNTER — OFFICE VISIT (OUTPATIENT)
Dept: FAMILY MEDICINE | Facility: OTHER | Age: 75
End: 2025-02-11
Attending: PHYSICIAN ASSISTANT
Payer: MEDICARE

## 2025-02-11 VITALS
HEART RATE: 70 BPM | SYSTOLIC BLOOD PRESSURE: 134 MMHG | DIASTOLIC BLOOD PRESSURE: 79 MMHG | BODY MASS INDEX: 30.66 KG/M2 | OXYGEN SATURATION: 97 % | TEMPERATURE: 97.9 F | WEIGHT: 169 LBS

## 2025-02-11 DIAGNOSIS — M25.561 ACUTE PAIN OF RIGHT KNEE: Primary | ICD-10-CM

## 2025-02-11 PROBLEM — H57.9 ITCHY, WATERY, AND RED EYE: Status: RESOLVED | Noted: 2021-02-09 | Resolved: 2025-02-11

## 2025-02-11 PROBLEM — Z71.85 IMMUNIZATION COUNSELING: Status: RESOLVED | Noted: 2022-05-17 | Resolved: 2025-02-11

## 2025-02-11 PROBLEM — I10 HTN (HYPERTENSION): Chronic | Status: RESOLVED | Noted: 2021-11-17 | Resolved: 2025-02-11

## 2025-02-11 PROBLEM — L60.3 NAIL DYSTROPHY: Chronic | Status: RESOLVED | Noted: 2022-05-05 | Resolved: 2025-02-11

## 2025-02-11 PROBLEM — M79.604 PAIN OF RIGHT LOWER EXTREMITY: Chronic | Status: RESOLVED | Noted: 2019-12-31 | Resolved: 2025-02-11

## 2025-02-11 PROCEDURE — G0463 HOSPITAL OUTPT CLINIC VISIT: HCPCS

## 2025-02-11 ASSESSMENT — PAIN SCALES - GENERAL: PAINLEVEL_OUTOF10: SEVERE PAIN (8)

## 2025-02-11 NOTE — NURSING NOTE
"Chief Complaint   Patient presents with    Follow Up     MRI     Patient here for follow up to discuss her MRI and leg pain.  Initial /79   Pulse 70   Temp 97.9  F (36.6  C) (Tympanic)   Wt 76.7 kg (169 lb)   LMP 06/01/1990 (Approximate)   SpO2 97%   BMI 30.66 kg/m   Estimated body mass index is 30.66 kg/m  as calculated from the following:    Height as of 1/6/25: 1.581 m (5' 2.25\").    Weight as of this encounter: 76.7 kg (169 lb).  Medication Review: complete    The next two questions are to help us understand your food security.  If you are feeling you need any assistance in this area, we have resources available to support you today.          4/15/2024   SDOH- Food Insecurity   Within the past 12 months, did you worry that your food would run out before you got money to buy more? N   Within the past 12 months, did the food you bought just not last and you didn t have money to get more? N         Health Care Directive:  Patient has a Health Care Directive on file      Mirela Fernando MA      "

## 2025-02-11 NOTE — PROGRESS NOTES
Assessment & Plan   Problem List Items Addressed This Visit          Nervous and Auditory    Acute pain of right knee - Primary      Right knee pain:  Send Enoch Romero CNP the images to review.  Encouraged to take ibuprofen for relief up to 4 times per day.  Encouraged rest and elevation.  Encouraged to use ice or heat 15 minutes at a time several times per day to decrease pain. Return to clinic in 1-2 weeks as necessary for persistent pain. Return to clinic with any change or worsening of symptoms.   Continue physical therapy.  Follow-up with orthopedics       See Patient Instructions    The longitudinal plan of care for the diagnosis(es)/condition(s) as documented were addressed during this visit. Due to the added complexity in care, I will continue to support Monisha in the subsequent management and with ongoing continuity of care.    Return if symptoms worsen or fail to improve.      Subjective   Monisha is a 75 year old, presenting for the following health issues:  Follow Up (MRI)    History of Present Illness       Reason for visit:  Leg knee discomfort    She eats 2-3 servings of fruits and vegetables daily.She consumes 2 sweetened beverage(s) daily.She exercises with enough effort to increase her heart rate 10 to 19 minutes per day.  She exercises with enough effort to increase her heart rate 3 or less days per week.   She is taking medications regularly.       Patient has been struggling with right knee pain.  Has been doing physical therapy over the last 3 weeks.  Had a knee MRI.  Tear appreciated.  Having right medial knee pain.  Sore on the outer knee as well.  Shooting pains up and down the leg radiating from the knee.  Currently going to the Enoch Romero CNP.  He needs the images released to be able to evaluate the images of the knee MRI, ultrasound, and x-ray.      JUSTIN Mejia  [facility]  Phone [phone]  Fax [phone]    Review of Systems  Constitutional, HEENT,  cardiovascular, pulmonary, gi and gu systems are negative, except as otherwise noted.      Objective    /79   Pulse 70   Temp 97.9  F (36.6  C) (Tympanic)   Wt 76.7 kg (169 lb)   LMP 06/01/1990 (Approximate)   SpO2 97%   BMI 30.66 kg/m    Body mass index is 30.66 kg/m .  Physical Exam  Vitals and nursing note reviewed.   Constitutional:       Appearance: Normal appearance.   HENT:      Head: Normocephalic and atraumatic.   Musculoskeletal:         General: No swelling or signs of injury. Normal range of motion.      Cervical back: Normal range of motion.      Comments: Right medial knee pain with palpation.  No swelling or bruising appreciated.   Skin:     General: Skin is warm and dry.   Neurological:      General: No focal deficit present.      Mental Status: She is alert and oriented to person, place, and time.      Motor: No weakness.      Coordination: Coordination normal.      Gait: Gait normal.      Deep Tendon Reflexes: Reflexes normal.   Psychiatric:         Mood and Affect: Mood normal.         Behavior: Behavior normal.          No results found for any visits on 02/11/25.        Signed Electronically by: Pricilla Clifford PA-C

## 2025-02-11 NOTE — PATIENT INSTRUCTIONS
Call unit 4 RN nurse or health information management to ensure the images of the knee were sent over.     Knee pain:   Encouraged to take ibuprofen for relief up to 4 times per day.  Encouraged rest and elevation.  Encouraged to use ice or heat 15 minutes at a time several times per day to decrease pain. Return to clinic in 1-2 weeks as necessary for persistent pain. Return to clinic with any change or worsening of symptoms.

## 2025-02-17 ENCOUNTER — LAB (OUTPATIENT)
Dept: LAB | Facility: OTHER | Age: 75
End: 2025-02-17
Payer: MEDICARE

## 2025-02-17 ENCOUNTER — OFFICE VISIT (OUTPATIENT)
Dept: UROLOGY | Facility: OTHER | Age: 75
End: 2025-02-17
Payer: MEDICARE

## 2025-02-17 VITALS
DIASTOLIC BLOOD PRESSURE: 80 MMHG | BODY MASS INDEX: 29.02 KG/M2 | RESPIRATION RATE: 18 BRPM | OXYGEN SATURATION: 97 % | HEART RATE: 68 BPM | HEIGHT: 64 IN | TEMPERATURE: 96.9 F | WEIGHT: 170 LBS | SYSTOLIC BLOOD PRESSURE: 122 MMHG

## 2025-02-17 DIAGNOSIS — R33.9 URINARY RETENTION WITH INCOMPLETE BLADDER EMPTYING: ICD-10-CM

## 2025-02-17 DIAGNOSIS — N32.81 OVERACTIVE BLADDER: Primary | ICD-10-CM

## 2025-02-17 LAB
ALBUMIN UR-MCNC: NEGATIVE MG/DL
APPEARANCE UR: CLEAR
BILIRUB UR QL STRIP: NEGATIVE
COLOR UR AUTO: YELLOW
GLUCOSE UR STRIP-MCNC: NEGATIVE MG/DL
HGB UR QL STRIP: NEGATIVE
KETONES UR STRIP-MCNC: NEGATIVE MG/DL
LEUKOCYTE ESTERASE UR QL STRIP: NEGATIVE
NITRATE UR QL: NEGATIVE
PH UR STRIP: 6.5 [PH] (ref 5–9)
SP GR UR STRIP: <=1.005 (ref 1–1.03)
UROBILINOGEN UR STRIP-MCNC: NORMAL MG/DL

## 2025-02-17 PROCEDURE — G0463 HOSPITAL OUTPT CLINIC VISIT: HCPCS | Mod: 25

## 2025-02-17 PROCEDURE — 81003 URINALYSIS AUTO W/O SCOPE: CPT | Mod: ZL

## 2025-02-17 PROCEDURE — 51798 US URINE CAPACITY MEASURE: CPT

## 2025-02-17 PROCEDURE — 99215 OFFICE O/P EST HI 40 MIN: CPT

## 2025-02-17 RX ORDER — ESTRADIOL 0.1 MG/G
2 CREAM VAGINAL SEE ADMIN INSTRUCTIONS
Qty: 45 G | Refills: 4 | Status: SHIPPED | OUTPATIENT
Start: 2025-02-17 | End: 2026-02-17

## 2025-02-17 RX ORDER — MIRABEGRON 50 MG/1
50 TABLET, FILM COATED, EXTENDED RELEASE ORAL DAILY
Qty: 90 TABLET | Refills: 3 | Status: SHIPPED | OUTPATIENT
Start: 2025-02-17 | End: 2026-02-17

## 2025-02-17 ASSESSMENT — PAIN SCALES - GENERAL: PAINLEVEL_OUTOF10: NO PAIN (0)

## 2025-02-17 NOTE — NURSING NOTE
"Chief Complaint   Patient presents with    Follow Up     6 wk f/unit(s) incontinence       Initial /80   Pulse 68   Temp 96.9  F (36.1  C) (Temporal)   Resp 18   Ht 1.613 m (5' 3.5\")   Wt 77.1 kg (170 lb)   LMP 06/01/1990 (Approximate)   SpO2 97%   BMI 29.64 kg/m   Estimated body mass index is 29.64 kg/m  as calculated from the following:    Height as of this encounter: 1.613 m (5' 3.5\").    Weight as of this encounter: 77.1 kg (170 lb).  Medication Review: complete    PVR->218    Health Care Directive:  Patient has a Health Care Directive on file      Bridget Schofield LPN      "

## 2025-02-17 NOTE — PATIENT INSTRUCTIONS
Drink plenty of fluids, > 2 liters/day  Avoid constipation.  Consider Miralax Over the counter, metamucil or Citrucel with increased fiber in your diet  Avoid bladder irritants such as caffeine, alcohol, and spicy foods.   Cranberry tabs twice a day:  Must contain 36 mg of PAC or proanthocyanidins  Probiotics:  Yogurts that contain good bacteria.  Kefir 1% milkfat (plain), Cultured Sachance, Kimchi or Culturelle or Align from your local pharmacy.   Continue with estrogen 0.1 mg/km around urethra and just inside the vaginal opening two times per week.  Clobetasol 0.05% is managed by women's health but these instructions are two times weekly to labia and external vaginal area.  Increase mirabegron to 50 mg daily. Continue to monitor blood pressure at least weekly. Contact our office at , if you have two readings over 140/90.  Follow up in six weeks.

## 2025-02-17 NOTE — PROGRESS NOTES
Chief Complaint: Follow Up (6 wk f/unit(s) incontinence)      HPI: Ms. Monisha Velez is a 75 year old year old female presenting today February 17, 2025 in follow up of urinary incontinence/overactive bladder.    She was previously seen by me on 1/6/2025. At that visit it was recommended that reduce bladder irritants, implement bowel regimen, and initiate Myrbetriq 25 mg daily.  I recommended that she check her blood pressure at least weekly and contact her office should she have 2 readings greater than 140/90.     Today patient endorses improvement of symptoms. She has been able to adhere to the recommended treatment.  Patient reports that she has reduced her caffeine intake to 2 cups of coffee per day.  She has been focusing on drinking more hot water, or caffeine free tea she is down to 2 beers per week and 1 diet Pepsi per week.  She is no longer wearing panty liners or pads as her symptoms of leakage have improved approximately 50%.  She is getting up approximately 2 times per night and only leaking 2-3 times per week her leakage is urgency see in nature.  She has attended pelvic floor physical therapy and thought this was helpful as she learned some home exercises to assist with her incontinence.    Past Medical History:   Diagnosis Date    Adnexal mass 08/22/2011    Contact dermatitis 12/16/2009    Diplopia     Episode of mild diplopia secondary to sinusitis, ophthalmology consult 07/2008.    H/O gastritis     History of adenomatous polyp of colon     Postmenopausal atrophic vaginitis 07/29/2011    Restless leg syndrome     Retention of urine 08/22/2011    Rosacea     Tinea corporis 11/19/2010    Urge incontinence of urine        Past Surgical History:   Procedure Laterality Date    COLONOSCOPY  03/18/2016    Tubular adenoma, Next in 5 years    COLONOSCOPY  04/12/2021    F/U N/A normal    COLONOSCOPY N/A 4/12/2021    Procedure: COLONOSCOPY;  Surgeon: Roberto Anderson MD;  Location:  OR    CYSTOSCOPY  01/2013     [facility]    DENTAL SURGERY  2005    Root canal,  Dr. Olmos.    ENDOSCOPIC SINUS SURGERY  1993    ESOPHAGOSCOPY, GASTROSCOPY, DUODENOSCOPY (EGD), COMBINED  03/18/2016    HYSTERECTOMY VAGINAL  1990    Endometriosis    LAPAROSCOPIC CYSTECTOMY OVARIAN (BENIGN)  09/27/2011    cystectomy with lysis of adhesions and possible oophrectomy  by Dr. Gay Mcgovern  at [facility]    ROTATOR CUFF REPAIR RT/LT Right 12/28/2017    Dr John    SALPINGO-OOPHORECTOMY BILATERAL  10/2007    bilateral ovarian serous cystadenofibromas, benign.       Current Outpatient Medications   Medication Sig Dispense Refill    albuterol (PROAIR HFA/PROVENTIL HFA/VENTOLIN HFA) 108 (90 Base) MCG/ACT inhaler Inhale 2 puffs into the lungs every 4 hours as needed for cough, wheezing or shortness of breath 18 g 2    aspirin 81 MG EC tablet Take 1 tablet (81 mg) by mouth daily with food      atorvastatin (LIPITOR) 10 MG tablet TAKE 1/2 TABLET BY MOUTH FOUR TIMES PER WEEK 90 tablet 4    benzonatate (TESSALON) 100 MG capsule Take 1 capsule (100 mg) by mouth 3 times daily as needed for cough. 42 capsule 1    cetirizine (ZYRTEC) 10 MG tablet Take 1 tablet (10 mg) by mouth daily 90 tablet 4    clobetasol propionate (TEMOVATE) 0.05 % external cream Apply topically at bedtime. Apply to external vaginal area/labia daily at bedtime for next 30 days, then decrease frequency to twice a week.  This is something he will continue to stay on for management of lichen sclerosis 45 g 2    Cranberry 250 MG CAPS Take 250 mg by mouth daily. 90 capsule 3    D-Mannose 500 MG CAPS Take 1,000 mg by mouth daily. 90 capsule 3    fluticasone (FLONASE) 50 MCG/ACT nasal spray Spray 1 spray into both nostrils daily 48 g 4    gabapentin (NEURONTIN) 100 MG capsule Take 1 capsule (100 mg) by mouth daily 90 capsule 4    melatonin 5 MG tablet Take 10 mg by mouth nightly as needed for sleep      mirabegron (MYRBETRIQ) 25 MG 24 hr tablet Take 1 tablet (25 mg) by  "mouth daily. 30 tablet 11    Multiple Vitamins-Minerals (PRESERVISION AREDS) CAPS Take 2 capsules by mouth daily      polyethylene glycol (MIRALAX) 17 GM/Dose powder Take 1 Capful by mouth.      triamcinolone (ARISTOCORT HP) 0.5 % external cream Apply topically 2 times daily To lesion on thigh 15 g 1    valACYclovir (VALTREX) 1000 mg tablet Take 1 tablet (1,000 mg) by mouth 3 times daily as needed (Cold Sores) 90 tablet 1    estradiol (ESTRACE) 0.1 MG/GM vaginal cream Place 2 g vaginally daily. 60 g 2       ALLERGIES: Penicillins, Sulfa antibiotics, Nsaids, Codeine sulfate, Hydroxyzine, Morphine sulfate, Venlafaxine, and Zolpidem tartrate     GENERAL PHYSICAL EXAM:   Vitals: /80   Pulse 68   Temp 96.9  F (36.1  C) (Temporal)   Resp 18   Ht 1.613 m (5' 3.5\")   Wt 77.1 kg (170 lb)   LMP 06/01/1990 (Approximate)   SpO2 97%   BMI 29.64 kg/m    Body mass index is 29.64 kg/m .    GENERAL: Well groomed, well developed, well nourished female in NAD.  ENT:  ENT exam normal  CV:  Warm Extremities   RESPIRATORY:  Normal respiratory effort   GI:  Soft, ND, NT  MS: Moving all four  NEURO: Alert and oriented x 3.  PSYCH: Normal mood and affect, pleasant and agreeable during interview and exam.    : Deferred.      PVR: Residual urine by ultrasound was 29 ml.           RADIOLOGY: The following tests were reviewed: None.    LABS: The last test results for Ms. Monisha Velez were reviewed.   Results for orders placed or performed in visit on 02/17/25 (from the past 24 hours)   Urinalysis Macroscopic   Result Value Ref Range    Color Urine Yellow Colorless, Straw, Light Yellow, Yellow    Appearance Urine Clear Clear    Glucose Urine Negative Negative mg/dL    Bilirubin Urine Negative Negative    Ketones Urine Negative Negative mg/dL    Specific Gravity Urine <=1.005 1.005 - 1.030    Blood Urine Negative Negative    pH Urine 6.5 5.0 - 9.0    Protein Albumin Urine Negative Negative mg/dL    Urobilinogen Urine Normal 0.2, " 1.0, Normal mg/dL    Nitrite Urine Negative Negative    Leukocyte Esterase Urine Negative Negative       BMP -   Recent Labs   Lab Test 01/29/25  0942 01/02/25  1251 12/17/24  1250 10/04/24  1341 03/13/24  1030 02/09/21  1058 01/11/21  1006    141 142   < > 141   < > 140   POTASSIUM 4.5 4.6 4.5   < > 4.4   < > 4.4   CHLORIDE 105 105 108*   < > 106   < > 106   CO2 26 25 28   < > 26   < > 29   BUN 19.9 17.8 17.7   < > 17.8   < > 14   CR 1.12* 1.15* 0.98*   < > 0.99*   < > 1.04   * 99 102*   < > 102*   < > 111*   DENISSE 9.8 9.6 9.8   < > 9.7   < > 9.2   MAG  --   --  2.1  --  1.9  --   --    PHOS  --   --   --   --   --   --  3.4    < > = values in this interval not displayed.       CBC -   Recent Labs   Lab Test 01/29/25  0942 01/02/25  1251 11/19/24  1400   WBC 5.5 6.8 5.3   HGB 14.7 14.4 14.5    188 219       ASSESSMENT:   OAB/urinary incontinence  Urinary retention      PLAN:   1. Urinary incontinence  See number 2.    2. Overactive bladder (Primary)  Patient endorses frequent urination that has improved with Myrbetriq 25 mg.  Her frequency of leakage has significantly reduced.  Her blood pressure has been stable and her very mild urinary retention has had some improvement since last visit.  She has had no UTIs or signs and symptoms of them.  We discussed increasing her dose of Myrbetriq to see if she would have additional benefit, we addressed the increased risk for retention and potential to elevate her BP.  Patient would like to proceed with increasing dose to 50 mg.  We will reassess her retention and symptom improvement at next visit in 8 weeks.  I recommended that if she did not see symptom improvement or noted to blood pressures greater than 140/90 she should contact our office for evaluation.  - mirabegron (MYRBETRIQ) 50 MG 24 hr tablet; Take 1 tablet (50 mg) by mouth daily. Monitor blood pressure weekly.  Dispense: 90 tablet; Refill: 3  - MEASUREMENT, POST-VOIDING RESIDUAL URINE &/OR  BLADDER CAPACITY, US, NON-IMAGING  - estradiol (ESTRACE) 0.1 MG/GM vaginal cream; Place 2 g vaginally See Admin Instructions. Apply a nickel sized amount twice a week.  Do not use the applicator for the application.  Dispense: 45 g; Refill: 4    3. Urinary retention with incomplete bladder emptying  Improvement to retention since last visit, I suspect that some of her ongoing retention is related to her very mild cystocele which she continues pelvic floor physical therapy for.  She has done an exceptional job reducing her caffeine and alcohol intake which she is noticing is improving her symptoms significantly.  - MEASUREMENT, POST-VOIDING RESIDUAL URINE &/OR BLADDER CAPACITY, US, NON-IMAGING      53 minutes spent on the date of this encounter doing chart review, history and exam, documentation and further activities as noted above.        JERRELL Gilman [facility]

## 2025-02-24 ENCOUNTER — THERAPY VISIT (OUTPATIENT)
Dept: PHYSICAL THERAPY | Facility: OTHER | Age: 75
End: 2025-02-24
Attending: NURSE PRACTITIONER
Payer: MEDICARE

## 2025-02-24 DIAGNOSIS — M25.561 ACUTE PAIN OF RIGHT KNEE: Primary | ICD-10-CM

## 2025-02-24 DIAGNOSIS — R25.2 LEG CRAMP: ICD-10-CM

## 2025-03-13 ENCOUNTER — OFFICE VISIT (OUTPATIENT)
Dept: INTERNAL MEDICINE | Facility: OTHER | Age: 75
End: 2025-03-13
Attending: INTERNAL MEDICINE
Payer: MEDICARE

## 2025-03-13 VITALS
WEIGHT: 163 LBS | RESPIRATION RATE: 14 BRPM | OXYGEN SATURATION: 96 % | DIASTOLIC BLOOD PRESSURE: 82 MMHG | HEIGHT: 63 IN | SYSTOLIC BLOOD PRESSURE: 138 MMHG | BODY MASS INDEX: 28.88 KG/M2 | HEART RATE: 78 BPM

## 2025-03-13 DIAGNOSIS — N32.81 OVERACTIVE BLADDER: ICD-10-CM

## 2025-03-13 DIAGNOSIS — R73.9 ELEVATED BLOOD SUGAR: ICD-10-CM

## 2025-03-13 DIAGNOSIS — R09.82 PND (POST-NASAL DRIP): ICD-10-CM

## 2025-03-13 DIAGNOSIS — E78.2 MIXED HYPERLIPIDEMIA: ICD-10-CM

## 2025-03-13 DIAGNOSIS — J44.9 CHRONIC OBSTRUCTIVE PULMONARY DISEASE, UNSPECIFIED COPD TYPE (H): ICD-10-CM

## 2025-03-13 DIAGNOSIS — N18.31 STAGE 3A CHRONIC KIDNEY DISEASE (H): ICD-10-CM

## 2025-03-13 DIAGNOSIS — Z00.00 ENCOUNTER FOR MEDICARE ANNUAL WELLNESS EXAM: Primary | ICD-10-CM

## 2025-03-13 DIAGNOSIS — G25.81 RESTLESS LEGS SYNDROME: ICD-10-CM

## 2025-03-13 DIAGNOSIS — J98.9 REACTIVE AIRWAY DISEASE WITHOUT ASTHMA: Chronic | ICD-10-CM

## 2025-03-13 DIAGNOSIS — R73.03 PREDIABETES: ICD-10-CM

## 2025-03-13 LAB
ALBUMIN SERPL BCG-MCNC: 4.3 G/DL (ref 3.5–5.2)
ANION GAP SERPL CALCULATED.3IONS-SCNC: 10 MMOL/L (ref 7–15)
BUN SERPL-MCNC: 13.1 MG/DL (ref 8–23)
CALCIUM SERPL-MCNC: 9.6 MG/DL (ref 8.8–10.4)
CHLORIDE SERPL-SCNC: 108 MMOL/L (ref 98–107)
CHOLEST SERPL-MCNC: 215 MG/DL
CREAT SERPL-MCNC: 0.94 MG/DL (ref 0.51–0.95)
CREAT UR-MCNC: 160.8 MG/DL
EGFRCR SERPLBLD CKD-EPI 2021: 63 ML/MIN/1.73M2
EST. AVERAGE GLUCOSE BLD GHB EST-MCNC: 126 MG/DL
FASTING STATUS PATIENT QL REPORTED: YES
GLUCOSE SERPL-MCNC: 106 MG/DL (ref 70–99)
HBA1C MFR BLD: 6 %
HCO3 SERPL-SCNC: 25 MMOL/L (ref 22–29)
HDLC SERPL-MCNC: 78 MG/DL
LDLC SERPL CALC-MCNC: 117 MG/DL
MICROALBUMIN UR-MCNC: <12 MG/L
MICROALBUMIN/CREAT UR: NORMAL MG/G{CREAT}
NONHDLC SERPL-MCNC: 137 MG/DL
PHOSPHATE SERPL-MCNC: 3.4 MG/DL (ref 2.5–4.5)
POTASSIUM SERPL-SCNC: 4.2 MMOL/L (ref 3.4–5.3)
SODIUM SERPL-SCNC: 143 MMOL/L (ref 135–145)
TRIGL SERPL-MCNC: 99 MG/DL

## 2025-03-13 PROCEDURE — 82043 UR ALBUMIN QUANTITATIVE: CPT | Mod: ZL | Performed by: INTERNAL MEDICINE

## 2025-03-13 PROCEDURE — 80061 LIPID PANEL: CPT | Mod: ZL | Performed by: INTERNAL MEDICINE

## 2025-03-13 PROCEDURE — 82570 ASSAY OF URINE CREATININE: CPT | Mod: ZL | Performed by: INTERNAL MEDICINE

## 2025-03-13 PROCEDURE — 83036 HEMOGLOBIN GLYCOSYLATED A1C: CPT | Mod: ZL | Performed by: INTERNAL MEDICINE

## 2025-03-13 PROCEDURE — G0463 HOSPITAL OUTPT CLINIC VISIT: HCPCS

## 2025-03-13 PROCEDURE — 36415 COLL VENOUS BLD VENIPUNCTURE: CPT | Mod: ZL | Performed by: INTERNAL MEDICINE

## 2025-03-13 PROCEDURE — 80069 RENAL FUNCTION PANEL: CPT | Mod: ZL | Performed by: INTERNAL MEDICINE

## 2025-03-13 RX ORDER — MIRABEGRON 25 MG/1
25 TABLET, FILM COATED, EXTENDED RELEASE ORAL DAILY
COMMUNITY
Start: 2025-03-13

## 2025-03-13 RX ORDER — ATORVASTATIN CALCIUM 10 MG/1
5 TABLET, FILM COATED ORAL
Qty: 90 TABLET | Refills: 4 | Status: SHIPPED | OUTPATIENT
Start: 2025-03-13

## 2025-03-13 RX ORDER — FLUTICASONE PROPIONATE 50 MCG
1 SPRAY, SUSPENSION (ML) NASAL DAILY
COMMUNITY
Start: 2025-03-13

## 2025-03-13 RX ORDER — GABAPENTIN 100 MG/1
200 CAPSULE ORAL AT BEDTIME
Qty: 180 CAPSULE | Refills: 2 | Status: SHIPPED | OUTPATIENT
Start: 2025-03-13

## 2025-03-13 RX ORDER — ALBUTEROL SULFATE 90 UG/1
2 INHALANT RESPIRATORY (INHALATION) EVERY 4 HOURS PRN
Qty: 18 G | Refills: 2 | Status: SHIPPED | OUTPATIENT
Start: 2025-03-13

## 2025-03-13 RX ORDER — CETIRIZINE HYDROCHLORIDE 10 MG/1
10 TABLET ORAL DAILY
Qty: 90 TABLET | Refills: 4 | Status: SHIPPED | OUTPATIENT
Start: 2025-03-13

## 2025-03-13 SDOH — HEALTH STABILITY: PHYSICAL HEALTH: ON AVERAGE, HOW MANY DAYS PER WEEK DO YOU ENGAGE IN MODERATE TO STRENUOUS EXERCISE (LIKE A BRISK WALK)?: 2 DAYS

## 2025-03-13 ASSESSMENT — PAIN SCALES - GENERAL: PAINLEVEL_OUTOF10: MILD PAIN (3)

## 2025-03-13 ASSESSMENT — SOCIAL DETERMINANTS OF HEALTH (SDOH): HOW OFTEN DO YOU GET TOGETHER WITH FRIENDS OR RELATIVES?: THREE TIMES A WEEK

## 2025-03-13 NOTE — NURSING NOTE
"Chief Complaint   Patient presents with    Medicare Annual Exam       Initial /82   Pulse 78   Resp 14   Ht 1.6 m (5' 3\")   Wt 73.9 kg (163 lb)   LMP 06/01/1990 (Approximate)   SpO2 96%   Breastfeeding No   BMI 28.87 kg/m   Estimated body mass index is 28.87 kg/m  as calculated from the following:    Height as of this encounter: 1.6 m (5' 3\").    Weight as of this encounter: 73.9 kg (163 lb).  Medication Review: complete    The next two questions are to help us understand your food security.  If you are feeling you need any assistance in this area, we have resources available to support you today.          3/13/2025   SDOH- Food Insecurity   Within the past 12 months, did you worry that your food would run out before you got money to buy more? N   Within the past 12 months, did the food you bought just not last and you didn t have money to get more? N         Health Care Directive:  Patient has a Health Care Directive on file      Francesca Gutierrez LPN      "

## 2025-03-13 NOTE — PROGRESS NOTES
Preventive Care Visit  [facility]  Raeann Pearson DO, Internal Medicine  Mar 13, 2025  {Provider  Link to [facility] :723459}    {PROVIDER CHARTING PREFERENCE:124046}    Susanne Bearden is a 75 year old, presenting for the following:  Medicare Annual Exam    {(!) Visit Details have not yet been documented.  Please enter Visit Details and then use this list to pull in documentation. (Optional):815930}       History of Present Illness       Reason for visit:  UrineShe exercises with enough effort to increase her heart rate 20 to 29 minutes per day.  She exercises with enough effort to increase her heart rate 3 or less days per week.   She is taking medications regularly.    ***   {MA/LPN/RN Pre-Provider Visit Orders- hCG/UA/Strep (Optional):761426}  {SUPERLIST (Optional):614611}  {additonal problems for provider to add (Optional):936352}  Advance Care Planning  Patient has a Health Care Directive on file  {(AWV REQUIRED) Advance Care Planning Reviewed:806182}      3/13/2025   General Health   How would you rate your overall physical health? Good   Feel stress (tense, anxious, or unable to sleep) To some extent   (!) STRESS CONCERN      3/13/2025   Nutrition   Diet: Regular (no restrictions)         3/13/2025   Exercise   Days per week of moderate/strenous exercise 2 days   (!) EXERCISE CONCERN      3/13/2025   Social Factors   Frequency of gathering with friends or relatives Three times a week   Worry food won't last until get money to buy more No   Food not last or not have enough money for food? No   Do you have housing? (Housing is defined as stable permanent housing and does not include staying ouside in a car, in a tent, in an abandoned building, in an overnight shelter, or couch-surfing.) No   Are you worried about losing your housing? No   Lack of transportation? No   Unable to get utilities (heat,electricity)? No   Want help with housing or utility concern? No   (!) HOUSING CONCERN  PRESENT      3/13/2025   Fall Risk   Fallen 2 or more times in the past year? No     No   Trouble with walking or balance? No     No       Proxy-reported    Multiple values from one day are sorted in reverse-chronological order          3/13/2025   Activities of Daily Living- Home Safety   Needs help with the following daily activites None of the above   Safety concerns in the home None of the above         3/13/2025   Dental   Dentist two times every year? Yes         3/13/2025   Hearing Screening   Hearing concerns? None of the above         3/13/2025   Driving Risk Screening   Patient/family members have concerns about driving No         3/13/2025   General Alertness/Fatigue Screening   Have you been more tired than usual lately? (!) YES         3/13/2025   Urinary Incontinence Screening   Bothered by leaking urine in past 6 months Yes           Today's PHQ-2 Score:       3/13/2025     9:04 AM   PHQ-2 (  [employer])   Q1: Little interest or pleasure in doing things 0   Q2: Feeling down, depressed or hopeless 0   PHQ-2 Score 0    Q1: Little interest or pleasure in doing things Not at all   Q2: Feeling down, depressed or hopeless Not at all   PHQ-2 Score 0       Patient-reported           3/13/2025   Substance Use   Alcohol more than 3/day or more than 7/wk No   Do you have a current opioid prescription? No   How severe/bad is pain from 1 to 10? 2/10   Do you use any other substances recreationally? No     Social History     Tobacco Use    Smoking status: Former     Current packs/day: 0.00     Average packs/day: 0.5 packs/day for 7.6 years (3.8 ttl pk-yrs)     Types: Cigarettes     Start date: 1965     Quit date: 1972     Years since quittin.6    Smokeless tobacco: Never   Vaping Use    Vaping status: Never Used   Substance Use Topics    Alcohol use: Yes     Comment: has slowed down a lot since huband's injury; aiming for occassional    Drug use: No     {Provider  If there are gaps in the social  history shown above, please follow the link to update and then refresh the note Link to Social and Substance History :409150}      2/27/2024   LAST FHS-7 RESULTS   1st degree relative breast or ovarian cancer No   Any relative bilateral breast cancer No   Any male have breast cancer No   Any ONE woman have BOTH breast AND ovarian cancer No   Any woman with breast cancer before 50yrs No   2 or more relatives with breast AND/OR ovarian cancer No   2 or more relatives with breast AND/OR bowel cancer No     {If any of the questions to the FHS7 are answered yes, consider referral for genetic counseling.    Additional indications for genetic referral include personal history of breast or ovarian cancer, genetic mutation in 1st degree relative which increases risk of breast cancer including BRCA1, BRCA2, MICHELLE, PALB 2, TP53, CHEK2, PTEN, CDH1, STK11 (per ACS) and/or 1st degree relative with history of pancreatic or high-risk prostate cancer (per NCCN):506710}   {Mammogram Decision Support (Optional):610167}    ASCVD Risk   The 10-year ASCVD risk score (Michelle EDDY, et al., 2019) is: 20.8%    Values used to calculate the score:      Age: 75 years      Sex: Female      Is Non- : No      Diabetic: No      Tobacco smoker: No      Systolic Blood Pressure: 138 mmHg      Is BP treated: No      HDL Cholesterol: 77 mg/dL      Total Cholesterol: 234 mg/dL    {Link to Fracture Risk Assessment Tool (Optional):155737}    {Provider  REQUIRED FOR AWV Use the storyboard to review patient history, after sections have been marked as reviewed, refresh note to capture documentation:911481}  {Provider   REQUIRED AWV use this link to review and update sexual activity history  after section has been marked as reviewed, refresh note to capture documentation:772117}  Reviewed and updated as needed this visit by Provider   Tobacco  Allergies  Meds  Problems  Med Hx  Surg Hx  Fam Hx  Soc   Hx Sexual Activity           {HISTORY OPTIONS (Optional):286817}  Current providers sharing in care for this patient include:  Patient Care Team:  Raeann Pearson DO as PCP - General (Internal Medicine)  Raeann Pearson DO as Assigned PCP  Jm Parra APRN CNP as Assigned Surgical Provider    The following health maintenance items are reviewed in Epic and correct as of today:  Health Maintenance   Topic Date Due    SPIROMETRY  Never done    COPD ACTION PLAN  Never done    COVID-19 Vaccine (5 - 2024-25 season) 09/01/2024    MAMMO SCREENING  02/27/2025    LIPID  03/13/2025    MICROALBUMIN  03/13/2025    BMP  01/29/2026    HEMOGLOBIN  01/29/2026    MEDICARE ANNUAL WELLNESS VISIT  03/13/2026    FALL RISK ASSESSMENT  03/13/2026    GLUCOSE  01/29/2028    ADVANCE CARE PLANNING  03/21/2029    DTAP/TDAP/TD IMMUNIZATION (3 - Td or Tdap) 07/14/2031    DEXA  03/04/2034    PHQ-2 (once per calendar year)  Completed    INFLUENZA VACCINE  Completed    Pneumococcal Vaccine: 50+ Years  Completed    URINALYSIS  Completed    ZOSTER IMMUNIZATION  Completed    RSV VACCINE  Completed    HEPATITIS C SCREENING  Addressed    HPV IMMUNIZATION  Aged Out    MENINGITIS IMMUNIZATION  Aged Out    COLORECTAL CANCER SCREENING  Discontinued     ROS:  CONSTITUTIONAL: Negative for fever, chills, night sweats, significant change in weight; she is working on weight loss  INTEGUMENTARY/SKIN/LYMPH: Negative for worrisome rashes, moles or lesions; swollen lymph nodes - sees Derm  EYES: Negative for significant vision changes or irritation  ENT: Negative for additional ear, mouth and throat problems  RESP: Negative for significant cough or SOB  CV: Negative for chest pain, palpitations or increased peripheral edema  GI: Negative for abdominal pain, or change in bowel habits or blood in the stools/black stools  : Negative for unusual urinary or vaginal symptoms. No vaginal bleeding.  MUSCULOSKELETAL: right knee pain - better with PT  PSYCHIATRIC: Negative for changes in mood  "or affect; significant anxiety or depression       Objective    Exam  /82   Pulse 78   Resp 14   Ht 1.6 m (5' 3\")   Wt 73.9 kg (163 lb)   LMP 06/01/1990 (Approximate)   SpO2 96%   Breastfeeding No   BMI 28.87 kg/m     Estimated body mass index is 28.87 kg/m  as calculated from the following:    Height as of this encounter: 1.6 m (5' 3\").    Weight as of this encounter: 73.9 kg (163 lb).    Physical Exam  GEN: Vitals reviewed. Healthy appearing. Patient is in no acute distress. Cooperative with exam.  HEENT: Normocephalic atraumatic.  Eyes grossly normal to inspection.  No discharge or erythema, or obvious scleral/conjunctival abnormalities.   NECK: Supple; no thyromegaly or masses noted.  No cervical or supraclavicular lymphadenopathy.  CV: Heart regular in rate and rhythm with no murmur.    LUNGS: No audible wheeze, cough, or visible cyanosis.  No visible retractions or increased work of breathing.  Lungs clear to auscultation bilaterally.    ABD:  Nondistended  SKIN: Warm and dry to touch.  Visible skin clear. No significant rash, abnormal pigmentation or lesions.  EXT: No clubbing or cyanosis.  No peripheral edema.  NEURO: Alert and oriented to person, place, and time.  Cranial nerves II-XII grossly intact with no focal or lateralizing deficits.  Muscle tone normal.  Gait normal. No tremor.   MSK: ROM of upper and lower ext symmetric and full.  PSYCH: Mood is good.  Mentation appears normal, affect normal/bright, judgement and insight intact, normal speech and appearance well-groomed.          3/13/2025   Mini Cog   Clock Draw Score 2 Normal   3 Item Recall 3 objects recalled   Mini Cog Total Score 5     {A Mini-Cog total score of 0-2 suggests the possibility of dementia, score of 3-5 suggests no dementia:557381}    Vision Screen     {Provider  Link to Vision and Hearing Results :739001}    The longitudinal plan of care for the diagnosis(es)/condition(s) as documented were addressed during this " visit. Due to the added complexity in care, I will continue to support Monisha in the subsequent management and with ongoing continuity of care.    Signed Electronically by: Raeann Pearson DO  {Email feedback regarding this note to [email or web address]   :987247}   "or depression       Objective    Exam  /82   Pulse 78   Resp 14   Ht 1.6 m (5' 3\")   Wt 73.9 kg (163 lb)   LMP 06/01/1990 (Approximate)   SpO2 96%   Breastfeeding No   BMI 28.87 kg/m     Estimated body mass index is 28.87 kg/m  as calculated from the following:    Height as of this encounter: 1.6 m (5' 3\").    Weight as of this encounter: 73.9 kg (163 lb).    Physical Exam  GEN: Vitals reviewed. Healthy appearing. Patient is in no acute distress. Cooperative with exam.  HEENT: Normocephalic atraumatic.  Eyes grossly normal to inspection.  No discharge or erythema, or obvious scleral/conjunctival abnormalities.   NECK: Supple; no thyromegaly or masses noted.  No cervical or supraclavicular lymphadenopathy.  CV: Heart regular in rate and rhythm with no murmur.    LUNGS: No audible wheeze, cough, or visible cyanosis.  No visible retractions or increased work of breathing.  Lungs clear to auscultation bilaterally.    ABD:  Nondistended  SKIN: Warm and dry to touch.  Visible skin clear. No significant rash, abnormal pigmentation or lesions.  EXT: No clubbing or cyanosis.  No peripheral edema.  NEURO: Alert and oriented to person, place, and time.  Cranial nerves II-XII grossly intact with no focal or lateralizing deficits.  Muscle tone normal.  Gait normal. No tremor.   MSK: ROM of upper and lower ext symmetric and full.  PSYCH: Mood is good.  Mentation appears normal, affect normal/bright, judgement and insight intact, normal speech and appearance well-groomed.          3/13/2025   Mini Cog   Clock Draw Score 2 Normal   3 Item Recall 3 objects recalled   Mini Cog Total Score 5           The longitudinal plan of care for the diagnosis(es)/condition(s) as documented were addressed during this visit. Due to the added complexity in care, I will continue to support Monisha in the subsequent management and with ongoing continuity of care.    Signed Electronically by: Raeann Pearson,     "

## 2025-03-13 NOTE — PATIENT INSTRUCTIONS
Decrease Mirabegron to the 25 mg dose daily as we discussed. Watch for worsening urinary symptoms    Increase Gabapentin to 200 mg (2 of your current capsules). Take this for 2-3, if this doesn't help the restless legs, ok to increase to 300 mg. Take this for 2-3 weeks and if this doesn't help we may have to find a different medications.  Keep me updated on the dose as you go via phone or [employer].    Ok to take Zyrtec earlier in the day    Caution with NSAIDS  (ibuprofen, aspirin, naproxen, aleve, advil) due to risk for increased blood pressure, stomach pain/nausea/ulcers and kidney damage; use minimal amount necessary    Ok to take Tylenol 1000mg (2- extra strength 500mg tablets or 3 regular strength 325mg tablets) three times a day; Maximum of 4000mg daily

## 2025-03-19 ENCOUNTER — THERAPY VISIT (OUTPATIENT)
Dept: PHYSICAL THERAPY | Facility: OTHER | Age: 75
End: 2025-03-19
Attending: NURSE PRACTITIONER
Payer: MEDICARE

## 2025-03-19 DIAGNOSIS — R25.2 LEG CRAMP: ICD-10-CM

## 2025-03-19 DIAGNOSIS — M25.561 ACUTE PAIN OF RIGHT KNEE: Primary | ICD-10-CM

## 2025-03-24 DIAGNOSIS — R33.9 URINARY RETENTION WITH INCOMPLETE BLADDER EMPTYING: Primary | ICD-10-CM

## 2025-03-26 ENCOUNTER — TELEPHONE (OUTPATIENT)
Dept: UROLOGY | Facility: OTHER | Age: 75
End: 2025-03-26
Payer: MEDICARE

## 2025-03-26 NOTE — TELEPHONE ENCOUNTER
Patient called and left a message stating when she was in last her mirabegron was increased from 25 to 50 mg. Ever since, her blood pressure has been skyrocketing.She is having headaches and not feeling well.  She would like to decrease the mirabegron.    Please contact patient.    Emely Vargas on 3/26/2025 at 10:52 AM

## 2025-03-26 NOTE — TELEPHONE ENCOUNTER
I contacted the patient and she has already discussed with her PCP at visit on 3/13/25 and Dr. Pearson reduced dose to 25 mg. I recommended that she continue to monitor her blood pressure 3 times weekly until she see me in early April and we will address whether the medication needs to be held.

## 2025-03-27 ENCOUNTER — THERAPY VISIT (OUTPATIENT)
Dept: PHYSICAL THERAPY | Facility: OTHER | Age: 75
End: 2025-03-27
Attending: NURSE PRACTITIONER
Payer: MEDICARE

## 2025-03-27 DIAGNOSIS — M25.561 ACUTE PAIN OF RIGHT KNEE: Primary | ICD-10-CM

## 2025-03-27 DIAGNOSIS — R25.2 LEG CRAMP: ICD-10-CM

## 2025-03-28 ENCOUNTER — HOSPITAL ENCOUNTER (OUTPATIENT)
Dept: MAMMOGRAPHY | Facility: OTHER | Age: 75
Discharge: HOME | End: 2025-03-28
Attending: INTERNAL MEDICINE | Admitting: INTERNAL MEDICINE
Payer: MEDICARE

## 2025-03-28 DIAGNOSIS — Z12.31 VISIT FOR SCREENING MAMMOGRAM: ICD-10-CM

## 2025-03-28 PROCEDURE — 77067 SCR MAMMO BI INCL CAD: CPT

## 2025-03-28 PROCEDURE — 77063 BREAST TOMOSYNTHESIS BI: CPT

## 2025-04-01 ENCOUNTER — THERAPY VISIT (OUTPATIENT)
Dept: PHYSICAL THERAPY | Facility: OTHER | Age: 75
End: 2025-04-01
Attending: NURSE PRACTITIONER
Payer: MEDICARE

## 2025-04-01 DIAGNOSIS — M25.561 ACUTE PAIN OF RIGHT KNEE: Primary | ICD-10-CM

## 2025-04-01 DIAGNOSIS — R25.2 LEG CRAMP: ICD-10-CM

## 2025-04-02 ENCOUNTER — OFFICE VISIT (OUTPATIENT)
Dept: UROLOGY | Facility: OTHER | Age: 75
End: 2025-04-02
Payer: MEDICARE

## 2025-04-02 ENCOUNTER — LAB (OUTPATIENT)
Dept: LAB | Facility: OTHER | Age: 75
End: 2025-04-02
Payer: MEDICARE

## 2025-04-02 VITALS
TEMPERATURE: 97.5 F | RESPIRATION RATE: 16 BRPM | DIASTOLIC BLOOD PRESSURE: 78 MMHG | SYSTOLIC BLOOD PRESSURE: 112 MMHG | HEART RATE: 66 BPM | OXYGEN SATURATION: 96 %

## 2025-04-02 DIAGNOSIS — R33.9 URINARY RETENTION WITH INCOMPLETE BLADDER EMPTYING: ICD-10-CM

## 2025-04-02 DIAGNOSIS — N32.81 OVERACTIVE BLADDER: Primary | ICD-10-CM

## 2025-04-02 DIAGNOSIS — N39.46 MIXED STRESS AND URGE URINARY INCONTINENCE: ICD-10-CM

## 2025-04-02 LAB
ALBUMIN UR-MCNC: NEGATIVE MG/DL
APPEARANCE UR: CLEAR
BILIRUB UR QL STRIP: NEGATIVE
COLOR UR AUTO: YELLOW
GLUCOSE UR STRIP-MCNC: NEGATIVE MG/DL
HGB UR QL STRIP: NEGATIVE
KETONES UR STRIP-MCNC: NEGATIVE MG/DL
LEUKOCYTE ESTERASE UR QL STRIP: NEGATIVE
NITRATE UR QL: NEGATIVE
PH UR STRIP: 5.5 [PH] (ref 5–9)
SP GR UR STRIP: <=1.005 (ref 1–1.03)
UROBILINOGEN UR STRIP-MCNC: NORMAL MG/DL

## 2025-04-02 PROCEDURE — 81003 URINALYSIS AUTO W/O SCOPE: CPT | Mod: ZL

## 2025-04-02 PROCEDURE — G0463 HOSPITAL OUTPT CLINIC VISIT: HCPCS | Mod: 25

## 2025-04-02 ASSESSMENT — PAIN SCALES - GENERAL: PAINLEVEL_OUTOF10: NO PAIN (0)

## 2025-04-02 NOTE — PROGRESS NOTES
Chief Complaint: Follow Up (6 wks incontinence)      HPI: Ms. Monisha Velez is a 75 year old year old female presenting today April 2, 2025 in follow up of urinary incontinence.    She was previously seen by me on 02/17/25. At that visit it was recommended that increase Mybetriq to 50 mg daily, continue monitoring blood pressure 1-2 times per week and contact our office if she has two readings over 140/90. She was seen by her PCP on 3/13/2025 and reported that her blood pressure had been elevated.  Dr. Amos decreased Myrbetriq to 25 mg at that time, and patient contacted our office to notify of this change.  I recommended that she continue to monitor her blood pressures and symptoms, so we can discuss these at today's visit.    Patient reports that she has not noticed a change in her symptoms going back to Mybetriq 25 mg daily. Patient reports that she leaks 2-3 times a week in small amounts.  She leaks for no apparent reason at night describes this as bubbling of urine.  She is no longer wearing a pad each day.  Quite satisfied with her symptoms as they are now.      She has been monitoring her blood pressure daily and it ranges from 110//78 (only three elevated blood pressures).    She reports she is drinking 40 ounces of water daily, 16 ounces of caffeine in the form of coffee with her last cup around 10 AM.  Has occasional alcohol in the form of beer.  Stools every 1 to 2 days.        Past Medical History:   Diagnosis Date    Adnexal mass 08/22/2011    Contact dermatitis 12/16/2009    Diplopia     Episode of mild diplopia secondary to sinusitis, ophthalmology consult 07/2008.    H/O gastritis     History of adenomatous polyp of colon     Postmenopausal atrophic vaginitis 07/29/2011    Restless leg syndrome     Retention of urine 08/22/2011    Rosacea     Tinea corporis 11/19/2010    Urge incontinence of urine        Past Surgical History:   Procedure Laterality Date    COLONOSCOPY  03/18/2016    Tubular  adenoma, Next in 5 years    COLONOSCOPY  04/12/2021    F/U N/A normal    COLONOSCOPY N/A 4/12/2021    Procedure: COLONOSCOPY;  Surgeon: Roberto Anderson MD;  Location: [facility] OR    [facility]COPY  01/2013    [facility]    DENTAL SURGERY  2005    Root canal,  Dr. Olmos.    ENDOSCOPIC SINUS SURGERY  1993    ESOPHAGOSCOPY, GASTROSCOPY, DUODENOSCOPY (EGD), COMBINED  03/18/2016    HYSTERECTOMY VAGINAL  1990    Endometriosis    LAPAROSCOPIC CYSTECTOMY OVARIAN (BENIGN)  09/27/2011    cystectomy with lysis of adhesions and possible oophrectomy  by Dr. Gay Mcgovern  at [facility]    ROTATOR CUFF REPAIR RT/LT Right 12/28/2017    Dr John    SALPINGO-OOPHORECTOMY BILATERAL  10/2007    bilateral ovarian serous cystadenofibromas, benign.       Current Outpatient Medications   Medication Sig Dispense Refill    albuterol (PROAIR HFA/PROVENTIL HFA/VENTOLIN HFA) 108 (90 Base) MCG/ACT inhaler Inhale 2 puffs into the lungs every 4 hours as needed for cough, wheezing or shortness of breath. 18 g 2    aspirin 81 MG EC tablet Take 1 tablet (81 mg) by mouth daily with food      atorvastatin (LIPITOR) 10 MG tablet Take 0.5 tablets (5 mg) by mouth four times a week. 90 tablet 4    benzonatate (TESSALON) 100 MG capsule Take 1 capsule (100 mg) by mouth 3 times daily as needed for cough. 42 capsule 1    cetirizine (ZYRTEC) 10 MG tablet Take 1 tablet (10 mg) by mouth daily. 90 tablet 4    clobetasol propionate (TEMOVATE) 0.05 % external cream Apply topically at bedtime. Apply to external vaginal area/labia daily at bedtime for next 30 days, then decrease frequency to twice a week.  This is something he will continue to stay on for management of lichen sclerosis 45 g 2    estradiol (ESTRACE) 0.1 MG/GM vaginal cream Place 2 g vaginally See Admin Instructions. Apply a nickel sized amount twice a week.  Do not use the applicator for the application. 45 g 4    fluticasone (FLONASE) 50 MCG/ACT nasal spray Spray 1 spray into both  nostrils daily. (Patient taking differently: Spray 1 spray into both nostrils as needed.)      gabapentin (NEURONTIN) 100 MG capsule Take 2 capsules (200 mg) by mouth at bedtime. 180 capsule 2    melatonin 5 MG tablet Take 10 mg by mouth nightly as needed for sleep      mirabegron (MYRBETRIQ) 25 MG 24 hr tablet Take 1 tablet (25 mg) by mouth daily. Monitor blood pressure weekly.      Multiple Vitamins-Minerals (PRESERVISION AREDS) CAPS Take 2 capsules by mouth daily      polyethylene glycol (MIRALAX) 17 GM/Dose powder Take 1 Capful by mouth.      valACYclovir (VALTREX) 1000 mg tablet Take 1 tablet (1,000 mg) by mouth 3 times daily as needed (Cold Sores) 90 tablet 1       ALLERGIES: Penicillins, Sulfa antibiotics, Nsaids, Codeine sulfate, Hydroxyzine, Morphine sulfate, Venlafaxine, and Zolpidem tartrate     GENERAL PHYSICAL EXAM:   Vitals: /78   Pulse 66   Temp 97.5  F (36.4  C) (Tympanic)   Resp 16   LMP 06/01/1990 (Approximate)   SpO2 96%   There is no height or weight on file to calculate BMI.    GENERAL: Well groomed, well developed, well nourished female in NAD.  ENT:  ENT exam normal  CV:  Warm Extremities   RESPIRATORY:  Normal respiratory effort   GI:  Soft, ND, NT  MS: Moving all four  NEURO: Alert and oriented x 3.  PSYCH: Normal mood and affect, pleasant and agreeable during interview and exam.    :  Negative for suprapubic tenderness, nonpalpable bladder.      PVR: Residual urine by ultrasound was 64 ml.           RADIOLOGY: The following tests were reviewed: None.    LABS: The last test results for Ms. Monisha Velez were reviewed.   Results for orders placed or performed in visit on 04/02/25 (from the past 24 hours)   Urinalysis Macroscopic   Result Value Ref Range    Color Urine Yellow Colorless, Straw, Light Yellow, Yellow    Appearance Urine Clear Clear    Glucose Urine Negative Negative mg/dL    Bilirubin Urine Negative Negative    Ketones Urine Negative Negative mg/dL    Specific  Gravity Urine <=1.005 1.005 - 1.030    Blood Urine Negative Negative    pH Urine 5.5 5.0 - 9.0    Protein Albumin Urine Negative Negative mg/dL    Urobilinogen Urine Normal 0.2, 1.0, Normal mg/dL    Nitrite Urine Negative Negative    Leukocyte Esterase Urine Negative Negative       BMP -   Recent Labs   Lab Test 03/13/25  1014 01/29/25  0942 01/02/25  1251 12/17/24  1250 10/04/24  1341 03/13/24  1030 02/09/21  1058 01/11/21  1006    141 141 142   < > 141   < > 140   POTASSIUM 4.2 4.5 4.6 4.5   < > 4.4   < > 4.4   CHLORIDE 108* 105 105 108*   < > 106   < > 106   CO2 25 26 25 28   < > 26   < > 29   BUN 13.1 19.9 17.8 17.7   < > 17.8   < > 14   CR 0.94 1.12* 1.15* 0.98*   < > 0.99*   < > 1.04   * 113* 99 102*   < > 102*   < > 111*   DENISSE 9.6 9.8 9.6 9.8   < > 9.7   < > 9.2   MAG  --   --   --  2.1  --  1.9  --   --    PHOS 3.4  --   --   --   --   --   --  3.4    < > = values in this interval not displayed.       CBC -   Recent Labs   Lab Test 01/29/25  0942 01/02/25  1251 11/19/24  1400   WBC 5.5 6.8 5.3   HGB 14.7 14.4 14.5    188 219       ASSESSMENT:   OAB in the presence of incomplete bladder emptying and constipation. Patient has known vaginal prolapse which she is doing pelvic floor physical therapy for.   PLAN:   1. Urinary retention with incomplete bladder emptying   Patient has mild retention today, this is up just slightly from her baseline. She has known pelvic organ prolapse and decrease pelvic tone which may put her bladder in a less than ideal position from emptying. She is also somewhat constipated with stools every 1-2 days. I recommend she restart Miralax every day to fully empty the bowel. She is not interested in pessary due to sexual activity. She is not ready to consider surgical intervention. We discussed that Myrbetriq may be contributing to her retention some.   - MEASUREMENT, POST-VOIDING RESIDUAL URINE &/OR BLADDER CAPACITY, US, NON-IMAGING; Future    2. Overactive  bladder  Patient is quite satisfied with her current urinary symptoms. We discussed that with the retention and occasional elevation in her blood pressure noted we need to be careful about continue with Myrbetriq. Patient verbalizes that she understands the risk of these side effects but would like to continue if possible. I recommended that she check the she come to the clinic to calibrate her blood pressure cuff as this is quite old. If cuff is accurate I would suggest holding Myrbetriq for two weeks with daily blood pressure checks. If inaccurate consider purchasing a new machine and continue on Myrbetriq 25 mg daily. I recommended she address her bowels and work to lean forward and apply pressure to fully empty bladder. We will have her back in two weeks for a nurse-only visit for bladder scan and blood pressure check. We will determine next steps once that visit occurs.    3. Mixed stress and urge urinary incontinence  Very minimal leakage at this point, patient is quite satisfied with current symptoms. I encouraged her to continue working on pelvic floor exercises, limit bladder irritants, and avoid constipation.      35 minutes spent on the date of this encounter doing chart review, history and exam, documentation and further activities as noted above.        JERRELL Gilman [facility]

## 2025-04-02 NOTE — PATIENT INSTRUCTIONS
Drink plenty of fluids, > 2 liters/day  Miralax every 1-2 days.  Have blood pressure cuff calibrated here at the clinic as able.  If calibration is off consider purchasing a new machine.   If note blood pressure machine is inaccurate may continue on Myrbetriq 25 mg daily. If the blood pressure cuff is correct do a two week hold of Mybetriq.  Return for bladder scan in two weeks.  Follow up will be determined after bladder scan.  Lean forward or to the side and apply pressure of bladder to empty.

## 2025-04-02 NOTE — NURSING NOTE
"Chief Complaint   Patient presents with    Follow Up     6 wks incontinence       Initial /78   Pulse 66   Temp 97.5  F (36.4  C) (Tympanic)   Resp 16   LMP 06/01/1990 (Approximate)   SpO2 96%  Estimated body mass index is 28.87 kg/m  as calculated from the following:    Height as of 3/13/25: 1.6 m (5' 3\").    Weight as of 3/13/25: 73.9 kg (163 lb).  Medication Review: complete    PVR-64    Health Care Directive:  Patient has a Health Care Directive on file      Bridget Schofield LPN      "

## 2025-04-09 ENCOUNTER — THERAPY VISIT (OUTPATIENT)
Dept: PHYSICAL THERAPY | Facility: OTHER | Age: 75
End: 2025-04-09
Attending: NURSE PRACTITIONER
Payer: MEDICARE

## 2025-04-09 DIAGNOSIS — R25.2 LEG CRAMP: ICD-10-CM

## 2025-04-09 DIAGNOSIS — M25.561 ACUTE PAIN OF RIGHT KNEE: Primary | ICD-10-CM

## 2025-04-22 ENCOUNTER — ALLIED HEALTH/NURSE VISIT (OUTPATIENT)
Dept: UROLOGY | Facility: OTHER | Age: 75
End: 2025-04-22
Payer: MEDICARE

## 2025-04-22 VITALS — DIASTOLIC BLOOD PRESSURE: 96 MMHG | SYSTOLIC BLOOD PRESSURE: 132 MMHG

## 2025-04-22 DIAGNOSIS — R33.9 URINARY RETENTION WITH INCOMPLETE BLADDER EMPTYING: Primary | ICD-10-CM

## 2025-04-22 DIAGNOSIS — L90.0 LICHEN SCLEROSUS: ICD-10-CM

## 2025-04-22 PROCEDURE — 51798 US URINE CAPACITY MEASURE: CPT

## 2025-04-22 RX ORDER — CLOBETASOL PROPIONATE 0.5 MG/G
CREAM TOPICAL AT BEDTIME
Qty: 45 G | Refills: 2 | Status: SHIPPED | OUTPATIENT
Start: 2025-04-22

## 2025-04-22 NOTE — PROGRESS NOTES
Patient presented to clinic for post void residual-53. Also BP check which was 132/96.   Bridget Schofield LPN on 4/22/2025 at 11:32 AM

## 2025-04-23 ENCOUNTER — THERAPY VISIT (OUTPATIENT)
Dept: PHYSICAL THERAPY | Facility: OTHER | Age: 75
End: 2025-04-23
Attending: NURSE PRACTITIONER
Payer: MEDICARE

## 2025-04-23 DIAGNOSIS — R25.2 LEG CRAMP: ICD-10-CM

## 2025-04-23 DIAGNOSIS — M25.561 ACUTE PAIN OF RIGHT KNEE: Primary | ICD-10-CM

## 2025-04-29 ENCOUNTER — THERAPY VISIT (OUTPATIENT)
Dept: PHYSICAL THERAPY | Facility: OTHER | Age: 75
End: 2025-04-29
Attending: NURSE PRACTITIONER
Payer: MEDICARE

## 2025-04-29 DIAGNOSIS — R25.2 LEG CRAMP: ICD-10-CM

## 2025-04-29 DIAGNOSIS — M25.561 ACUTE PAIN OF RIGHT KNEE: Primary | ICD-10-CM

## 2025-05-07 ENCOUNTER — THERAPY VISIT (OUTPATIENT)
Dept: PHYSICAL THERAPY | Facility: OTHER | Age: 75
End: 2025-05-07
Attending: NURSE PRACTITIONER
Payer: MEDICARE

## 2025-05-07 DIAGNOSIS — R25.2 LEG CRAMP: ICD-10-CM

## 2025-05-07 DIAGNOSIS — M25.561 ACUTE PAIN OF RIGHT KNEE: Primary | ICD-10-CM

## 2025-06-05 ENCOUNTER — OFFICE VISIT (OUTPATIENT)
Dept: FAMILY MEDICINE | Facility: OTHER | Age: 75
End: 2025-06-05
Payer: MEDICARE

## 2025-06-05 ENCOUNTER — RESULTS FOLLOW-UP (OUTPATIENT)
Dept: FAMILY MEDICINE | Facility: OTHER | Age: 75
End: 2025-06-05

## 2025-06-05 VITALS
HEART RATE: 70 BPM | TEMPERATURE: 98.2 F | DIASTOLIC BLOOD PRESSURE: 82 MMHG | BODY MASS INDEX: 30.06 KG/M2 | SYSTOLIC BLOOD PRESSURE: 141 MMHG | WEIGHT: 169.7 LBS | OXYGEN SATURATION: 96 %

## 2025-06-05 DIAGNOSIS — R03.0 ELEVATED BLOOD PRESSURE READING WITHOUT DIAGNOSIS OF HYPERTENSION: ICD-10-CM

## 2025-06-05 DIAGNOSIS — R35.0 URINARY FREQUENCY: ICD-10-CM

## 2025-06-05 DIAGNOSIS — R10.9 FLANK PAIN: ICD-10-CM

## 2025-06-05 DIAGNOSIS — R30.0 DYSURIA: Primary | ICD-10-CM

## 2025-06-05 DIAGNOSIS — N39.46 MIXED STRESS AND URGE URINARY INCONTINENCE: ICD-10-CM

## 2025-06-05 LAB
ALBUMIN UR-MCNC: NEGATIVE MG/DL
ANION GAP SERPL CALCULATED.3IONS-SCNC: 11 MMOL/L (ref 7–15)
APPEARANCE UR: CLEAR
BACTERIAL VAGINOSIS VAG-IMP: NEGATIVE
BASOPHILS # BLD AUTO: 0 10E3/UL (ref 0–0.2)
BASOPHILS NFR BLD AUTO: 1 %
BILIRUB UR QL STRIP: NEGATIVE
BUN SERPL-MCNC: 12.5 MG/DL (ref 8–23)
CALCIUM SERPL-MCNC: 9.5 MG/DL (ref 8.8–10.4)
CANDIDA DNA VAG QL NAA+PROBE: NOT DETECTED
CANDIDA GLABRATA / CANDIDA KRUSEI DNA: NOT DETECTED
CHLORIDE SERPL-SCNC: 105 MMOL/L (ref 98–107)
COLOR UR AUTO: YELLOW
CREAT SERPL-MCNC: 0.88 MG/DL (ref 0.51–0.95)
EGFRCR SERPLBLD CKD-EPI 2021: 68 ML/MIN/1.73M2
EOSINOPHIL # BLD AUTO: 0.1 10E3/UL (ref 0–0.7)
EOSINOPHIL NFR BLD AUTO: 1 %
ERYTHROCYTE [DISTWIDTH] IN BLOOD BY AUTOMATED COUNT: 12.4 % (ref 10–15)
GLUCOSE SERPL-MCNC: 90 MG/DL (ref 70–99)
GLUCOSE UR STRIP-MCNC: NEGATIVE MG/DL
HCO3 SERPL-SCNC: 24 MMOL/L (ref 22–29)
HCT VFR BLD AUTO: 42.8 % (ref 35–47)
HGB BLD-MCNC: 14.4 G/DL (ref 11.7–15.7)
HGB UR QL STRIP: NEGATIVE
IMM GRANULOCYTES # BLD: 0 10E3/UL
IMM GRANULOCYTES NFR BLD: 0 %
KETONES UR STRIP-MCNC: NEGATIVE MG/DL
LEUKOCYTE ESTERASE UR QL STRIP: NEGATIVE
LYMPHOCYTES # BLD AUTO: 1.5 10E3/UL (ref 0.8–5.3)
LYMPHOCYTES NFR BLD AUTO: 24 %
MCH RBC QN AUTO: 31.2 PG (ref 26.5–33)
MCHC RBC AUTO-ENTMCNC: 33.6 G/DL (ref 31.5–36.5)
MCV RBC AUTO: 93 FL (ref 78–100)
MONOCYTES # BLD AUTO: 0.5 10E3/UL (ref 0–1.3)
MONOCYTES NFR BLD AUTO: 8 %
NEUTROPHILS # BLD AUTO: 4.2 10E3/UL (ref 1.6–8.3)
NEUTROPHILS NFR BLD AUTO: 66 %
NITRATE UR QL: NEGATIVE
NRBC # BLD AUTO: 0 10E3/UL
NRBC BLD AUTO-RTO: 0 /100
PH UR STRIP: 5.5 [PH] (ref 5–9)
PLATELET # BLD AUTO: 224 10E3/UL (ref 150–450)
POTASSIUM SERPL-SCNC: 4.4 MMOL/L (ref 3.4–5.3)
RBC # BLD AUTO: 4.62 10E6/UL (ref 3.8–5.2)
SODIUM SERPL-SCNC: 140 MMOL/L (ref 135–145)
SP GR UR STRIP: 1.02 (ref 1–1.03)
T VAGINALIS DNA VAG QL NAA+PROBE: NOT DETECTED
UROBILINOGEN UR STRIP-MCNC: NORMAL MG/DL
WBC # BLD AUTO: 6.3 10E3/UL (ref 4–11)

## 2025-06-05 PROCEDURE — 36415 COLL VENOUS BLD VENIPUNCTURE: CPT | Mod: ZL

## 2025-06-05 PROCEDURE — 85014 HEMATOCRIT: CPT | Mod: ZL

## 2025-06-05 PROCEDURE — G0463 HOSPITAL OUTPT CLINIC VISIT: HCPCS

## 2025-06-05 PROCEDURE — 81515 NFCT DS BV&VAGINITIS DNA ALG: CPT | Mod: ZL

## 2025-06-05 PROCEDURE — 81003 URINALYSIS AUTO W/O SCOPE: CPT | Mod: ZL

## 2025-06-05 PROCEDURE — 82310 ASSAY OF CALCIUM: CPT | Mod: ZL

## 2025-06-05 RX ORDER — VIT C/B6/B5/MAGNESIUM/HERB 173 50-5-6-5MG
1500 CAPSULE ORAL DAILY
COMMUNITY

## 2025-06-05 NOTE — PROGRESS NOTES
ASSESSMENT/PLAN:    I have reviewed the nursing notes.  I have reviewed the findings, diagnosis, plan and need for follow up with the patient.    1. Elevated blood pressure reading without diagnosis of hypertension    - Patient presents to clinic today with a slightly elevated blood pressure of 141/82.  Patient states that she has had some higher blood pressures in the past and her PCP has asked her to monitor her blood pressures at home.  Patient states that her blood pressures have been doing okay so she has not monitored them for quite some time but states that she will begin monitoring daily and logging her blood pressures to review with her PCP at next visit.    Differential diagnoses may include possible overactive bladder, medications, nerve damage, kidney stones, kidney infection or some possible diagnoses of current symptoms.  As discussed in the clinic today, recommend follow-up with urology as there is no evidence of any infections today.    2. Mixed stress and urge urinary incontinence  3. Urinary frequency  4. Flank pain  5. Dysuria (Primary)    - Multiplex Vaginal Panel by PCR- negative results    - CBC and Differential- unremarkable results    - Basic Metabolic Panel- unremarkable results    - UA Macroscopic with reflex to Microscopic and Culture- negative results    - Please read the attached formation on bladder diet and bladder training for at home care treatment.    - May use over-the-counter Tylenol as needed for pain or fever    - Discussed warning signs/symptoms indicative of need to f/u    - Follow up if symptoms persist or worsen or concerns    - I explained my diagnostic considerations and recommendations to the patient, who voiced understanding and agreement with the treatment plan. All questions were answered. We discussed potential side effects of any prescribed or recommended therapies, as well as expectations for response to treatments.    JERRELL Denise CNP  6/5/2025  11:09  AM    HPI:    Monisha Velez is a 75 year old female who presents to [facility] today for concerns of a possible UTI.  Patient complaining of mild dysuria, urinary urgency, frequency and incontinence, bilateral flank pain, chills and headache for the past week but patient states that symptoms worsened yesterday.  Patient does follow closely with her PCP and Jm and urology.  Patient states that she is taking her clobetasol and Estrace cream twice weekly as prescribed.  At home care treatment consists of increasing water intake and rest only.  Denies fever, body aches, abnormal vaginal discharge, hematuria, abdominal pain, cloudy or foul-smelling urine, nausea, vomiting, diarrhea, constipation, lightheadedness, dizziness, otalgia, cough, congestion, rhinorrhea, sore throat or rashes.    Past Medical History:   Diagnosis Date    Adnexal mass 08/22/2011    Contact dermatitis 12/16/2009    Diplopia     Episode of mild diplopia secondary to sinusitis, ophthalmology consult 07/2008.    H/O gastritis     History of adenomatous polyp of colon     Postmenopausal atrophic vaginitis 07/29/2011    Restless leg syndrome     Retention of urine 08/22/2011    Rosacea     Tinea corporis 11/19/2010    Urge incontinence of urine      Past Surgical History:   Procedure Laterality Date    COLONOSCOPY  03/18/2016    Tubular adenoma, Next in 5 years    COLONOSCOPY  04/12/2021    F/U N/A normal    COLONOSCOPY N/A 4/12/2021    Procedure: COLONOSCOPY;  Surgeon: Roberto Anderson MD;  Location: [facility] OR    [facility]COPY  01/2013    [facility]    DENTAL SURGERY  2005    Root canal,  Dr. Olmos.    ENDOSCOPIC SINUS SURGERY  1993    ESOPHAGOSCOPY, GASTROSCOPY, DUODENOSCOPY (EGD), COMBINED  03/18/2016    HYSTERECTOMY VAGINAL  1990    Endometriosis    LAPAROSCOPIC CYSTECTOMY OVARIAN (BENIGN)  09/27/2011    cystectomy with lysis of adhesions and possible oophrectomy  by Dr. Gay Mcgovern  at [facility]    ROTATOR CUFF REPAIR  RT/LT Right 2017    Dr John    SALPINGO-OOPHORECTOMY BILATERAL  10/2007    bilateral ovarian serous cystadenofibromas, benign.     Social History     Tobacco Use    Smoking status: Former     Current packs/day: 0.00     Average packs/day: 0.5 packs/day for 7.6 years (3.8 ttl pk-yrs)     Types: Cigarettes     Start date: 1965     Quit date: 1972     Years since quittin.8    Smokeless tobacco: Never   Substance Use Topics    Alcohol use: Yes     Comment: has slowed down a lot since huband's injury; aiming for occassional     Current Outpatient Medications   Medication Sig Dispense Refill    Turmeric 500 MG CAPS Take 1,500 mg by mouth daily.      albuterol (PROAIR HFA/PROVENTIL HFA/VENTOLIN HFA) 108 (90 Base) MCG/ACT inhaler Inhale 2 puffs into the lungs every 4 hours as needed for cough, wheezing or shortness of breath. 18 g 2    aspirin 81 MG EC tablet Take 1 tablet (81 mg) by mouth daily with food      atorvastatin (LIPITOR) 10 MG tablet Take 0.5 tablets (5 mg) by mouth four times a week. 90 tablet 4    benzonatate (TESSALON) 100 MG capsule Take 1 capsule (100 mg) by mouth 3 times daily as needed for cough. 42 capsule 1    cetirizine (ZYRTEC) 10 MG tablet Take 1 tablet (10 mg) by mouth daily. 90 tablet 4    clobetasol propionate (TEMOVATE) 0.05 % external cream Apply topically at bedtime. Apply to external vaginal area/labia twice a week.  This is something she will continue to stay on for management of lichen sclerosis 45 g 2    estradiol (ESTRACE) 0.1 MG/GM vaginal cream Place 2 g vaginally See Admin Instructions. Apply a nickel sized amount twice a week.  Do not use the applicator for the application. 45 g 4    fluticasone (FLONASE) 50 MCG/ACT nasal spray Spray 1 spray into both nostrils daily. (Patient taking differently: Spray 1 spray into both nostrils as needed.)      gabapentin (NEURONTIN) 100 MG capsule Take 2 capsules (200 mg) by mouth at bedtime. 180 capsule 2    melatonin 5 MG tablet  Take 10 mg by mouth nightly as needed for sleep      mirabegron (MYRBETRIQ) 25 MG 24 hr tablet Take 1 tablet (25 mg) by mouth daily. 90 tablet 3    Multiple Vitamins-Minerals (PRESERVISION AREDS) CAPS Take 2 capsules by mouth daily      polyethylene glycol (MIRALAX) 17 GM/Dose powder Take 1 Capful by mouth.      valACYclovir (VALTREX) 1000 mg tablet Take 1 tablet (1,000 mg) by mouth 3 times daily as needed (Cold Sores) 90 tablet 1     Allergies   Allergen Reactions    Penicillins Hives    Sulfa Antibiotics Hives    Nsaids Other (See Comments)     Contraindicated per [facility]    Codeine Sulfate Other (See Comments)     nightmares    Hydroxyzine Other (See Comments)     Nightmares    Morphine Sulfate Other (See Comments)     Cant sleep    Venlafaxine Hives     'bad reaction     Zolpidem Tartrate      Paresthesias; stomach upset       Past medical history, past surgical history, current medications and allergies reviewed and accurate to the best of my knowledge.      ROS:  Refer to HPI    BP (!) 141/82   Pulse 70   Temp 98.2  F (36.8  C) (Tympanic)   Wt 77 kg (169 lb 11.2 oz)   LMP 06/01/1990 (Approximate)   SpO2 96%   BMI 30.06 kg/m      EXAM:  General Appearance: Well appearing 75 year old female, appropriate appearance for age. No acute distress   Respiratory: normal chest wall and respirations.  Normal effort.  Clear to auscultation bilaterally, no wheezing, crackles or rhonchi.  No increased work of breathing.  No cough appreciated.  Cardiac: RRR with no murmurs  Abdomen: soft, nontender, no rigidity, no rebound tenderness or guarding, normal bowel sounds present  :  No suprapubic tenderness to palpation.  Mild CVA tenderness to palpation.    Musculoskeletal:  Equal movement of bilateral upper extremities.  Equal movement of bilateral lower extremities.  Normal gait.    Neuro: Alert and oriented to person, place, and time.    Psychological: normal affect, alert, oriented, and pleasant.      Labs:  Results for orders placed or performed in visit on 06/05/25   UA Macroscopic with reflex to Microscopic and Culture     Status: Normal    Specimen: Urine, Midstream   Result Value Ref Range    Color Urine Yellow Colorless, Straw, Light Yellow, Yellow    Appearance Urine Clear Clear    Glucose Urine Negative Negative mg/dL    Bilirubin Urine Negative Negative    Ketones Urine Negative Negative mg/dL    Specific Gravity Urine 1.020 1.005 - 1.030    Blood Urine Negative Negative    pH Urine 5.5 5.0 - 9.0    Protein Albumin Urine Negative Negative mg/dL    Urobilinogen Urine Normal 0.2, 1.0, Normal mg/dL    Nitrite Urine Negative Negative    Leukocyte Esterase Urine Negative Negative    Narrative    Microscopic not indicated   Basic Metabolic Panel     Status: Normal   Result Value Ref Range    Sodium 140 135 - 145 mmol/L    Potassium 4.4 3.4 - 5.3 mmol/L    Chloride 105 98 - 107 mmol/L    Carbon Dioxide (CO2) 24 22 - 29 mmol/L    Anion Gap 11 7 - 15 mmol/L    Urea Nitrogen 12.5 8.0 - 23.0 mg/dL    Creatinine 0.88 0.51 - 0.95 mg/dL    GFR Estimate 68 >60 mL/min/1.73m2    Calcium 9.5 8.8 - 10.4 mg/dL    Glucose 90 70 - 99 mg/dL   CBC with platelets and differential     Status: None   Result Value Ref Range    WBC Count 6.3 4.0 - 11.0 10e3/uL    RBC Count 4.62 3.80 - 5.20 10e6/uL    Hemoglobin 14.4 11.7 - 15.7 g/dL    Hematocrit 42.8 35.0 - 47.0 %    MCV 93 78 - 100 fL    MCH 31.2 26.5 - 33.0 pg    MCHC 33.6 31.5 - 36.5 g/dL    RDW 12.4 10.0 - 15.0 %    Platelet Count 224 150 - 450 10e3/uL    % Neutrophils 66 %    % Lymphocytes 24 %    % Monocytes 8 %    % Eosinophils 1 %    % Basophils 1 %    % Immature Granulocytes 0 %    NRBCs per 100 WBC 0 <1 /100    Absolute Neutrophils 4.2 1.6 - 8.3 10e3/uL    Absolute Lymphocytes 1.5 0.8 - 5.3 10e3/uL    Absolute Monocytes 0.5 0.0 - 1.3 10e3/uL    Absolute Eosinophils 0.1 0.0 - 0.7 10e3/uL    Absolute Basophils 0.0 0.0 - 0.2 10e3/uL    Absolute Immature Granulocytes 0.0  <=0.4 10e3/uL    Absolute NRBCs 0.0 10e3/uL   Multiplex Vaginal Panel by PCR     Status: Normal    Specimen: Vagina; Swab   Result Value Ref Range    Bacterial Vaginosis Organism DNA Negative Negative    Candida Group DNA Not Detected Not Detected    Candida glabrata / Raina krusei DNA Not Detected Not Detected    Trichomonas vaginalis DNA Not Detected Not Detected    Narrative    The Xpert  Xpress MVP test, performed on the [employer], is an automated, qualitative in vitro diagnostic test for the detection of DNA targets from anaerobic bacteria associated with bacterial vaginosis, Candida species associated with vulvovaginal candidiasis, and Trichomonas vaginalis. The assay uses clinician-collected and self-collected vaginal swabs from patients who are symptomatic for vaginitis/ vaginosis. The Xpert  Xpress MVP test utilizes real-time polymerase chain reaction (PCR) for the amplification of specific DNA targets and utilizes fluorogenic target-specific hybridization probes to detect and differentiate DNA. It is intended to aid in the diagnosis of vaginal infections in women with a clinical presentation consistent with bacterial vaginosis, vulvovaginal candidiasis, or trichomoniasis.   The assay targets three anaerobic microorgansims that are associated with bacterial vaginosis (BV). Other organisms that are not detected by the Xpert  Xpress MVP test have also been reported to be associated with BV. The BV organism and Candida species targets of the Xpert  Xpress MVP test can be commensal in women; positive results must be considered in conjunction with other clinical and patient information to determine the disease status.   CBC and Differential     Status: None    Narrative    The following orders were created for panel order CBC and Differential.  Procedure                               Abnormality         Status                     ---------                               -----------          ------                     CBC with platelets and ...[[id number]]                      Final result                 Please view results for these tests on the individual orders.

## 2025-06-05 NOTE — NURSING NOTE
"Chief Complaint   Patient presents with    Urinary Problem     Patient here for flank and pelvic pain. She has had a couple accidents as well recently. She has been dealing with ongoing issues and has been seen for this before. She also would like her left leg looked at.  Initial BP (!) 141/82   Pulse 70   Temp 98.2  F (36.8  C) (Tympanic)   Wt 77 kg (169 lb 11.2 oz)   LMP 06/01/1990 (Approximate)   SpO2 96%   BMI 30.06 kg/m   Estimated body mass index is 30.06 kg/m  as calculated from the following:    Height as of 3/13/25: 1.6 m (5' 3\").    Weight as of this encounter: 77 kg (169 lb 11.2 oz).  Medication Review: complete    The next two questions are to help us understand your food security.  If you are feeling you need any assistance in this area, we have resources available to support you today.          3/13/2025   SDOH- Food Insecurity   Within the past 12 months, did you worry that your food would run out before you got money to buy more? N   Within the past 12 months, did the food you bought just not last and you didn t have money to get more? N        Data saved with a previous flowsheet row definition         Health Care Directive:  Patient has a Health Care Directive on file      Mirela Fernando MA      "

## 2025-06-10 DIAGNOSIS — N32.81 OVERACTIVE BLADDER: ICD-10-CM

## 2025-06-10 DIAGNOSIS — R33.9 URINARY RETENTION WITH INCOMPLETE BLADDER EMPTYING: Primary | ICD-10-CM

## 2025-06-25 ENCOUNTER — HOSPITAL ENCOUNTER (OUTPATIENT)
Dept: ULTRASOUND IMAGING | Facility: OTHER | Age: 75
Discharge: HOME | End: 2025-06-25
Attending: STUDENT IN AN ORGANIZED HEALTH CARE EDUCATION/TRAINING PROGRAM
Payer: MEDICARE

## 2025-06-25 ENCOUNTER — APPOINTMENT (OUTPATIENT)
Dept: LAB | Facility: OTHER | Age: 75
End: 2025-06-25
Payer: MEDICARE

## 2025-06-25 ENCOUNTER — OFFICE VISIT (OUTPATIENT)
Dept: UROLOGY | Facility: OTHER | Age: 75
End: 2025-06-25
Payer: MEDICARE

## 2025-06-25 ENCOUNTER — OFFICE VISIT (OUTPATIENT)
Dept: FAMILY MEDICINE | Facility: OTHER | Age: 75
End: 2025-06-25
Payer: MEDICARE

## 2025-06-25 VITALS
HEART RATE: 74 BPM | RESPIRATION RATE: 18 BRPM | SYSTOLIC BLOOD PRESSURE: 106 MMHG | DIASTOLIC BLOOD PRESSURE: 76 MMHG | TEMPERATURE: 97.8 F | OXYGEN SATURATION: 97 %

## 2025-06-25 VITALS
SYSTOLIC BLOOD PRESSURE: 130 MMHG | BODY MASS INDEX: 30.11 KG/M2 | DIASTOLIC BLOOD PRESSURE: 84 MMHG | OXYGEN SATURATION: 96 % | WEIGHT: 170 LBS | RESPIRATION RATE: 19 BRPM | TEMPERATURE: 98 F

## 2025-06-25 DIAGNOSIS — S80.12XA HEMATOMA OF LEFT LOWER LEG: Primary | ICD-10-CM

## 2025-06-25 DIAGNOSIS — M79.662 PAIN OF LEFT CALF: ICD-10-CM

## 2025-06-25 DIAGNOSIS — N32.81 OVERACTIVE BLADDER: ICD-10-CM

## 2025-06-25 DIAGNOSIS — R33.9 URINARY RETENTION WITH INCOMPLETE BLADDER EMPTYING: Primary | ICD-10-CM

## 2025-06-25 DIAGNOSIS — M79.605 PAIN OF LEFT LOWER EXTREMITY: Chronic | ICD-10-CM

## 2025-06-25 PROCEDURE — 93971 EXTREMITY STUDY: CPT | Mod: 26 | Performed by: RADIOLOGY

## 2025-06-25 PROCEDURE — G0463 HOSPITAL OUTPT CLINIC VISIT: HCPCS | Mod: 25,27

## 2025-06-25 PROCEDURE — 51798 US URINE CAPACITY MEASURE: CPT

## 2025-06-25 PROCEDURE — G0463 HOSPITAL OUTPT CLINIC VISIT: HCPCS | Mod: 25

## 2025-06-25 PROCEDURE — 93971 EXTREMITY STUDY: CPT | Mod: LT

## 2025-06-25 ASSESSMENT — PAIN SCALES - GENERAL
PAINLEVEL_OUTOF10: SEVERE PAIN (8)
PAINLEVEL_OUTOF10: SEVERE PAIN (8)

## 2025-06-25 NOTE — PROGRESS NOTES
Chief Complaint: Follow Up (Rapid clinic, urinary symptoms)      HPI: Ms. Monisha Velez is a 75 year old year old female presenting today June 25, 2025 in follow up of urinary urge incontinence.    She was previously seen by me on 04/02/25. At that visit it was recommended that patient continue on Myrbetriq 25 mg and monitor her blood pressure.  She was instructed to contact her office should she have any readings greater than 140/90.  I also asked her to come back for a bladder scan and blood pressure check these were improved.     Today patient endorses improvement of symptoms. She has been able to adhere to the recommended treatment.  No concern for poor emptying.  She has not had any reoccurrence of UTIs.    She does report an ongoing pain to her left lower extremity, that seems to be getting worse.  She has a history of a Baker's cyst behind her right knee.  Notes a history of venous stasis insufficiency in her mother.  She denies numbness or tingling to her lower extremity.    Past Medical History:   Diagnosis Date    Adnexal mass 08/22/2011    Contact dermatitis 12/16/2009    Diplopia     Episode of mild diplopia secondary to sinusitis, ophthalmology consult 07/2008.    H/O gastritis     History of adenomatous polyp of colon     Postmenopausal atrophic vaginitis 07/29/2011    Restless leg syndrome     Retention of urine 08/22/2011    Rosacea     Tinea corporis 11/19/2010    Urge incontinence of urine        Past Surgical History:   Procedure Laterality Date    COLONOSCOPY  03/18/2016    Tubular adenoma, Next in 5 years    COLONOSCOPY  04/12/2021    F/U N/A normal    COLONOSCOPY N/A 4/12/2021    Procedure: COLONOSCOPY;  Surgeon: Roberto Anderson MD;  Location:  OR    CYSTOSCOPY  01/2013    [facility]    DENTAL SURGERY  2005    [facility],  Dr. Olmos.    ENDOSCOPIC SINUS SURGERY  1993    ESOPHAGOSCOPY, GASTROSCOPY, DUODENOSCOPY (EGD), COMBINED  03/18/2016    HYSTERECTOMY VAGINAL  1990    Endometriosis     LAPAROSCOPIC CYSTECTOMY OVARIAN (BENIGN)  09/27/2011    cystectomy with lysis of adhesions and possible oophrectomy  by Dr. Gay Mcgovern  at [facility]    ROTATOR CUFF REPAIR RT/LT Right 12/28/2017    Dr John    SALPINGO-OOPHORECTOMY BILATERAL  10/2007    bilateral ovarian serous cystadenofibromas, benign.       Current Outpatient Medications   Medication Sig Dispense Refill    albuterol (PROAIR HFA/PROVENTIL HFA/VENTOLIN HFA) 108 (90 Base) MCG/ACT inhaler Inhale 2 puffs into the lungs every 4 hours as needed for cough, wheezing or shortness of breath. 18 g 2    aspirin 81 MG EC tablet Take 1 tablet (81 mg) by mouth daily with food      atorvastatin (LIPITOR) 10 MG tablet Take 0.5 tablets (5 mg) by mouth four times a week. 90 tablet 4    benzonatate (TESSALON) 100 MG capsule Take 1 capsule (100 mg) by mouth 3 times daily as needed for cough. 42 capsule 1    cetirizine (ZYRTEC) 10 MG tablet Take 1 tablet (10 mg) by mouth daily. 90 tablet 4    clobetasol propionate (TEMOVATE) 0.05 % external cream Apply topically at bedtime. Apply to external vaginal area/labia twice a week.  This is something she will continue to stay on for management of lichen sclerosis 45 g 2    estradiol (ESTRACE) 0.1 MG/GM vaginal cream Place 2 g vaginally See Admin Instructions. Apply a nickel sized amount twice a week.  Do not use the applicator for the application. 45 g 4    fluticasone (FLONASE) 50 MCG/ACT nasal spray Spray 1 spray into both nostrils daily. (Patient taking differently: Spray 1 spray into both nostrils as needed.)      gabapentin (NEURONTIN) 100 MG capsule Take 2 capsules (200 mg) by mouth at bedtime. 180 capsule 2    melatonin 5 MG tablet Take 10 mg by mouth nightly as needed for sleep      mirabegron (MYRBETRIQ) 25 MG 24 hr tablet Take 1 tablet (25 mg) by mouth daily. 90 tablet 3    Multiple Vitamins-Minerals (PRESERVISION AREDS) CAPS Take 2 capsules by mouth daily      polyethylene glycol (MIRALAX)  17 GM/Dose powder Take 1 Capful by mouth.      Turmeric 500 MG CAPS Take 1,500 mg by mouth daily.      valACYclovir (VALTREX) 1000 mg tablet Take 1 tablet (1,000 mg) by mouth 3 times daily as needed (Cold Sores) 90 tablet 1       ALLERGIES: Penicillins, Sulfa antibiotics, Nsaids, Codeine sulfate, Hydroxyzine, Morphine sulfate, Venlafaxine, and Zolpidem tartrate     GENERAL PHYSICAL EXAM:   Vitals: /76   Pulse 74   Temp 97.8  F (36.6  C) (Temporal)   Resp 18   LMP 06/01/1990 (Approximate)   SpO2 97%   There is no height or weight on file to calculate BMI.    GENERAL: Well groomed, well developed, well nourished female in NAD.  ENT:  ENT exam normal  CV: Right extremity is warm, mild decrease in temperature of left.  Positive Homans' sign on left.  Pedal pulses 2+ bilaterally.  RESPIRATORY:  Normal respiratory effort   GI:  Soft, ND, NT  MS: Moving all four, mild asymmetry, with left being slightly larger than right.  NEURO: Alert and oriented x 3.  PSYCH: Normal mood and affect, pleasant and agreeable during interview and exam.    : Nonpalpable bladder.      PVR: Residual urine by ultrasound was 94 ml.      RADIOLOGY: The following tests were reviewed: None.    LABS: The last test results for Ms. Monisha Velez were reviewed.   Recent Results (from the past 24 hours)   Urinalysis Macroscopic   Result Value Ref Range    Color Urine Yellow Colorless, Straw, Light Yellow, Yellow    Appearance Urine Clear Clear    Glucose Urine Negative Negative mg/dL    Bilirubin Urine Negative Negative    Ketones Urine Negative Negative mg/dL    Specific Gravity Urine 1.004 1.000 - 1.030    Blood Urine Negative Negative    pH Urine 6.5 5.0 - 9.0    Protein Albumin Urine Negative Negative mg/dL    Urobilinogen Urine Normal Normal mg/dL    Nitrite Urine Negative Negative    Leukocyte Esterase Urine Negative Negative       BMP -   Recent Labs   Lab Test 06/05/25  1156 03/13/25  1014 01/29/25  0942 01/02/25  1251  12/17/24  1250 10/04/24  1341 03/13/24  1030 02/09/21  1058 01/11/21  1006    143 141   < > 142   < > 141   < > 140   POTASSIUM 4.4 4.2 4.5   < > 4.5   < > 4.4   < > 4.4   CHLORIDE 105 108* 105   < > 108*   < > 106   < > 106   CO2 24 25 26   < > 28   < > 26   < > 29   BUN 12.5 13.1 19.9   < > 17.7   < > 17.8   < > 14   CR 0.88 0.94 1.12*   < > 0.98*   < > 0.99*   < > 1.04   GLC 90 106* 113*   < > 102*   < > 102*   < > 111*   DENISSE 9.5 9.6 9.8   < > 9.8   < > 9.7   < > 9.2   MAG  --   --   --   --  2.1  --  1.9  --   --    PHOS  --  3.4  --   --   --   --   --   --  3.4    < > = values in this interval not displayed.       CBC -   Recent Labs   Lab Test 06/05/25  1156 01/29/25  0942 01/02/25  1251   WBC 6.3 5.5 6.8   HGB 14.4 14.7 14.4    210 188       ASSESSMENT:   Improved urinary urge incontinence with Myrbetriq.    PLAN:   1. Urinary retention with incomplete bladder emptying   Retention is stable.  Patient has had no acute infections.  I did discuss symptoms to monitor with her and encouraged her to come in should she have suprapubic pain or discomfort.  I encouraged her to keep her bowels empty.  Urine is negative for infection today.  - MEASUREMENT, POST-VOIDING RESIDUAL URINE &/OR BLADDER CAPACITY, US, NON-IMAGING    2. Pain of left lower extremity  Due to mild asymmetry and positive Homans' sign I recommended that patient be evaluated in rapid clinic or ER for possible clot to lower extremity.    3. Overactive bladder  I recommended patient continue with Myrbetriq 25 mg daily.  Continue with vaginal estrogen.  Continue to limit bladder irritants and avoid constipation.  Follow-up in 6 months or sooner should she develop worsening of symptoms.        15 minutes spent on the date of this encounter doing chart review, history and exam, documentation and further activities as noted above.        JERRELL Gilman [facility]

## 2025-06-25 NOTE — PROGRESS NOTES
Assessment & Plan     (S80.12XA) Hematoma of left lower leg  (primary encounter diagnosis)    Comment: Presumed 5.9 by 2.0 by 1.2 cm hematoma on left lower leg. No DVT. Symptomatic with pain. Radiologist review did note less likely avascular tumor. No obvious visible bruising on skin. Distal pulses intact.    Plan: Recommended elevation, warm wet compresses, tylenol. Follow up if not improving. Return to rapid clinic/ER if worsening or changing. She is comfortable and agreeable with this plan.    (M79.662) Pain of left calf  Plan: US Lower Extremity Venous Duplex Olvin Bearden is a 75 year old, presenting for the following health issues:  Deep Vein Thrombosis (Left leg)    HPI     Patient presents today with concerns of left lower extremity pain. Symptoms began two weeks ago. She notes worsening over the past couple of days. She notes most of the pain is in the back of the calf/knee area. She does not take any blood thinners. No specific injury. She does have physical therapy for her other knee/leg.       Review of Systems  Constitutional, HEENT, cardiovascular, pulmonary, gi and gu systems are negative, except as otherwise noted.          Objective    /84 (BP Location: Left arm, Patient Position: Sitting, Cuff Size: Adult Small)   Temp 98  F (36.7  C) (Temporal)   Resp 19   Wt 77.1 kg (170 lb)   LMP 06/01/1990 (Approximate)   SpO2 96%   BMI 30.11 kg/m    Body mass index is 30.11 kg/m .    Physical Exam   GENERAL: alert and no distress  RESP: lungs clear to auscultation - no rales, rhonchi or wheezes  CV: regular rate and rhythm, normal S1 S2  MS: no gross musculoskeletal defects noted, no edema, erythema, or obvious ecchymosis of left lower extremity. Tender to palpation behind left knee down into left calf, no warmth noted, distal posterior tibial and dorsalis pedis pulses 2+, intact, light touch sensation intact.    Results for orders placed or performed during the hospital  encounter of 06/25/25   US Lower Extremity Venous Duplex Left     Status: None    Narrative    US LOWER EXTREMITY VENOUS DUPLEX LEFT  6/25/2025 4:43 PM    History:Female, age 75 years; ; Pain of left calf    Comparison: No relevant prior imaging.    Technique: Grayscale and color Doppler ultrasound of the left lower  extremity deep venous structures.    Findings:   The deep venous structures are patent and fully compressible. There is  normal augmentation of flow.      Impression    Impression:  No evidence of DVT.     There is a 5.9 x 2.0 x 1.2 cm fusiform heterogeneously hypoechoic mass  located within the deep soft tissues at the site of discomfort. The  structure appears to be relatively avascular, likely representing an  organized clot or less likely, hypovascular tumor.    JOHN POWELL MD         SYSTEM ID:  [id number]         Signed Electronically by: Pricilla Simmons PA-C

## 2025-06-25 NOTE — NURSING NOTE
"Chief Complaint   Patient presents with    Follow Up     Rapid clinic, urinary symptoms       Initial /76   Pulse 74   Temp 97.8  F (36.6  C) (Temporal)   Resp 18   LMP 06/01/1990 (Approximate)   SpO2 97%  Estimated body mass index is 30.06 kg/m  as calculated from the following:    Height as of 3/13/25: 1.6 m (5' 3\").    Weight as of 6/5/25: 77 kg (169 lb 11.2 oz).    236 before voiding for 2nd time  PVR-94    Bridget Schofield, TIKIN      "

## 2025-06-25 NOTE — NURSING NOTE
Patient presents from urology clinic today with concerns of left leg pain. Concerns noted for possible DVT painful to touch. Patient additionally reports having a head ache. Madeleine Mims LPN on 6/25/2025 at 3:01 PM

## 2025-06-25 NOTE — PATIENT INSTRUCTIONS
No Deep Vein Clots    Warm compresses.     Tylenol.     Elevation.    Follow up imaging.   Return to rapid clinic/ER if symptoms worsen or change.

## 2025-07-07 ENCOUNTER — OFFICE VISIT (OUTPATIENT)
Dept: FAMILY MEDICINE | Facility: OTHER | Age: 75
End: 2025-07-07
Attending: FAMILY MEDICINE
Payer: MEDICARE

## 2025-07-07 ENCOUNTER — HOSPITAL ENCOUNTER (OUTPATIENT)
Dept: GENERAL RADIOLOGY | Facility: OTHER | Age: 75
Discharge: HOME | End: 2025-07-07
Attending: FAMILY MEDICINE
Payer: MEDICARE

## 2025-07-07 VITALS
BODY MASS INDEX: 29.52 KG/M2 | TEMPERATURE: 97.6 F | DIASTOLIC BLOOD PRESSURE: 72 MMHG | HEART RATE: 71 BPM | OXYGEN SATURATION: 98 % | RESPIRATION RATE: 16 BRPM | WEIGHT: 172 LBS | SYSTOLIC BLOOD PRESSURE: 134 MMHG

## 2025-07-07 DIAGNOSIS — M71.22 POPLITEAL CYST, LEFT: ICD-10-CM

## 2025-07-07 DIAGNOSIS — M71.22 POPLITEAL CYST, LEFT: Primary | ICD-10-CM

## 2025-07-07 PROCEDURE — G0463 HOSPITAL OUTPT CLINIC VISIT: HCPCS

## 2025-07-07 PROCEDURE — 73562 X-RAY EXAM OF KNEE 3: CPT | Mod: 26 | Performed by: STUDENT IN AN ORGANIZED HEALTH CARE EDUCATION/TRAINING PROGRAM

## 2025-07-07 PROCEDURE — 73562 X-RAY EXAM OF KNEE 3: CPT | Mod: LT

## 2025-07-07 ASSESSMENT — PAIN SCALES - GENERAL: PAINLEVEL_OUTOF10: MODERATE PAIN (6)

## 2025-07-07 NOTE — NURSING NOTE
"Chief Complaint   Patient presents with    RECHECK     Follow up MRI results, left leg- bruising for about 4-5 months between both legs and the discomfort.        Initial /72 (BP Location: Right arm, Patient Position: Sitting, Cuff Size: Adult Regular)   Pulse 71   Temp 97.6  F (36.4  C) (Tympanic)   Resp 16   Wt 78 kg (172 lb)   LMP 06/01/1990 (Approximate)   SpO2 98%   BMI 29.52 kg/m   Estimated body mass index is 29.52 kg/m  as calculated from the following:    Height as of 6/26/25: 1.626 m (5' 4\").    Weight as of this encounter: 78 kg (172 lb).  Medication Reconciliation: complete    Betsy Hernandez LPN    Advance Care Directive reviewed    "

## 2025-07-07 NOTE — PROGRESS NOTES
"  Assessment & Plan       ICD-10-CM    1. Popliteal cyst, left  M71.22 XR Knee Left 3 Views     Orthopedic  Referral        Reviewed with the patient that her initial presentation was likely related to a ruptured popliteal cyst that caused a fluid collection in her calf.  X-ray imaging of the knee today does show mild arthritis, but not necessarily severe enough that knee replacement surgery would be indicated.  Unclear what underlying pathology could have triggered popliteal cyst.  MRI that was obtained did not fully include the knee area.  Would recommend referral to orthopedics.  Discussed with patient the possibility of needing an updated MRI focused on her knee.  But at this point, we opted to wait to see what Ortho thought.    In the meantime, she can continue rest, ice, elevation and consider adding compression for symptom management.    BMI  Estimated body mass index is 29.52 kg/m  as calculated from the following:    Height as of 6/26/25: 1.626 m (5' 4\").    Weight as of this encounter: 78 kg (172 lb).           Susanne Bearden is a 75 year old, presenting for the following health issues:  RECHECK (Follow up MRI results, left leg- bruising for about 4-5 months between both legs and the discomfort. )        7/7/2025     2:29 PM   Additional Questions   Roomed by Betsy   Accompanied by self     History of Present Illness       Reason for visit:  MRI results, left leg         Patient presented to rapid clinic on 6/25/2025 with about 2 weeks of left lower extremity pain.  She noted pain in the back of the calf and the popliteal area.  Ultrasound was done at that time which showed a 5.9 x 2.0 x 1.2 cm fusiform heterogeneously hypoechoic mass  located within the deep soft tissues at the site of discomfort. The  structure appears to be relatively avascular, likely representing an  organized clot or less likely, hypovascular tumor.    Conservative treatment was planned and patient followed up in " clinic.    Due to persistent pain, MRI of this area was obtained to further evaluate possible hematoma versus mass.  Please see MRI results below.          Objective    /72 (BP Location: Right arm, Patient Position: Sitting, Cuff Size: Adult Regular)   Pulse 71   Temp 97.6  F (36.4  C) (Tympanic)   Resp 16   Wt 78 kg (172 lb)   LMP 06/01/1990 (Approximate)   SpO2 98%   BMI 29.52 kg/m    Body mass index is 29.52 kg/m .  Physical Exam       MRI tibia-fibula 7/3/2025:    IMPRESSION: Dissecting popliteal cyst. There is a dissecting component  with heterogeneous signal, likely sequela of proteinaceous debris  superficial to the medial head of the gastrocnemius with adjacent  edema correlating with the recent ultrasonographic findings.     Diffuse fatty replacement/atrophic change of the medial head of the  gastrocnemius suggesting denervation. An a small intramuscular cyst,  likely ganglion cyst, is present within the proximal head of the  gastrocnemius adjacent to the popliteal cyst.      Results for orders placed or performed during the hospital encounter of 07/07/25   XR Knee Left 3 Views     Status: None    Narrative    Exam: XR KNEE LEFT 3 VIEWS    Technique: Left knee, 3 Views    Comparison: 4/24/2024    Exam reason: Popliteal cyst, left    Findings:  No acute fracture or dislocation. Mild patellofemoral joint space  narrowing and small patellofemoral osteophytes.     No focal soft tissue abnormality.      Impression    Impression:  No acute fracture or dislocation.    REUBEN JENKINS MD         SYSTEM ID:  [id number]         Signed Electronically by: Selina Oliva MD

## (undated) DEVICE — ENDO BRUSH CHANNEL MASTER CLEANING 2-4.2MM BW-412T

## (undated) DEVICE — SOL WATER 1500ML

## (undated) DEVICE — TUBING SUCTION 10'X3/16" N510

## (undated) DEVICE — ENDO KIT COMPLIANCE DYKENDOCMPLY

## (undated) DEVICE — SUCTION MANIFOLD NEPTUNE 2 SYS 4 PORT 0702-020-000

## (undated) RX ORDER — PROPOFOL 10 MG/ML
INJECTION, EMULSION INTRAVENOUS
Status: DISPENSED
Start: 2021-04-12

## (undated) RX ORDER — KETOROLAC TROMETHAMINE 30 MG/ML
INJECTION, SOLUTION INTRAMUSCULAR; INTRAVENOUS
Status: DISPENSED
Start: 2022-06-13

## (undated) RX ORDER — LIDOCAINE HYDROCHLORIDE 20 MG/ML
INJECTION, SOLUTION EPIDURAL; INFILTRATION; INTRACAUDAL; PERINEURAL
Status: DISPENSED
Start: 2021-04-12